# Patient Record
Sex: FEMALE | Race: WHITE | NOT HISPANIC OR LATINO | Employment: FULL TIME | URBAN - METROPOLITAN AREA
[De-identification: names, ages, dates, MRNs, and addresses within clinical notes are randomized per-mention and may not be internally consistent; named-entity substitution may affect disease eponyms.]

---

## 2017-01-16 DIAGNOSIS — F33.1 MAJOR DEPRESSIVE DISORDER, RECURRENT EPISODE, MODERATE (H): ICD-10-CM

## 2017-01-16 DIAGNOSIS — F33.1 MAJOR DEPRESSIVE DISORDER, RECURRENT EPISODE, MODERATE (H): Primary | ICD-10-CM

## 2017-01-17 NOTE — TELEPHONE ENCOUNTER
Fluoxetine     Last Written Prescription Date: 10/04/2016  Last Fill Quantity: 30, # refills: 1  Last Office Visit with [facility] primary care provider:  11/14/2016        Last PHQ-9 score on record=   PHQ-9 SCORE 9/26/2016   Total Score 20       Natasha Daley MA

## 2017-01-17 NOTE — TELEPHONE ENCOUNTER
Routing refill request to provider for review/approval because:  A break in medication  Patient needs to be seen because:  Per last OV note on 10/10/2016 patient was to follow up in 4 weeks  PHQ 9>4  Nuha Delatorre, RN

## 2017-01-18 RX ORDER — FLUOXETINE 40 MG/1
40 CAPSULE ORAL DAILY
Qty: 30 CAPSULE | Refills: 1 | Status: SHIPPED | OUTPATIENT
Start: 2017-01-18 | End: 2017-09-01

## 2017-01-18 RX ORDER — HUMAN INSULIN 100 [USP'U]/ML
INJECTION, SUSPENSION SUBCUTANEOUS
Qty: 45 ML | Refills: 3 | Status: ON HOLD | OUTPATIENT
Start: 2017-01-18 | End: 2017-10-08

## 2017-03-07 ENCOUNTER — TELEPHONE (OUTPATIENT)
Dept: FAMILY MEDICINE | Facility: OTHER | Age: 32
End: 2017-03-07

## 2017-03-07 NOTE — TELEPHONE ENCOUNTER
Summary:    Patient is due/failing the following:   Eye and foot exam, TSH, Microalbumin, A1C, LDL, PAP and PHQ9    Action needed:   Patient needs office visit for PAP and diabetic follow up., Patient needs to do PHQ9. and Patient needs fasting lab only appointment    Type of outreach:    Phone, left message for patient to call back.     Questions for provider review:    None                                                                                                                                    Matilda Saucedo       Chart routed to Care Team .        Panel Management Review      Patient has the following on her problem list:     Depression / Dysthymia review  PHQ-9 SCORE 11/23/2015 9/26/2016   Total Score 16 20      Patient is due for:  PHQ9 and DAP    Diabetes    ASA:     Last A1C  Lab Results   Component Value Date    A1C 11.5 01/29/2016    A1C 11.7 10/06/2015    A1C 11.1 04/01/2015    A1C 11.3 07/24/2014    A1C 11.3 05/30/2014     A1C tested: FAILED    Last LDL:    Lab Results   Component Value Date    CHOL 207 01/29/2016     Lab Results   Component Value Date    HDL 55 01/29/2016     Lab Results   Component Value Date     01/29/2016     03/05/2014     Lab Results   Component Value Date    TRIG 116 01/29/2016     Lab Results   Component Value Date    CHOLHDLRATIO 3.7 04/01/2015     Lab Results   Component Value Date    NHDL 152 01/29/2016       Is the patient on a Statin? NO             Is the patient on Aspirin? NO        Last three blood pressure readings:  BP Readings from Last 3 Encounters:   10/10/16 102/70   10/04/16 110/60   09/28/16 105/74          Tobacco History:     History   Smoking Status     Current Every Day Smoker     Packs/day: 1.00     Years: 13.00     Types: Cigarettes   Smokeless Tobacco     Never Used     Comment: Reports 1/2 PPD - was smoking 1 PPD pre-pregnancy           Composite cancer screening  Chart review shows that this patient is due/due soon for the  following Pap Smear

## 2017-03-10 NOTE — TELEPHONE ENCOUNTER
Patient will call back to schedule when life settles down. Patient reports her daughter had a liver transplant and her health is more important then her own at this time.    Matilda Saucedo

## 2017-05-26 ENCOUNTER — HEALTH MAINTENANCE LETTER (OUTPATIENT)
Age: 32
End: 2017-05-26

## 2017-06-26 ENCOUNTER — HOSPITAL ENCOUNTER (EMERGENCY)
Facility: CLINIC | Age: 32
Discharge: HOME | End: 2017-06-26
Attending: FAMILY MEDICINE | Admitting: FAMILY MEDICINE
Payer: COMMERCIAL

## 2017-06-26 VITALS
HEART RATE: 70 BPM | DIASTOLIC BLOOD PRESSURE: 77 MMHG | OXYGEN SATURATION: 99 % | SYSTOLIC BLOOD PRESSURE: 130 MMHG | RESPIRATION RATE: 20 BRPM | TEMPERATURE: 97.3 F

## 2017-06-26 DIAGNOSIS — Z79.4 ENCOUNTER FOR LONG-TERM (CURRENT) USE OF INSULIN (H): ICD-10-CM

## 2017-06-26 DIAGNOSIS — Z79.4 TYPE 2 DIABETES MELLITUS WITHOUT COMPLICATION, WITH LONG-TERM CURRENT USE OF INSULIN (H): ICD-10-CM

## 2017-06-26 DIAGNOSIS — N92.0 MENORRHAGIA WITH REGULAR CYCLE: ICD-10-CM

## 2017-06-26 DIAGNOSIS — E11.9 TYPE 2 DIABETES MELLITUS WITHOUT COMPLICATION, WITH LONG-TERM CURRENT USE OF INSULIN (H): ICD-10-CM

## 2017-06-26 LAB
ALBUMIN SERPL-MCNC: 4 G/DL (ref 3.4–5)
ALBUMIN UR-MCNC: NEGATIVE MG/DL
ALP SERPL-CCNC: 126 U/L (ref 40–150)
ALT SERPL W P-5'-P-CCNC: 17 U/L (ref 0–50)
ANION GAP SERPL CALCULATED.3IONS-SCNC: 8 MMOL/L (ref 3–14)
APPEARANCE UR: CLEAR
APTT PPP: 30 SEC (ref 22–37)
AST SERPL W P-5'-P-CCNC: 7 U/L (ref 0–45)
B-HCG SERPL-ACNC: <1 IU/L (ref 0–5)
BASOPHILS # BLD AUTO: 0 10E9/L (ref 0–0.2)
BASOPHILS NFR BLD AUTO: 0 %
BILIRUB SERPL-MCNC: 0.1 MG/DL (ref 0.2–1.3)
BILIRUB UR QL STRIP: NEGATIVE
BUN SERPL-MCNC: 10 MG/DL (ref 7–30)
CALCIUM SERPL-MCNC: 8.5 MG/DL (ref 8.5–10.1)
CHLORIDE SERPL-SCNC: 99 MMOL/L (ref 94–109)
CO2 SERPL-SCNC: 30 MMOL/L (ref 20–32)
COLOR UR AUTO: ABNORMAL
CREAT SERPL-MCNC: 0.83 MG/DL (ref 0.52–1.04)
DIFFERENTIAL METHOD BLD: ABNORMAL
EOSINOPHIL # BLD AUTO: 0 10E9/L (ref 0–0.7)
EOSINOPHIL NFR BLD AUTO: 0 %
ERYTHROCYTE [DISTWIDTH] IN BLOOD BY AUTOMATED COUNT: 14.2 % (ref 10–15)
GFR SERPL CREATININE-BSD FRML MDRD: 78 ML/MIN/1.7M2
GLUCOSE SERPL-MCNC: 412 MG/DL (ref 70–99)
GLUCOSE UR STRIP-MCNC: >499 MG/DL
HCT VFR BLD AUTO: 37.2 % (ref 35–47)
HGB BLD-MCNC: 11.9 G/DL (ref 11.7–15.7)
HGB UR QL STRIP: NEGATIVE
IMM GRANULOCYTES # BLD: 0 10E9/L (ref 0–0.4)
IMM GRANULOCYTES NFR BLD: 0.3 %
INR PPP: 0.89 (ref 0.86–1.14)
KETONES UR STRIP-MCNC: NEGATIVE MG/DL
LEUKOCYTE ESTERASE UR QL STRIP: NEGATIVE
LYMPHOCYTES # BLD AUTO: 2.9 10E9/L (ref 0.8–5.3)
LYMPHOCYTES NFR BLD AUTO: 39.8 %
MCH RBC QN AUTO: 25.4 PG (ref 26.5–33)
MCHC RBC AUTO-ENTMCNC: 32 G/DL (ref 31.5–36.5)
MCV RBC AUTO: 80 FL (ref 78–100)
MONOCYTES # BLD AUTO: 0.7 10E9/L (ref 0–1.3)
MONOCYTES NFR BLD AUTO: 9 %
NEUTROPHILS # BLD AUTO: 3.7 10E9/L (ref 1.6–8.3)
NEUTROPHILS NFR BLD AUTO: 50.9 %
NITRATE UR QL: NEGATIVE
PH UR STRIP: 7 PH (ref 5–7)
PLATELET # BLD AUTO: 244 10E9/L (ref 150–450)
POTASSIUM SERPL-SCNC: 3.8 MMOL/L (ref 3.4–5.3)
PROT SERPL-MCNC: 7.7 G/DL (ref 6.8–8.8)
RBC # BLD AUTO: 4.68 10E12/L (ref 3.8–5.2)
RBC #/AREA URNS AUTO: <1 /HPF (ref 0–2)
SODIUM SERPL-SCNC: 137 MMOL/L (ref 133–144)
SP GR UR STRIP: 1.03 (ref 1–1.03)
T4 FREE SERPL-MCNC: 1.02 NG/DL (ref 0.76–1.46)
TSH SERPL DL<=0.005 MIU/L-ACNC: 6.05 MU/L (ref 0.4–4)
URN SPEC COLLECT METH UR: ABNORMAL
UROBILINOGEN UR STRIP-MCNC: 0 MG/DL (ref 0–2)
WBC # BLD AUTO: 7.2 10E9/L (ref 4–11)
WBC #/AREA URNS AUTO: <1 /HPF (ref 0–2)

## 2017-06-26 PROCEDURE — 81001 URINALYSIS AUTO W/SCOPE: CPT | Performed by: FAMILY MEDICINE

## 2017-06-26 PROCEDURE — 84439 ASSAY OF FREE THYROXINE: CPT | Performed by: FAMILY MEDICINE

## 2017-06-26 PROCEDURE — 84702 CHORIONIC GONADOTROPIN TEST: CPT | Performed by: FAMILY MEDICINE

## 2017-06-26 PROCEDURE — 99283 EMERGENCY DEPT VISIT LOW MDM: CPT | Performed by: FAMILY MEDICINE

## 2017-06-26 PROCEDURE — 85025 COMPLETE CBC W/AUTO DIFF WBC: CPT | Performed by: FAMILY MEDICINE

## 2017-06-26 PROCEDURE — 84443 ASSAY THYROID STIM HORMONE: CPT | Performed by: FAMILY MEDICINE

## 2017-06-26 PROCEDURE — 83036 HEMOGLOBIN GLYCOSYLATED A1C: CPT | Performed by: OBSTETRICS & GYNECOLOGY

## 2017-06-26 PROCEDURE — 80053 COMPREHEN METABOLIC PANEL: CPT | Performed by: FAMILY MEDICINE

## 2017-06-26 PROCEDURE — 85610 PROTHROMBIN TIME: CPT | Performed by: FAMILY MEDICINE

## 2017-06-26 PROCEDURE — 99283 EMERGENCY DEPT VISIT LOW MDM: CPT | Mod: Z6 | Performed by: FAMILY MEDICINE

## 2017-06-26 PROCEDURE — 85730 THROMBOPLASTIN TIME PARTIAL: CPT | Performed by: FAMILY MEDICINE

## 2017-06-26 PROCEDURE — 36415 COLL VENOUS BLD VENIPUNCTURE: CPT | Performed by: OBSTETRICS & GYNECOLOGY

## 2017-06-26 NOTE — ED AVS SNAPSHOT
[facility] Emergency Department    [address] [place] [address]    Phone:  [phone]    Fax:  [phone]                                       Vickie Duque   MRN: [id number]    Department:  [facility] Emergency Department   Date of Visit:  6/26/2017           After Visit Summary Signature Page     I have received my discharge instructions, and my questions have been answered. I have discussed any challenges I see with this plan with the nurse or doctor.    ..........................................................................................................................................  Patient/Patient Representative Signature      ..........................................................................................................................................  Patient Representative Print Name and Relationship to Patient    ..................................................               ................................................  Date                                            Time    ..........................................................................................................................................  Reviewed by Signature/Title    ...................................................              ..............................................  Date                                                            Time

## 2017-06-26 NOTE — ED AVS SNAPSHOT
[facility] Emergency Department    [address] [place] [address]    Phone:  [phone]    Fax:  [phone]                                       Vickie Duque   MRN: [id number]    Department:  [facility] Emergency Department   Date of Visit:  6/26/2017           Patient Information     Date Of Birth          1985        Your diagnoses for this visit were:     Menorrhagia with regular cycle        You were seen by Robbie Arnold MD.      Follow-up Information     Follow up with Donnie Velasquez MD On 6/30/2017.    Specialties:  Family Practice, OB/Gyn    Why:  at 11:40 am in [facility]    Contact information:    [facility]  [address] [place] [id number] [phone]          Discharge Instructions       See Dr Velasquez in clinic on Friday, June 30 at 11:40 AM and the [place] clinic.  If your labs are significantly abnormal and require attention, I will contact you, otherwise Dr Velasquez can review them in clinic when you see him.  Please return to the ED if you develop dizziness, lightheadedness, fever over 101, or any concerns.  It was nice visiting with both of you tonight.   I hope you find some relief very soon.    Thank you for choosing [facility]. We appreciate the opportunity to meet your urgent medical needs. Please let us know if we could have done anything to make your stay more satisfying.    After discharge, please closely monitor for any new or worsening symptoms. Return to the Emergency Department if you develop any acute worsening signs or symptoms.    If you had lab work, cultures or imaging studies done during your stay, the final results may still be pending. We will call you if your plan of care needs to change. However, if you are not improving as expected, please follow up with your primary care provider or clinic.     Start any prescription medications that were prescribed to you  "and take them as directed.     Please see additional handouts that may be pertinent to your condition.         Future Appointments        Provider Department Dept Phone Center    6/30/2017 11:40 AM Donnie Velasquez MD [facility] [phone] [facility]      24 Hour Appointment Hotline       To make an appointment at any [facility], call [phone] ([phone]). If you don't have a family doctor or clinic, we will help you find one. [facility] are conveniently located to serve the needs of you and your family.             Review of your medicines      Our records show that you are taking the medicines listed below. If these are incorrect, please call your family doctor or clinic.        Dose / Directions Last dose taken    ACCU-CHEK SMARTVIEW test strip   Quantity:  100 each   Generic drug:  blood glucose monitoring        TEST 4 TIMES A DAY OR AS DI RECTED   Refills:  11        ACE/ARB NOT PRESCRIBED (INTENTIONAL)   Dose:  1 each        1 each every 3 hours ACE & ARB not prescribed due to Intolerance and Symptomatic hypotension not due to excessive diuresis   Refills:  0        acetaminophen 500 MG tablet   Commonly known as:  TYLENOL   Dose:  500 mg   Quantity:  60 tablet        Take 1 tablet (500 mg) by mouth every 6 hours as needed for pain   Refills:  0        B-D INSULIN SYRINGE 31G X 5/16\" 0.5 ML   Quantity:  100 each   Generic drug:  insulin syringe-needle U-100        USE TO INJECT INSULIN   Refills:  6        * blood glucose monitoring meter device kit   Commonly known as:  no brand specified   Quantity:  1 kit        Use to test blood sugar 2 times daily or as directed.   Refills:  0        * blood glucose monitoring meter device kit   Quantity:  1 kit        Use to test blood sugar 4 times daily or as directed.   Refills:  0        DIABETIC STERILE LANCETS device   Dose:  1 Device   Quantity:  1 Box        1 Device 4 times daily.   Refills:  11        " FLUoxetine 40 MG capsule   Commonly known as:  PROzac   Dose:  40 mg   Quantity:  30 capsule        Take 1 capsule (40 mg) by mouth daily   Refills:  1        glucagon 1 MG kit   Commonly known as:  GLUCAGON EMERGENCY   Dose:  1 mg   Quantity:  1 each        Inject 1 mg into the vein once for 1 dose   Refills:  0        LORazepam 1 MG tablet   Commonly known as:  ATIVAN   Dose:  0.5 mg   Quantity:  20 tablet        Take 0.5 tablets (0.5 mg) by mouth every 8 hours as needed for anxiety   Refills:  0        NovoLIN MIX 70/30 VIAL injection   Quantity:  45 mL   Generic drug:  insulin NPH-insulin regular        INJECT 40 UNITS SUBQ BEFORE BREAKFAST AND 30 UNITS BEFORE DINNER   Refills:  3        WOMENS MULTI VITAMIN & MINERAL PO        Refills:  0        * Notice:  This list has 2 medication(s) that are the same as other medications prescribed for you. Read the directions carefully, and ask your doctor or other care provider to review them with you.            Procedures and tests performed during your visit     CBC with platelets differential    Comprehensive metabolic panel    HCG quantitative pregnancy (blood)    INR    Partial thromboplastin time    TSH with free T4 reflex    UA with Microscopic      Orders Needing Specimen Collection     None      Pending Results     Date and Time Order Name Status Description    6/26/2017 0045 HCG quantitative pregnancy (blood) In process     6/26/2017 0037 Comprehensive metabolic panel In process     6/26/2017 0037 Partial thromboplastin time In process     6/26/2017 0037 INR In process     6/26/2017 0037 TSH with free T4 reflex In process             Pending Culture Results     No orders found from 6/24/2017 to 6/27/2017.            Pending Results Instructions     If you had any lab results that were not finalized at the time of your Discharge, you can call the ED Lab Result RN at [phone]. You will be contacted by this team for any positive Lab results or changes in  treatment. The nurses are available 7 days a week from 10A to 6:30P.  You can leave a message 24 hours per day and they will return your call.        Thank you for choosing [facility]       Thank you for choosing [facility] for your care. Our goal is always to provide you with excellent care. Hearing back from our patients is one way we can continue to improve our services. Please take a few minutes to complete the written survey that you may receive in the mail after you visit with us. Thank you!        [employer] Information     [employer] gives you secure access to your electronic health record. If you see a primary care provider, you can also send messages to your care team and make appointments. If you have questions, please call your primary care clinic.  If you do not have a primary care provider, please call [phone] and they will assist you.        Care EveryWhere ID     This is your Care EveryWhere ID. This could be used by other organizations to access your [facility] medical records  [id number]        Equal Access to Services     KRISTIN ONOFRE : Theresa Renteria, cesario jeff, emre burris, kendra luna. So [facility] [phone].    ATENCIÓN: Si habla español, tiene a barrientos disposición servicios gratuitos de asistencia lingüística. Llame al [phone].    We comply with applicable federal civil rights laws and [place] laws. We do not discriminate on the basis of race, color, national origin, age, disability sex, sexual orientation or gender identity.            After Visit Summary       This is your record. Keep this with you and show to your community pharmacist(s) and doctor(s) at your next visit.

## 2017-06-26 NOTE — ED PROVIDER NOTES
History     Chief Complaint   Patient presents with     Vaginal Bleeding     HPI  Vickie Duque is a 31 year old female who presents to the ED this evening with heavy menses.  She has been dealing with this for the past 4 years ever since her tubal ligation in April 2013.  This menses was a couple of days late but has been even heavier than usual.  She is soaking a super pad every hour.  She denies any orthostatic changes.  No fevers.  No vaginal discharge.    In a monogamous relationship.  Partner had a vasectomy, in addition to her tubal ligation, so she really doubts chance of pregnancy.    She had a D&C back in 2010 after miscarriage.    Type I diabetic on insulin for the past 14 years.    Works at the [employer].  Lives in [place].    Past Medical History:   Diagnosis Date     Adjustment disorder with anxious mood 11/23/2015     Anxiety 11/27/2015     Depressive disorder, not elsewhere classified      Diabetic eye exam (H) 12/19/14     Mixed hyperlipidemia 11/30/2006     Regional enteritis of unspecified site     Ulcerative Colitis     Type I (juvenile type) diabetes mellitus with ketoacidosis, not stated as uncontrolled(250.11) (H) 01/01/2007    Moderately severe intensity.     Type I (juvenile type) diabetes mellitus with ketoacidosis, uncontrolled 02/14/08     Type I (juvenile type) diabetes mellitus without mention of complication, not stated as uncontrolled     diagnosed in 2003     Ulcerative colitis, unspecified     remission. Last flare 6 yrs ago.        Past Surgical History:   Procedure Laterality Date     DILATION AND CURETTAGE SUCTION  4/2010    miscarriage     HC COLONOSCOPY W BIOPSY  08/16/06     HC COLONOSCOPY W/WO BRUSH/WASH         Social History     Social History     Marital status: Single     Spouse name: N/A     Number of children: 1     Years of education: 12     Occupational History     Nursing assistant      Social History Main Topics     Smoking status: Current Every Day Smoker      Packs/day: 1.00     Years: 13.00     Types: Cigarettes     Smokeless tobacco: Never Used      Comment: Reports 1/2 PPD - was smoking 1 PPD pre-pregnancy     Alcohol use 0.0 oz/week     0 Standard drinks or equivalent per week      Comment: 1-2 times per month     Drug use: No     Sexual activity: Yes     Partners: Male     Birth control/ protection: Surgical, Female Surgical      Comment: tubal ligation     Other Topics Concern      Service No     Blood Transfusions No     Caffeine Concern Yes     Advised not more than 16 ounces/day     Occupational Exposure Yes     Visiting [employer] - student online classes     Hobby Hazards No     Sleep Concern No     Stress Concern Yes     Weight Concern No     Special Diet Yes     IDDM Type I     Back Care Yes     work-related about 1 year ago     Exercise Yes     Active at work     Bike Helmet No     Seat Belt Yes     Self-Exams No     Social History Narrative    Lives in [place] with Rod gandhi and her son and their daughter.   Vickie and Rod smoke.   No indoor cats/kittens.   No concerns about domestic violence.          Allergies   Allergen Reactions     No Known Drug Allergies        Med List Reviewed       I have reviewed the Medications, Allergies, Past Medical and Surgical History, and Social History in the Epic system.         Review of Systems   All other systems reviewed and are negative.      Physical Exam   BP: 130/77  Pulse: 70  Temp: 97.3  F (36.3  C)  Resp: 20  SpO2: 99 %  Physical Exam   Constitutional: She is oriented to person, place, and time. She appears well-developed and well-nourished. No distress.   HENT:   Head: Normocephalic.   Pulmonary/Chest: Effort normal. No respiratory distress.   Neurological: She is alert and oriented to person, place, and time.   Skin: She is not diaphoretic. No pallor.   Psychiatric: She has a normal mood and affect.     Using shared decision making, we did decided to defer her pelvic exam as she will  need one in clinic anyway.      ED Course    She is in no acute distress.  I suggested that we get her in to seek GYN to consider options.  Since she is in monogamous relationship and has had no vaginal discharge or fevers, we elected to forego pelvic exam and she will need one when she is seen by gynecology.  May also consider an endometrial biopsy in the clinic and they can discuss options.      I offered OCPs or a progesterone withdrawal which would give her a heavy menses when she stops it,  but since she is already several days into her menses this time around, we elected to hold off on that and just get her into see Dr Velasquez clinic.      We will check some basic labs including a CBC, comprehensive profile, TSH with T4 reflex, serum hcg and coags.        Late entry:  Patient's blood sugar came back at 412.  Nursing staff contacted her.  She will follow her scale insulin plan for elevated blood sugar and can check her sugars at home.  Her TSH was up a bit.  Free T4 is pending.  That can be addressed at her clinic visit.       ED Course     Procedures          Results for orders placed or performed during the hospital encounter of 06/26/17 (from the past 24 hour(s))   UA with Microscopic   Result Value Ref Range    Color Urine Straw     Appearance Urine Clear     Glucose Urine >499 (A) NEG mg/dL    Bilirubin Urine Negative NEG    Ketones Urine Negative NEG mg/dL    Specific Gravity Urine 1.028 1.003 - 1.035    Blood Urine Negative NEG    pH Urine 7.0 5.0 - 7.0 pH    Protein Albumin Urine Negative NEG mg/dL    Urobilinogen mg/dL 0.0 0.0 - 2.0 mg/dL    Nitrite Urine Negative NEG    Leukocyte Esterase Urine Negative NEG    Source Midstream Urine     WBC Urine <1 0 - 2 /HPF    RBC Urine <1 0 - 2 /HPF   CBC with platelets differential   Result Value Ref Range    WBC 7.2 4.0 - 11.0 10e9/L    RBC Count 4.68 3.8 - 5.2 10e12/L    Hemoglobin 11.9 11.7 - 15.7 g/dL    Hematocrit 37.2 35.0 - 47.0 %    MCV 80 78 - 100 fl     MCH 25.4 (L) 26.5 - 33.0 pg    MCHC 32.0 31.5 - 36.5 g/dL    RDW 14.2 10.0 - 15.0 %    Platelet Count 244 150 - 450 10e9/L    Diff Method Automated Method     % Neutrophils 50.9 %    % Lymphocytes 39.8 %    % Monocytes 9.0 %    % Eosinophils 0.0 %    % Basophils 0.0 %    % Immature Granulocytes 0.3 %    Absolute Neutrophil 3.7 1.6 - 8.3 10e9/L    Absolute Lymphocytes 2.9 0.8 - 5.3 10e9/L    Absolute Monocytes 0.7 0.0 - 1.3 10e9/L    Absolute Eosinophils 0.0 0.0 - 0.7 10e9/L    Absolute Basophils 0.0 0.0 - 0.2 10e9/L    Abs Immature Granulocytes 0.0 0 - 0.4 10e9/L   TSH with free T4 reflex   Result Value Ref Range    TSH 6.05 (H) 0.40 - 4.00 mU/L   INR   Result Value Ref Range    INR 0.89 0.86 - 1.14   Partial thromboplastin time   Result Value Ref Range    PTT 30 22 - 37 sec   Comprehensive metabolic panel   Result Value Ref Range    Sodium 137 133 - 144 mmol/L    Potassium 3.8 3.4 - 5.3 mmol/L    Chloride 99 94 - 109 mmol/L    Carbon Dioxide 30 20 - 32 mmol/L    Anion Gap 8 3 - 14 mmol/L    Glucose 412 (H) 70 - 99 mg/dL    Urea Nitrogen 10 7 - 30 mg/dL    Creatinine 0.83 0.52 - 1.04 mg/dL    GFR Estimate 78 >60 mL/min/1.7m2    GFR Estimate If Black >90   GFR Calc   >60 mL/min/1.7m2    Calcium 8.5 8.5 - 10.1 mg/dL    Bilirubin Total 0.1 (L) 0.2 - 1.3 mg/dL    Albumin 4.0 3.4 - 5.0 g/dL    Protein Total 7.7 6.8 - 8.8 g/dL    Alkaline Phosphatase 126 40 - 150 U/L    ALT 17 0 - 50 U/L    AST 7 0 - 45 U/L   HCG quantitative pregnancy (blood)   Result Value Ref Range    HCG Quantitative Serum <1 0 - 5 IU/L        Assessments & Plan (with Medical Decision Making)  (N92.0) Menorrhagia with regular cycle  Comment: 4 years duration, since tubal ligation.  Plan: Labs are pending.  GYN consult as outpatient.  We set up an appointment for her with Dr Velasquez this coming Friday, June 30 at 11:40 AM.          I have reviewed the nursing notes.    I have reviewed the findings, diagnosis, plan and need for  follow up with the patient.       New Prescriptions    No medications on file       Final diagnoses:   Menorrhagia with regular cycle       6/26/2017   [facility] EMERGENCY DEPARTMENT     Robbie Arnold MD  06/26/17 0047       Robbie Arnold MD  06/26/17 0138

## 2017-06-26 NOTE — DISCHARGE INSTRUCTIONS
See Dr Velasquez in clinic on Friday, June 30 at 11:40 AM and the [facility].  If your labs are significantly abnormal and require attention, I will contact you, otherwise Dr Velasquez can review them in clinic when you see him.  Please return to the ED if you develop dizziness, lightheadedness, fever over 101, or any concerns.  It was nice visiting with both of you tonight.   I hope you find some relief very soon.    Thank you for choosing [facility]. We appreciate the opportunity to meet your urgent medical needs. Please let us know if we could have done anything to make your stay more satisfying.    After discharge, please closely monitor for any new or worsening symptoms. Return to the Emergency Department if you develop any acute worsening signs or symptoms.    If you had lab work, cultures or imaging studies done during your stay, the final results may still be pending. We will call you if your plan of care needs to change. However, if you are not improving as expected, please follow up with your primary care provider or clinic.     Start any prescription medications that were prescribed to you and take them as directed.     Please see additional handouts that may be pertinent to your condition.

## 2017-06-27 ENCOUNTER — TELEPHONE (OUTPATIENT)
Dept: OBGYN | Facility: CLINIC | Age: 32
End: 2017-06-27

## 2017-06-27 NOTE — TELEPHONE ENCOUNTER
Per RM would like to see patient on Wed (06/28/2017) at 1130 instead of Friday at 1140.  Left message for patient to return call to clinic and confirm if this is ok. Please change appt time if this is doable.  Emma Monk, [facility]

## 2017-06-27 NOTE — TELEPHONE ENCOUNTER
Necoe returns call and message is relayed.  Pt is agreeable to the appt change and is placed on Dr Taylor schedule accordingly.

## 2017-06-28 ENCOUNTER — OFFICE VISIT (OUTPATIENT)
Dept: FAMILY MEDICINE | Facility: CLINIC | Age: 32
End: 2017-06-28
Payer: COMMERCIAL

## 2017-06-28 VITALS
TEMPERATURE: 96.9 F | HEIGHT: 64 IN | WEIGHT: 158 LBS | BODY MASS INDEX: 26.98 KG/M2 | DIASTOLIC BLOOD PRESSURE: 64 MMHG | HEART RATE: 76 BPM | SYSTOLIC BLOOD PRESSURE: 114 MMHG | RESPIRATION RATE: 14 BRPM | OXYGEN SATURATION: 98 %

## 2017-06-28 DIAGNOSIS — N92.1 MENORRHAGIA WITH IRREGULAR CYCLE: Primary | ICD-10-CM

## 2017-06-28 DIAGNOSIS — E10.8 TYPE 1 DIABETES MELLITUS WITH COMPLICATION (H): ICD-10-CM

## 2017-06-28 DIAGNOSIS — Z12.4 SCREENING FOR MALIGNANT NEOPLASM OF CERVIX: ICD-10-CM

## 2017-06-28 LAB — HBA1C MFR BLD: 11.2 % (ref 4.3–6)

## 2017-06-28 PROCEDURE — 88305 TISSUE EXAM BY PATHOLOGIST: CPT | Mod: 26 | Performed by: OBSTETRICS & GYNECOLOGY

## 2017-06-28 PROCEDURE — 88305 TISSUE EXAM BY PATHOLOGIST: CPT | Performed by: OBSTETRICS & GYNECOLOGY

## 2017-06-28 PROCEDURE — 99215 OFFICE O/P EST HI 40 MIN: CPT | Mod: 25 | Performed by: OBSTETRICS & GYNECOLOGY

## 2017-06-28 PROCEDURE — 58100 BIOPSY OF UTERUS LINING: CPT | Performed by: OBSTETRICS & GYNECOLOGY

## 2017-06-28 ASSESSMENT — PAIN SCALES - GENERAL: PAINLEVEL: NO PAIN (0)

## 2017-06-28 NOTE — MR AVS SNAPSHOT
After Visit Summary   6/28/2017    Vickie Duque    MRN: [id number]           Patient Information     Date Of Birth          1985        Visit Information        Provider Department      6/28/2017 11:30 AM Donnie Velasquez MD [facility]        Today's Diagnoses     Menorrhagia with irregular cycle    -  1    Type 1 diabetes mellitus with complication (H)           Follow-ups after your visit        Additional Services     ENDOCRINOLOGY ADULT REFERRAL       Your provider has referred you to: [facility]: [facility] [phone]   [email or web address]      Please be aware that coverage of these services is subject to the terms and limitations of your health insurance plan.  Call member services at your health plan with any benefit or coverage questions.      Please bring the following to your appointment:    >>   Any x-rays, CTs or MRIs which have been performed.  Contact the facility where they were done to arrange for  prior to your scheduled appointment.    >>   List of current medications   >>   This referral request   >>   Any documents/labs given to you for this referral                  Your next 10 appointments already scheduled     Jun 30, 2017  2:45 PM CDT   US PELVIC COMPLETE W TRANSVAGINAL with PHUS1   [facility] ([facility])    [address] [place] [id number] [phone]           Please bring a list of your medicines (including vitamins, minerals and over-the-counter drugs). Also, tell your doctor about any allergies you may have. Wear comfortable clothes and leave your valuables at home.  Adults: Drink six 8-ounce glasses of fluid one hour before your exam. Do NOT empty your bladder.  If you need to empty your bladder before your exam, try to release only a little bit of urine. Then, drink another 8oz glass of fluid.   Children: Children who are potty trained should drink at least 4 cups (32 oz) of liquid 45 minutes to one hour prior to the exam. The child s bladder must be full in order to achieve a diagnostic exam. If your child is very uncomfortable or has an urgent need to pee, please notify a technologist; they will try to find out how much longer the wait may be and provide instructions to help relieve the pressure. Occasionally it is medically necessary to insert a urinary catheter to fill the bladder.  Please call the Imaging Department at your exam site with any questions.            Jul 05, 2017  9:50 AM CDT   [facility]    [address] [address] [phone]           Bring a current list of meds and any records pertaining to this visit.  For Physicals, please bring immunization records and any forms needing to be filled out.  Please arrive 10 minutes early to complete paperwork.              Future tests that were ordered for you today     Open Future Orders        Priority Expected Expires Ordered    US Pelvic Complete w Transvaginal Routine  6/28/2018 6/28/2017            Who to contact     If you have questions or need follow up information about today's clinic visit or your schedule please contact [facility] directly at [phone].  Normal or non-critical lab and imaging results will be communicated to you by MyChart, letter or phone within 4 business days after the clinic has received the results. If you do not hear from us within 7 days, please contact the clinic through MyChart or phone. If you have a critical or abnormal lab result, we will notify you by phone as soon as possible.  Submit refill requests through [employer] or call your pharmacy and they will forward the refill request to us. Please allow 3 business days for your refill to be completed.          Additional  "Information About Your Visit        MyChart Information     [employer] gives you secure access to your electronic health record. If you see a primary care provider, you can also send messages to your care team and make appointments. If you have questions, please call your primary care clinic.  If you do not have a primary care provider, please call [phone] and they will assist you.        Care EveryWhere ID     This is your Care EveryWhere ID. This could be used by other organizations to access your [facility] medical records  [id number]        Your Vitals Were     Pulse Temperature Respirations Height Last Period Pulse Oximetry    76 96.9  F (36.1  C) (Tympanic) 14 5' 4\" (1.626 m) 06/22/2017 98%    Breastfeeding? BMI (Body Mass Index)                No 27.12 kg/m2           Blood Pressure from Last 3 Encounters:   06/28/17 114/64   06/26/17 130/77   10/10/16 102/70    Weight from Last 3 Encounters:   06/28/17 158 lb (71.7 kg)   10/10/16 150 lb 6.4 oz (68.2 kg)   10/04/16 152 lb 6.4 oz (69.1 kg)              We Performed the Following     ENDOCRINOLOGY ADULT REFERRAL     ENDOMETRIAL BIOPSY W/O CERVICAL DILATION     Hemoglobin A1c        Primary Care Provider    Physician No Ref-Primary       No address on file        Equal Access to Services     KRISTIN ONOFRE : Hadii aad ku hadasho Soomaali, waaxda luqadaha, qaybta kaalmada adeegyada, waxay yonyin bora gutierrez . So [facility] [phone].    ATENCIÓN: Si habla español, tiene a barrientos disposición servicios gratuitos de asistencia lingüística. Llame al [phone].    We comply with applicable federal civil rights laws and [place] laws. We do not discriminate on the basis of race, color, national origin, age, disability sex, sexual orientation or gender identity.            Thank you!     Thank you for choosing [facility]  for your care. Our goal is always to provide you with excellent care. Hearing back from our patients is one way we can " "continue to improve our services. Please take a few minutes to complete the written survey that you may receive in the mail after your visit with us. Thank you!             Your Updated Medication List - Protect others around you: Learn how to safely use, store and throw away your medicines at www.disposemymeds.org.          This list is accurate as of: 6/28/17 11:58 AM.  Always use your most recent med list.                   Brand Name Dispense Instructions for use Diagnosis    ACCU-CHEK SMARTVIEW test strip   Generic drug:  blood glucose monitoring     100 each    TEST 4 TIMES A DAY OR AS DI RECTED    Uncontrolled type 1 diabetes mellitus (H)       ACE/ARB NOT PRESCRIBED (INTENTIONAL)      1 each every 3 hours ACE & ARB not prescribed due to Intolerance and Symptomatic hypotension not due to excessive diuresis    Type 1 diabetes, HbA1c goal < 7% (H)       acetaminophen 500 MG tablet    TYLENOL    60 tablet    Take 1 tablet (500 mg) by mouth every 6 hours as needed for pain    Headache(784.0)       B-D INSULIN SYRINGE 31G X 5/16\" 0.5 ML   Generic drug:  insulin syringe-needle U-100     100 each    USE TO INJECT INSULIN    Diabetes mellitus type I (H)       * blood glucose monitoring meter device kit    no brand specified    1 kit    Use to test blood sugar 2 times daily or as directed.    Type I (juvenile type) diabetes mellitus without mention of complication, uncontrolled       * blood glucose monitoring meter device kit     1 kit    Use to test blood sugar 4 times daily or as directed.    Type 1 diabetes, HbA1c goal < 7% (H)       DIABETIC STERILE LANCETS device     1 Box    1 Device 4 times daily.    Type I (juvenile type) diabetes mellitus without mention of complication, not stated as uncontrolled       FLUoxetine 40 MG capsule    PROzac    30 capsule    Take 1 capsule (40 mg) by mouth daily    Major depressive disorder, recurrent episode, moderate (H)       NovoLIN MIX 70/30 VIAL injection   Generic drug:  " insulin NPH-insulin regular     45 mL    INJECT 40 UNITS SUBQ BEFORE BREAKFAST AND 30 UNITS BEFORE DINNER    Uncontrolled type 1 diabetes mellitus with complication (H)       WOMENS MULTI VITAMIN & MINERAL PO           * Notice:  This list has 2 medication(s) that are the same as other medications prescribed for you. Read the directions carefully, and ask your doctor or other care provider to review them with you.

## 2017-06-28 NOTE — NURSING NOTE
"Chief Complaint   Patient presents with     Consult     ER follow up menorrhagia       Initial /64 (BP Location: Right arm, Patient Position: Chair, Cuff Size: Adult Regular)  Pulse 76  Temp 96.9  F (36.1  C) (Tympanic)  Resp 14  Ht 5' 4\" (1.626 m)  Wt 158 lb (71.7 kg)  LMP 06/22/2017  SpO2 98%  Breastfeeding? No  BMI 27.12 kg/m2 Estimated body mass index is 27.12 kg/(m^2) as calculated from the following:    Height as of this encounter: 5' 4\" (1.626 m).    Weight as of this encounter: 158 lb (71.7 kg)..   BP completed using cuff size: regular  Medication Rec Completed    Emma Monk CMA    "

## 2017-06-28 NOTE — LETTER
July 13, 2017    Vickie Duque  [address] [facility] [address]    Dear Vickie,  We are happy to inform you that your PAP smear result from 6/28/17 is normal.  We are now able to do a follow up test on PAP smears. The DNA test is for HPV (Human Papilloma Virus). Cervical cancer is closely linked with certain types of HPV. Your result showed no evidence of high risk HPV.  Therefore we recommend you return in 3 years for your next pap smear and HPV test.  You will still need to return to the clinic every year for an annual exam and other preventive tests.  Please contact the clinic at [phone] with any questions.  Sincerely,    Donnie Velasquez MD/anshul

## 2017-06-28 NOTE — PROGRESS NOTES
SUBJECTIVE:                                                      Referral from Dr Arnold    Reason for consultation:  menorrhagia    History:  Vickie  Is a 31 year old female  4 para 3013( SVDs ) (1 miscarriage) who presents today because of menorrhagia for the past 4 years since her tubal ligation after her last delivery.  She doesn't want to use birth control pills due to her smoking history and diabetes and also wants to avoid other hormonal therapy such as Mirena IUD.  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     DKA (diabetic ketoacidoses) (H)     Advanced directives, counseling/discussion     DVT prophylaxis     Tobacco abuse     SIRS (systemic inflammatory response syndrome) (H)     Uncontrolled type 1 diabetes mellitus (H)     Viral URI with cough     Noncompliance with medication regimen     Anxiety     Ulcerative colitis without complications, unspecified ulcerative colitis     Major depressive disorder, recurrent episode, moderate (H)     Past Surgical History:   Procedure Laterality Date     DILATION AND CURETTAGE SUCTION  2010    miscarriage     HC COLONOSCOPY W BIOPSY  06     HC COLONOSCOPY W/WO BRUSH/WASH         Social History   Substance Use Topics     Smoking status: Current Every Day Smoker     Packs/day: 1.00     Years: 13.00     Types: Cigarettes     Smokeless tobacco: Never Used      Comment: Reports 1/2 PPD - was smoking 1 PPD pre-pregnancy     Alcohol use 0.0 oz/week     0 Standard drinks or equivalent per week      Comment: 1-2 times per month     Family History   Problem Relation Age of Onset     Hypertension Father      DIABETES Maternal Grandmother      CANCER Maternal Grandmother      DIABETES Paternal Grandfather      DIABETES  "Maternal Uncle      DIABETES Maternal Aunt          Current Outpatient Prescriptions   Medication Sig Dispense Refill     NOVOLIN MIX 70/30 VIAL (70-30) 100 UNIT/ML susp INJECT 40 UNITS SUBQ BEFORE BREAKFAST AND 30 UNITS BEFORE DINNER 45 mL 3     FLUoxetine (PROZAC) 40 MG capsule Take 1 capsule (40 mg) by mouth daily 30 capsule 1     B-D INSULIN SYRINGE 31G X 5/16\" 0.5 ML USE TO INJECT INSULIN 100 each 6     ACCU-CHEK SMARTVIEW test strip TEST 4 TIMES A DAY OR AS DI RECTED 100 each 11     blood glucose monitoring (ACCU-CHEK TRAA SMARTVIEW) meter device kit Use to test blood sugar 4 times daily or as directed. 1 kit 0     blood glucose meter (NO BRAND SPECIFIED) meter device kit Use to test blood sugar 2 times daily or as directed. 1 kit 0     ACE/ARB NOT PRESCRIBED, INTENTIONAL, 1 each every 3 hours ACE & ARB not prescribed due to Intolerance and Symptomatic hypotension not due to excessive diuresis       acetaminophen (TYLENOL) 500 MG tablet Take 1 tablet (500 mg) by mouth every 6 hours as needed for pain 60 tablet 0     DIABETIC STERILE LANCETS device 1 Device 4 times daily. 1 Box 11     Multiple Vitamins-Minerals (WOMENS MULTI VITAMIN & MINERAL PO)          ROS:  A 12 point systems review is negative except for what is listed above in the Subjective history.            OBJECTIVE:                                                    Vital signs: Blood pressure 114/64, pulse 76, temperature 96.9  F (36.1  C), temperature source Tympanic, resp. rate 14, height 5' 4\" (1.626 m), weight 158 lb (71.7 kg), last menstrual period 06/22/2017, SpO2 98 %, not currently breastfeeding.        HEENT is normal.      Neck is supple, mobile, no adenoapthy or masses palpable. Normal range of motion noted.     Chest is clear to auscultation. No wheezes, rales or rhonchi heard.  cardiac exam is normal with s1, s2, no murmurs or adventitious sounds.Normal rate and rhythm is heard.     Abdomen is soft,  nondistended, No masses felt.No " HSM. No guarding or rigidity or rebound noted. Palpation reveals  no    tenderness   Normal bowel sounds heard.     Pelvic exam:My nurse Emma   was present to chaperone the exam.    The external genitalia appeared normal.      The vaginal vault was without bleeding  or   discharge or odor.      The cervix was smooth and shiny and normal in appearance.      A pap was obtained.      No vaginal support defects were noted,      Bimanual exam revealed a 6 week   sized uterus. It does descend  Somewhat  well in the vaginal vault.      No adnexal masses were felt.      There was no  cervical motion tenderness.      Exam was NOT   limited by the patient's body habitus.        With my nurse present, and after receiving informed consent from the patient, I performed the endometrial biopsy in the usual fashion using a standard pipelle. The pipelle sounded to  6 cm. I sent the biopsy for pathology. She tolerated the procedure well. She was instructed to call or present to clinic or ER if she has unusual amount of cramping, bleeding, fever or vaginal discharge.                 ASSESSMENT/PLAN:                                                        1.31 year old female  with menorrhagia. Differential diagnosis would include:  A. thyroid dysfunction(hypothroid or hyperthroid)- recent TSH normal  B. uterine leiomyomata(fibroids)- -will check pelvic US    C. endometrial hyperplasia -  await the endometrial biopsy results    D. anovulatory cycles, such as from PCO, stress, weight gain or loss, aging, etc  E.  With the menorrhagia, one must rule out anemia.- recent hgb normal        2. A total of 45 minutes were spent face-to-face with this patient during today's consultation, with more than 50% of that time devoted to conversation and counseling about the management decisions.  We discussed options for treatment- i think that ablation makes the most sense because she declines hromone therapy.  Estrogen is relatively  contraindicated due to her uncontrolled diabetes and smoking history.  Booklet given    She will review it and rechekc with me in 1 week.      3. uncotrolled diabetes- i have advised her to see endocrinology . Dr Escoto has been managing her diabetes and he advised her to do this so i reiterated that today. We cannot do surgery until her A1C is reasonably back in line- will check today.    4. rechekc 1 week.                                                                        Thank you for this consultation.    Copy to  Dr Paulie MD  [facility]

## 2017-06-29 ENCOUNTER — TELEPHONE (OUTPATIENT)
Dept: OBGYN | Facility: CLINIC | Age: 32
End: 2017-06-29

## 2017-06-29 DIAGNOSIS — E10.65 TYPE 1 DIABETES MELLITUS WITH HYPERGLYCEMIA (H): Primary | ICD-10-CM

## 2017-06-29 NOTE — TELEPHONE ENCOUNTER
Left message for patient to return call to clinic.  Please inform patient of results. Referral placed for diabetic ed. Remind patient to see Endocrinology but to see diabetic ed in the mean time  Emma Monk CMA

## 2017-06-29 NOTE — PROGRESS NOTES
Emma Please inform Necoe/ or caretaker  that this result(s) is/are showing that her diabetic control is not good- please make sure she keeps her referral to endocrinologist and set up appt with Verenice from diabetic ed in the meantime to help her.Thanks. CARMELA Velasquez MD

## 2017-06-29 NOTE — TELEPHONE ENCOUNTER
----- Message from Donnie Velasquez MD sent at 6/29/2017 10:07 AM CDT -----  Emma Please inform Necoe/ or caretaker  that this result(s) is/are showing that her diabetic control is not good- please make sure she keeps her referral to endocrinologist and set up appt with Verenice from diabetic ed in the meantime to help her.Thanks. CARMELA Velasquez MD

## 2017-06-30 ENCOUNTER — HOSPITAL ENCOUNTER (OUTPATIENT)
Dept: ULTRASOUND IMAGING | Facility: CLINIC | Age: 32
Discharge: HOME | End: 2017-06-30
Attending: OBSTETRICS & GYNECOLOGY | Admitting: OBSTETRICS & GYNECOLOGY
Payer: COMMERCIAL

## 2017-06-30 ENCOUNTER — TELEPHONE (OUTPATIENT)
Dept: EDUCATION SERVICES | Facility: CLINIC | Age: 32
End: 2017-06-30

## 2017-06-30 DIAGNOSIS — N92.1 MENORRHAGIA WITH IRREGULAR CYCLE: ICD-10-CM

## 2017-06-30 PROCEDURE — 76830 TRANSVAGINAL US NON-OB: CPT

## 2017-06-30 NOTE — TELEPHONE ENCOUNTER
Patient returned call, unable to reach team, message per Dr Velasquez was relayed to patient, patient states no further questions & said she has the number she needs to schedule her appt.  Thank you,  Aviva Mcdowell  Patient Representative

## 2017-06-30 NOTE — TELEPHONE ENCOUNTER
Diabetes Education Scheduling Outreach #1:    Call to patient to schedule. Patient declined to schedule. She states she will call us back at another time.      Mara Mosley  [place] [facility]  Diabetes and Nutrition Scheduling

## 2017-06-30 NOTE — TELEPHONE ENCOUNTER
Hi Dr. Velasquez,     Just an FYI, Vickie declined to schedule right now with Diabetes Education.      Thank you for the referral!   Viri Escalera RD, LD   Diabetes Education

## 2017-07-01 LAB — COPATH REPORT: NORMAL

## 2017-07-04 NOTE — PROGRESS NOTES
Emma Please inform Necoe/ or caretaker  that her results show that she has a normal biopsy of the lining in her uterus, but the ultrasound shows either blood clot or else a small polyp, which is a growth in the uterus- it may be the cause of her bleeding. I would advise her to have the ablation we discussed at our last visit, but first she needs to get her diabetes under control- please ask her to see diabetic ed and also the endocrinologist as she promised to do, and then come back to see me.Thanks. CARMELA Velasquez MD

## 2017-07-05 ENCOUNTER — TELEPHONE (OUTPATIENT)
Dept: OBGYN | Facility: CLINIC | Age: 32
End: 2017-07-05

## 2017-07-05 LAB
COPATH REPORT: NORMAL
PAP: NORMAL

## 2017-07-05 NOTE — TELEPHONE ENCOUNTER
Spoke to patient, she is frustrated and stated she is working 12 hour shifts and has not been able to schedule with endocrinology due to that. Revisited the topic of diabetic ed and patient states she doesn't want someone to tell her how to eat. I explained to patient that our diabetic educators are trained to assist her in multiple different thing including insulin management, diet, etc. Patient said her diabetes will always be out of control and hung up the phone.  Emma Monk, CMA

## 2017-07-05 NOTE — TELEPHONE ENCOUNTER
----- Message from Donnie Velasquez MD sent at 7/4/2017  4:04 PM CDT -----  Emma Please inform Necoe/ or caretaker  that her results show that she has a normal biopsy of the lining in her uterus, but the ultrasound shows either blood clot or else a small polyp, which is a growth in the uterus- it may be the cause of her bleeding. I would advise her to have the ablation we discussed at our last visit, but first she needs to get her diabetes under control- please ask her to see diabetic ed and also the endocrinologist as she promised to do, and then come back to see me.Thanks. CARMELA Velasquez MD

## 2017-07-07 LAB
FINAL DIAGNOSIS: NORMAL
HPV HR 12 DNA CVX QL NAA+PROBE: NEGATIVE
HPV16 DNA SPEC QL NAA+PROBE: NEGATIVE
HPV18 DNA SPEC QL NAA+PROBE: NEGATIVE
SPECIMEN DESCRIPTION: NORMAL

## 2017-09-01 ENCOUNTER — HOSPITAL ENCOUNTER (EMERGENCY)
Facility: CLINIC | Age: 32
Discharge: HOME | End: 2017-09-01
Attending: FAMILY MEDICINE | Admitting: FAMILY MEDICINE
Payer: COMMERCIAL

## 2017-09-01 VITALS
TEMPERATURE: 96.9 F | SYSTOLIC BLOOD PRESSURE: 112 MMHG | RESPIRATION RATE: 12 BRPM | WEIGHT: 160 LBS | HEIGHT: 65 IN | HEART RATE: 85 BPM | BODY MASS INDEX: 26.66 KG/M2 | DIASTOLIC BLOOD PRESSURE: 79 MMHG

## 2017-09-01 DIAGNOSIS — M77.42 METATARSALGIA OF BOTH FEET: ICD-10-CM

## 2017-09-01 DIAGNOSIS — M77.41 METATARSALGIA OF BOTH FEET: ICD-10-CM

## 2017-09-01 PROCEDURE — 99284 EMERGENCY DEPT VISIT MOD MDM: CPT | Mod: Z6 | Performed by: FAMILY MEDICINE

## 2017-09-01 PROCEDURE — 99282 EMERGENCY DEPT VISIT SF MDM: CPT | Performed by: FAMILY MEDICINE

## 2017-09-01 RX ORDER — METHYLPREDNISOLONE 4 MG
TABLET, DOSE PACK ORAL
Qty: 21 TABLET | Refills: 0 | Status: SHIPPED | OUTPATIENT
Start: 2017-09-01 | End: 2017-09-18

## 2017-09-01 NOTE — ED NOTES
Pt with one month of pain in the bottom of both of her feet off the base of the second toe. 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 Pt here because it is not improving.

## 2017-09-01 NOTE — ED PROVIDER NOTES
"  History     Chief Complaint   Patient presents with     Foot Pain     bilateral     HPI  Vickie Duque is a 31 year old female who presents with one month of foot pain.  Patient works on her feet mostly time during the day.  She states she is try different shoes and the ones with more gel bottom seems to help better.  She denies any specific injury.  She has not tried any ice or massage to her feet.  She is not seen anyone for this.  She states the pain seems to be worst Andrade at night and it is not bad 1st thing in the morning.    I have reviewed the Medications, Allergies, Past Medical and Surgical History, and Social History in the Epic system.        Review of Systems   All other systems reviewed and are negative.      Physical Exam   BP: 112/79  Pulse: 85  Temp: 96.9  F (36.1  C)  Resp: 12  Height: 165.1 cm (5' 5\")  Weight: 72.6 kg (160 lb)  Physical Exam   Constitutional: She appears well-developed and well-nourished. No distress.   HENT:   Head: Normocephalic and atraumatic.   Mouth/Throat: Oropharynx is clear and moist.   Musculoskeletal:        Right foot: There is tenderness and bony tenderness. There is normal range of motion, no swelling, normal capillary refill and no crepitus.        Left foot: There is bony tenderness. There is normal range of motion, no tenderness, no swelling, normal capillary refill, no crepitus and no deformity.        Feet:    Skin: She is not diaphoretic.   Nursing note and vitals reviewed.      ED Course     ED Course     Procedures         exam seems consistent with the possible metatarsalgia.  Recommend patient to  a metatarsal pad where they can pick this up at a sports store.  Patient was told to follow-up with podiatry she's not better in one week.  I'm also going to try to patient on Medrol Dosepak to see if this helps.  She was given some limitations at work to be off of her feet every 15 minutes every hour.    Assessments & Plan (with Medical Decision " Making)  metatarsalgia      I have reviewed the nursing notes.    I have reviewed the findings, diagnosis, plan and need for follow up with the patient.      New Prescriptions    METHYLPREDNISOLONE (MEDROL DOSEPAK) 4 MG TABLET    Follow package instructions       Final diagnoses:   Metatarsalgia of both feet       9/1/2017   [facility] EMERGENCY DEPARTMENT     Brian Gonzales MD  09/01/17 0036

## 2017-09-01 NOTE — LETTER
[facility] EMERGENCY DEPARTMENT  [address] [place] [address]  [phone]      2017    Vickie Duque  [address] [facility] [address]  [phone] (home)     : 1985      To Whom it may concern:    Vickie Duque was seen in our Emergency Department today, 2017 for an injury.        After returning to work the following restrictions apply for 5 days:   needs to be able to get off of her feet for 15 minutes every hour while working.      Sincerely,    Brian Gonzales MD

## 2017-09-01 NOTE — ED AVS SNAPSHOT
[facility] Emergency Department    [address] [place] [address]    Phone:  [phone]    Fax:  [phone]                                       Vickie Duque   MRN: [id number]    Department:  [facility] Emergency Department   Date of Visit:  9/1/2017           After Visit Summary Signature Page     I have received my discharge instructions, and my questions have been answered. I have discussed any challenges I see with this plan with the nurse or doctor.    ..........................................................................................................................................  Patient/Patient Representative Signature      ..........................................................................................................................................  Patient Representative Print Name and Relationship to Patient    ..................................................               ................................................  Date                                            Time    ..........................................................................................................................................  Reviewed by Signature/Title    ...................................................              ..............................................  Date                                                            Time

## 2017-09-01 NOTE — LETTER
[facility] EMERGENCY DEPARTMENT  [address] [place] [address]  [phone]      2017    Vickie Duque  [address] [facility] [address]  [phone] (home)     : 1985      To Whom it may concern:    Vickie Duque was seen in our Emergency Department today, 2017 for an injury.        The employee might be taking medication so that they cannot operate moving machinery or perform activities that require balancing or working above ground.    After returning to work the following restrictions apply for 5 days:   needs to be able to get off of her feet for 15 minutes every hour while working.      Sincerely,    Brian Gonzales MD

## 2017-09-01 NOTE — ED AVS SNAPSHOT
[facility] Emergency Department    [address] [place] [address]    Phone:  [phone]    Fax:  [phone]                                       Vickie Duque   MRN: [id number]    Department:  [facility] Emergency Department   Date of Visit:  9/1/2017           Patient Information     Date Of Birth          1985        Your diagnoses for this visit were:     Metatarsalgia of both feet        You were seen by Brian Gonzales MD.      Follow-up Information     Follow up with Pacheco Stephenson DPM. Schedule an appointment as soon as possible for a visit in 1 week.    Specialty:  Podiatry    Why:  If not improving.    Contact information:    [address] [place] [address] [phone]        Discharge References/Attachments     METATARSALGIA, WHAT IS (ENGLISH)    METATARSALGIA, TREATING (ENGLISH)      24 Hour Appointment Hotline       To make an appointment at any [place] clinic, call [phone]). If you don't have a family doctor or clinic, we will help you find one. [place] clinics are conveniently located to serve the needs of you and your family.             Review of your medicines      START taking        Dose / Directions Last dose taken    methylPREDNISolone 4 MG tablet   Commonly known as:  MEDROL DOSEPAK   Quantity:  21 tablet        Follow package instructions   Refills:  0          Our records show that you are taking the medicines listed below. If these are incorrect, please call your family doctor or clinic.        Dose / Directions Last dose taken    ACCU-CHEK SMARTVIEW test strip   Quantity:  100 each   Generic drug:  blood glucose monitoring        TEST 4 TIMES A DAY OR AS DI RECTED   Refills:  11        ACE/ARB NOT PRESCRIBED (INTENTIONAL)   Dose:  1 each        1 each every 3 hours ACE & ARB not prescribed due to Intolerance and Symptomatic hypotension not due to excessive diuresis   Refills:  0        acetaminophen 500 MG  "tablet   Commonly known as:  TYLENOL   Dose:  500 mg   Quantity:  60 tablet        Take 1 tablet (500 mg) by mouth every 6 hours as needed for pain   Refills:  0        B-D INSULIN SYRINGE 31G X 5/16\" 0.5 ML   Quantity:  100 each   Generic drug:  insulin syringe-needle U-100        USE TO INJECT INSULIN   Refills:  6        * blood glucose monitoring meter device kit   Commonly known as:  no brand specified   Quantity:  1 kit        Use to test blood sugar 2 times daily or as directed.   Refills:  0        * blood glucose monitoring meter device kit   Quantity:  1 kit        Use to test blood sugar 4 times daily or as directed.   Refills:  0        DIABETIC STERILE LANCETS device   Dose:  1 Device   Quantity:  1 Box        1 Device 4 times daily.   Refills:  11        NovoLIN MIX 70/30 VIAL injection   Quantity:  45 mL   Generic drug:  insulin NPH-insulin regular        INJECT 40 UNITS SUBQ BEFORE BREAKFAST AND 30 UNITS BEFORE DINNER   Refills:  3        WOMENS MULTI VITAMIN & MINERAL PO        Refills:  0        * Notice:  This list has 2 medication(s) that are the same as other medications prescribed for you. Read the directions carefully, and ask your doctor or other care provider to review them with you.            Prescriptions were sent or printed at these locations (1 Prescription)                   [facility]   [address] [address]    Telephone:  [phone]   Fax:  [phone]   Hours:                  These medications are not ready yet because we are checking if your insurance will help you pay for them. Call your pharmacy to confirm that your medication is ready for pickup. It may take up to 24 hours for them to receive the prescription. If the prescription is not ready within 3 business days, please contact your clinic or your provider (1 of 1)         methylPREDNISolone (MEDROL DOSEPAK) 4 MG tablet                Orders Needing Specimen Collection     " None      Pending Results     No orders found from 8/30/2017 to 9/2/2017.            Pending Culture Results     No orders found from 8/30/2017 to 9/2/2017.            Pending Results Instructions     If you had any lab results that were not finalized at the time of your Discharge, you can call the ED Lab Result RN at [phone]. You will be contacted by this team for any positive Lab results or changes in treatment. The nurses are available 7 days a week from 10A to 6:30P.  You can leave a message 24 hours per day and they will return your call.        Thank you for choosing [facility]       Thank you for choosing [facility] for your care. Our goal is always to provide you with excellent care. Hearing back from our patients is one way we can continue to improve our services. Please take a few minutes to complete the written survey that you may receive in the mail after you visit with us. Thank you!        [facility] Information     [facility] gives you secure access to your electronic health record. If you see a primary care provider, you can also send messages to your care team and make appointments. If you have questions, please call your primary care clinic.  If you do not have a primary care provider, please call [phone] and they will assist you.        Care EveryWhere ID     This is your Care EveryWhere ID. This could be used by other organizations to access your [facility] medical records  [id number]        Equal Access to Services     KRISTIN ONOFRE : Hadii sri Renteria, waaxda luqadaha, qaybta kaalmada fatuma, kendra luna. So [facility] [phone].    ATENCIÓN: Si habla español, tiene a barrientos disposición servicios gratuitos de asistencia lingüística. Llame al [phone].    We comply with applicable federal civil rights laws and [place] laws. We do not discriminate on the basis of race, color, national origin, age, disability sex, sexual orientation or gender  identity.            After Visit Summary       This is your record. Keep this with you and show to your community pharmacist(s) and doctor(s) at your next visit.

## 2017-09-14 ENCOUNTER — TELEPHONE (OUTPATIENT)
Dept: FAMILY MEDICINE | Facility: OTHER | Age: 32
End: 2017-09-14

## 2017-09-14 NOTE — TELEPHONE ENCOUNTER
Summary:    Patient is due/failing the following:   Eye and foot exam, Microalbumin, A1C, FOLLOW UP, LDL and PHQ9    Action needed:   Patient needs office visit for diabetic follow up., Patient needs to do PHQ9. and Patient needs fasting lab only appointment    Type of outreach:    Phone, left message for patient to call back.     Questions for provider review:    None                                                                                                                                    Matilda Saucedo       Chart routed to Care Team .        Panel Management Review      Patient has the following on her problem list:     Depression / Dysthymia review  PHQ-9 SCORE 11/23/2015 9/26/2016   Total Score 16 20      Patient is due for:  PHQ9    Diabetes    ASA:     Last A1C  Lab Results   Component Value Date    A1C 11.2 06/26/2017    A1C 11.5 01/29/2016    A1C 11.7 10/06/2015    A1C 11.1 04/01/2015    A1C 11.3 07/24/2014     A1C tested: FAILED    Last LDL:    Lab Results   Component Value Date    CHOL 207 01/29/2016     Lab Results   Component Value Date    HDL 55 01/29/2016     Lab Results   Component Value Date     01/29/2016     Lab Results   Component Value Date    TRIG 116 01/29/2016     Lab Results   Component Value Date    CHOLHDLRATIO 3.7 04/01/2015     Lab Results   Component Value Date    NHDL 152 01/29/2016       Is the patient on a Statin? NO             Is the patient on Aspirin? NO        Last three blood pressure readings:  BP Readings from Last 3 Encounters:   09/01/17 112/79   06/28/17 114/64   06/26/17 130/77        Tobacco History:     History   Smoking Status     Current Every Day Smoker     Packs/day: 1.00     Years: 13.00     Types: Cigarettes   Smokeless Tobacco     Never Used               Composite cancer screening  Chart review shows that this patient is due/due soon for the following None

## 2017-09-14 NOTE — LETTER
[address]   [facility] [address]  Phone: [phone]  September 27, 2017      Vickie Duque  [facility] [address] [facility] [address]      Dear Vickie,    We care about your health and have reviewed your health plan including your medical conditions, medications, and lab results.  Based on this review, it is recommended that you follow up regarding the following health topic(s):  -Diabetes    We recommend you take the following action(s):  -schedule a FOLLOWUP OFFICE APPOINTMENT.  We will perform the following labs:  A1c, Lipids (fasting cholesterol - nothing to eat except water and/or meds for 8-10 hours) and Microablumin.     Please call us at the [facility] - [phone] (or use [employer]) to address the above recommendations.     Thank you for trusting [facility] and we appreciate the opportunity to serve you.  We look forward to supporting your healthcare needs in the future.    Healthy Regards,    Your Health Care Team  [facility]

## 2017-09-18 ENCOUNTER — OFFICE VISIT (OUTPATIENT)
Dept: PODIATRY | Facility: CLINIC | Age: 32
End: 2017-09-18
Payer: COMMERCIAL

## 2017-09-18 ENCOUNTER — RADIANT APPOINTMENT (OUTPATIENT)
Dept: GENERAL RADIOLOGY | Facility: CLINIC | Age: 32
End: 2017-09-18
Attending: PODIATRIST
Payer: COMMERCIAL

## 2017-09-18 VITALS — WEIGHT: 160 LBS | HEIGHT: 64 IN | TEMPERATURE: 97 F | BODY MASS INDEX: 27.31 KG/M2

## 2017-09-18 DIAGNOSIS — M77.42 METATARSALGIA OF BOTH FEET: Primary | ICD-10-CM

## 2017-09-18 DIAGNOSIS — M77.41 METATARSALGIA OF BOTH FEET: ICD-10-CM

## 2017-09-18 DIAGNOSIS — M77.8 CAPSULITIS OF FOOT, LEFT: ICD-10-CM

## 2017-09-18 DIAGNOSIS — M77.42 METATARSALGIA OF BOTH FEET: ICD-10-CM

## 2017-09-18 DIAGNOSIS — M77.41 METATARSALGIA OF BOTH FEET: Primary | ICD-10-CM

## 2017-09-18 PROCEDURE — 73630 X-RAY EXAM OF FOOT: CPT | Mod: TC

## 2017-09-18 PROCEDURE — 99204 OFFICE O/P NEW MOD 45 MIN: CPT | Performed by: PODIATRIST

## 2017-09-18 ASSESSMENT — PAIN SCALES - GENERAL: PAINLEVEL: MILD PAIN (3)

## 2017-09-18 NOTE — NURSING NOTE
"Chief Complaint   Patient presents with     Consult     B/L ball ft pain > Left, onset late Aug 2017; new pt     Diabetes     Type 1       Initial Temp 97  F (36.1  C) (Temporal)  Ht 5' 4\" (1.626 m)  Wt 160 lb (72.6 kg)  LMP 08/31/2017 (Exact Date)  BMI 27.46 kg/m2 Estimated body mass index is 27.46 kg/(m^2) as calculated from the following:    Height as of this encounter: 5' 4\" (1.626 m).    Weight as of this encounter: 160 lb (72.6 kg).  BP completed using cuff size: NA (Not Taken)  Medication Reconciliation: complete    Ann-Marie Najera CMA, September 18, 2017    "

## 2017-09-18 NOTE — PATIENT INSTRUCTIONS
Reliable shoe stores: To maximize your experience and provide the best possible fit.  Be sure to show them your foot concerns and tell them Dr. Stephenson sent you.      Stores listed in bold have only athletic shoes, and stores that are not bold are mostly casual or variety of shoes    [employer]  [address] [place] [address]  [phone]    [facility]  [address] [place] [address]  [phone]     [employer]  [address] [place] [address]  [phone]    [employer]  [address] [facility]  [facility] [address]  [phone]    [employer]  [address] [place], [place] [address] [phone]    [employer]  [address] [place] [address]  [phone]                         [employer] Locations:     [address] [place] [address]   [phone]     [address] [place] [address] [address] [place] [address]   [phone]     [address] [place] [address]   [phone]     [address] [place] [address]   [phone]     [address] [place] [address]   [phone]     [address] [place] [address]   [phone]     [address] [facility] [address]   [place] [address]   [phone]     [address] [facility]   [place] [address]   [phone]             How to find reasonable shoes          The correct width    Correct Fitting    Correct Length      Foot Distortion    Posture Distortion                          Torsional Rigidity      Grasp behind the heel and underneath the foot and twist      Bad    Excessive torsion/twist in midfoot     Less torsion/twist in midfoot is better                   Heel Counter Rigidity      Grasp just above   midsole and squeeze      Bad    Soft heel counter      Good    Rigid Heel Counter      Flexion Rigidity      Grasp shoe and bend from forefoot to rearfoot              DIABETES AND YOUR FEET    What effect does  "diabetes have on the feet?  Diabetes can result in several problems in the feet including contractures of the tendons leading to deformities and reduced function of the bones, skin ulcers or open sores on pressure points or prominent deformities, reduced sensation, reduced blood flow and thus reduced oxygen and immune cells to the tiny vessels in our feet. This all leads to higher risk of hospitalization, infections, and amputations.     What is neuropathy?  Neuropathy is a term used to describe a loss of nerve function.  Patients with diabetes are at risk of developing neuropathy if their sugars continue to run high and are above the normal value of 140.  The elevated blood sugar in the body enters the nerves causing it to swell and impair nerve function.  The higher the blood sugar and the longer it is elevated, the more damage is done to nerves.  This damage is permanent and irreversible.  These damaged sensory nerves can then cause reduced feeling or cause pain.  Damaged motor nerves can reduce blood flow and white blood cells into into your foot, skin and bones reducing your ability to heal a small problem. And neuropathy can cause tendons to become unbalanced and contribute to the formation of deformity and contractures in our feet. Often times, neuropathy can be prevented by controlling your blood sugar.  Your risk of developing neuropathy goes up dramatically as your hemoglobin A1C raises above 7.5.      How do I know if I have neuropathy?  When a person develops neuropathy, they usually begin to feel numbness or tingling in their feet and sometimes in their legs.  Other symptoms may include painful burning or hot feet, tingling, electrical sensations or feeling like insects or ants are crawling on your feet or legs.  If blood sugar remains above 140  for long periods of time, neuropathy can also occur in the hands.  When a person loses their \"protective threshold\" or ability to detect a 5.[address] [place] " Pradip monofilament is when they have elevated risk for developing foot deformity, contractures, foot infections, amputations, Charcot arthropathy, or other complications. Keep your hemoglobin A1C below 7.5 to reduce this risk.    What is vascular disease?  Peripheral vascular disease is a term used to describe a loss or decrease in circulation (blood flow).  There is a problem in getting blood, immune cells, and oxygen to areas that need it.  Similar to neuropathy, sugars can build up in the walls of the arteries (blood vessels) and cause them to become swollen, thickened and hardened.  This decreases the amount of blood that can go to an area that needs it.  Though this is common in the legs of diabetic patients, it can also affect other arteries (blood vessels) in the body such as in the heart, kidney, eyes, and the blood flow into bones.  It is often seen first in the small vessels of her body notably our feet and toes.    How do I know if I have vascular disease?  In the legs, vascular disease usually results in cramping.  Patients who develop leg cramps after walking the same distance every time (i.e. One block, half a mile, ect.) need to let their doctors know so that their circulation may be checked.  Cramps causing severe pain in the feet and/or legs while sleeping and the cramps go away when you stand or hang your legs off the side of the bed, may also be a sign of poor blood circulation.  Occasional cramping in cold weather or on rare occasions with activity may not be due to poor circulation, but you should inform your doctor.    How can these problems be prevented?  The key to prevention is good blood sugar control all day every day.  Inadequate blood sugar control is the most common way patients experience these problems. Reducing, controlling and measuring your daily consumption of sugar or carbohydrates is essential to understanding and managing diabetes.  Physical activity (exercise) is a very  good way to help decrease your blood sugars.  Exercise can lower your blood sugar, blood pressure, and cholesterol.  It also reduces your risk for heart disease and stroke, relieves stress, and strengthens your heart, muscles and bones. Physical activity also increases your balance and reduces development of contractures and foot deformities over time. In addition, regular activity helps insulin work better, improves your blood circulation, and keeps your joints flexible.  If you're trying to lose weight, a combination of exercise and wise food choices can help you reach your target weight and maintain it.  Activity and exercise alone can not make up for poor diet choices, eating too much, or eating too many sugars or carbohydrates.  Ask your doctor for help when you are not meeting your blood sugar goals. Changes or increases in medication are powerful tools in reducing your blood sugar.    Know your blood sugar and hemoglobin A1C trend.  Upon first diagnosis or during acute illness, checking your blood sugar 4 times a day can help you understand how your diet, activity, and lifestyle affect your blood sugar.  Monitoring your hemoglobin A1C can help you understand how well you are managing blood sugar over the long run.  Your hemoglobin A1C tells you what your blood sugar averages all day, every day, over the past 90 days.       To experience the lowest risk of complications associated with diabetes such as neuropathy, loss of blood flow, bone or joint infection, charcot arthropathy, or amputation, the American Diabetes Association recommends a target hemoglobin A1C of less than 7.0%, while the American Association of Clinical Endocrinologists' recommendation is 6.5% or less.  Both organizations advise that the goals be individualized based on patient factors such as other health conditions, history of hypoglycemia, education, and life expectancy.  A patients risk of experiencing complications associated with  diabetes is only slightly elevated with a hemoglobin A1C above 6.0.  However, this risk goes up exponentially when the hemoglobin A1C is above 7.5.  The longer the hemoglobin A1C is elevated, the more risk that patient will experience in their lifetime. The damage that occurs to nerves, blood vessels, tendons, bones and body organs, while their hemoglobin A1C is elevated is mostly irreversible and worsens with each additional time period of elevated hemoglobin A1C.     You must understand and manage your disease.  Your health insurance or medical team cannot manage this disease for you.  When you take responsibility for understanding and managing your disease, you can expect to experience fewer problems associated with diabetes in your lifetime.  You will  Also experience a higher quality of life and health and reduced cost of health care.    Diabetic Foot Care Recommendations  The following are recommendations for avoiding serious foot problems or injury    DO'S  1. Be aware of your hemoglobin A1C and continue to follow up with your medical team for adjustments in your lifestyle and medication until your reach your A1C goal.  Keep this below 7.5 to reduce your risk of developing complications associated with diabetes.    2.  Wash your feet with lukewarm water and a mild soap and then dry them thoroughly, especially between the toes.  Gently floss your towel or washcloth between each toe at every bath.  Soaking your feet in water cannot clean dead skin, debris, and bacteria from your feet and is not necessary.   3. Examine your feet daily looking for cuts, corns, blisters, cracks, ect..., especially after wearing new shoes or increased or changed activities.  Make sure to look between your toes.  If you cannot see the bottom of your feet, set a mirror on the floor and hold your foot over it, or ask a family member to examine your feet for you daily.  Contact your doctor immediately if new problems are noted or if  sores are not healing.  4.  Immediately apply moisturizer cream such as Cetaphil to the tops and bottoms of your feet, avoiding areas between the toes.  Apply sunscreen or cover your feet if they will be exposed to extended sunlight.  5.Use clean comfortable shoes.  Socks should not have thick seams or cut off the circulation around the leg.  Break in new shoes slowly and rotate with older shoes until broken in.  Check the inside of your shoes with your hand to look for areas of irritation or objects that may have fallen into your shoes.    6. Keep slippers by the side of your bed for use during the night.  7. Shoes should be fitted by a professional and should not cause areas of irritation.  Check your feet regularly when wearing a new pair of shoes and replace them as needed.  8.  Talk to your doctor about proper exercise.  Exercise and stretching stimulate blood flow to your feet and maintain proper glucose levels.  Use it or lose it!  9.  Monitor your blood glucose level and your hemoglobin A1C.  Notify your doctor immediately if your blood sugar is abnormally high or low.  10.  Cut your nails straight across, but then gently round any sharp edges with a nail file.  If you have neuropathy, peripheral vascular disease or cannot see that well to trim your own toenails, see a medical professional for care.    DONT'S  1.  Do not soak your feet if you have an open sore or your provider has informed you that you have neuropathy or loss of protective threshold.  Use only lukewarm water and always check the temperature with your hand as hot water can easily burn your feet.    2.  Never use a hot water bottle or heating pad on your feet.  Also do not apply hot or cold compresses to your feet.  With decreased sensation, you could burn or freeze your feet.  Do not rest your feet near a heat source such as a heater or heat register.    3.  Do not apply any of these to your feet:    - over the counter medicine for corns or  warts    -  Harsh chemicals like boric acid    -  Do not self-treat corns, cuts, blisters or infections.  Always consult your doctor.   4.  Do not wear sandals, slippers or walk barefoot, especially on harsh surfaces.  5.  If you smoke, stop!!!

## 2017-09-18 NOTE — LETTER
9/18/2017        RE: Vickie Duque  [address] [facility] [address]        HPI:  Vickie Duque is a 32 year old female who is seen in consultation at the request of ED Dept - Brian Gonzales MD.    Pt presents for eval of:   (Onset, Location, L/R, Character, Treatments, Injury if yes)    XR Left and Right foot today, 9/18/2017    DM Type 1     Onset late Aug 2017, plantar Left and Right ball of foot pain > left. No injury noted.   Sharp, feels like bone is going to come thru top of foot, throbbing, pain 3-10  Different shoes, OTC , finished medrol dose pack, no relief    Works as a PCA at [employer].    Weight management plan: Patient was referred to their PCP to discuss a diet and exercise plan.    ROS:  10 point ROS neg other than the symptoms noted above in the HPI.    PAST MEDICAL HISTORY:   Past Medical History:   Diagnosis Date     Adjustment disorder with anxious mood 11/23/2015     Anxiety 11/27/2015     ASCUS of cervix with negative high risk HPV 06/19/2008     Depressive disorder, not elsewhere classified      Diabetic eye exam (H) 12/19/14     DM ketoacidosis type I (H) 01/01/2007    Moderately severe intensity.     Mixed hyperlipidemia 11/30/2006     Regional enteritis of unspecified site     Ulcerative Colitis     Type I (juvenile type) diabetes mellitus with ketoacidosis, uncontrolled 02/14/08     Type I (juvenile type) diabetes mellitus without mention of complication, not stated as uncontrolled     diagnosed in 2003     Ulcerative colitis, unspecified     remission. Last flare 6 yrs ago.         PAST SURGICAL HISTORY:   Past Surgical History:   Procedure Laterality Date     DILATION AND CURETTAGE SUCTION  4/2010    miscarriage     HC COLONOSCOPY W BIOPSY  08/16/06     HC COLONOSCOPY W/WO BRUSH/WASH          MEDICATIONS:   Current Outpatient Prescriptions:      NOVOLIN MIX 70/30 VIAL (70-30) 100 UNIT/ML susp, INJECT 40 UNITS SUBQ BEFORE BREAKFAST AND 30 UNITS BEFORE DINNER, Disp: 45 mL,  "Rfl: 3     B-D INSULIN SYRINGE 31G X 5/16\" 0.5 ML, USE TO INJECT INSULIN, Disp: 100 each, Rfl: 6     ACCU-CHEK SMARTVIEW test strip, TEST 4 TIMES A DAY OR AS DI RECTED, Disp: 100 each, Rfl: 11     blood glucose monitoring (ACCU-CHEK TARA SMARTVIEW) meter device kit, Use to test blood sugar 4 times daily or as directed., Disp: 1 kit, Rfl: 0     Multiple Vitamins-Minerals (WOMENS MULTI VITAMIN & MINERAL PO), , Disp: , Rfl:      blood glucose meter (NO BRAND SPECIFIED) meter device kit, Use to test blood sugar 2 times daily or as directed., Disp: 1 kit, Rfl: 0     ACE/ARB NOT PRESCRIBED, INTENTIONAL,, 1 each every 3 hours ACE & ARB not prescribed due to Intolerance and Symptomatic hypotension not due to excessive diuresis, Disp: , Rfl:      acetaminophen (TYLENOL) 500 MG tablet, Take 1 tablet (500 mg) by mouth every 6 hours as needed for pain, Disp: 60 tablet, Rfl: 0     DIABETIC STERILE LANCETS device, 1 Device 4 times daily., Disp: 1 Box, Rfl: 11     ALLERGIES:    Allergies   Allergen Reactions     No Known Drug Allergies         SOCIAL HISTORY:   Social History     Social History     Marital status: Single     Spouse name: N/A     Number of children: 1     Years of education: 12     Occupational History     Nursing assistant      Social History Main Topics     Smoking status: Current Every Day Smoker     Packs/day: 1.00     Years: 13.00     Types: Cigarettes     Smokeless tobacco: Never Used     Alcohol use 0.0 oz/week     0 Standard drinks or equivalent per week      Comment: rare     Drug use: No     Sexual activity: Yes     Partners: Male     Birth control/ protection: Surgical, Female Surgical      Comment: tubal ligation     Other Topics Concern      Service No     Blood Transfusions No     Caffeine Concern Yes     Advised not more than 16 ounces/day     Occupational Exposure Yes     Visiting [facility] [employer] [facility] - student online classes     Hobby Hazards No     Sleep Concern No     Stress Concern Yes     " "Weight Concern No     Special Diet Yes     IDDM Type I     Back Care Yes     work-related about 1 year ago     Exercise Yes     Active at work     Bike Helmet No     Seat Belt Yes     Self-Exams No     Social History Narrative    Lives in [place] with Rod gandhi and her son and their daughter.   Vickie and Rod smoke.   No indoor cats/kittens.   No concerns about domestic violence.        FAMILY HISTORY:   Family History   Problem Relation Age of Onset     Hypertension Father      DIABETES Maternal Grandmother      CANCER Maternal Grandmother      DIABETES Paternal Grandfather      DIABETES Maternal Uncle      DIABETES Maternal Aunt         EXAM:Vitals: Temp 97  F (36.1  C) (Temporal)  Ht 5' 4\" (1.626 m)  Wt 160 lb (72.6 kg)  LMP 08/31/2017 (Exact Date)  BMI 27.46 kg/m2  BMI= Body mass index is 27.46 kg/(m^2).    General appearance: Patient is alert and fully cooperative with history & exam.  No sign of distress is noted during the visit.     Psychiatric: Affect is pleasant & appropriate.  Patient appears motivated to improve health.     Respiratory: Breathing is regular & unlabored while sitting.     HEENT: Hearing is intact to spoken word.  Speech is clear.  No gross evidence of visual impairment that would impact ambulation.     Vascular: DP & PT pulses are intact & regular bilaterally.  No significant edema or varicosities noted.  CFT and skin temperature is normal to both lower extremities.     Neurologic: Lower extremity sensation is intact to light touch.  No evidence of weakness or contracture in the lower extremities.  No evidence of neuropathy.    Dermatologic: Skin is intact to both lower extremities with adequate texture, turgor and tone about the integument.  No paronychia or evidence of soft tissue infection is noted.     Musculoskeletal: Patient is ambulatory without assistive device or brace.  No palpable edema in all pain is noted about the second third metatarsal phalangeal joint left foot. " Negative anterior drawer about this joint. There is no medial or lateral deviation about this joint. She wears flip-flops today.    Radiographs 3 views left foot demonstrate no joint dislocation or diastases. No medial lateral deviation. No acute cortical reaction.    Hemoglobin A1C (%)   Date Value   06/26/2017 11.2 (H)   01/29/2016 11.5 (H)   10/06/2015 11.7 (H)   04/01/2015 11.1 (H)   07/24/2014 11.3 (H)   05/30/2014 11.3 (H)   03/05/2014 11.2 (H)   11/06/2012 7.9 (H)     Creatinine (mg/dL)   Date Value   06/26/2017 0.83   09/27/2016 0.85   01/29/2016 0.86   11/16/2015 0.76   04/01/2015 0.75   07/24/2014 0.83       ASSESSMENT:       ICD-10-CM    1. Metatarsalgia of both feet M77.41 XR Foot Bilateral G/E 3 Views    M77.42    2. Capsulitis of foot, left M77.52 CARE COORDINATION REFERRAL     CANCELED: ORTHOTICS REFERRAL   3. Uncontrolled type 1 diabetes mellitus with complication (H) E10.8 CANCELED: ORTHOTICS REFERRAL    E10.65         PLAN:  Reviewed patient[facility] in [facility].      9/18/2017   Has not had controlled DM since 2012.    Interpreted radiographs  Secondary to uncontrolled diabetes she has developed extensive collagen cross-linking and this is now weakening very hypermobile flexible joints throughout the ball the foot and she has developed capsulitis metatarsalgia left second MPJ. Negative anterior drawer of this joint note dislocation. No stress reaction or stress fractures. Most at today's visit was spent discussing collagen cross-linking and how uncontrolled blood sugars weakens this tissue causing overuse and how this will cause other problems that are not able to be fixed without controlling her blood sugars first.    For now we can begin custom molded orthotics to increase load through the arch and reduce load through the forefoot. Orthotic to also cut out this area and offload.  Written instructions for proper shoe gear for stiff shank and more cushion about the forefoot.  Discontinue barefoot  walking flip-flops and slip ons for now  Discussed oral anti-inflammatories as well as risks associated with them  Also discussed injections and risks associated    All questions were answered to her satisfaction. She agrees that she has not been managing her diabetes well in that she has been noncompliant and she admits that there must be changes to be successful in her health. I recommended a visit with care coordination to help find this to improve her medical literacy, improve diabetes education, nutrition, appropriate support or referred to endocrinology.  At that point she may be a candidate for sliding scale and/or insulin pump she is very willing to move forward and engage with this.    At the end of the visit however she requested a work release for painful feet area and I suggested that it is her uncontrolled diabetes that makes her feet hurt. A few days off of work will not make her feet feel better upon returning to work. She requested that I leave at that point.    My sense is that she will engage managing her diabetes a bit more in an experience multispecialty care team can improve her medical literacy understanding and treatment and management of her disease.      Follow-up after obtaining the orthotics and changes in shoe gear.    In addition to the above history and physical exam, approximately 45 minutes of time was spent with the patient, greater than 50% directly with the patient, counseling, educating, and coordinating care in regards to the above noted multiple diagnoses in addition to performing the documented procedure.        At this point she would benefit from understanding why it is important to have blood sugars well below 200 average in 140 and below and never above 200. Then she would benefit from understanding how to manage carbohydrates and calories consistently then how to utilize insulin in relationship to those carbohydrates/calories.  Some form of insulin sliding scale may be  helpful for her to learn. Insulin pump may also be helpful for managing baseline.    Pacheco Stephenson DPM        Sincerely,        Pacheco Stephenson DPM

## 2017-09-18 NOTE — MR AVS SNAPSHOT
After Visit Summary   9/18/2017    Vickie Duque    MRN: [id number]           Patient Information     Date Of Birth          1985        Visit Information        Provider Department      9/18/2017 1:30 PM Pacheco Stephenson, RAJESH [facility]'s Diagnoses     Metatarsalgia of both feet    -  1    Capsulitis of foot, left        Uncontrolled type 1 diabetes mellitus with complication (H)          Care Instructions    Reliable shoe stores: To maximize your experience and provide the best possible fit.  Be sure to show them your foot concerns and tell them Dr. Stephenson sent you.      Stores listed in bold have only athletic shoes, and stores that are not bold are mostly casual or variety of shoes    [employer]  [address] [place] [address]  [phone]    [facility] [employer] [facility]  [address] [place] [address]  [phone]    [facility] [employer] [facility]  [address] [place], [place] [id number] [phone]    [employer]  [address] [facility]  [facility] [address]  [phone]    [employer]  [address] [place] [address]  [phone]    [employer]  [address] [place] [address]  [phone]                         [employer] Locations:     [address] [place] [address]   [phone]     [address] [place] [address] [address] [place] [address]   [phone]     [address] [place] [address]   [phone]     [address] [place] [address]   [phone]     [address] [place] [address]   [phone]     [address] [facility] [place] [address]   [phone]     [address] [facility] [address]   [place] [address]   [phone]     [address]   [place], [place] [id number] [phone]             How to find reasonable shoes          The correct width    Correct Fitting    Correct Length      Foot Distortion    Posture Distortion                           Torsional Rigidity      Grasp behind the heel and underneath the foot and twist      Bad    Excessive torsion/twist in midfoot     Less torsion/twist in midfoot is better                   Heel Counter Rigidity      Grasp just above   midsole and squeeze      Bad    Soft heel counter      Good    Rigid Heel Counter      Flexion Rigidity      Grasp shoe and bend from forefoot to rearfoot              DIABETES AND YOUR FEET    What effect does diabetes have on the feet?  Diabetes can result in several problems in the feet including contractures of the tendons leading to deformities and reduced function of the bones, skin ulcers or open sores on pressure points or prominent deformities, reduced sensation, reduced blood flow and thus reduced oxygen and immune cells to the tiny vessels in our feet. This all leads to higher risk of hospitalization, infections, and amputations.     What is neuropathy?  Neuropathy is a term used to describe a loss of nerve function.  Patients with diabetes are at risk of developing neuropathy if their sugars continue to run high and are above the normal value of 140.  The elevated blood sugar in the body enters the nerves causing it to swell and impair nerve function.  The higher the blood sugar and the longer it is elevated, the more damage is done to nerves.  This damage is permanent and irreversible.  These damaged sensory nerves can then cause reduced feeling or cause pain.  Damaged motor nerves can reduce blood flow and white blood cells into into your foot, skin and bones reducing your ability to heal a small problem. And neuropathy can cause tendons to become unbalanced and contribute to the formation of deformity and contractures in our feet. Often times, neuropathy can be prevented by controlling your blood sugar.  Your risk of developing neuropathy goes up dramatically as your hemoglobin A1C raises above 7.5.      How do I know if I have neuropathy?  When a person  "develops neuropathy, they usually begin to feel numbness or tingling in their feet and sometimes in their legs.  Other symptoms may include painful burning or hot feet, tingling, electrical sensations or feeling like insects or ants are crawling on your feet or legs.  If blood sugar remains above 140  for long periods of time, neuropathy can also occur in the hands.  When a person loses their \"protective threshold\" or ability to detect a 5.[address] monofilament is when they have elevated risk for developing foot deformity, contractures, foot infections, amputations, Charcot arthropathy, or other complications. Keep your hemoglobin A1C below 7.5 to reduce this risk.    What is vascular disease?  Peripheral vascular disease is a term used to describe a loss or decrease in circulation (blood flow).  There is a problem in getting blood, immune cells, and oxygen to areas that need it.  Similar to neuropathy, sugars can build up in the walls of the arteries (blood vessels) and cause them to become swollen, thickened and hardened.  This decreases the amount of blood that can go to an area that needs it.  Though this is common in the legs of diabetic patients, it can also affect other arteries (blood vessels) in the body such as in the heart, kidney, eyes, and the blood flow into bones.  It is often seen first in the small vessels of her body notably our feet and toes.    How do I know if I have vascular disease?  In the legs, vascular disease usually results in cramping.  Patients who develop leg cramps after walking the same distance every time (i.e. One block, half a mile, ect.) need to let their doctors know so that their circulation may be checked.  Cramps causing severe pain in the feet and/or legs while sleeping and the cramps go away when you stand or hang your legs off the side of the bed, may also be a sign of poor blood circulation.  Occasional cramping in cold weather or on rare occasions with " activity may not be due to poor circulation, but you should inform your doctor.    How can these problems be prevented?  The key to prevention is good blood sugar control all day every day.  Inadequate blood sugar control is the most common way patients experience these problems. Reducing, controlling and measuring your daily consumption of sugar or carbohydrates is essential to understanding and managing diabetes.  Physical activity (exercise) is a very good way to help decrease your blood sugars.  Exercise can lower your blood sugar, blood pressure, and cholesterol.  It also reduces your risk for heart disease and stroke, relieves stress, and strengthens your heart, muscles and bones. Physical activity also increases your balance and reduces development of contractures and foot deformities over time. In addition, regular activity helps insulin work better, improves your blood circulation, and keeps your joints flexible.  If you're trying to lose weight, a combination of exercise and wise food choices can help you reach your target weight and maintain it.  Activity and exercise alone can not make up for poor diet choices, eating too much, or eating too many sugars or carbohydrates.  Ask your doctor for help when you are not meeting your blood sugar goals. Changes or increases in medication are powerful tools in reducing your blood sugar.    Know your blood sugar and hemoglobin A1C trend.  Upon first diagnosis or during acute illness, checking your blood sugar 4 times a day can help you understand how your diet, activity, and lifestyle affect your blood sugar.  Monitoring your hemoglobin A1C can help you understand how well you are managing blood sugar over the long run.  Your hemoglobin A1C tells you what your blood sugar averages all day, every day, over the past 90 days.       To experience the lowest risk of complications associated with diabetes such as neuropathy, loss of blood flow, bone or joint infection,  charcot arthropathy, or amputation, the American Diabetes Association recommends a target hemoglobin A1C of less than 7.0%, while the American Association of Clinical Endocrinologists' recommendation is 6.5% or less.  Both organizations advise that the goals be individualized based on patient factors such as other health conditions, history of hypoglycemia, education, and life expectancy.  A patients risk of experiencing complications associated with diabetes is only slightly elevated with a hemoglobin A1C above 6.0.  However, this risk goes up exponentially when the hemoglobin A1C is above 7.5.  The longer the hemoglobin A1C is elevated, the more risk that patient will experience in their lifetime. The damage that occurs to nerves, blood vessels, tendons, bones and body organs, while their hemoglobin A1C is elevated is mostly irreversible and worsens with each additional time period of elevated hemoglobin A1C.     You must understand and manage your disease.  Your health insurance or medical team cannot manage this disease for you.  When you take responsibility for understanding and managing your disease, you can expect to experience fewer problems associated with diabetes in your lifetime.  You will  Also experience a higher quality of life and health and reduced cost of health care.    Diabetic Foot Care Recommendations  The following are recommendations for avoiding serious foot problems or injury    DO'S  1. Be aware of your hemoglobin A1C and continue to follow up with your medical team for adjustments in your lifestyle and medication until your reach your A1C goal.  Keep this below 7.5 to reduce your risk of developing complications associated with diabetes.    2.  Wash your feet with lukewarm water and a mild soap and then dry them thoroughly, especially between the toes.  Gently floss your towel or washcloth between each toe at every bath.  Soaking your feet in water cannot clean dead skin, debris, and  bacteria from your feet and is not necessary.   3. Examine your feet daily looking for cuts, corns, blisters, cracks, ect..., especially after wearing new shoes or increased or changed activities.  Make sure to look between your toes.  If you cannot see the bottom of your feet, set a mirror on the floor and hold your foot over it, or ask a family member to examine your feet for you daily.  Contact your doctor immediately if new problems are noted or if sores are not healing.  4.  Immediately apply moisturizer cream such as Cetaphil to the tops and bottoms of your feet, avoiding areas between the toes.  Apply sunscreen or cover your feet if they will be exposed to extended sunlight.  5.Use clean comfortable shoes.  Socks should not have thick seams or cut off the circulation around the leg.  Break in new shoes slowly and rotate with older shoes until broken in.  Check the inside of your shoes with your hand to look for areas of irritation or objects that may have fallen into your shoes.    6. Keep slippers by the side of your bed for use during the night.  7. Shoes should be fitted by a professional and should not cause areas of irritation.  Check your feet regularly when wearing a new pair of shoes and replace them as needed.  8.  Talk to your doctor about proper exercise.  Exercise and stretching stimulate blood flow to your feet and maintain proper glucose levels.  Use it or lose it!  9.  Monitor your blood glucose level and your hemoglobin A1C.  Notify your doctor immediately if your blood sugar is abnormally high or low.  10.  Cut your nails straight across, but then gently round any sharp edges with a nail file.  If you have neuropathy, peripheral vascular disease or cannot see that well to trim your own toenails, see a medical professional for care.    DONT'S  1.  Do not soak your feet if you have an open sore or your provider has informed you that you have neuropathy or loss of protective threshold.  Use only  "lukewarm water and always check the temperature with your hand as hot water can easily burn your feet.    2.  Never use a hot water bottle or heating pad on your feet.  Also do not apply hot or cold compresses to your feet.  With decreased sensation, you could burn or freeze your feet.  Do not rest your feet near a heat source such as a heater or heat register.    3.  Do not apply any of these to your feet:    - over the counter medicine for corns or warts    -  Harsh chemicals like boric acid    -  Do not self-treat corns, cuts, blisters or infections.  Always consult your doctor.   4.  Do not wear sandals, slippers or walk barefoot, especially on harsh surfaces.  5.  If you smoke, stop!!!            Follow-ups after your visit        Additional Services     CARE COORDINATION REFERRAL       Services are provided by a care coordinator for people with complex needs such as; medical, social, or  financial issues.  The care coordinator works with the patient and their primary care provider to determine health goals, obtain resources, achieve outcomes, and develop care plans that help coordinate a patient's care.     REFERRAL REASON:   Uncontrolled Chronic Disease    Provide additional details for Care Coordination to best meet Patient's current needs:     Clinic Staff has discussed Care Coordination Referral with the Patient/Caregiver: yes            ORTHOTICS REFERRAL       [facility] scheduling staff may contact patient to arrange appointments for casting of orthotics and often do not leave messages.  The patient may call this number for scheduling at their convenience. Scheduling Phone [phone].      One pair custom functional foot orthotics.   Flexible polypropylene shell with 1/8\" Spenco cushioned top cover, crepe rearfoot post, medial density arch fill, LAVERN bottom cover.  Aerobic model.                  Who to contact     If you have questions or need follow up information about today's clinic visit or your " "schedule please contact [facility] directly at [phone].  Normal or non-critical lab and imaging results will be communicated to you by MyChart, letter or phone within 4 business days after the clinic has received the results. If you do not hear from us within 7 days, please contact the clinic through [employer] or phone. If you have a critical or abnormal lab result, we will notify you by phone as soon as possible.  Submit refill requests through [employer] or call your pharmacy and they will forward the refill request to us. Please allow 3 business days for your refill to be completed.          Additional Information About Your Visit        [employer] Information     [employer] gives you secure access to your electronic health record. If you see a primary care provider, you can also send messages to your care team and make appointments. If you have questions, please call your primary care clinic.  If you do not have a primary care provider, please call [phone] and they will assist you.        Care EveryWhere ID     This is your Care EveryWhere ID. This could be used by other organizations to access your [facility] medical records  [id number]        Your Vitals Were     Temperature Height Last Period BMI (Body Mass Index)          97  F (36.1  C) (Temporal) 5' 4\" (1.626 m) 08/31/2017 (Exact Date) 27.46 kg/m2         Blood Pressure from Last 3 Encounters:   09/01/17 112/79   06/28/17 114/64   06/26/17 130/77    Weight from Last 3 Encounters:   09/18/17 160 lb (72.6 kg)   09/01/17 160 lb (72.6 kg)   06/28/17 158 lb (71.7 kg)              We Performed the Following     CARE COORDINATION REFERRAL     ORTHOTICS REFERRAL        Primary Care Provider    Physician No Ref-Primary       No address on file        Equal Access to Services     KRISTIN ONOFRE : Theresa Renteria, cesario jeff, kendra main. [facility] [phone].    ATENCIÓN: Si " "radha rubio, tiene a barrientos disposición servicios gratuitos de asistencia lingüística. Armin damian [phone].    We comply with applicable federal civil rights laws and [place] laws. We do not discriminate on the basis of race, color, national origin, age, disability sex, sexual orientation or gender identity.            Thank you!     Thank you for choosing [facility]  for your care. Our goal is always to provide you with excellent care. Hearing back from our patients is one way we can continue to improve our services. Please take a few minutes to complete the written survey that you may receive in the mail after your visit with us. Thank you!             Your Updated Medication List - Protect others around you: Learn how to safely use, store and throw away your medicines at www.disposemymeds.org.          This list is accurate as of: 9/18/17  2:10 PM.  Always use your most recent med list.                   Brand Name Dispense Instructions for use Diagnosis    ACCU-CHEK SMARTVIEW test strip   Generic drug:  blood glucose monitoring     100 each    TEST 4 TIMES A DAY OR AS DI RECTED    Uncontrolled type 1 diabetes mellitus (H)       ACE/ARB NOT PRESCRIBED (INTENTIONAL)      1 each every 3 hours ACE & ARB not prescribed due to Intolerance and Symptomatic hypotension not due to excessive diuresis    Type 1 diabetes, HbA1c goal < 7% (H)       acetaminophen 500 MG tablet    TYLENOL    60 tablet    Take 1 tablet (500 mg) by mouth every 6 hours as needed for pain    Headache(784.0)       B-D INSULIN SYRINGE 31G X 5/16\" 0.5 ML   Generic drug:  insulin syringe-needle U-100     100 each    USE TO INJECT INSULIN    Diabetes mellitus type I (H)       * blood glucose monitoring meter device kit    no brand specified    1 kit    Use to test blood sugar 2 times daily or as directed.    Type I (juvenile type) diabetes mellitus without mention of complication, uncontrolled       * blood glucose monitoring meter " device kit     1 kit    Use to test blood sugar 4 times daily or as directed.    Type 1 diabetes, HbA1c goal < 7% (H)       DIABETIC STERILE LANCETS device     1 Box    1 Device 4 times daily.    Type I (juvenile type) diabetes mellitus without mention of complication, not stated as uncontrolled       NovoLIN MIX 70/30 VIAL injection   Generic drug:  insulin NPH-insulin regular     45 mL    INJECT 40 UNITS SUBQ BEFORE BREAKFAST AND 30 UNITS BEFORE DINNER    Uncontrolled type 1 diabetes mellitus with complication (H)       WOMENS MULTI VITAMIN & MINERAL PO           * Notice:  This list has 2 medication(s) that are the same as other medications prescribed for you. Read the directions carefully, and ask your doctor or other care provider to review them with you.

## 2017-09-18 NOTE — PROGRESS NOTES
"HPI:  Vickie Duque is a 32 year old female who is seen in consultation at the request of ED Dept - Brian Gonzales MD.    Pt presents for eval of:   (Onset, Location, L/R, Character, Treatments, Injury if yes)    XR Left and Right foot today, 9/18/2017    DM Type 1     Onset late Aug 2017, plantar Left and Right ball of foot pain > left. No injury noted.   Sharp, feels like bone is going to come thru top of foot, throbbing, pain 3-10  Different shoes, OTC , finished medrol dose pack, no relief    Works as a PCA at [employer].    Weight management plan: Patient was referred to their PCP to discuss a diet and exercise plan.    ROS:  10 point ROS neg other than the symptoms noted above in the HPI.    PAST MEDICAL HISTORY:   Past Medical History:   Diagnosis Date     Adjustment disorder with anxious mood 11/23/2015     Anxiety 11/27/2015     ASCUS of cervix with negative high risk HPV 06/19/2008     Depressive disorder, not elsewhere classified      Diabetic eye exam (H) 12/19/14     DM ketoacidosis type I (H) 01/01/2007    Moderately severe intensity.     Mixed hyperlipidemia 11/30/2006     Regional enteritis of unspecified site     Ulcerative Colitis     Type I (juvenile type) diabetes mellitus with ketoacidosis, uncontrolled 02/14/08     Type I (juvenile type) diabetes mellitus without mention of complication, not stated as uncontrolled     diagnosed in 2003     Ulcerative colitis, unspecified     remission. Last flare 6 yrs ago.         PAST SURGICAL HISTORY:   Past Surgical History:   Procedure Laterality Date     DILATION AND CURETTAGE SUCTION  4/2010    miscarriage     HC COLONOSCOPY W BIOPSY  08/16/06     HC COLONOSCOPY W/WO BRUSH/WASH          MEDICATIONS:   Current Outpatient Prescriptions:      NOVOLIN MIX 70/30 VIAL (70-30) 100 UNIT/ML susp, INJECT 40 UNITS SUBQ BEFORE BREAKFAST AND 30 UNITS BEFORE DINNER, Disp: 45 mL, Rfl: 3     B-D INSULIN SYRINGE 31G X 5/16\" 0.5 ML, USE TO INJECT INSULIN, Disp: 100 " each, Rfl: 6     ACCU-CHEK SMARTVIEW test strip, TEST 4 TIMES A DAY OR AS DI RECTED, Disp: 100 each, Rfl: 11     blood glucose monitoring (ACCU-CHEK TARA SMARTVIEW) meter device kit, Use to test blood sugar 4 times daily or as directed., Disp: 1 kit, Rfl: 0     Multiple Vitamins-Minerals (WOMENS MULTI VITAMIN & MINERAL PO), , Disp: , Rfl:      blood glucose meter (NO BRAND SPECIFIED) meter device kit, Use to test blood sugar 2 times daily or as directed., Disp: 1 kit, Rfl: 0     ACE/ARB NOT PRESCRIBED, INTENTIONAL,, 1 each every 3 hours ACE & ARB not prescribed due to Intolerance and Symptomatic hypotension not due to excessive diuresis, Disp: , Rfl:      acetaminophen (TYLENOL) 500 MG tablet, Take 1 tablet (500 mg) by mouth every 6 hours as needed for pain, Disp: 60 tablet, Rfl: 0     DIABETIC STERILE LANCETS device, 1 Device 4 times daily., Disp: 1 Box, Rfl: 11     ALLERGIES:    Allergies   Allergen Reactions     No Known Drug Allergies         SOCIAL HISTORY:   Social History     Social History     Marital status: Single     Spouse name: N/A     Number of children: 1     Years of education: 12     Occupational History     Nursing assistant      Social History Main Topics     Smoking status: Current Every Day Smoker     Packs/day: 1.00     Years: 13.00     Types: Cigarettes     Smokeless tobacco: Never Used     Alcohol use 0.0 oz/week     0 Standard drinks or equivalent per week      Comment: rare     Drug use: No     Sexual activity: Yes     Partners: Male     Birth control/ protection: Surgical, Female Surgical      Comment: tubal ligation     Other Topics Concern      Service No     Blood Transfusions No     Caffeine Concern Yes     Advised not more than 16 ounces/day     Occupational Exposure Yes     Visiting [employer] [facility] - student online classes     Hobby Hazards No     Sleep Concern No     Stress Concern Yes     Weight Concern No     Special Diet Yes     IDDM Type I     Back Care Yes      "work-related about 1 year ago     Exercise Yes     Active at work     Bike Helmet No     Seat Belt Yes     Self-Exams No     Social History Narrative    Lives in [place] with Rod gandhi and her son and their daughter.   Vickie and Rod smoke.   No indoor cats/kittens.   No concerns about domestic violence.        FAMILY HISTORY:   Family History   Problem Relation Age of Onset     Hypertension Father      DIABETES Maternal Grandmother      CANCER Maternal Grandmother      DIABETES Paternal Grandfather      DIABETES Maternal Uncle      DIABETES Maternal Aunt         EXAM:Vitals: Temp 97  F (36.1  C) (Temporal)  Ht 5' 4\" (1.626 m)  Wt 160 lb (72.6 kg)  LMP 08/31/2017 (Exact Date)  BMI 27.46 kg/m2  BMI= Body mass index is 27.46 kg/(m^2).    General appearance: Patient is alert and fully cooperative with history & exam.  No sign of distress is noted during the visit.     Psychiatric: Affect is pleasant & appropriate.  Patient appears motivated to improve health.     Respiratory: Breathing is regular & unlabored while sitting.     HEENT: Hearing is intact to spoken word.  Speech is clear.  No gross evidence of visual impairment that would impact ambulation.     Vascular: DP & PT pulses are intact & regular bilaterally.  No significant edema or varicosities noted.  CFT and skin temperature is normal to both lower extremities.     Neurologic: Lower extremity sensation is intact to light touch.  No evidence of weakness or contracture in the lower extremities.  No evidence of neuropathy.    Dermatologic: Skin is intact to both lower extremities with adequate texture, turgor and tone about the integument.  No paronychia or evidence of soft tissue infection is noted.     Musculoskeletal: Patient is ambulatory without assistive device or brace.  No palpable edema in all pain is noted about the second third metatarsal phalangeal joint left foot. Negative anterior drawer about this joint. There is no medial or lateral " deviation about this joint. She wears flip-flops today.    Radiographs 3 views left foot demonstrate no joint dislocation or diastases. No medial lateral deviation. No acute cortical reaction.    Hemoglobin A1C (%)   Date Value   06/26/2017 11.2 (H)   01/29/2016 11.5 (H)   10/06/2015 11.7 (H)   04/01/2015 11.1 (H)   07/24/2014 11.3 (H)   05/30/2014 11.3 (H)   03/05/2014 11.2 (H)   11/06/2012 7.9 (H)     Creatinine (mg/dL)   Date Value   06/26/2017 0.83   09/27/2016 0.85   01/29/2016 0.86   11/16/2015 0.76   04/01/2015 0.75   07/24/2014 0.83       ASSESSMENT:       ICD-10-CM    1. Metatarsalgia of both feet M77.41 XR Foot Bilateral G/E 3 Views    M77.42    2. Capsulitis of foot, left M77.52 CARE COORDINATION REFERRAL     CANCELED: ORTHOTICS REFERRAL   3. Uncontrolled type 1 diabetes mellitus with complication (H) E10.8 CANCELED: ORTHOTICS REFERRAL    E10.65         PLAN:  Reviewed [facility] in [facility].      9/18/2017   Has not had controlled DM since 2012.    Interpreted radiographs  Secondary to uncontrolled diabetes she has developed extensive collagen cross-linking and this is now weakening very hypermobile flexible joints throughout the ball the foot and she has developed capsulitis metatarsalgia left second MPJ. Negative anterior drawer of this joint note dislocation. No stress reaction or stress fractures. Most at today's visit was spent discussing collagen cross-linking and how uncontrolled blood sugars weakens this tissue causing overuse and how this will cause other problems that are not able to be fixed without controlling her blood sugars first.    For now we can begin custom molded orthotics to increase load through the arch and reduce load through the forefoot. Orthotic to also cut out this area and offload.  Written instructions for proper shoe gear for stiff shank and more cushion about the forefoot.  Discontinue barefoot walking flip-flops and slip ons for now  Discussed oral anti-inflammatories as  well as risks associated with them  Also discussed injections and risks associated    All questions were answered to her satisfaction. She agrees that she has not been managing her diabetes well in that she has been noncompliant and she admits that there must be changes to be successful in her health. I recommended a visit with care coordination to help find this to improve her medical literacy, improve diabetes education, nutrition, appropriate support or referred to endocrinology.  At that point she may be a candidate for sliding scale and/or insulin pump she is very willing to move forward and engage with this.    At the end of the visit however she requested a work release for painful feet area and I suggested that it is her uncontrolled diabetes that makes her feet hurt. A few days off of work will not make her feet feel better upon returning to work. She requested that I leave at that point.    My sense is that she will engage managing her diabetes a bit more in an experience multispecialty care team can improve her medical literacy understanding and treatment and management of her disease.      Follow-up after obtaining the orthotics and changes in shoe gear.    In addition to the above history and physical exam, approximately 45 minutes of time was spent with the patient, greater than 50% directly with the patient, counseling, educating, and coordinating care in regards to the above noted multiple diagnoses in addition to performing the documented procedure.        At this point she would benefit from understanding why it is important to have blood sugars well below 200 average in 140 and below and never above 200. Then she would benefit from understanding how to manage carbohydrates and calories consistently then how to utilize insulin in relationship to those carbohydrates/calories.  Some form of insulin sliding scale may be helpful for her to learn. Insulin pump may also be helpful for managing  baseline.    Pacheco Stephenson DPM

## 2017-09-18 NOTE — Clinical Note
9/18/2017         RE: Vickie Duque  [address] [facility] [address]        Dear Colleague,    Thank you for referring your patient, Vickie Duque, to the [facility]. Please see a copy of my visit note below.    HPI:  Vickie Duque is a 32 year old female who is seen in consultation at the request of ED Dept - Brian Gonzales MD.    Pt presents for eval of:   (Onset, Location, L/R, Character, Treatments, Injury if yes)    XR Left and Right foot today, 9/18/2017    DM Type 1     Onset late Aug 2017, plantar Left and Right ball of foot pain > left. No injury noted.   Sharp, feels like bone is going to come thru top of foot, throbbing, pain 3-10  Different shoes, OTC , finished medrol dose pack, no relief    Works as a PCA at [employer].    Weight management plan: Patient was referred to their PCP to discuss a diet and exercise plan.    HPI:  ***    ROS:  10 point ROS neg other than the symptoms noted above in the HPI.    PAST MEDICAL HISTORY:   Past Medical History:   Diagnosis Date     Adjustment disorder with anxious mood 11/23/2015     Anxiety 11/27/2015     ASCUS of cervix with negative high risk HPV 06/19/2008     Depressive disorder, not elsewhere classified      Diabetic eye exam (H) 12/19/14     DM ketoacidosis type I (H) 01/01/2007    Moderately severe intensity.     Mixed hyperlipidemia 11/30/2006     Regional enteritis of unspecified site     Ulcerative Colitis     Type I (juvenile type) diabetes mellitus with ketoacidosis, uncontrolled 02/14/08     Type I (juvenile type) diabetes mellitus without mention of complication, not stated as uncontrolled     diagnosed in 2003     Ulcerative colitis, unspecified     remission. Last flare 6 yrs ago.         PAST SURGICAL HISTORY:   Past Surgical History:   Procedure Laterality Date     DILATION AND CURETTAGE SUCTION  4/2010    miscarriage     HC COLONOSCOPY W BIOPSY  08/16/06     HC COLONOSCOPY W/WO BRUSH/WASH          MEDICATIONS:  "  Current Outpatient Prescriptions:      NOVOLIN MIX 70/30 VIAL (70-30) 100 UNIT/ML susp, INJECT 40 UNITS SUBQ BEFORE BREAKFAST AND 30 UNITS BEFORE DINNER, Disp: 45 mL, Rfl: 3     B-D INSULIN SYRINGE 31G X 5/16\" 0.5 ML, USE TO INJECT INSULIN, Disp: 100 each, Rfl: 6     ACCU-CHEK SMARTVIEW test strip, TEST 4 TIMES A DAY OR AS DI RECTED, Disp: 100 each, Rfl: 11     blood glucose monitoring (ACCU-CHEK TARA SMARTVIEW) meter device kit, Use to test blood sugar 4 times daily or as directed., Disp: 1 kit, Rfl: 0     Multiple Vitamins-Minerals (WOMENS MULTI VITAMIN & MINERAL PO), , Disp: , Rfl:      blood glucose meter (NO BRAND SPECIFIED) meter device kit, Use to test blood sugar 2 times daily or as directed., Disp: 1 kit, Rfl: 0     ACE/ARB NOT PRESCRIBED, INTENTIONAL,, 1 each every 3 hours ACE & ARB not prescribed due to Intolerance and Symptomatic hypotension not due to excessive diuresis, Disp: , Rfl:      acetaminophen (TYLENOL) 500 MG tablet, Take 1 tablet (500 mg) by mouth every 6 hours as needed for pain, Disp: 60 tablet, Rfl: 0     DIABETIC STERILE LANCETS device, 1 Device 4 times daily., Disp: 1 Box, Rfl: 11     ALLERGIES:    Allergies   Allergen Reactions     No Known Drug Allergies         SOCIAL HISTORY:   Social History     Social History     Marital status: Single     Spouse name: N/A     Number of children: 1     Years of education: 12     Occupational History     Nursing assistant      Social History Main Topics     Smoking status: Current Every Day Smoker     Packs/day: 1.00     Years: 13.00     Types: Cigarettes     Smokeless tobacco: Never Used     Alcohol use 0.0 oz/week     0 Standard drinks or equivalent per week      Comment: rare     Drug use: No     Sexual activity: Yes     Partners: Male     Birth control/ protection: Surgical, Female Surgical      Comment: tubal ligation     Other Topics Concern      Service No     Blood Transfusions No     Caffeine Concern Yes     Advised not more than " "16 ounces/day     Occupational Exposure Yes     Visiting [employer] [facility] - student online classes     Hobby Hazards No     Sleep Concern No     Stress Concern Yes     Weight Concern No     Special Diet Yes     IDDM Type I     Back Care Yes     work-related about 1 year ago     Exercise Yes     Active at work     Bike Helmet No     Seat Belt Yes     Self-Exams No     Social History Narrative    Lives in [place] with Rod gandhi and her son and their daughter.   Vickie and Rod smoke.   No indoor cats/kittens.   No concerns about domestic violence.        FAMILY HISTORY:   Family History   Problem Relation Age of Onset     Hypertension Father      DIABETES Maternal Grandmother      CANCER Maternal Grandmother      DIABETES Paternal Grandfather      DIABETES Maternal Uncle      DIABETES Maternal Aunt         EXAM:Vitals: Temp 97  F (36.1  C) (Temporal)  Ht 5' 4\" (1.626 m)  Wt 160 lb (72.6 kg)  LMP 08/31/2017 (Exact Date)  BMI 27.46 kg/m2  BMI= Body mass index is 27.46 kg/(m^2).    General appearance: Patient is alert and fully cooperative with history & exam.  No sign of distress is noted during the visit.     Psychiatric: Affect is pleasant & appropriate.  Patient appears motivated to improve health.     Respiratory: Breathing is regular & unlabored while sitting.     HEENT: Hearing is intact to spoken word.  Speech is clear.  No gross evidence of visual impairment that would impact ambulation.     Vascular: DP & PT pulses are intact & regular bilaterally.  No significant edema or varicosities noted.  CFT and skin temperature is normal to both lower extremities.     Neurologic: Lower extremity sensation is intact to light touch.  No evidence of weakness or contracture in the lower extremities.  No evidence of neuropathy.    Dermatologic: Skin is intact to both lower extremities with adequate texture, turgor and tone about the integument.  No paronychia or evidence of soft tissue infection is noted. "     Musculoskeletal: Patient is ambulatory without assistive device or brace.  No gross ankle deformity noted.  No foot or ankle joint effusion is noted.  ***    Hemoglobin A1C (%)   Date Value   06/26/2017 11.2 (H)   01/29/2016 11.5 (H)   10/06/2015 11.7 (H)   04/01/2015 11.1 (H)   07/24/2014 11.3 (H)   05/30/2014 11.3 (H)   03/05/2014 11.2 (H)   11/06/2012 7.9 (H)     Creatinine (mg/dL)   Date Value   06/26/2017 0.83   09/27/2016 0.85   01/29/2016 0.86   11/16/2015 0.76   04/01/2015 0.75   07/24/2014 0.83       ASSESSMENT:       ICD-10-CM    1. Metatarsalgia of both feet M77.41 XR Foot Bilateral G/E 3 Views    M77.42    2. Capsulitis of foot, left M77.52 CARE COORDINATION REFERRAL     CANCELED: ORTHOTICS REFERRAL   3. Uncontrolled type 1 diabetes mellitus with complication (H) E10.8 CANCELED: ORTHOTICS REFERRAL    E10.65         PLAN:  Reviewed patient's chart in epic.      9/18/2017   Has not had controlled DM since 2012.    Secondary to uncontrolled diabetes she has developed extensive collagen cross-linking and this is now weakening very hypermobile flexible joints throughout the ball the foot and she has developed capsulitis metatarsalgia left second MPJ. Negative anterior drawer of this joint note dislocation. No stress reaction or stress fractures. Most at today's visit was spent discussing collagen cross-linking and how uncontrolled blood sugars weakens this tissue causing overuse and how this will cause other problems that are not able to be fixed without controlling her blood sugars first.    For now we can begin custom molded orthotics to increase load through the arch and reduce load through the forefoot. Orthotic to also cut out this area and offload.  Written instructions for proper shoe gear for stiff shank and more cushion about the forefoot.  Discontinue barefoot walking flip-flops and slip ons for now  Discussed oral anti-inflammatories as well as risks associated with them  Also discussed  injections and risks associated    All questions were answered to her satisfaction. She agrees that she has not been managing her diabetes well in that she has been noncompliant and she admits that there must be changes to be successful in her health. I recommended a visit with care coordination to help find this to improve her medical literacy, improve diabetes education, nutrition, appropriate support or referred to endocrinology.  At that point she may be a candidate for sliding scale and/or insulin pump she is very willing to move forward and engage with this.    At the end of the visit however she requested a work release for painful feet area and I suggested that it is her uncontrolled diabetes that makes her feet hurt. A few days off of work will not make her feet feel better upon returning to work. She requested that I leave at that point.    My sense is that she will engage managing her diabetes a bit more in an experience multispecialty care team can improve her medical literacy understanding and treatment and management of her disease.      Follow-up after obtaining the orthotics and changes in shoe gear.    At this point she would benefit from understanding why it is important to have blood sugars well below 200 average in 140 and below and never above 200. Then she would benefit from understanding how to manage carbohydrates and calories consistently then how to utilize insulin in relationship to those carbohydrates/calories.  Some form of insulin sliding scale may be helpful for her to learn. Insulin pump may also be helpful for managing baseline.    Pacheco Stephenson DPM      Again, thank you for allowing me to participate in the care of your patient.        Sincerely,        Pacheco Stephenson DPM

## 2017-09-19 ENCOUNTER — CARE COORDINATION (OUTPATIENT)
Dept: CARE COORDINATION | Facility: CLINIC | Age: 32
End: 2017-09-19

## 2017-09-19 NOTE — LETTER
Health Care Home - Access Care Plan    About Me  Patient Name:  Vickie Duque    YOB: 1985  Age:                            32 year old   Caren MRN:         [id number] Telephone Information:     Home Phone [phone]   Mobile [phone]       Address:    [address] [place] [id number] Email address:  [email or web address]      Emergency Contact(s)  Name Relationship Lgl Grd Work Phone Home Phone Mobile Phone   1MIESHA HERNANDEZ Mother   [phone]             Health Maintenance: Routine Health maintenance Reviewed: Due/Overdue   Health Maintenance Due   Topic Date Due     DEPRESSION ACTION PLAN Q1 YR  09/16/2001     FOOT EXAM Q1 YEAR  04/01/2016     MICROALBUMIN Q1 YEAR  04/01/2016     EYE EXAM Q1 YEAR  10/08/2016     LIPID MONITORING Q1 YEAR  01/29/2017     PHQ-9 Q6 MONTHS  03/26/2017     INFLUENZA VACCINE (SYSTEM ASSIGNED)  09/01/2017         My Access Plan  Medical Emergency 911   Questions or concerns during clinic hours Primary Clinic Line, I will call the clinic directly: Primary Clinic: [facility] [phone]   24 Hour Appointment Line [phone] or  [phone] ([phone])  (toll free)   24 Hour Nurse Line [phone] (toll free)   Questions or concerns outside clinic hours 24 Hour Appointment Line, I will call the after-hours on-call line:   [place] [phone] or [phone] ([phone]) (toll-free)   Preferred Urgent Care Preferred Urgent Care: Other   Preferred Hospital Preferred Hospital: [facility]  [phone]   Preferred Pharmacy [facility] [place], [place] - [address]      Behavioral Health Crisis Line The National Suicide Prevention Lifeline at 1-191.251.5022 or 911     My Care Team Members  Patient Care Team       Relationship Specialty Notifications Start End    No Ref-Primary, Physician PCP - General   6/26/17     Ирина Irving, RN Clinic Care Coordinator   Admissions 9/19/17     Phone: [phone] Fax: [phone]        Dede Putnam, RN Clinic Care Coordinator  Admissions 9/19/17     Phone: [phone] Fax: [phone]        Pacheco Stephenson DPM MD Podiatry Admissions 9/19/17     Phone: [phone] Fax: [phone]         [address] [place] [address]        My Medical and Care Information  Problem List   Patient Active Problem List   Diagnosis     Sprain of back     Type 1 diabetes mellitus with hyperglycemia (H)     HYPERLIPIDEMIA LDL GOAL <100     Facial paralysis/[place] palsy     DKA (diabetic ketoacidoses) (H)     Advanced directives, counseling/discussion     DVT prophylaxis     Tobacco abuse     SIRS (systemic inflammatory response syndrome) (H)     Uncontrolled type 1 diabetes mellitus (H)     Viral URI with cough     Noncompliance with medication regimen     Anxiety     Ulcerative colitis without complications, unspecified ulcerative colitis     Major depressive disorder, recurrent episode, moderate (H)     ASCUS of cervix with negative high risk HPV      Current Medications and Allergies:  See printed Medication Report

## 2017-09-19 NOTE — PROGRESS NOTES
Clinic Care Coordination Contact  [facility]/Voicemail    Referral Source: Specialist    Clinical Data: Care Coordinator Outreach, Per Podiatry referred her to Care Coordination due to not controlling her diabetes well. He referred her to improve her medical literacy, improve diabetic education, nutrition, appropriate support or referred to endocrinology per note.    Outreach attempted x 1.  Left message on voicemail with call back information and requested return call.    Plan: Care Coordinator will mail out care coordination introduction letter with care coordinator contact information and explanation of care coordination services. Care Coordinator will try to reach patient again in 1-2 business days.    Ирина Irving RN, [facility]  RN Care Coordinator  [facility], [place], [facility]  Phone # [phone]  9/19/2017 11:54 AM

## 2017-09-19 NOTE — LETTER
[address]   [id number]        September 19, 2017      Vickie Duque  [address] [place] [id number]    Dear Vickie,  I am the Clinic Care Coordinator that works with your primary care provider's clinic. I have been trying to reach you recently to introduce Clinic Care Coordination and to see if there was anything I could assist you with.  Below is a description of what Clinic Care Coordination is and how I can further assist you.     The Clinic Care Coordinator role is a Registered Nurse and/or  who understands the health care system. The goal of Clinic Care Coordination is to help you manage your health and improve access to the [facility] in the most efficient manner.  The Registered Nurse can assist you in meeting your health care goals by providing education, coordinating services, and strengthening the communication among your providers. The  can assist you with financial, behavioral, psychosocial, and chemical dependency and counseling/psychiatric resources.    Please feel free to keep this letter and contact information to contact me (Ирина)at [phone] or your Primary Care RN Care Coordinator Haydee [phone] (out of the office until 10/1/17) with any further questions or concerns that may arise. We at [facility] are focused on providing you with the highest-quality healthcare experience possible and that all starts with you.       Sincerely,     Ирина Irving        Enclosed: I have enclosed a copy of a 24 Hour Access Plan. This has helpful phone numbers for you to call when needed. Please keep this in an easy to access place to use as needed. I have enclosed an Authorization to Discuss Protected Health Information form. Please review, fill out, sign and send back to me so I can make sure we have this on file to be able to talk to family/friends if you would like us to be able to.

## 2017-09-20 NOTE — PROGRESS NOTES
Clinic Care Coordination Contact  [facility]/Voicemail    Referral Source: Specialist    Clinical Data: Care Coordinator Outreach, Per Podiatry referred her to Care Coordination due to not controlling her diabetes well. He referred her to improve her medical literacy, improve diabetic education, nutrition, appropriate support or referred to endocrinology per note.    Outreach attempted x 2.  Left message on voicemail with call back information and requested return call.    Plan: Care Coordinator mailed out care coordination introduction letter on 9/19/17. Care Coordinator will do no further outreaches at this time.    Ирина Irving RN, [facility]  RN Care Coordinator  [facility], [facility]  Phone # [phone]  9/20/2017 10:16 AM

## 2017-10-07 ENCOUNTER — HOSPITAL ENCOUNTER (OUTPATIENT)
Facility: CLINIC | Age: 32
Setting detail: OBSERVATION
Discharge: HOME | End: 2017-10-08
Attending: INTERNAL MEDICINE | Admitting: INTERNAL MEDICINE
Payer: COMMERCIAL

## 2017-10-07 DIAGNOSIS — F17.210 CIGARETTE SMOKER: ICD-10-CM

## 2017-10-07 DIAGNOSIS — E10.9 TYPE 1 DIABETES MELLITUS WITHOUT COMPLICATION (H): ICD-10-CM

## 2017-10-07 DIAGNOSIS — E10.65 UNCONTROLLED TYPE 1 DIABETES MELLITUS WITH HYPERGLYCEMIA (H): ICD-10-CM

## 2017-10-07 DIAGNOSIS — Z79.4 ENCOUNTER FOR LONG-TERM (CURRENT) USE OF INSULIN (H): ICD-10-CM

## 2017-10-07 LAB — GLUCOSE BLDC GLUCOMTR-MCNC: 282 MG/DL (ref 70–99)

## 2017-10-07 PROCEDURE — 83036 HEMOGLOBIN GLYCOSYLATED A1C: CPT | Performed by: INTERNAL MEDICINE

## 2017-10-07 PROCEDURE — 80053 COMPREHEN METABOLIC PANEL: CPT | Performed by: INTERNAL MEDICINE

## 2017-10-07 PROCEDURE — 99285 EMERGENCY DEPT VISIT HI MDM: CPT | Mod: 25 | Performed by: INTERNAL MEDICINE

## 2017-10-07 PROCEDURE — 87086 URINE CULTURE/COLONY COUNT: CPT | Performed by: INTERNAL MEDICINE

## 2017-10-07 PROCEDURE — 96361 HYDRATE IV INFUSION ADD-ON: CPT | Performed by: INTERNAL MEDICINE

## 2017-10-07 PROCEDURE — 99285 EMERGENCY DEPT VISIT HI MDM: CPT | Mod: Z6 | Performed by: INTERNAL MEDICINE

## 2017-10-07 PROCEDURE — 81001 URINALYSIS AUTO W/SCOPE: CPT | Performed by: INTERNAL MEDICINE

## 2017-10-07 PROCEDURE — 96360 HYDRATION IV INFUSION INIT: CPT | Performed by: INTERNAL MEDICINE

## 2017-10-07 PROCEDURE — 85025 COMPLETE CBC W/AUTO DIFF WBC: CPT | Performed by: INTERNAL MEDICINE

## 2017-10-07 PROCEDURE — 00000146 ZZHCL STATISTIC GLUCOSE BY METER IP

## 2017-10-07 PROCEDURE — 25000128 H RX IP 250 OP 636: Performed by: INTERNAL MEDICINE

## 2017-10-07 PROCEDURE — 87088 URINE BACTERIA CULTURE: CPT | Performed by: INTERNAL MEDICINE

## 2017-10-07 PROCEDURE — 87186 SC STD MICRODIL/AGAR DIL: CPT | Performed by: INTERNAL MEDICINE

## 2017-10-07 PROCEDURE — 84443 ASSAY THYROID STIM HORMONE: CPT | Performed by: INTERNAL MEDICINE

## 2017-10-07 PROCEDURE — 86140 C-REACTIVE PROTEIN: CPT | Performed by: INTERNAL MEDICINE

## 2017-10-07 RX ORDER — SODIUM CHLORIDE 9 MG/ML
1000 INJECTION, SOLUTION INTRAVENOUS CONTINUOUS
Status: DISCONTINUED | OUTPATIENT
Start: 2017-10-07 | End: 2017-10-08

## 2017-10-07 RX ADMIN — SODIUM CHLORIDE 1000 ML: 9 INJECTION, SOLUTION INTRAVENOUS at 23:45

## 2017-10-07 NOTE — IP AVS SNAPSHOT
Unit [address] Observation [address] [address]    Phone:  [phone]    Fax:  [phone]                                       After Visit Summary   10/7/2017    Vickie Duque    MRN: [id number]           After Visit Summary Signature Page     I have received my discharge instructions, and my questions have been answered. I have discussed any challenges I see with this plan with the nurse or doctor.    ..........................................................................................................................................  Patient/Patient Representative Signature      ..........................................................................................................................................  Patient Representative Print Name and Relationship to Patient    ..................................................               ................................................  Date                                            Time    ..........................................................................................................................................  Reviewed by Signature/Title    ...................................................              ..............................................  Date                                                            Time

## 2017-10-07 NOTE — LETTER
To Whom it may concern:      Vickie Duque was seen in our Emergency Department today, 10/08/17.  I expect her condition to improve over the next few days.  She may return to work/school when improved.    Sincerely,        YAIMA SMITH MD

## 2017-10-07 NOTE — LETTER
UNIT [address] OBSERVATION [address] [address]  Phone: [phone]  Fax: [phone]    October 8, 2017        Vickie Duque  [address] [place] [id number]          To whom it may concern:    RE: Vickie Johnston was seen in the Emergency Department on 10/07/2017 and subsequently admitted to the Observation until from 10/7-10/8.          Sincerely,        RAJESH Pulliam, CNP  Nurse Practitioner   Emergency Department Observation Unit

## 2017-10-07 NOTE — IP AVS SNAPSHOT
MRN:[id number]                      After Visit Summary   10/7/2017    Vickie Duque    MRN: [id number]           Thank you!     Thank you for choosing [facility] for your care. Our goal is always to provide you with excellent care. Hearing back from our patients is one way we can continue to improve our services. Please take a few minutes to complete the written survey that you may receive in the mail after you visit with us. Thank you!        Patient Information     Date Of Birth          1985        Designated Caregiver       Most Recent Value    Caregiver    Will someone help with your care after discharge? yes    Name of designated caregiver     Phone number of caregiver home    Caregiver address home      About your hospital stay     You were admitted on:  October 8, 2017 You last received care in the:  Unit [facility] Observation [facility]    You were discharged on:  October 8, 2017        Reason for your hospital stay       You were admitted with uncontrolled type 1 DM. We consulted Endocrinology who recommended the following:   -Discontinue insulin 70/30  -Start insulin Basaglar 25 units daily  -Insulin Novolog 1 unit per 15 g of carbohydrate Three times daily with meals  -Insulin Novolog medium dose correction scale 1 unit per 50 >140 mg/dl  -Glucagon pen to use for severe hypoglycemia  -Follow-up in endocrine clinic in [facility] in 4-8 weeks.    Please monitor your blood glucose carefully. I am concerned that you do not recognize low blood sugars as this could be life threatening for you.                  Who to Call     For medical emergencies, please call 911.  For non-urgent questions about your medical care, please call your primary care provider or clinic, None          Attending Provider     Provider Specialty    Marc Greer MD Emergency Medicine    Janie Pressley MD Internal Medicine       Primary Care Provider    Physician No Ref-Primary       When to  contact your care team       Return to the ED with low (< 70) or high blood sugars (> 300),  fever, uncontrolled nausea, vomiting, unrelieved pain, bleeding not relieved with pressure, dizziness, chest pain, shortness of breath, loss of consciousness, and any new or concerning symptoms.                  After Care Instructions     Activity       Your activity upon discharge: activity as tolerated, no driving if symptoms of hypoglycemia            Diet       Follow this diet upon discharge:       Moderate Consistent CHO Diet            Monitor and record       blood glucose 4 times a day, before meals and at bedtime                  Follow-up Appointments     Adult [facility] Follow-up and recommended labs and tests       Follow up with primary care provider, Physician No Ref-Primary, within 7 days for hospital follow- up.     Endocrinology in 4-6 weeks.       Appointments on [place] and/or [facility] (with [facility] or [facility] provider or service). Call [phone] if you haven't heard regarding these appointments within 7 days of discharge.                  Additional Services     Endocrinology Adult Referral                 Pending Results     Date and Time Order Name Status Description    10/7/2017 2320 Urine Culture Aerobic Bacterial Preliminary             Statement of Approval     Ordered          10/08/17 1420  I have reviewed and agree with all the recommendations and orders detailed in this document.  EFFECTIVE NOW     Approved and electronically signed by:  Sarah Paul APRN CNP             Admission Information     Date & Time Provider Department Dept. Phone    10/7/2017 Janie Pressley MD Unit 6D Observation [facility] [phone]      Your Vitals Were     Blood Pressure Pulse Temperature Respirations Weight Pulse Oximetry    95/61 76 98  F (36.7  C) (Oral) 16 74.8 kg (165 lb) 98%    BMI (Body Mass Index)                   28.32 kg/m2           MyChart Information     [facility] gives you secure  access to your electronic health record. If you see a primary care provider, you can also send messages to your care team and make appointments. If you have questions, please call your primary care clinic.  If you do not have a primary care provider, please call [phone] and they will assist you.        Care EveryWhere ID     This is your Care EveryWhere ID. This could be used by other organizations to access your [facility] medical records  [id number]        Equal Access to Services     KRISTIN ONOFRE : Hadii sri ramoso Soomaali, waaxda luqadaha, qaybta kaalmada adeegyada, kendra luna. So [facility] [phone].    ATENCIÓN: Si habla español, tiene a barrientos disposición servicios gratuitos de asistencia lingüística. Llame al [phone].    We comply with applicable federal civil rights laws and [place] laws. We do not discriminate on the basis of race, color, national origin, age, disability, sex, sexual orientation, or gender identity.               Review of your medicines      START taking        Dose / Directions    BASAGLAR 100 UNIT/ML injection   Used for:  Uncontrolled type 1 diabetes mellitus with hyperglycemia (H)        Dose:  25 Units   Inject 25 Units Subcutaneous daily   Quantity:  30 mL   Refills:  1       glucagon 1 MG Solr injection   Used for:  Uncontrolled type 1 diabetes mellitus with hyperglycemia (H)        Dose:  1 mg   Inject 1 mg Subcutaneous every 15 minutes as needed for low blood sugar   Quantity:  2 each   Refills:  0       * insulin aspart 100 UNIT/ML injection   Commonly known as:  NovoLOG PEN   Used for:  Uncontrolled type 1 diabetes mellitus with hyperglycemia (H)        Dose:  1-7 Units   Inject 1-7 Units Subcutaneous 3 times daily (before meals) Do Not take Correction Insulin if Pre-Meal BG < than 140.  140 - 189 give 1 unit 190 - 239 give 2 units 240 - 289 give 3 units 290 - 339 give 4 units 340- 399 give 5 units 400-449 give 6 units Greater than or  "equal to 450 give 7 units Based on pre-meal blood glucose.   Notify clinic if glucose greater than or equal to 350 mg/dL after administration of correction dose   Quantity:  10 mL   Refills:  0       * insulin aspart 100 UNIT/ML injection   Commonly known as:  NovoLOG PEN   Used for:  Uncontrolled type 1 diabetes mellitus with hyperglycemia (H)        Dose:  1 Units   Inject 1 Units Subcutaneous 3 times daily (with meals) DOSE:  1 units per 15 grams of carbohydrate. Do not give if pre-meal glucose is less than 60 mg/dL.   Quantity:  5 mL   Refills:  0       * Notice:  This list has 2 medication(s) that are the same as other medications prescribed for you. Read the directions carefully, and ask your doctor or other care provider to review them with you.      CONTINUE these medicines which have NOT CHANGED        Dose / Directions    ACCU-CHEK SMARTVIEW test strip   Used for:  Uncontrolled type 1 diabetes mellitus (H)   Generic drug:  blood glucose monitoring        TEST 4 TIMES A DAY OR AS DI RECTED   Quantity:  100 each   Refills:  11       ACE/ARB NOT PRESCRIBED (INTENTIONAL)   Used for:  Type 1 diabetes, HbA1c goal < 7% (H)        Dose:  1 each   1 each every 3 hours ACE & ARB not prescribed due to Intolerance and Symptomatic hypotension not due to excessive diuresis   Refills:  0       B-D INSULIN SYRINGE 31G X 5/16\" 0.5 ML   Used for:  Diabetes mellitus type I (H)   Generic drug:  insulin syringe-needle U-100        USE TO INJECT INSULIN   Quantity:  100 each   Refills:  6       * blood glucose monitoring meter device kit   Commonly known as:  no brand specified   Used for:  Type I (juvenile type) diabetes mellitus without mention of complication, uncontrolled        Use to test blood sugar 2 times daily or as directed.   Quantity:  1 kit   Refills:  0       * blood glucose monitoring meter device kit   Used for:  Type 1 diabetes, HbA1c goal < 7% (H)        Use to test blood sugar 4 times daily or as directed. " "  Quantity:  1 kit   Refills:  0       DIABETIC STERILE LANCETS device   Used for:  Type I (juvenile type) diabetes mellitus without mention of complication, not stated as uncontrolled        Dose:  1 Device   1 Device 4 times daily.   Quantity:  1 Box   Refills:  11       * Notice:  This list has 2 medication(s) that are the same as other medications prescribed for you. Read the directions carefully, and ask your doctor or other care provider to review them with you.      STOP taking     NovoLIN MIX 70/30 VIAL injection   Generic drug:  insulin NPH-insulin regular                Where to get your medicines      These medications were sent to [facility] [place], [place] - [address] [address]     Phone:  [phone]     BASAGLAR 100 UNIT/ML injection    glucagon 1 MG Solr injection         Some of these will need a paper prescription and others can be bought over the counter. Ask your nurse if you have questions.     Bring a paper prescription for each of these medications     insulin aspart 100 UNIT/ML injection    insulin aspart 100 UNIT/ML injection                Protect others around you: Learn how to safely use, store and throw away your medicines at www.disposemymeds.org.             Medication List: This is a list of all your medications and when to take them. Check marks below indicate your daily home schedule. Keep this list as a reference.      Medications           Morning Afternoon Evening Bedtime As Needed    ACCU-CHEK SMARTVIEW test strip   TEST 4 TIMES A DAY OR AS DI RECTED   Generic drug:  blood glucose monitoring                                ACE/ARB NOT PRESCRIBED (INTENTIONAL)   1 each every 3 hours ACE & ARB not prescribed due to Intolerance and Symptomatic hypotension not due to excessive diuresis                                B-D INSULIN SYRINGE 31G X 5/16\" 0.5 ML   USE TO INJECT INSULIN   Generic drug:  insulin syringe-needle " U-100                                BASAGLAR 100 UNIT/ML injection   Inject 25 Units Subcutaneous daily   Last time this was given:  25 Units on 10/8/2017  2:12 PM                                * blood glucose monitoring meter device kit   Commonly known as:  no brand specified   Use to test blood sugar 2 times daily or as directed.                                * blood glucose monitoring meter device kit   Use to test blood sugar 4 times daily or as directed.                                DIABETIC STERILE LANCETS device   1 Device 4 times daily.                                glucagon 1 MG Solr injection   Inject 1 mg Subcutaneous every 15 minutes as needed for low blood sugar                                * insulin aspart 100 UNIT/ML injection   Commonly known as:  NovoLOG PEN   Inject 1-7 Units Subcutaneous 3 times daily (before meals) Do Not take Correction Insulin if Pre-Meal BG < than 140.  140 - 189 give 1 unit 190 - 239 give 2 units 240 - 289 give 3 units 290 - 339 give 4 units 340- 399 give 5 units 400-449 give 6 units Greater than or equal to 450 give 7 units Based on pre-meal blood glucose.   Notify clinic if glucose greater than or equal to 350 mg/dL after administration of correction dose   Last time this was given:  3 Units on 10/8/2017  2:11 PM                                * insulin aspart 100 UNIT/ML injection   Commonly known as:  NovoLOG PEN   Inject 1 Units Subcutaneous 3 times daily (with meals) DOSE:  1 units per 15 grams of carbohydrate. Do not give if pre-meal glucose is less than 60 mg/dL.   Last time this was given:  3 Units on 10/8/2017  2:11 PM                                * Notice:  This list has 4 medication(s) that are the same as other medications prescribed for you. Read the directions carefully, and ask your doctor or other care provider to review them with you.              More Information        Hypoglycemia (Low Blood Sugar)     Fast-acting sugar includes a cup of nonfat  milk.     Too little sugar (glucose) in your blood is called hypoglycemia or low blood sugar. Low blood sugar usually means anything lower than 70 mg/dL. Talk with your healthcare provider about your target range and what level is too low for you. Diabetes itself doesn t cause low blood sugar. But some of the treatments for diabetes, such as pills or insulin, may raise your risk for it. Low blood sugar may cause you to pass out or have a seizure. So always treat low blood sugar right away, but don't overeat.  Special note: Always carry a source of fast-acting sugar and a snack in case of hypoglycemia.   What you may notice  If you have low blood sugar, you may have one or more of these symptoms:    Shakiness or dizziness    Cold, clammy skin or sweating    Feelings of hunger    Headache    Nervousness    A hard, fast heartbeat    Weakness    Confusion or irritability    Blurred vision    Having nightmares or waking up confused or sweating    Numbness or tingling in the lips or tongue  What you should do  Here are tips to follow if you have hypoglycemia:     First check your blood sugar. If it is too low (out of your target range), eat or drink 15 to 20 grams of fast-acting sugar. This may be 3 to 4 glucose tablets, 4 ounces (half a cup) of fruit juice or regular (nondiet) soda, 8 ounces (1 cup) of fat-free milk, or 1 tablespoon of honey. Don t take more than this, or your blood sugar may go too high.    Wait 15 minutes. Then recheck your blood sugar if you can.    If your blood sugar is still too low, repeat the steps above and check your blood sugar again. If your blood sugar still has not returned to your target range, contact your healthcare provider or seek emergency care.    Once your blood sugar returns to target range, eat a snack or meal.  Preventing low blood sugar  Things you can do include the following:     If your condition needs a strict treatment plan, eat your meals and snacks at the same times each  day. Don t skip meals!    If your treatment plan lets you change when you eat and what you eat, learn how to change the time and dose of your rapid-acting insulin to match this.     Ask your healthcare provider if it is safe for you to drink alcohol. Never drink on an empty stomach.    Take your medicine at the prescribed times.    Always carry a source of fast-acting sugar and a snack when you re away from home.  Other things to do  Additional tips include the following:    Carry a medical ID card, a compact USB drive, or wear a medical alert bracelet or necklace. It should say that you have diabetes. It should also say what to do if you pass out or have a seizure.    Make sure your family, friends, and coworkers know the signs of low blood sugar. Tell them what to do if your blood sugar falls very low and you can t treat yourself.    Keep a glucagon emergency kit handy. Be sure your family, friends, and coworkers know how and when to use it. Check it regularly and replace the glucagon before it expires.    Talk with your health care team about other things you can do to prevent low blood sugar.     If you have unexplained hypoglycemia or hypoglycemia several times, call your healthcare provider.   Date Last Reviewed: 5/1/2016 2000-2017 [employer]. [address] [place] [address], [place] [address]. All rights reserved. This information is not intended as a substitute for professional medical care. Always follow your healthcare professional's instructions.                Diabetes with High Blood Sugar  You have been treated for high blood sugar (hyperglycemia). This may be because of an infection or other illness, eating too many sweets or starches, or not taking enough insulin.  Home care  Monitor and write down your blood sugar level at least twice a day. Do this before breakfast and before dinner. If you take insulin, record your routine insulin dose as well. Also record any additional doses required  "based on your sliding scale. Do this for the next 3 to 5 days.  High blood sugar may cause symptoms that you can learn to recognize, such as:    Frequent urination    Thirst    Dizziness    Headache    Shortness of breath    Breath that smells fruity    Nausea or vomiting    Abdominal pain    Drowsiness or loss of consciousness  If you have high-blood-sugar symptoms, use a blood or urine test to find out what your blood sugar level is. If it is above your usual range, use the \"sliding scale\" regular insulin dose prescribed by your healthcare provider. If you were not given a range for your insulin dose, contact your healthcare provider for more advice.  Follow-up care  Follow up with your healthcare provider, or as advised. You may need to meet with your healthcare provider in the next week. You will likely review your blood sugar records together. You may also talk to your provider about adjusting your dose of insulin or other medicine for blood sugar.  When to seek medical advice  Call your healthcare provider right away if these occur:    High blood sugar symptoms (Symptoms are described above.)    Blood sugar over 300 mg/dl If you can t reach your healthcare provider, go to a hospital emergency room or urgent care center  Date Last Reviewed: 6/1/2016 2000-2017 [employer]. [address] [place] [address]. All rights reserved. This information is not intended as a substitute for professional medical care. Always follow your healthcare professional's instructions.                Understanding Carbohydrates    A car needs the right type of fuel to run. And you need the right kind of food to function. To keep your energy level up, your body needs food that has carbohydrates. But carbohydrates raise blood sugar levels higher and faster than other kinds of food. Your dietitian will work with you to figure out the amount of carbohydrates you need.  Starches  Starches are found in grains, some " vegetables, and beans. Grain products include bread, pasta, cereal, and tortillas. Starchy vegetables include potatoes, peas, corn, lima beans, yams, and squash. Kidney beans, boland beans, black beans, garbanzo beans, and lentils also contain starches.  Sugars  Sugars are found naturally in many foods. Or sugar can be added. Foods that contain natural sugar include fruits and fruit juices, dairy products, honey, and molasses. Added sugars are found in most desserts, processed foods, candy, regular soda, and fruit drinks. These are very helpful for treating low blood sugar, or hypoglycemia. They provide sugar quickly. Try to keep at least 15 to 20 grams of these simple sugars with you at all times. Eat this if you begin to have low blood sugar symptoms.  Fiber  Fiber comes from plant foods. Most fiber isn t digested by the body. Instead of raising blood sugar levels like other carbohydrates, it actually stops blood sugar from rising too fast. Fiber is found in fruits, vegetables, whole grains, beans, peas, and many nuts.  Carb counting  Keep track of the amount of carbohydrates you eat. This can help you keep the right balance of physical activity and medicine. The amount of carbohydrates needed will vary for each person. It depends on many things such as your health, the medicines you take, and how active you are. Your healthcare team will help you figure out the right amount of carbohydrates for you. You may start with around 45 to 60 grams of carbohydrate per meal, depending on your situation. Carb counting is a system that helps you keep track of the carbohydrates you eat at each meal.  Carbohydrates come from a variety of foods. These include grains, starchy vegetables, fruit, milk, beans, and snack foods. You can either count carbohydrate grams or carbohydrate servings. When you count carbohydrate servings, 1 carbohydrate serving = 15 grams of carbohydrates.  Here are some examples of foods containing about 15  grams of carbohydrates (1 serving of carbohydrates):    1/2 cup of canned or frozen fruit    A small piece of fresh fruit (4 ounces)    1 slice of bread    1/2 cup of oatmeal    1/3 cup of rice    4 to 6 crackers    1/2 English muffin    1/2 cup of black beans    1/4 of a large baked potato (3 ounces)    2/3 cup of plain fat-free yogurt    1 cup of soup    1/2 cup of casserole    6 chicken nuggets    2-inch-square brownie or cake without frosting    2 small cookies    1/2 cup of ice cream or sherbet  Carb counting is easier when food labels are available. Look at the label to see how many grams of total carbohydrates the food contains. Then you can figure out how much you should eat.  Two very important lines to look at on the label are the serving size and the total carbohydrate amount. Here are some tips for using food labels to count your carbohydrate intake:    Check the serving size. The information on the label is based on that serving size. If you eat more than the listed serving size, you may have to double or triple the other information on the label.     Check the total grams of carbohydrates. Total carbohydrate from the label includes sugar, starch, and fiber. Be sure to use the total carbohydrate number and not sugar alone.    Know how many grams of carbohydrates you can have.  Be familiar with the matching portion sizes.    Compare labels. Compare the labels of different products, looking at serving sizes and total carbohydrates to find the products that work best for you.     Don't forget protein and fat. With all the focus on carb counting, it might be easy to forget protein and fat in your meals. Don't forget to include sources of protein and healthy fat to balance your meals.  It s also important to be consistent with the amount and time you eat when taking a fixed dose of diabetes medicine. Work with your healthcare provider or dietitian if you need additional help. He or she can help you keep  track of your carbohydrate intake. He or she can also help you figure out how many grams of carbohydrates you should have.  Date Last Reviewed: 7/1/2016 2000-2017 [employer]. [address] [place] [address], [place] [address]. All rights reserved. This information is not intended as a substitute for professional medical care. Always follow your healthcare professional's instructions.

## 2017-10-08 VITALS
WEIGHT: 165 LBS | OXYGEN SATURATION: 98 % | HEART RATE: 76 BPM | TEMPERATURE: 98 F | BODY MASS INDEX: 28.32 KG/M2 | SYSTOLIC BLOOD PRESSURE: 95 MMHG | DIASTOLIC BLOOD PRESSURE: 61 MMHG | RESPIRATION RATE: 16 BRPM

## 2017-10-08 PROBLEM — R73.9 HYPERGLYCEMIA: Status: ACTIVE | Noted: 2017-10-08

## 2017-10-08 LAB
ALBUMIN SERPL-MCNC: 3.1 G/DL (ref 3.4–5)
ALBUMIN UR-MCNC: NEGATIVE MG/DL
ALP SERPL-CCNC: 95 U/L (ref 40–150)
ALT SERPL W P-5'-P-CCNC: 14 U/L (ref 0–50)
ANION GAP SERPL CALCULATED.3IONS-SCNC: 8 MMOL/L (ref 3–14)
APPEARANCE UR: CLEAR
AST SERPL W P-5'-P-CCNC: 8 U/L (ref 0–45)
BACTERIA #/AREA URNS HPF: ABNORMAL /HPF
BASOPHILS # BLD AUTO: 0 10E9/L (ref 0–0.2)
BASOPHILS NFR BLD AUTO: 0 %
BILIRUB SERPL-MCNC: 0.2 MG/DL (ref 0.2–1.3)
BILIRUB UR QL STRIP: NEGATIVE
BUN SERPL-MCNC: 15 MG/DL (ref 7–30)
CALCIUM SERPL-MCNC: 8.9 MG/DL (ref 8.5–10.1)
CHLORIDE SERPL-SCNC: 105 MMOL/L (ref 94–109)
CO2 SERPL-SCNC: 27 MMOL/L (ref 20–32)
COLOR UR AUTO: ABNORMAL
CREAT SERPL-MCNC: 0.88 MG/DL (ref 0.52–1.04)
CRP SERPL-MCNC: <2.9 MG/L (ref 0–8)
DIFFERENTIAL METHOD BLD: ABNORMAL
EOSINOPHIL # BLD AUTO: 0 10E9/L (ref 0–0.7)
EOSINOPHIL NFR BLD AUTO: 0 %
ERYTHROCYTE [DISTWIDTH] IN BLOOD BY AUTOMATED COUNT: 15.3 % (ref 10–15)
GFR SERPL CREATININE-BSD FRML MDRD: 73 ML/MIN/1.7M2
GLUCOSE BLDC GLUCOMTR-MCNC: 103 MG/DL (ref 70–99)
GLUCOSE BLDC GLUCOMTR-MCNC: 107 MG/DL (ref 70–99)
GLUCOSE BLDC GLUCOMTR-MCNC: 115 MG/DL (ref 70–99)
GLUCOSE BLDC GLUCOMTR-MCNC: 131 MG/DL (ref 70–99)
GLUCOSE BLDC GLUCOMTR-MCNC: 176 MG/DL (ref 70–99)
GLUCOSE BLDC GLUCOMTR-MCNC: 267 MG/DL (ref 70–99)
GLUCOSE BLDC GLUCOMTR-MCNC: 305 MG/DL (ref 70–99)
GLUCOSE BLDC GLUCOMTR-MCNC: 47 MG/DL (ref 70–99)
GLUCOSE BLDC GLUCOMTR-MCNC: 52 MG/DL (ref 70–99)
GLUCOSE BLDC GLUCOMTR-MCNC: 89 MG/DL (ref 70–99)
GLUCOSE SERPL-MCNC: 245 MG/DL (ref 70–99)
GLUCOSE UR STRIP-MCNC: >1000 MG/DL
HBA1C MFR BLD: 10 % (ref 4.3–6)
HCT VFR BLD AUTO: 34.7 % (ref 35–47)
HGB BLD-MCNC: 11.3 G/DL (ref 11.7–15.7)
HGB UR QL STRIP: NEGATIVE
IMM GRANULOCYTES # BLD: 0 10E9/L (ref 0–0.4)
IMM GRANULOCYTES NFR BLD: 0.2 %
KETONES BLD-SCNC: 0.3 MMOL/L (ref 0–0.6)
KETONES UR STRIP-MCNC: 5 MG/DL
LEUKOCYTE ESTERASE UR QL STRIP: NEGATIVE
LYMPHOCYTES # BLD AUTO: 3.7 10E9/L (ref 0.8–5.3)
LYMPHOCYTES NFR BLD AUTO: 36.2 %
MAGNESIUM SERPL-MCNC: 1.8 MG/DL (ref 1.6–2.3)
MCH RBC QN AUTO: 25.6 PG (ref 26.5–33)
MCHC RBC AUTO-ENTMCNC: 32.6 G/DL (ref 31.5–36.5)
MCV RBC AUTO: 79 FL (ref 78–100)
MONOCYTES # BLD AUTO: 0.6 10E9/L (ref 0–1.3)
MONOCYTES NFR BLD AUTO: 5.8 %
MUCOUS THREADS #/AREA URNS LPF: PRESENT /LPF
NEUTROPHILS # BLD AUTO: 6 10E9/L (ref 1.6–8.3)
NEUTROPHILS NFR BLD AUTO: 57.8 %
NITRATE UR QL: POSITIVE
NRBC # BLD AUTO: 0 10*3/UL
NRBC BLD AUTO-RTO: 0 /100
PH UR STRIP: 5.5 PH (ref 5–7)
PHOSPHATE SERPL-MCNC: 4 MG/DL (ref 2.5–4.5)
PLATELET # BLD AUTO: 273 10E9/L (ref 150–450)
POTASSIUM SERPL-SCNC: 3.6 MMOL/L (ref 3.4–5.3)
PROT SERPL-MCNC: 6.5 G/DL (ref 6.8–8.8)
RBC # BLD AUTO: 4.42 10E12/L (ref 3.8–5.2)
RBC #/AREA URNS AUTO: 0 /HPF (ref 0–2)
SODIUM SERPL-SCNC: 140 MMOL/L (ref 133–144)
SOURCE: ABNORMAL
SP GR UR STRIP: 1.03 (ref 1–1.03)
SQUAMOUS #/AREA URNS AUTO: <1 /HPF (ref 0–1)
TSH SERPL DL<=0.005 MIU/L-ACNC: 2.57 MU/L (ref 0.4–4)
UROBILINOGEN UR STRIP-MCNC: NORMAL MG/DL (ref 0–2)
WBC # BLD AUTO: 10.3 10E9/L (ref 4–11)
WBC #/AREA URNS AUTO: 3 /HPF (ref 0–2)

## 2017-10-08 PROCEDURE — 25000131 ZZH RX MED GY IP 250 OP 636 PS 637: Performed by: NURSE PRACTITIONER

## 2017-10-08 PROCEDURE — 82010 KETONE BODYS QUAN: CPT | Performed by: INTERNAL MEDICINE

## 2017-10-08 PROCEDURE — 83735 ASSAY OF MAGNESIUM: CPT | Performed by: INTERNAL MEDICINE

## 2017-10-08 PROCEDURE — 84100 ASSAY OF PHOSPHORUS: CPT | Performed by: INTERNAL MEDICINE

## 2017-10-08 PROCEDURE — 99235 HOSP IP/OBS SAME DATE MOD 70: CPT | Mod: Z6 | Performed by: PHYSICIAN ASSISTANT

## 2017-10-08 PROCEDURE — 25000131 ZZH RX MED GY IP 250 OP 636 PS 637: Performed by: PHYSICIAN ASSISTANT

## 2017-10-08 PROCEDURE — 36415 COLL VENOUS BLD VENIPUNCTURE: CPT | Performed by: INTERNAL MEDICINE

## 2017-10-08 PROCEDURE — 00000146 ZZHCL STATISTIC GLUCOSE BY METER IP

## 2017-10-08 PROCEDURE — G0378 HOSPITAL OBSERVATION PER HR: HCPCS

## 2017-10-08 PROCEDURE — 25000128 H RX IP 250 OP 636: Performed by: INTERNAL MEDICINE

## 2017-10-08 PROCEDURE — 96372 THER/PROPH/DIAG INJ SC/IM: CPT

## 2017-10-08 PROCEDURE — 25000131 ZZH RX MED GY IP 250 OP 636 PS 637: Performed by: INTERNAL MEDICINE

## 2017-10-08 PROCEDURE — 96361 HYDRATE IV INFUSION ADD-ON: CPT | Performed by: INTERNAL MEDICINE

## 2017-10-08 RX ORDER — ONDANSETRON 2 MG/ML
4 INJECTION INTRAMUSCULAR; INTRAVENOUS EVERY 6 HOURS PRN
Status: DISCONTINUED | OUTPATIENT
Start: 2017-10-08 | End: 2017-10-08 | Stop reason: HOSPADM

## 2017-10-08 RX ORDER — LIDOCAINE 40 MG/G
CREAM TOPICAL
Status: DISCONTINUED | OUTPATIENT
Start: 2017-10-08 | End: 2017-10-08 | Stop reason: HOSPADM

## 2017-10-08 RX ORDER — NICOTINE POLACRILEX 4 MG
15-30 LOZENGE BUCCAL
Status: DISCONTINUED | OUTPATIENT
Start: 2017-10-08 | End: 2017-10-08 | Stop reason: HOSPADM

## 2017-10-08 RX ORDER — INSULIN GLARGINE 100 [IU]/ML
25 INJECTION, SOLUTION SUBCUTANEOUS DAILY
Qty: 30 ML | Refills: 1 | Status: SHIPPED | OUTPATIENT
Start: 2017-10-08 | End: 2017-10-31

## 2017-10-08 RX ORDER — DEXTROSE MONOHYDRATE 25 G/50ML
25 INJECTION, SOLUTION INTRAVENOUS ONCE
Status: DISCONTINUED | OUTPATIENT
Start: 2017-10-08 | End: 2017-10-08

## 2017-10-08 RX ORDER — DEXTROSE MONOHYDRATE 25 G/50ML
25-50 INJECTION, SOLUTION INTRAVENOUS
Status: DISCONTINUED | OUTPATIENT
Start: 2017-10-08 | End: 2017-10-08 | Stop reason: HOSPADM

## 2017-10-08 RX ORDER — METOCLOPRAMIDE HYDROCHLORIDE 5 MG/ML
10 INJECTION INTRAMUSCULAR; INTRAVENOUS EVERY 6 HOURS PRN
Status: DISCONTINUED | OUTPATIENT
Start: 2017-10-08 | End: 2017-10-08 | Stop reason: HOSPADM

## 2017-10-08 RX ADMIN — SODIUM CHLORIDE 1000 ML: 9 INJECTION, SOLUTION INTRAVENOUS at 01:18

## 2017-10-08 RX ADMIN — INSULIN ASPART 3 UNITS: 100 INJECTION, SOLUTION INTRAVENOUS; SUBCUTANEOUS at 11:01

## 2017-10-08 RX ADMIN — INSULIN ASPART 4 UNITS: 100 INJECTION, SOLUTION INTRAVENOUS; SUBCUTANEOUS at 14:10

## 2017-10-08 RX ADMIN — INSULIN GLARGINE 25 UNITS: 100 INJECTION, SOLUTION SUBCUTANEOUS at 14:12

## 2017-10-08 RX ADMIN — INSULIN ASPART 3 UNITS: 100 INJECTION, SOLUTION INTRAVENOUS; SUBCUTANEOUS at 14:11

## 2017-10-08 ASSESSMENT — ENCOUNTER SYMPTOMS
NECK PAIN: 0
COUGH: 0
SHORTNESS OF BREATH: 0
NAUSEA: 0
DYSURIA: 0
CONFUSION: 0
WHEEZING: 0
HEADACHES: 0
DIARRHEA: 0
POLYDIPSIA: 0
VOMITING: 0
ADENOPATHY: 0
ABDOMINAL PAIN: 0
BACK PAIN: 0
TROUBLE SWALLOWING: 0
WEAKNESS: 0
LIGHT-HEADEDNESS: 1
FLANK PAIN: 0
NUMBNESS: 0
FEVER: 0
PALPITATIONS: 0
CHILLS: 0

## 2017-10-08 NOTE — PLAN OF CARE
Problem: Patient Care Overview  Goal: Discharge Needs Assessment  - Blood Glucose greater than 70 less than 250 on two consecutive readings. : Yes  - Ketones absent from urine. : Pending results  - Tolerating oral intake to maintain hydration : Yes  - Safe disposition plan has been identified : No     Pt a/o x 4; VSS, pt denies pain, headache, dizziness, n/v, or SOB. Pt reports some numbness and tingaling on bottoms of feet.      According to hand-off report from ED [place] nurse, just prior to transporting to [facility], pt is not symptomatic but her blood sugar was 52; they gave her orange juice, blood sugar went down to 47, they gave her a sandwich, last blood sugar was 89. Upon arrival to , blood sugar level was 103. Per provider, we are checking BS Q15 minutes.     Pt left the unit to go smoke. Will check BS when she returns to room.

## 2017-10-08 NOTE — ED PROVIDER NOTES
Sign out from Dr. Greer at 3AM    Situation:  33 yo F with a hx of type I DM presenting with labile blood sugar with episodes of hypoglycemia. On 70/30 insulin BID.  250s glucose now. Has UTI as well.     Plan:  Admitted to  for transition to long and short acting insulin with sliding scale/carb counting and DM education.     Events:  3:41 AM  Alerted by nursing of pt having hypoglycemia in 50s. Not symptomatic. Did not receive any insulin since arrival here. Given juice.   Rechecked, glucose in 40s. Given a sandwich.  Continue to recheck regularly until safe glucose levels achieved.     3:54 AM  Repeat glucose in 80s. Ready for transfer to . [facility] here.    MD Rafael Scott, Ethel Nathan MD  10/10/17 0609

## 2017-10-08 NOTE — ED NOTES
ED Nursing Note:    Type 1 diabetes for 14 years.    Has not been well controlled for the past several.  States her blood glucoses have been ranging between 33 (this morning) to 508.   Feels tired, but otherwise not nauseated, no vomiting, and denies other symptoms of DKA.   BG checks are every couple of hours right now.  Takes 40 units of 70/30 Humulin  in the morning and 30 units 70/30 Humulin in the evening.

## 2017-10-08 NOTE — DISCHARGE SUMMARY
ED Observation Discharge Summary    Vickie Duque   MRN# [id number]  Age: 32 year old   YOB: 1985            Date of Admission: 10/7/2017    Date of Discharge: 10/8/2017  Admitting Physician: Dr. Janie Pressley MD  Discharge Physician: Dr. Qyunh MD  NP/PA: Sarah Paul CNP        DISCHARGE DIAGNOSIS:   1. Uncontrolled Type 1 DM    INTERVAL HISTORY: VSS, afebrile. Up ad saray. Eating and drinking ok. Feels comfortable with insulin changes. Has been on SSI and Carb Counting in the past. Denies fevers, chills, headaches, dizziness,  cough, SOB, wheezing, chest pain/pressure, abdominal pain, N/V, constipation, extremity swelling/weakness/numbness/tingling, or signs of active bleeding.        PHYSICAL EXAM:   Blood pressure 95/61, pulse 76, temperature 98  F (36.7  C), temperature source Oral, resp. rate 16, weight 74.8 kg (165 lb), SpO2 98 %, not currently breastfeeding.     GENERAL APPEARENCE: A/O x4. NAD.  SKIN: Clean, dry, and intact   HEENT/NECK: NCAT. Sclera anicteric, PERRLA, EOMI.  Oral mucosa pink and moist   CARDIOVASCULAR: S1, S2 RRR. No murmurs, rubs, or gallops.   RESPIRATORY: Respiratory effort WNL. CTA  bilaterally without crackles/rales/wheeze   GI: Active BS in all 4 quadrants. Abdomen soft and non-tender. No masses or hepatosplenomegaly.  : Deferred  MUSCULOSKELETAL:Extremities normal, no gross deformities noted, non-tender to palpation.   PV: 2+ bilateral radial and pedal pulses. No edema noted.   NEURO: CN II-XII grossly intact. Speech normal. Appropriate throughout interview. HEME/LYMPH: No visible bleeding.   PSYCHIATRIC: Mentation and affect appear normal  VASCULAR ACCESS: CDI without erythema or discharge. Non-tender.    PROCEDURES AND IMAGING:   Results for orders placed or performed during the hospital encounter of 10/07/17 (from the past 24 hour(s))   Glucose by meter   Result Value Ref Range    Glucose 282 (H) 70 - 99 mg/dL   UA with Microscopic reflex to Culture   Result  Value Ref Range    Color Urine Light Yellow     Appearance Urine Clear     Glucose Urine >1000 (A) NEG^Negative mg/dL    Bilirubin Urine Negative NEG^Negative    Ketones Urine 5 (A) NEG^Negative mg/dL    Specific Gravity Urine 1.027 1.003 - 1.035    Blood Urine Negative NEG^Negative    pH Urine 5.5 5.0 - 7.0 pH    Protein Albumin Urine Negative NEG^Negative mg/dL    Urobilinogen mg/dL Normal 0.0 - 2.0 mg/dL    Nitrite Urine Positive (A) NEG^Negative    Leukocyte Esterase Urine Negative NEG^Negative    Source Unspecified Urine     WBC Urine 3 (H) 0 - 2 /HPF    RBC Urine 0 0 - 2 /HPF    Bacteria Urine Many (A) NEG^Negative /HPF    Squamous Epithelial /HPF Urine <1 0 - 1 /HPF    Mucous Urine Present (A) NEG^Negative /LPF   Urine Culture Aerobic Bacterial   Result Value Ref Range    Specimen Description Unspecified Urine     Special Requests Specimen received in preservative     Culture Micro PENDING    CBC with platelets differential   Result Value Ref Range    WBC 10.3 4.0 - 11.0 10e9/L    RBC Count 4.42 3.8 - 5.2 10e12/L    Hemoglobin 11.3 (L) 11.7 - 15.7 g/dL    Hematocrit 34.7 (L) 35.0 - 47.0 %    MCV 79 78 - 100 fl    MCH 25.6 (L) 26.5 - 33.0 pg    MCHC 32.6 31.5 - 36.5 g/dL    RDW 15.3 (H) 10.0 - 15.0 %    Platelet Count 273 150 - 450 10e9/L    Diff Method Automated Method     % Neutrophils 57.8 %    % Lymphocytes 36.2 %    % Monocytes 5.8 %    % Eosinophils 0.0 %    % Basophils 0.0 %    % Immature Granulocytes 0.2 %    Nucleated RBCs 0 0 /100    Absolute Neutrophil 6.0 1.6 - 8.3 10e9/L    Absolute Lymphocytes 3.7 0.8 - 5.3 10e9/L    Absolute Monocytes 0.6 0.0 - 1.3 10e9/L    Absolute Eosinophils 0.0 0.0 - 0.7 10e9/L    Absolute Basophils 0.0 0.0 - 0.2 10e9/L    Abs Immature Granulocytes 0.0 0 - 0.4 10e9/L    Absolute Nucleated RBC 0.0    Comprehensive metabolic panel   Result Value Ref Range    Sodium 140 133 - 144 mmol/L    Potassium 3.6 3.4 - 5.3 mmol/L    Chloride 105 94 - 109 mmol/L    Carbon Dioxide 27 20 -  32 mmol/L    Anion Gap 8 3 - 14 mmol/L    Glucose 245 (H) 70 - 99 mg/dL    Urea Nitrogen 15 7 - 30 mg/dL    Creatinine 0.88 0.52 - 1.04 mg/dL    GFR Estimate 73 >60 mL/min/1.7m2    GFR Estimate If Black 89 >60 mL/min/1.7m2    Calcium 8.9 8.5 - 10.1 mg/dL    Bilirubin Total 0.2 0.2 - 1.3 mg/dL    Albumin 3.1 (L) 3.4 - 5.0 g/dL    Protein Total 6.5 (L) 6.8 - 8.8 g/dL    Alkaline Phosphatase 95 40 - 150 U/L    ALT 14 0 - 50 U/L    AST 8 0 - 45 U/L   Hemoglobin A1c   Result Value Ref Range    Hemoglobin A1C 10.0 (H) 4.3 - 6.0 %   CRP inflammation   Result Value Ref Range    CRP Inflammation <2.9 0.0 - 8.0 mg/L   TSH with free T4 reflex   Result Value Ref Range    TSH 2.57 0.40 - 4.00 mU/L   Glucose by meter   Result Value Ref Range    Glucose 52 (L) 70 - 99 mg/dL   Glucose by meter   Result Value Ref Range    Glucose 47 (LL) 70 - 99 mg/dL   Glucose by meter   Result Value Ref Range    Glucose 89 70 - 99 mg/dL   Glucose by meter   Result Value Ref Range    Glucose 103 (H) 70 - 99 mg/dL   Glucose by meter   Result Value Ref Range    Glucose 107 (H) 70 - 99 mg/dL   Glucose by meter   Result Value Ref Range    Glucose 115 (H) 70 - 99 mg/dL   Glucose by meter   Result Value Ref Range    Glucose 131 (H) 70 - 99 mg/dL   Endocrine Diabetes Adult IP Consult: Patient to be seen: Routine within 24 hrs; Call back #: [phone]; Poorly controlled Type 1 DM; Consultant may enter orders: Yes    Narrative    Dylan Medrano MD     10/8/2017 12:41 PM  Reason for visit/consult: Management of DMI    Primary care provider: No Ref-Primary, Physician    HPI:  Vickie Duque is a 32 year old female with a history of DM type I   diagnosed at age 18 who presented to the ED with extreme   fluctuations in blood glucose.States yesterday morning when she   woke up on her way to work she felt like she was going to pass   out and diaphoretic; BG was 33. She turned around and went back   home and had something to eat and drink and it was back up to  67   after a nap. Then later in the day it shot up to over 200. At   some point it was up to 406.   She has been managed for the most part by her PCP currently Dr. Lau. She has been on Insulin 70/30 for the last 3 years. She   takes 40 units in the morning and 30 units at night. Prior to   that had been on Lantus and Novolog. She reports a few episodes   of DKA for which she as been admitted at [facility].  She was admitted to observation for monitoring and a better   control of her DMI.   Her most recent A1C is 10%. She is being placed on medium dose   correction scale. She has not received any insulin since her   admission. Yet her BG dropped from 282 at 2300 to 43 @ 3 AM.  She reports hypoglycemia episodes on frequent basis. She admits   to hypoglycemia unawareness.     Past Medical/Surgical History:  Past Medical History:   Diagnosis Date     Adjustment disorder with anxious mood 11/23/2015     Anxiety 11/27/2015     ASCUS of cervix with negative high risk HPV 06/19/2008     Depressive disorder, not elsewhere classified      Diabetic eye exam (H) 12/19/14     Mixed hyperlipidemia 11/30/2006     Regional enteritis of unspecified site     Ulcerative Colitis     Type I (juvenile type) diabetes mellitus with ketoacidosis, not   stated as uncontrolled(250.11) 01/01/2007    Moderately severe intensity.     Type I (juvenile type) diabetes mellitus with ketoacidosis,   uncontrolled 02/14/08     Type I (juvenile type) diabetes mellitus without mention of   complication, not stated as uncontrolled     diagnosed in 2003     Ulcerative colitis, unspecified     remission. Last flare 6 yrs ago.      Past Surgical History:   Procedure Laterality Date     DILATION AND CURETTAGE SUCTION  4/2010    miscarriage     HC COLONOSCOPY W BIOPSY  08/16/06     HC COLONOSCOPY W/WO BRUSH/WASH       TUBAL LIGATION         Allergies:  Allergies   Allergen Reactions     No Known Drug Allergies        Current Medications   Current  Facility-Administered Medications   Medication     lidocaine 1 % 1 mL     lidocaine (LMX4) kit     sodium chloride (PF) 0.9% PF flush 3 mL     sodium chloride (PF) 0.9% PF flush 3 mL     glucose 40 % gel 15-30 g    Or     dextrose 50 % injection 25-50 mL    Or     glucagon injection 1 mg     ondansetron (ZOFRAN) injection 4 mg     prochlorperazine (COMPAZINE) injection 5-10 mg     metoclopramide (REGLAN) injection 10 mg     insulin aspart (NovoLOG) inj (RAPID ACTING)     insulin aspart (NovoLOG) inj (RAPID ACTING)       Family History:  Family History   Problem Relation Age of Onset     Hypertension Father      DIABETES Maternal Grandmother      CANCER Maternal Grandmother      DIABETES Paternal Grandfather      DIABETES Maternal Uncle      DIABETES Maternal Aunt        Social History:  Social History   Substance Use Topics     Smoking status: Current Every Day Smoker     Packs/day: 1.00     Years: 16.00     Types: Cigarettes     Smokeless tobacco: Never Used     Alcohol use 0.0 oz/week     0 Standard drinks or equivalent per week      Comment: rare       ROS:  10 point negative except as mentioned in HPI    Exam  Blood pressure 101/66, pulse 76, temperature 98.2  F (36.8  C),   temperature source Oral, resp. rate 16, weight 74.8 kg (165 lb),   SpO2 96 %, not currently breastfeeding.  Gen: well appearing, nad, pleasant and conversant  HEENT: anicteric, EOMI, no proptosis or lid lag  Thyroid: no thyroid goiter or cervical LAD  Cardio: RRR, no m/r/g  Resp: CTAB. No wheezing or crackles  Abd: soft, NT/ND. Normal BS  Skin: Warm and dry. No rash or lesions.   Ext: no swelling or edema  Feet: no deformities or ulcers, 2+ DP pulses  Neuro: A&Ox3, relaxation phase of reflexes normal, no tremor on   outstretched arms  Psych: Normal affect and mood.    Labs/Imaging    TSH   Date Value Ref Range Status   10/07/2017 2.57 0.40 - 4.00 mU/L Final   06/26/2017 6.05 (H) 0.40 - 4.00 mU/L Final   03/05/2014 3.97 0.4 - 5.0 mU/L Final    03/16/2012 3.75 0.4 - 5.0 mU/L Final   01/29/2010 2.91 0.4 - 5.0 mU/L Final     T4 Free   Date Value Ref Range Status   06/26/2017 1.02 0.76 - 1.46 ng/dL Final   03/28/2006 1.10 0.70 - 1.85 ng/dL Final   ]  Lab Results   Component Value Date    A1C 10.0 10/07/2017    A1C 11.2 06/26/2017    A1C 11.5 01/29/2016    A1C 11.7 10/06/2015    A1C 11.1 04/01/2015         Assessment and Plan  Uncontrolled DMI complicated by episodes of hypoglycemia and   hypoglycemia unawareness. A1C is 10%. The patient would like to   achieve better control of her diabetes and minimize hypoglycemic   episodes. We discussed changing her insulin regimen to   basal-bolus. She was also counseled on carb counting, sliding   scale and hypoglycemia management.  -D/c PTA regimen of insulin 70/30  -Discharge the patient home on insulin Lantus 25 units daily  -Insulin Novolog 1 unit per 15 g of carbohydrate TID with meals  -Insulin Novolog medium dose correction scale 1 unit per 50 >140   mg/dl  -Please prescribe a glucagon pen upon discharge to use for severe   hypoglycemia  -Referral to our endocrine clinic in [facility] in 4-8 weeks.      Patient seen and examined with staff endocrinologist Dr Chava Medrano MD  Diabetes, Metabolism and Endocrinology Fellow  Pager: [phone]     Ketone Beta-Hydroxybutyrate Quantitative   Result Value Ref Range    Ketone Quantitative 0.3 0.0 - 0.6 mmol/L   Magnesium   Result Value Ref Range    Magnesium 1.8 1.6 - 2.3 mg/dL   Phosphorus   Result Value Ref Range    Phosphorus 4.0 2.5 - 4.5 mg/dL   Glucose by meter   Result Value Ref Range    Glucose 176 (H) 70 - 99 mg/dL   Glucose by meter   Result Value Ref Range    Glucose 267 (H) 70 - 99 mg/dL   Glucose by meter   Result Value Ref Range    Glucose 305 (H) 70 - 99 mg/dL     DISCHARGE MEDICATIONS:   Current Discharge Medication List      START taking these medications    Details   glucagon 1 MG SOLR injection Inject 1 mg Subcutaneous  "every 15 minutes as needed for low blood sugar  Qty: 2 each, Refills: 0    Associated Diagnoses: Uncontrolled type 1 diabetes mellitus with hyperglycemia (H)      !! insulin aspart (NOVOLOG PEN) 100 UNIT/ML injection Inject 1-7 Units Subcutaneous 3 times daily (before meals) Do Not take Correction Insulin if Pre-Meal BG < than 140.   140 - 189 give 1 unit  190 - 239 give 2 units  240 - 289 give 3 units  290 - 339 give 4 units  340- 399 give 5 units  400-449 give 6 units  Greater than or equal to 450 give 7 units  Based on pre-meal blood glucose.    Notify clinic if glucose greater than or equal to 350 mg/dL after administration of correction dose  Qty: 10 mL, Refills: 0    Associated Diagnoses: Uncontrolled type 1 diabetes mellitus with hyperglycemia (H)      !! insulin aspart (NOVOLOG PEN) 100 UNIT/ML injection Inject 1 Units Subcutaneous 3 times daily (with meals) DOSE:  1 units per 15 grams of carbohydrate. Do not give if pre-meal glucose is less than 60 mg/dL.  Qty: 5 mL, Refills: 0    Associated Diagnoses: Uncontrolled type 1 diabetes mellitus with hyperglycemia (H)      BASAGLAR 100 UNIT/ML injection Inject 25 Units Subcutaneous daily  Qty: 30 mL, Refills: 1    Associated Diagnoses: Uncontrolled type 1 diabetes mellitus with hyperglycemia (H)       !! - Potential duplicate medications found. Please discuss with provider.      CONTINUE these medications which have NOT CHANGED    Details   B-D INSULIN SYRINGE 31G X 5/16\" 0.5 ML USE TO INJECT INSULIN  Qty: 100 each, Refills: 6    Associated Diagnoses: Diabetes mellitus type I (H)      ACCU-CHEK SMARTVIEW test strip TEST 4 TIMES A DAY OR AS DI RECTED  Qty: 100 each, Refills: 11    Associated Diagnoses: Uncontrolled type 1 diabetes mellitus (H)      !! blood glucose monitoring (ACCU-CHEK TARA SMARTVIEW) meter device kit Use to test blood sugar 4 times daily or as directed.  Qty: 1 kit, Refills: 0    Associated Diagnoses: Type 1 diabetes, HbA1c goal < 7% (H)      !! " "blood glucose meter (NO BRAND SPECIFIED) meter device kit Use to test blood sugar 2 times daily or as directed.  Qty: 1 kit, Refills: 0    Comments: Please dispense as patient's insurance will cover.  Associated Diagnoses: Type I (juvenile type) diabetes mellitus without mention of complication, uncontrolled      ACE/ARB NOT PRESCRIBED, INTENTIONAL, 1 each every 3 hours ACE & ARB not prescribed due to Intolerance and Symptomatic hypotension not due to excessive diuresis    Associated Diagnoses: Type 1 diabetes, HbA1c goal < 7% (H)      DIABETIC STERILE LANCETS device 1 Device 4 times daily.  Qty: 1 Box, Refills: 11    Associated Diagnoses: Type I (juvenile type) diabetes mellitus without mention of complication, not stated as uncontrolled       !! - Potential duplicate medications found. Please discuss with provider.      STOP taking these medications       NOVOLIN MIX 70/30 VIAL (70-30) 100 UNIT/ML susp Comments:   Reason for Stopping:                 CONSULTATIONS:   Consultation during this admission received from:  Endocrinology     BRIEF HISTORY OF PRESENT ILLNESS:   (Adopted from admission H&P).    \"Vickie Duque is a 32 year old female with a history of Juvenile DM type I diagnosed at age 18 who presented to the ED with extreme fluctuations in blood glucose. States yesterday morning when she woke up on her way to work she felt like she was going to pass out and diaphoretic; BG was 33. She turned around and went back home and had something to eat and drink and it was back up to 67 after a nap. Then later in the day it shot up to over 200. At some point it was up to 406. Her last insulin dose was last night at 7pm. She has been managed for the most part by her PCP currently Dr. Lau. She has been on Insulin 70/30 for the last 3 years. She takes 40 units in the morning and 30 units at night. Prior to that had been on Lantus and Novolog. She has never been on an insulin pump and has been told that that would " "not be an option for her. She reports daily problems with glucose control and such extreme fluctuations. She reports a few episodes of DKA for which she as been admitted at [facility]. Reports recent nasal congestion. Has a headache but this is a chronic problem. Denies any recent ear pain, sore throat, GI symptoms, chest pain, SOB, urinary symptoms. She states she has had problems with her foot already and has seen a Podiatrist occasionally. She is due for an eye exam.     In the ED, HR 70's-80's, BP /58-82, RR 16-18, SaO2 % on RA, Temp 97.8. Labs show CMP with albumin 3.1, TP 6.5  otherwise normal. CBC with H/H 11.3/34.7 otherwise normal. Normal CRP, TSH. UA with >1000 glucose, 5 ketones, positive nitrite, 3 WBC, many SE. UCx sent and pending. In the ED the patient was given 1L NS bolus. Just before she left the ED her BG was 52. She was given juice and it was 47. She was then given a sandwich and  It was up to 89. She is being admitted for consult with Endocrinology for recommendations on BG control.     On admission to the observation unit the patient was stable.\"     ED OBSERVATION COURSE: Vickie Duque is a 32 year old female with a history of Juvenile DM type I diagnosed at age 18 who presented to the ED with extreme fluctuations in blood glucose.       1. Type 1 DM: Yesterday morning when she woke up on her way to work she felt like she was going to pass out and diaphoretic; BG was 33. After food and drink and it was back up to 67 after a nap. Later in the day shot up to over 200. At some point it was up to 406. Her last insulin dose was last night at 7pm. She has been on Insulin 70/30 for the last 3 years. She takes 40 units in the morning and 30 units at night. In the ED, HR 70's-80's, BP /58-82, RR 16-18, SaO2 % on RA, Temp 97.8. Labs show CMP with albumin 3.1, TP 6.5  otherwise normal. CBC with H/H 11.3/34.7 otherwise normal. Normal CRP, TSH. UA with >1000 glucose, 5 " ketones, positive nitrite, 3 WBC, many SE. UCx sent and pending. In ED the patient was given 1L NS bolus. Just before she left the ED her BG was 52. She was given juice and it was 47. She was then given a sandwich and  It was up to 89. Last A1c was on 10 last night. It was 11.2 on 6/26/17. Has been on Insulin 70/30 x 3 years PTA. While in the ED and in observation her BG ranged from . Endocrinology was consulted who recommended:  -D/c PTA regimen of insulin 70/30  -Discharge the patient home on insulin Lantus 25 units daily, insurance doesn't cover Lantus so will replace with Basaglar 25 Unit daily   -Insulin Novolog 1 unit per 15 g of carbohydrate TID with meals  -Insulin Novolog medium dose correction scale 1 unit per 50 >140 mg/dl  -Prescribe a glucagon pen upon discharge to use for severe hypoglycemia  -Follow-up in endocrine clinic in [facility] in 4-8 weeks.      She was discharged in a stable condition. States she feels comfortable with the plan of care. Has done SSI and Carb counting in past. Discussed signs of hypo and hyperglycemia. Discussed that I am concerned she doesn't have symptoms of hypoglycemia. Discussed that this can be life threatening or cause significant disability. She verbalized understanding. Will monitor BG more frequently on this new regimen to be sure she doesn't experience hypo or hyperglycemia.     2. Tobacco Abuse: Declined Nicotine patch or gum      DISCHARGE DISPOSITION:   Discharged to home. The patient was discharged in a stable condition.     DISCHARGE INSTRUCTIONS AND FOLLOW-UP:    Discharge Procedure Orders  Endocrinology Adult Referral     Adult [facility] Follow-up and recommended labs and tests   Order Comments: Follow up with primary care provider, Physician No Ref-Primary, within 7 days for hospital follow- up.     Endocrinology in 4-6 weeks.       Appointments on [place] and/or [facility] (with [facility] or [facility] provider or service). Call [phone] if you  haven't heard regarding these appointments within 7 days of discharge.     Activity   Order Comments: Your activity upon discharge: activity as tolerated, no driving if symptoms of hypoglycemia   Order Specific Question Answer Comments   Is discharge order? Yes      Monitor and record   Order Comments: blood glucose 4 times a day, before meals and at bedtime     Reason for your hospital stay   Order Comments: You were admitted with uncontrolled type 1 DM. We consulted Endocrinology who recommended the following:   -Discontinue insulin 70/30  -Start insulin Basaglar 25 units daily  -Insulin Novolog 1 unit per 15 g of carbohydrate Three times daily with meals  -Insulin Novolog medium dose correction scale 1 unit per 50 >140 mg/dl  -Glucagon pen to use for severe hypoglycemia  -Follow-up in endocrine clinic in [facility] in 4-8 weeks.    Please monitor your blood glucose carefully. I am concerned that you do not recognize low blood sugars as this could be life threatening for you.     When to contact your care team   Order Comments: Return to the ED with low (< 70) or high blood sugars (> 300),  fever, uncontrolled nausea, vomiting, unrelieved pain, bleeding not relieved with pressure, dizziness, chest pain, shortness of breath, loss of consciousness, and any new or concerning symptoms.     Full Code     Diet   Order Comments: Follow this diet upon discharge:      Moderate Consistent CHO Diet   Order Specific Question Answer Comments   Is discharge order? Yes         Attestation:   I have reviewed today's vital signs, notes, medications, labs and imaging.      RAJESH Pulliam, CNP  Nurse Practitioner   Emergency Department Observation Unit

## 2017-10-08 NOTE — ED NOTES
BG 52. MD aware. Patient alert and oriented.  2 Apple juice given. BG decrease to 47. MD aware. Ham sandwich given with [place].  BG increased to 89. MD aware. OK for transport.

## 2017-10-08 NOTE — PLAN OF CARE
Problem: Patient Care Overview  Goal: Plan of Care/Patient Progress Review  - Blood Glucose greater than 70 less than 250 on two consecutive readings. : YES  - Ketones absent from urine. : PENDING  - Tolerating oral intake to maintain hydration : YES  - Safe disposition plan has been identified : No     Pt A&Ox4, VSS, denies pain. Blood glucose WNL, pt to notify nurse when breakfast arrives to recheck before eating. Pt resting comfortably. Will continue to monitor with endocrinology consult.

## 2017-10-08 NOTE — ED PROVIDER NOTES
"  History     Chief Complaint   Patient presents with     Hyperglycemia     Ongoing  problems with blood sugars, ranging from 33 to over 400 this week, \"It has been out of whack for a while and I am sick of trying things that don't work.\"     HPI  Vickie Duque is a 32 year old female who presents with labile blood sugar. She has a history of Type 1 diabetes for 14 years. She has had DKA in the past. She has never had great control. Recently she has been managed with 70/30 insulin twice/ day. She has been on long acting insulin with carb counting and 3 times/ day sliding scale coverage in the past. She has never had an insulin pump. She had an episode of symptomatic hypoglycemia this morning with blood sugar of 39. Tonight she was going to work and felt she might again pass out on the road. Blood sugar has been all over the map, from  over the past week. She has not had good control at all recently. She states her doctor put her on the 70/30 regimen after the birth of her second child because he thought it would be easier to manage with her lifestyle. She notes visual blurring. She denies history of neuropathy or nephropathy. She has had a bit of urinary urgency, but no burning. She denies polyuria, polydipsia or nocturia. She has no fever, chills, sweats, cough, sputum, shortness of breaht, chest pain, nausea, vomiting, diarrhea or abdominal pain. She has no leg pain or swelling.    PAST MEDICAL HISTORY:   Past Medical History:   Diagnosis Date     Adjustment disorder with anxious mood 11/23/2015     Anxiety 11/27/2015     ASCUS of cervix with negative high risk HPV 06/19/2008     Depressive disorder, not elsewhere classified      Diabetic eye exam (H) 12/19/14     Mixed hyperlipidemia 11/30/2006     Regional enteritis of unspecified site     Ulcerative Colitis     Type I (juvenile type) diabetes mellitus with ketoacidosis, not stated as uncontrolled(250.11) 01/01/2007    Moderately severe intensity.     Type " I (juvenile type) diabetes mellitus with ketoacidosis, uncontrolled 02/14/08     Type I (juvenile type) diabetes mellitus without mention of complication, not stated as uncontrolled     diagnosed in 2003     Ulcerative colitis, unspecified     remission. Last flare 6 yrs ago.        PAST SURGICAL HISTORY:   Past Surgical History:   Procedure Laterality Date     DILATION AND CURETTAGE SUCTION  4/2010    miscarriage     HC COLONOSCOPY W BIOPSY  08/16/06     HC COLONOSCOPY W/WO BRUSH/WASH         FAMILY HISTORY:   Family History   Problem Relation Age of Onset     Hypertension Father      DIABETES Maternal Grandmother      CANCER Maternal Grandmother      DIABETES Paternal Grandfather      DIABETES Maternal Uncle      DIABETES Maternal Aunt        SOCIAL HISTORY:   Social History   Substance Use Topics     Smoking status: Current Every Day Smoker     Packs/day: 1.00     Years: 13.00     Types: Cigarettes     Smokeless tobacco: Never Used     Alcohol use 0.0 oz/week     0 Standard drinks or equivalent per week      Comment: rare         I have reviewed the Medications, Allergies, Past Medical and Surgical History, and Social History in the Epic system.    Review of Systems   Constitutional: Negative for chills and fever.   HENT: Negative for congestion and trouble swallowing.    Eyes: Positive for visual disturbance.   Respiratory: Negative for cough, shortness of breath and wheezing.    Cardiovascular: Negative for chest pain, palpitations and leg swelling.   Gastrointestinal: Negative for abdominal pain, diarrhea, nausea and vomiting.   Endocrine: Negative for polydipsia and polyuria.   Genitourinary: Positive for urgency. Negative for dysuria and flank pain.   Musculoskeletal: Negative for back pain and neck pain.   Skin: Negative for rash.   Neurological: Positive for light-headedness. Negative for weakness, numbness and headaches.   Hematological: Negative for adenopathy.   Psychiatric/Behavioral: Negative for  confusion.       Physical Exam   BP: 132/82  Pulse: 85  Heart Rate: 85  Temp: 97.8  F (36.6  C)  Resp: 16  Weight: 74.8 kg (165 lb)  SpO2: 100 %  Physical Exam   Constitutional: She is oriented to person, place, and time. She appears well-developed and well-nourished. No distress.   HENT:   Head: Normocephalic and atraumatic.   Right Ear: External ear normal.   Left Ear: External ear normal.   Nose: Nose normal.   Mouth/Throat: Oropharynx is clear and moist.   Eyes: EOM are normal. Pupils are equal, round, and reactive to light. No scleral icterus.   Neck: Normal range of motion. Neck supple. No JVD present.   Cardiovascular: Normal rate, regular rhythm and normal heart sounds.    No murmur heard.  Pulmonary/Chest: Effort normal and breath sounds normal. She has no wheezes. She has no rales.   Abdominal: Soft. Bowel sounds are normal. There is no tenderness. There is no rebound and no guarding.   Musculoskeletal: She exhibits no edema or tenderness.   Lymphadenopathy:     She has no cervical adenopathy.   Neurological: She is alert and oriented to person, place, and time. She displays normal reflexes. No cranial nerve deficit. She exhibits normal muscle tone. Coordination normal.   Skin: Skin is warm and dry. No rash noted.   Psychiatric: She has a normal mood and affect. Her behavior is normal.   Nursing note and vitals reviewed.      ED Course     ED Course     Procedures        Labs/Imaging    Results for orders placed or performed during the hospital encounter of 10/07/17 (from the past 24 hour(s))   Glucose by meter   Result Value Ref Range    Glucose 282 (H) 70 - 99 mg/dL   UA with Microscopic reflex to Culture   Result Value Ref Range    Color Urine Light Yellow     Appearance Urine Clear     Glucose Urine >1000 (A) NEG^Negative mg/dL    Bilirubin Urine Negative NEG^Negative    Ketones Urine 5 (A) NEG^Negative mg/dL    Specific Gravity Urine 1.027 1.003 - 1.035    Blood Urine Negative NEG^Negative    pH Urine  5.5 5.0 - 7.0 pH    Protein Albumin Urine Negative NEG^Negative mg/dL    Urobilinogen mg/dL Normal 0.0 - 2.0 mg/dL    Nitrite Urine Positive (A) NEG^Negative    Leukocyte Esterase Urine Negative NEG^Negative    Source Unspecified Urine     WBC Urine 3 (H) 0 - 2 /HPF    RBC Urine 0 0 - 2 /HPF    Bacteria Urine Many (A) NEG^Negative /HPF    Squamous Epithelial /HPF Urine <1 0 - 1 /HPF    Mucous Urine Present (A) NEG^Negative /LPF   CBC with platelets differential   Result Value Ref Range    WBC 10.3 4.0 - 11.0 10e9/L    RBC Count 4.42 3.8 - 5.2 10e12/L    Hemoglobin 11.3 (L) 11.7 - 15.7 g/dL    Hematocrit 34.7 (L) 35.0 - 47.0 %    MCV 79 78 - 100 fl    MCH 25.6 (L) 26.5 - 33.0 pg    MCHC 32.6 31.5 - 36.5 g/dL    RDW 15.3 (H) 10.0 - 15.0 %    Platelet Count 273 150 - 450 10e9/L    Diff Method Automated Method     % Neutrophils 57.8 %    % Lymphocytes 36.2 %    % Monocytes 5.8 %    % Eosinophils 0.0 %    % Basophils 0.0 %    % Immature Granulocytes 0.2 %    Nucleated RBCs 0 0 /100    Absolute Neutrophil 6.0 1.6 - 8.3 10e9/L    Absolute Lymphocytes 3.7 0.8 - 5.3 10e9/L    Absolute Monocytes 0.6 0.0 - 1.3 10e9/L    Absolute Eosinophils 0.0 0.0 - 0.7 10e9/L    Absolute Basophils 0.0 0.0 - 0.2 10e9/L    Abs Immature Granulocytes 0.0 0 - 0.4 10e9/L    Absolute Nucleated RBC 0.0    Comprehensive metabolic panel   Result Value Ref Range    Sodium 140 133 - 144 mmol/L    Potassium 3.6 3.4 - 5.3 mmol/L    Chloride 105 94 - 109 mmol/L    Carbon Dioxide 27 20 - 32 mmol/L    Anion Gap 8 3 - 14 mmol/L    Glucose 245 (H) 70 - 99 mg/dL    Urea Nitrogen 15 7 - 30 mg/dL    Creatinine 0.88 0.52 - 1.04 mg/dL    GFR Estimate 73 >60 mL/min/1.7m2    GFR Estimate If Black 89 >60 mL/min/1.7m2    Calcium 8.9 8.5 - 10.1 mg/dL    Bilirubin Total 0.2 0.2 - 1.3 mg/dL    Albumin 3.1 (L) 3.4 - 5.0 g/dL    Protein Total 6.5 (L) 6.8 - 8.8 g/dL    Alkaline Phosphatase 95 40 - 150 U/L    ALT 14 0 - 50 U/L    AST 8 0 - 45 U/L   Hemoglobin A1c   Result  Value Ref Range    Hemoglobin A1C 10.0 (H) 4.3 - 6.0 %   CRP inflammation   Result Value Ref Range    CRP Inflammation <2.9 0.0 - 8.0 mg/L   TSH with free T4 reflex   Result Value Ref Range    TSH 2.57 0.40 - 4.00 mU/L         Assessments & Plan (with Medical Decision Making)   Impression:  Young woman with uncontrolled type 1 diabetes. She is frustrated at her glycemic lability and inability to control her blood sugar. She is having frequent hypoglycemic events and episodes of high blood sugar. There is no definite acute change in this. She does appear to have a probable mild UTI. She is on a very poor insulin regimen for type 1 diabetes. She is amenable to going on to a current standard therapy with basal insulin and ultrashort insulin with meals and snacks and is familiar with carb counting and sliding scale. She is interested in getting her diabetes under better control. She is agreeable to observation admission to convert to a more appropriate regimen and referral to a diabetes specialist clinic.    I have reviewed the nursing notes.    I have reviewed the findings, diagnosis, plan and need for follow up with the patient.    New Prescriptions    No medications on file       Final diagnoses:   Uncontrolled type 1 diabetes mellitus with hyperglycemia (H)       10/7/2017   [facility], [place], EMERGENCY DEPARTMENT     Marc Greer MD  10/08/17 0049       Marc Greer MD  10/08/17 0147

## 2017-10-08 NOTE — PLAN OF CARE
Problem: Patient Care Overview  Goal: Plan of Care/Patient Progress Review  Outcome: Adequate for Discharge Date Met:  10/08/17  Discharge instructions reviewed, understood, and signed by patient. VSS, PIV removed, new medications reviewed and understood, patient has all belongings. Patient left unit via ambulating with family.

## 2017-10-08 NOTE — PROGRESS NOTES
Diabetes education provided to pt. Per pt, she has had lantus and carb coveraged prescribed before and feels comfortable with both. Pt educated on how to use insulin pens, where to inject, time of action for each medication, and the changes to both novolog and lantus prescriptions. Handouts provided and pt verbalizes understanding.

## 2017-10-08 NOTE — PROGRESS NOTES
[facility]  Daily Progress Note          Assessment & Plan:   Vickie Duque is a 32 year old female with a history of Juvenile DM type I diagnosed at age 18 who presented to the ED with extreme fluctuations in blood glucose.     Plan:  1. Uncontrolled Type 1 DM: BG labile. B/t  yesterday.  She has been on Insulin 70/30 for the last 3 years. She takes 40 units in the morning and 30 units at night. In the ED, HR 70's-80's, BP /58-82, RR 16-18, SaO2 % on RA, Temp 97.8. Labs show CMP with albumin 3.1, TP 6.5  otherwise normal. CBC with H/H 11.3/34.7 otherwise normal. Normal CRP, TSH. UA with >1000 glucose, 5 ketones, positive nitrite, 3 WBC, many SE. UCx sent and pending. In ED the patient was given 1L NS bolus. Just before she left the ED her BG was 52. She was given juice and it was 47. She was then given a sandwich and  It was up to 89.  A1c 10.0 last night. It was 11.2 on 6/26/17. On Insulin 70/30 x 3 years.  this am   - BG checks TID ac and Qhs  - Carbohydrate Consistent Diet  - SSI with medium resistance  - Hypoglycemic protocol  - Endocrinology Consult  - Diabetes CNS Consult     2. Tobacco Abuse: Declined Nicotine patch or gum       FEN: Moderate Consistent CHO Diet  Lines: PIV  Prophylaxis: Early Ambulation          Consults:   Endocrinology          Discharge Planning:   Pending Endocrinology recommendations         Interval History:   Tired this am. Denies acute complaints     ROS:   Constitutional: No fevers/chills. Tolerating diet.   Cardiovascular: No chest pain or palpitations.   Respiratory: No cough or SOB.   GI: No abdominal pain. No N/V.      : No urinary complaints.   Musculoskeletal: Denies pain.           Physical Exam:   /66  Pulse 76  Temp 98.2  F (36.8  C) (Oral)  Resp 16  Wt 74.8 kg (165 lb)  SpO2 96%  BMI 28.32 kg/m2     GENERAL: Alert and oriented x 3. NAD.   HEENT: Anicteric sclera. Mucous membranes moist.    CV: RRR. S1, S2. No murmurs appreciated.   RESPIRATORY: Effort normal. Lungs CTAB with no wheezing, rales, rhonchi.   GI: Abdomen soft and non distended with normoactive bowel sounds present in all quadrants. No tenderness, rebound, guarding.   NEUROLOGICAL: No focal deficits. Moves all extremities.    EXTREMITIES: No peripheral edema. Intact bilateral pedal pulses.   SKIN: No jaundice. No rashes.     Medication list reviewed.   Today's labs and imaging were reviewed.     RAJESH Pulliam, CNP  Emergency Department Observation Unit

## 2017-10-08 NOTE — H&P
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    Plan:  1. Type 1 DM: Yesterday morning when she woke up on her way to work she felt like she was going to pass out and diaphoretic; BG was 33. After food and drink and it was back up to 67 after a nap. Later in the day shot up to over 200. At some point it was up to 406. Her last insulin dose was last night at 7pm. She has been on Insulin 70/30 for the last 3 years. She takes 40 units in the morning and 30 units at night. In the ED, HR 70's-80's, BP /58-82, RR 16-18, SaO2 % on RA, Temp 97.8. Labs show CMP with albumin 3.1, TP 6.5  otherwise normal. CBC with H/H 11.3/34.7 otherwise normal. Normal CRP, TSH. UA with >1000 glucose, 5 ketones, positive nitrite, 3 WBC, many SE. UCx sent and pending. In ED the patient was given 1L NS bolus. Just before she left the ED her BG was 52. She was given juice and it was 47. She was then given a sandwich and  It was up to 89. Last A1c was on 10 tonight. It was 11.2 on 6/26/17. On Insulin 70/30 x 3 years.  - BG checks TID ac and Qhs  - Carbohydrate Consistent Diet  - SSI with medium resistance  - Hypoglycemic protocol  - Endocrinology Consult  - Diabetes CNS Consult    2. 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 States yesterday morning when she woke up on her way to work she felt like she was going to pass out and diaphoretic; BG was 33. She " turned around and went back home and had something to eat and drink and it was back up to 67 after a nap. Then later in the day it shot up to over 200. At some point it was up to 406. Her last insulin dose was last night at 7pm. She has been managed for the most part by her PCP currently Dr. Lau. She has been on Insulin 70/30 for the last 3 years. She takes 40 units in the morning and 30 units at night. Prior to that had been on Lantus and Novolog. She has never been on an insulin pump and has been told that that would not be an option for her. She reports daily problems with glucose control and such extreme fluctuations. She reports a few episodes of DKA for which she as been admitted at [facility]. Reports recent nasal congestion. Has a headache but this is a chronic problem. Denies any recent ear pain, sore throat, GI symptoms, chest pain, SOB, urinary symptoms. She states she has had problems with her foot already and has seen a Podiatrist occasionally. She is due for an eye exam.    In the ED, HR 70's-80's, BP /58-82, RR 16-18, SaO2 % on RA, Temp 97.8. Labs show CMP with albumin 3.1, TP 6.5  otherwise normal. CBC with H/H 11.3/34.7 otherwise normal. Normal CRP, TSH. UA with >1000 glucose, 5 ketones, positive nitrite, 3 WBC, many SE. UCx sent and pending. In the ED the patient was given 1L NS bolus. Just before she left the ED her BG was 52. She was given juice and it was 47. She was then given a sandwich and  It was up to 89. She is being admitted for consult with Endocrinology for recommendations on BG control.    On admission to the observation unit the patient was stable.    History:    Past Medical History:   Diagnosis Date     Adjustment disorder with anxious mood 11/23/2015     Anxiety 11/27/2015     ASCUS of cervix with negative high risk HPV 06/19/2008     Depressive disorder, not elsewhere classified      Diabetic eye exam (H) 12/19/14     Mixed hyperlipidemia 11/30/2006      Regional enteritis of unspecified site     Ulcerative Colitis     Type I (juvenile type) diabetes mellitus with ketoacidosis, not stated as uncontrolled(250.11) 01/01/2007    Moderately severe intensity.     Type I (juvenile type) diabetes mellitus with ketoacidosis, uncontrolled 02/14/08     Type I (juvenile type) diabetes mellitus without mention of complication, not stated as uncontrolled     diagnosed in 2003     Ulcerative colitis, unspecified     remission. Last flare 6 yrs ago.        Past Surgical History:   Procedure Laterality Date     DILATION AND CURETTAGE SUCTION  4/2010    miscarriage     HC COLONOSCOPY W BIOPSY  08/16/06     HC COLONOSCOPY W/WO BRUSH/WASH       TUBAL LIGATION         Family History   Problem Relation Age of Onset     Hypertension Father      DIABETES Maternal Grandmother      CANCER Maternal Grandmother      DIABETES Paternal Grandfather      DIABETES Maternal Uncle      DIABETES Maternal Aunt        Social History     Social History     Marital status: Single     Spouse name: N/A     Number of children: 1     Years of education: 12     Occupational History     Nursing assistant      Social History Main Topics     Smoking status: Current Every Day Smoker     Packs/day: 1.00     Years: 16.00     Types: Cigarettes     Smokeless tobacco: Never Used     Alcohol use 0.0 oz/week     0 Standard drinks or equivalent per week      Comment: rare     Drug use: No     Sexual activity: Yes     Partners: Male     Birth control/ protection: Surgical, Female Surgical      Comment: tubal ligation     Other Topics Concern      Service No     Blood Transfusions No     Caffeine Concern Yes     Advised not more than 16 ounces/day     Occupational Exposure Yes     Visiting [employer] [facility] - student online classes     Hobby Hazards No     Sleep Concern No     Stress Concern Yes     Weight Concern No     Special Diet Yes     IDDM Type I     Back Care Yes     work-related about 1 year ago      "Exercise Yes     Active at work     Bike Helmet No     Seat Belt Yes     Self-Exams No     Social History Narrative    Lives in [place] with Rod gandhi and her son and their daughter.   Vickie and Rod smoke.   No indoor cats/kittens.   No concerns about domestic violence.         No current facility-administered medications on file prior to encounter.   Current Outpatient Prescriptions on File Prior to Encounter:  NOVOLIN MIX 70/30 VIAL (70-30) 100 UNIT/ML susp INJECT 40 UNITS SUBQ BEFORE BREAKFAST AND 30 UNITS BEFORE DINNER   B-D INSULIN SYRINGE 31G X 5/16\" 0.5 ML USE TO INJECT INSULIN   ACCU-CHEK SMARTVIEW test strip TEST 4 TIMES A DAY OR AS DI RECTED   blood glucose monitoring (ACCU-CHEK TARA SMARTVIEW) meter device kit Use to test blood sugar 4 times daily or as directed.   blood glucose meter (NO BRAND SPECIFIED) meter device kit Use to test blood sugar 2 times daily or as directed.   ACE/ARB NOT PRESCRIBED, INTENTIONAL, 1 each every 3 hours ACE & ARB not prescribed due to Intolerance and Symptomatic hypotension not due to excessive diuresis   DIABETIC STERILE LANCETS device 1 Device 4 times daily.       Data:    Results for orders placed or performed during the hospital encounter of 10/07/17   Glucose by meter   Result Value Ref Range    Glucose 282 (H) 70 - 99 mg/dL   CBC with platelets differential   Result Value Ref Range    WBC 10.3 4.0 - 11.0 10e9/L    RBC Count 4.42 3.8 - 5.2 10e12/L    Hemoglobin 11.3 (L) 11.7 - 15.7 g/dL    Hematocrit 34.7 (L) 35.0 - 47.0 %    MCV 79 78 - 100 fl    MCH 25.6 (L) 26.5 - 33.0 pg    MCHC 32.6 31.5 - 36.5 g/dL    RDW 15.3 (H) 10.0 - 15.0 %    Platelet Count 273 150 - 450 10e9/L    Diff Method Automated Method     % Neutrophils 57.8 %    % Lymphocytes 36.2 %    % Monocytes 5.8 %    % Eosinophils 0.0 %    % Basophils 0.0 %    % Immature Granulocytes 0.2 %    Nucleated RBCs 0 0 /100    Absolute Neutrophil 6.0 1.6 - 8.3 10e9/L    Absolute Lymphocytes 3.7 0.8 - 5.3 10e9/L    " Absolute Monocytes 0.6 0.0 - 1.3 10e9/L    Absolute Eosinophils 0.0 0.0 - 0.7 10e9/L    Absolute Basophils 0.0 0.0 - 0.2 10e9/L    Abs Immature Granulocytes 0.0 0 - 0.4 10e9/L    Absolute Nucleated RBC 0.0    Comprehensive metabolic panel   Result Value Ref Range    Sodium 140 133 - 144 mmol/L    Potassium 3.6 3.4 - 5.3 mmol/L    Chloride 105 94 - 109 mmol/L    Carbon Dioxide 27 20 - 32 mmol/L    Anion Gap 8 3 - 14 mmol/L    Glucose 245 (H) 70 - 99 mg/dL    Urea Nitrogen 15 7 - 30 mg/dL    Creatinine 0.88 0.52 - 1.04 mg/dL    GFR Estimate 73 >60 mL/min/1.7m2    GFR Estimate If Black 89 >60 mL/min/1.7m2    Calcium 8.9 8.5 - 10.1 mg/dL    Bilirubin Total 0.2 0.2 - 1.3 mg/dL    Albumin 3.1 (L) 3.4 - 5.0 g/dL    Protein Total 6.5 (L) 6.8 - 8.8 g/dL    Alkaline Phosphatase 95 40 - 150 U/L    ALT 14 0 - 50 U/L    AST 8 0 - 45 U/L   Hemoglobin A1c   Result Value Ref Range    Hemoglobin A1C 10.0 (H) 4.3 - 6.0 %   CRP inflammation   Result Value Ref Range    CRP Inflammation <2.9 0.0 - 8.0 mg/L   TSH with free T4 reflex   Result Value Ref Range    TSH 2.57 0.40 - 4.00 mU/L   UA with Microscopic reflex to Culture   Result Value Ref Range    Color Urine Light Yellow     Appearance Urine Clear     Glucose Urine >1000 (A) NEG^Negative mg/dL    Bilirubin Urine Negative NEG^Negative    Ketones Urine 5 (A) NEG^Negative mg/dL    Specific Gravity Urine 1.027 1.003 - 1.035    Blood Urine Negative NEG^Negative    pH Urine 5.5 5.0 - 7.0 pH    Protein Albumin Urine Negative NEG^Negative mg/dL    Urobilinogen mg/dL Normal 0.0 - 2.0 mg/dL    Nitrite Urine Positive (A) NEG^Negative    Leukocyte Esterase Urine Negative NEG^Negative    Source Unspecified Urine     WBC Urine 3 (H) 0 - 2 /HPF    RBC Urine 0 0 - 2 /HPF    Bacteria Urine Many (A) NEG^Negative /HPF    Squamous Epithelial /HPF Urine <1 0 - 1 /HPF    Mucous Urine Present (A) NEG^Negative /LPF   Glucose by meter   Result Value Ref Range    Glucose 52 (L) 70 - 99 mg/dL   Glucose by  meter   Result Value Ref Range    Glucose 47 (LL) 70 - 99 mg/dL   Glucose by meter   Result Value Ref Range    Glucose 89 70 - 99 mg/dL   Glucose by meter   Result Value Ref Range    Glucose 103 (H) 70 - 99 mg/dL   Urine Culture Aerobic Bacterial   Result Value Ref Range    Specimen Description Unspecified Urine     Special Requests Specimen received in preservative     Culture Micro PENDING         ROS:    Review Of Systems  Skin: negative  Eyes: blurry vision x few days  Ears/Nose/Throat: positive for nasal congestion, negative for, earache, persistent sore throat  Respiratory: No shortness of breath, dyspnea on exertion, cough, or hemoptysis  Cardiovascular: negative  Gastrointestinal: negative for, poor appetite, nausea, vomiting, heartburn, abdominal pain, constipation and diarrhea  Genitourinary: negative  Musculoskeletal: negative  Neurologic: positive for headaches and numbness or tingling of feet, negative for, syncope, paralysis and local weakness  Psychiatric: negative  Hematologic/Lymphatic/Immunologic: negative for, chills and fever  Endocrine: positive for diabetes, negative for, polyphagia, polydipsia and polyuria  PCP: Dr. Lau    10 point ROS negative other than the symptoms noted above.      Exam:  Vitals:  B/P: 105/69, T: 97.9, P: 76, R: 16    Constitutional: alert, no distress, cooperative and over weight  Head: Normocephalic. No masses, lesions, tenderness or abnormalities  Neck: Neck supple. No adenopathy. Thyroid symmetric, normal size,, Carotids without bruits.  ENT: ENT exam normal, no neck nodes or sinus tenderness  Cardiovascular: negative, PMI normal. No lifts, heaves, or thrills. RRR. No murmurs, clicks gallops or rub  Respiratory: negative, Percussion normal. Good diaphragmatic excursion. Lungs clear  Gastrointestinal: Abdomen soft, non-tender. BS normal. No masses, organomegaly  : Deferred  Musculoskeletal: extremities normal- no gross deformities noted, gait normal and normal  muscle tone  Skin: no suspicious lesions or rashes  Neurologic: Gait normal. Reflexes normal and symmetric. Sensation grossly WNL.  Psychiatric: mentation appears normal and affect normal/bright  Hematologic/Lymphatic/Immunologic: normal ant/post cervical, axillary, supraclavicular and inguinal nodes    Consults: endocrine, diabetes CNS  FEN: carb consistent diet  DVT prophylaxis: encourage ambulation; expect short stay  GI prophylaxis: protonix  BM prophylaxis: none  Code Status: full   Disposition: home once Endocrine consult has been completed with dispo recommendations, stable labs and vitals    Signed:  Mayi Mohr PA-C   October 8, 2017 at 4:57 AM

## 2017-10-08 NOTE — PLAN OF CARE
Problem: Patient Care Overview  Goal: Plan of Care/Patient Progress Review  - Blood Glucose greater than 70 less than 250 on two consecutive readings. : YES  - Ketones absent from urine. : YES  - Tolerating oral intake to maintain hydration : YES  - Safe disposition plan has been identified : YES     Pt resting comfortably. Endocrine consult complete, lantus and novolog changed and pt updated.

## 2017-10-08 NOTE — CONSULTS
Reason for visit/consult: Management of DMI    Primary care provider: No Ref-Primary, Physician    HPI:  Vickie Duque is a 32 year old female with a history of DM type I diagnosed at age 18 who presented to the ED with extreme fluctuations in blood glucose.States yesterday morning when she woke up on her way to work she felt like she was going to pass out and diaphoretic; BG was 33. She turned around and went back home and had something to eat and drink and it was back up to 67 after a nap. Then later in the day it shot up to over 200. At some point it was up to 406.     She has been managed for the most part by her PCP currently Dr. Lau. She has been on Insulin 70/30 for the last 3 years. She takes 40 units in the morning and 30 units at night. Prior to that had been on Lantus and Novolog. She reports a few episodes of DKA for which she as been admitted at [facility].    She was admitted to observation for monitoring and a better control of her DMI.     Her most recent A1C is 10%. She is being placed on medium dose correction scale. She has not received any insulin since her admission. Yet her BG dropped from 282 at 2300 to 43 @ 3 AM.  She reports hypoglycemia episodes on frequent basis. She admits to hypoglycemia unawareness.     Past Medical/Surgical History:  Past Medical History:   Diagnosis Date     Adjustment disorder with anxious mood 11/23/2015     Anxiety 11/27/2015     ASCUS of cervix with negative high risk HPV 06/19/2008     Depressive disorder, not elsewhere classified      Diabetic eye exam (H) 12/19/14     Mixed hyperlipidemia 11/30/2006     Regional enteritis of unspecified site     Ulcerative Colitis     Type I (juvenile type) diabetes mellitus with ketoacidosis, not stated as uncontrolled(250.11) 01/01/2007    Moderately severe intensity.     Type I (juvenile type) diabetes mellitus with ketoacidosis, uncontrolled 02/14/08     Type I (juvenile type) diabetes mellitus without mention of  complication, not stated as uncontrolled     diagnosed in 2003     Ulcerative colitis, unspecified     remission. Last flare 6 yrs ago.      Past Surgical History:   Procedure Laterality Date     DILATION AND CURETTAGE SUCTION  4/2010    miscarriage     HC COLONOSCOPY W BIOPSY  08/16/06     HC COLONOSCOPY W/WO BRUSH/WASH       TUBAL LIGATION         Allergies:  Allergies   Allergen Reactions     No Known Drug Allergies        Current Medications   Current Facility-Administered Medications   Medication     lidocaine 1 % 1 mL     lidocaine (LMX4) kit     sodium chloride (PF) 0.9% PF flush 3 mL     sodium chloride (PF) 0.9% PF flush 3 mL     glucose 40 % gel 15-30 g    Or     dextrose 50 % injection 25-50 mL    Or     glucagon injection 1 mg     ondansetron (ZOFRAN) injection 4 mg     prochlorperazine (COMPAZINE) injection 5-10 mg     metoclopramide (REGLAN) injection 10 mg     insulin aspart (NovoLOG) inj (RAPID ACTING)     insulin aspart (NovoLOG) inj (RAPID ACTING)       Family History:  Family History   Problem Relation Age of Onset     Hypertension Father      DIABETES Maternal Grandmother      CANCER Maternal Grandmother      DIABETES Paternal Grandfather      DIABETES Maternal Uncle      DIABETES Maternal Aunt        Social History:  Social History   Substance Use Topics     Smoking status: Current Every Day Smoker     Packs/day: 1.00     Years: 16.00     Types: Cigarettes     Smokeless tobacco: Never Used     Alcohol use 0.0 oz/week     0 Standard drinks or equivalent per week      Comment: rare       ROS:  10 point negative except as mentioned in HPI    Exam  Blood pressure 101/66, pulse 76, temperature 98.2  F (36.8  C), temperature source Oral, resp. rate 16, weight 74.8 kg (165 lb), SpO2 96 %, not currently breastfeeding.  Gen: well appearing, nad, pleasant and conversant  HEENT: anicteric, EOMI, no proptosis or lid lag  Thyroid: no thyroid goiter or cervical LAD  Cardio: RRR, no m/r/g  Resp: CTAB. No  wheezing or crackles  Abd: soft, NT/ND. Normal BS  Skin: Warm and dry. No rash or lesions.   Ext: no swelling or edema  Feet: no deformities or ulcers, 2+ DP pulses  Neuro: A&Ox3, relaxation phase of reflexes normal, no tremor on outstretched arms  Psych: Normal affect and mood.    Labs/Imaging    TSH   Date Value Ref Range Status   10/07/2017 2.57 0.40 - 4.00 mU/L Final   06/26/2017 6.05 (H) 0.40 - 4.00 mU/L Final   03/05/2014 3.97 0.4 - 5.0 mU/L Final   03/16/2012 3.75 0.4 - 5.0 mU/L Final   01/29/2010 2.91 0.4 - 5.0 mU/L Final     T4 Free   Date Value Ref Range Status   06/26/2017 1.02 0.76 - 1.46 ng/dL Final   03/28/2006 1.10 0.70 - 1.85 ng/dL Final   ]  Lab Results   Component Value Date    A1C 10.0 10/07/2017    A1C 11.2 06/26/2017    A1C 11.5 01/29/2016    A1C 11.7 10/06/2015    A1C 11.1 04/01/2015         Assessment and Plan  Uncontrolled DMI complicated by episodes of hypoglycemia and hypoglycemia unawareness. A1C is 10%. The patient would like to achieve better control of her diabetes and minimize hypoglycemic episodes. We discussed changing her insulin regimen to basal-bolus. She was also counseled on carb counting, sliding scale and hypoglycemia management.    -D/c PTA regimen of insulin 70/30  -Discharge the patient home on insulin Lantus 25 units daily  -Insulin Novolog 1 unit per 15 g of carbohydrate TID with meals  -Insulin Novolog medium dose correction scale 1 unit per 50 >140 mg/dl  -Please prescribe a glucagon pen upon discharge to use for severe hypoglycemia  -Referral to our endocrine clinic in [facility] in 4-8 weeks.      Patient seen and examined with staff endocrinologist Dr Chava Medrano MD  Diabetes, Metabolism and Endocrinology Fellow  Pager: [phone]    ATTENDING NOTE   I have seen and examined the patient, reviewed and edited the fellow's note, and agree with the plan of care.    Aaliyah Larsen MD    Division of Diabetes and  Endocrinology  Department of Medicine  [phone]

## 2017-10-09 ENCOUNTER — TELEPHONE (OUTPATIENT)
Dept: FAMILY MEDICINE | Facility: CLINIC | Age: 32
End: 2017-10-09

## 2017-10-09 ENCOUNTER — NURSE TRIAGE (OUTPATIENT)
Dept: NURSING | Facility: CLINIC | Age: 32
End: 2017-10-09

## 2017-10-09 ENCOUNTER — TELEPHONE (OUTPATIENT)
Dept: ENDOCRINOLOGY | Facility: CLINIC | Age: 32
End: 2017-10-09

## 2017-10-09 LAB
BACTERIA SPEC CULT: ABNORMAL
Lab: ABNORMAL
SPECIMEN SOURCE: ABNORMAL

## 2017-10-09 NOTE — TELEPHONE ENCOUNTER
Type of Visit  [facility]  Diagnosis/Reason for Visit  Uncontrolled Type 1 Diabetes with hyperglycemia  Date of Visit  10/08/17  # of ED Visits in past year  2      Please call patient for follow up.

## 2017-10-09 NOTE — TELEPHONE ENCOUNTER
Per Chava Message:  She should see Haydee Rojas in between because we started her on new regimen.     Aaliyah            Previous Messages       ----- Message -----      From: Sumi Strickland LPN      Sent: 10/9/2017   2:24 PM        To: Aaliyah Larsen MD   Subject: FW: new referral diabetes                         Leslie Aaliyah,     All of our providers are out until February. How urgent is this, do we need to over book some one?     Sumi     ----- Message -----      From: Aaliyah Larsen MD      Sent: 10/8/2017  12:32 PM        To: Dylan Medrano MD, *   Subject: new referral diabetes                             Judd Rodriguez,   This pt would like to be seen at [facility]. She is new to our clinic. Has not seen endocrinologist. I saw her in the hospital this weekend. Please help her make appt with anybody.     Thanks,   Aaliyah         As of 10/9/17 next available new patient with [facility] is 1/10/18.    Left message for patient to return call.    Sumi Strickland LPN  Adult Endocrinology  [facility]

## 2017-10-09 NOTE — TELEPHONE ENCOUNTER
Monitor and record   Order Comments: blood glucose 4 times a day, before meals and at bedtime          Reason for your hospital stay   Order Comments: You were admitted with uncontrolled type 1 DM. We consulted Endocrinology who recommended the following:   -Discontinue insulin 70/30  -Start insulin Basaglar 25 units daily  -Insulin Novolog 1 unit per 15 g of carbohydrate Three times daily with meals  -Insulin Novolog medium dose correction scale 1 unit per 50 >140 mg/dl  -Glucagon pen to use for severe hypoglycemia  -Follow-up in endocrine clinic in [facility] in 4-8 weeks.    Please monitor your blood glucose carefully. I am concerned that you do not recognize low blood sugars as this could be life threatening for you.          When to contact your care team   Order Comments: Return to the ED with low (< 70) or high blood sugars (> 300),  fever, uncontrolled nausea, vomiting, unrelieved pain, bleeding not relieved with pressure, dizziness, chest pain, shortness of breath, loss of consciousness, and any new or concerning symptoms.     ED / Discharge Outreach Protocol    Patient Contact    Attempt # 1    Was call answered?  No.  Left message on voicemail with information to call me back.  Elena Keller, RN

## 2017-10-10 NOTE — TELEPHONE ENCOUNTER
Patient scheduled with Haydee GAVIN and Dr Cao. Patient will bring meter to appointment.    Sumi Strickland LPN

## 2017-10-11 ENCOUNTER — DOCUMENTATION ONLY (OUTPATIENT)
Dept: PHARMACY | Facility: CLINIC | Age: 32
End: 2017-10-11

## 2017-10-11 NOTE — TELEPHONE ENCOUNTER
ACCU-CHEK SMARTVIEW test strip         Last Written Prescription Date: 10/28/15  Last Fill Quantity: 100, # refills: 11  Last Office Visit with G, P or [facility] prescribing provider:  6/28/17        BP Readings from Last 3 Encounters:   10/08/17 95/61   09/01/17 112/79   06/28/17 114/64     Lab Results   Component Value Date    MICROL 6 04/01/2015     Lab Results   Component Value Date    UMALCR 7.56 04/01/2015     Creatinine   Date Value Ref Range Status   10/07/2017 0.88 0.52 - 1.04 mg/dL Final   ]  GFR Estimate   Date Value Ref Range Status   10/07/2017 73 >60 mL/min/1.7m2 Final     Comment:     Non  GFR Calc   06/26/2017 78 >60 mL/min/1.7m2 Final     Comment:     Non  GFR Calc   09/27/2016 77 >60 mL/min/1.7m2 Final     Comment:     Non  GFR Calc     GFR Estimate If Black   Date Value Ref Range Status   10/07/2017 89 >60 mL/min/1.7m2 Final     Comment:      GFR Calc   06/26/2017 >90   GFR Calc   >60 mL/min/1.7m2 Final   09/27/2016 >90   GFR Calc   >60 mL/min/1.7m2 Final     Lab Results   Component Value Date    CHOL 207 01/29/2016     Lab Results   Component Value Date    HDL 55 01/29/2016     Lab Results   Component Value Date     01/29/2016     Lab Results   Component Value Date    TRIG 116 01/29/2016     Lab Results   Component Value Date    CHOLHDLRATIO 3.7 04/01/2015     Lab Results   Component Value Date    AST 8 10/07/2017     Lab Results   Component Value Date    ALT 14 10/07/2017     Lab Results   Component Value Date    A1C 10.0 10/07/2017    A1C 11.2 06/26/2017    A1C 11.5 01/29/2016    A1C 11.7 10/06/2015    A1C 11.1 04/01/2015     Potassium   Date Value Ref Range Status   10/07/2017 3.6 3.4 - 5.3 mmol/L Final

## 2017-10-11 NOTE — TELEPHONE ENCOUNTER
Patient observation status. No need for Hospital follow up call. If following up with Endocrine in [place].  Megan Aguirre RN

## 2017-10-11 NOTE — PROGRESS NOTES
Prior Authorization Approval    Humalog Kwikpen  Qty: 15  Day Supply: 30  Diagnosis: Uncontrolled type 1 diabetes mellitus with hyperglycemia (H) (E10.65)    Expected copay: 3.00  Effective Dates: 10/08/2017 - 10/08/2018    Insurance: KALLIE Crespo  Phone: [phone]  ID: [id number]  Case Number: [id number]  Submitted via: [email or web address]    Note: Pt's primary insurance, [employer], prefers Humalog. Pt's secondary insurance, [facility], prefers Novolog. Prior auth requested for secondary insurance.          Elvira Tavarez  Pharmacy Liaison  Ph: [phone] Page: [phone]

## 2017-10-12 RX ORDER — BLOOD SUGAR DIAGNOSTIC
STRIP MISCELLANEOUS
Qty: 100 EACH | Refills: 1 | Status: SHIPPED | OUTPATIENT
Start: 2017-10-12 | End: 2018-08-09

## 2017-10-12 NOTE — TELEPHONE ENCOUNTER
Routing refill request to provider for review/approval because:  A break in medication, has not been prescribed since 2015    Siri Thomas RN  [facility]

## 2017-10-31 DIAGNOSIS — E10.65 UNCONTROLLED TYPE 1 DIABETES MELLITUS WITH HYPERGLYCEMIA (H): ICD-10-CM

## 2017-10-31 NOTE — TELEPHONE ENCOUNTER
Pt is requsting a refill sent to new pharmacy for her pen needles, Basaglar, and short acting insulin. Gabrielle Galo CMA ([facility])

## 2017-11-01 RX ORDER — INSULIN GLARGINE 100 [IU]/ML
25 INJECTION, SOLUTION SUBCUTANEOUS DAILY
Qty: 30 ML | Refills: 1 | Status: SHIPPED | OUTPATIENT
Start: 2017-11-01 | End: 2018-08-09

## 2017-11-01 RX ORDER — SYRINGE-NEEDLE,INSULIN,0.5 ML 27GX1/2"
SYRINGE, EMPTY DISPOSABLE MISCELLANEOUS
Qty: 100 EACH | Refills: 6 | Status: SHIPPED | OUTPATIENT
Start: 2017-11-01 | End: 2018-08-09

## 2017-11-01 NOTE — TELEPHONE ENCOUNTER
So it would appear that I last saw this patient well over a year ago.    Please contact her and set up a follow-up appointment.    Thank you. Tigist

## 2017-11-01 NOTE — TELEPHONE ENCOUNTER
Pt was called and advised her prescriptions were refill but she is due for an appointment. Pt was scheduled for a follow up on 11/13/17 with Dr. Escoto. Appointment reminder was mailed to home address.

## 2017-11-03 ENCOUNTER — TELEPHONE (OUTPATIENT)
Dept: FAMILY MEDICINE | Facility: OTHER | Age: 32
End: 2017-11-03

## 2017-11-03 DIAGNOSIS — E10.65 UNCONTROLLED TYPE 1 DIABETES MELLITUS WITH HYPERGLYCEMIA (H): ICD-10-CM

## 2017-11-13 ENCOUNTER — OFFICE VISIT (OUTPATIENT)
Dept: INTERNAL MEDICINE | Facility: CLINIC | Age: 32
End: 2017-11-13
Payer: COMMERCIAL

## 2017-11-13 VITALS
TEMPERATURE: 97.3 F | DIASTOLIC BLOOD PRESSURE: 72 MMHG | SYSTOLIC BLOOD PRESSURE: 102 MMHG | BODY MASS INDEX: 27.81 KG/M2 | HEART RATE: 80 BPM | OXYGEN SATURATION: 99 % | WEIGHT: 162 LBS

## 2017-11-13 DIAGNOSIS — K51.90 ULCERATIVE COLITIS WITHOUT COMPLICATIONS, UNSPECIFIED LOCATION (H): ICD-10-CM

## 2017-11-13 DIAGNOSIS — F33.1 MAJOR DEPRESSIVE DISORDER, RECURRENT EPISODE, MODERATE (H): ICD-10-CM

## 2017-11-13 DIAGNOSIS — E10.65 TYPE 1 DIABETES MELLITUS WITH HYPERGLYCEMIA (H): Primary | ICD-10-CM

## 2017-11-13 LAB
ALBUMIN SERPL-MCNC: 3.9 G/DL (ref 3.4–5)
ALP SERPL-CCNC: 115 U/L (ref 40–150)
ALT SERPL W P-5'-P-CCNC: 16 U/L (ref 0–50)
ANION GAP SERPL CALCULATED.3IONS-SCNC: 7 MMOL/L (ref 3–14)
AST SERPL W P-5'-P-CCNC: 6 U/L (ref 0–45)
BILIRUB SERPL-MCNC: 0.2 MG/DL (ref 0.2–1.3)
BUN SERPL-MCNC: 13 MG/DL (ref 7–30)
CALCIUM SERPL-MCNC: 9.2 MG/DL (ref 8.5–10.1)
CHLORIDE SERPL-SCNC: 104 MMOL/L (ref 94–109)
CO2 SERPL-SCNC: 29 MMOL/L (ref 20–32)
CREAT SERPL-MCNC: 0.8 MG/DL (ref 0.52–1.04)
GFR SERPL CREATININE-BSD FRML MDRD: 82 ML/MIN/1.7M2
GLUCOSE SERPL-MCNC: 37 MG/DL (ref 70–99)
HBA1C MFR BLD: 8.9 % (ref 4.3–6)
POTASSIUM SERPL-SCNC: 3.2 MMOL/L (ref 3.4–5.3)
PROT SERPL-MCNC: 7.8 G/DL (ref 6.8–8.8)
SODIUM SERPL-SCNC: 140 MMOL/L (ref 133–144)

## 2017-11-13 PROCEDURE — 80053 COMPREHEN METABOLIC PANEL: CPT | Performed by: INTERNAL MEDICINE

## 2017-11-13 PROCEDURE — 99214 OFFICE O/P EST MOD 30 MIN: CPT | Performed by: INTERNAL MEDICINE

## 2017-11-13 PROCEDURE — 83036 HEMOGLOBIN GLYCOSYLATED A1C: CPT | Performed by: INTERNAL MEDICINE

## 2017-11-13 PROCEDURE — 36415 COLL VENOUS BLD VENIPUNCTURE: CPT | Performed by: INTERNAL MEDICINE

## 2017-11-13 PROCEDURE — 99207 C FOOT EXAM  NO CHARGE: CPT | Mod: 25 | Performed by: INTERNAL MEDICINE

## 2017-11-13 ASSESSMENT — PATIENT HEALTH QUESTIONNAIRE - PHQ9: SUM OF ALL RESPONSES TO PHQ QUESTIONS 1-9: 8

## 2017-11-13 ASSESSMENT — PAIN SCALES - GENERAL: PAINLEVEL: MODERATE PAIN (4)

## 2017-11-13 NOTE — LETTER
My Depression Action Plan  Name: Vickie Duque   Date of Birth 1985  Date: 11/13/2017    My doctor: No Ref-Primary, Physician   My clinic: [address] [address] [phone]          GREEN    ZONE   Good Control    What it looks like:     Things are going generally well. You have normal up s and down s. You may even feel depressed from time to time, but bad moods usually last less than a day.   What you need to do:  1. Continue to care for yourself (see self care plan)  2. Check your depression survival kit and update it as needed  3. Follow your physician s recommendations including any medication.  4. Do not stop taking medication unless you consult with your physician first.           YELLOW         ZONE Getting Worse    What it looks like:     Depression is starting to interfere with your life.     It may be hard to get out of bed; you may be starting to isolate yourself from others.    Symptoms of depression are starting to last most all day and this has happened for several days.     You may have suicidal thoughts but they are not constant.   What you need to do:     1. Call your care team, your response to treatment will improve if you keep your care team informed of your progress. Yellow periods are signs an adjustment may need to be made.     2. Continue your self-care, even if you have to fake it!    3. Talk to someone in your support network    4. Open up your depression survival kit           RED    ZONE Medical Alert - Get Help    What it looks like:     Depression is seriously interfering with your life.     You may experience these or other symptoms: You can t get out of bed most days, can t work or engage in other necessary activities, you have trouble taking care of basic hygiene, or basic responsibilities, thoughts of suicide or death that will not go away, self-injurious behavior.     What you need to do:  1. Call your care team  and request a same-day appointment. If they are not available (weekends or after hours) call your local crisis line, emergency room or 911.      Electronically signed by: aMyra Barry, November 13, 2017    Depression Self Care Plan / Survival Kit    Self-Care for Depression  Here s the deal. Your body and mind are really not as separate as most people think.  What you do and think affects how you feel and how you feel influences what you do and think. This means if you do things that people who feel good do, it will help you feel better.  Sometimes this is all it takes.  There is also a place for medication and therapy depending on how severe your depression is, so be sure to consult with your medical provider and/ or Behavioral Health Consultant if your symptoms are worsening or not improving.     In order to better manage my stress, I will:    Exercise  Get some form of exercise, every day. This will help reduce pain and release endorphins, the  feel good  chemicals in your brain. This is almost as good as taking antidepressants!  This is not the same as joining a gym and then never going! (they count on that by the way ) It can be as simple as just going for a walk or doing some gardening, anything that will get you moving.      Hygiene   Maintain good hygiene (Get out of bed in the morning, Make your bed, Brush your teeth, Take a shower, and Get dressed like you were going to work, even if you are unemployed).  If your clothes don't fit try to get ones that do.    Diet  I will strive to eat foods that are good for me, drink plenty of water, and avoid excessive sugar, caffeine, alcohol, and other mood-altering substances.  Some foods that are helpful in depression are: complex carbohydrates, B vitamins, flaxseed, fish or fish oil, fresh fruits and vegetables.    Psychotherapy  I agree to participate in Individual Therapy (if recommended).    Medication  If prescribed medications, I agree to take them.  Missing  doses can result in serious side effects.  I understand that drinking alcohol, or other illicit drug use, may cause potential side effects.  I will not stop my medication abruptly without first discussing it with my provider.    Staying Connected With Others  I will stay in touch with my friends, family members, and my primary care provider/team.    Use your imagination  Be creative.  We all have a creative side; it doesn t matter if it s oil painting, sand castles, or mud pies! This will also kick up the endorphins.    Witness Beauty  (AKA stop and smell the roses) Take a look outside, even in mid-winter. Notice colors, textures. Watch the squirrels and birds.     Service to others  Be of service to others.  There is always someone else in need.  By helping others we can  get out of ourselves  and remember the really important things.  This also provides opportunities for practicing all the other parts of the program.    Humor  Laugh and be silly!  Adjust your TV habits for less news and crime-drama and more comedy.    Control your stress  Try breathing deep, massage therapy, biofeedback, and meditation. Find time to relax each day.     My support system    Clinic Contact:  Phone number:    Contact 1:  Phone number:    Contact 2:  Phone number:    Islam/:  Phone number:    Therapist:  Phone number:    Local crisis center:    Phone number:    Other community support:  Phone number:

## 2017-11-13 NOTE — MR AVS SNAPSHOT
After Visit Summary   11/13/2017    Vickie Duque    MRN: [id number]           Patient Information     Date Of Birth          1985        Visit Information        Provider Department      11/13/2017 2:00 PM Roscoe Escoto DO [facility]        Today's Diagnoses     Type 1 diabetes mellitus with hyperglycemia (H)    -  1    Major depressive disorder, recurrent episode, moderate (H)        Ulcerative colitis without complications, unspecified location (H)           Follow-ups after your visit        Your next 10 appointments already scheduled     Deyvi 10, 2018  2:00 PM CST   New Visit with Brea Cao MD   [facility]    [address] [address] [place] [address] [phone]              Who to contact     If you have questions or need follow up information about today's clinic visit or your schedule please contact [facility] directly at [phone].  Normal or non-critical lab and imaging results will be communicated to you by MyChart, letter or phone within 4 business days after the clinic has received the results. If you do not hear from us within 7 days, please contact the clinic through Contactualhart or phone. If you have a critical or abnormal lab result, we will notify you by phone as soon as possible.  Submit refill requests through [employer] or call your pharmacy and they will forward the refill request to us. Please allow 3 business days for your refill to be completed.          Additional Information About Your Visit        MyChart Information     [employer] gives you secure access to your electronic health record. If you see a primary care provider, you can also send messages to your care team and make appointments. If you have questions, please call your primary care clinic.  If you do not have a primary care provider, please call [phone] and they will assist you.        Care  EveryWhere ID     This is your Care EveryWhere ID. This could be used by other organizations to access your [place] medical records  [id number]        Your Vitals Were     Pulse Temperature Pulse Oximetry BMI (Body Mass Index)          80 97.3  F (36.3  C) (Temporal) 99% 27.81 kg/m2         Blood Pressure from Last 3 Encounters:   11/13/17 102/72   10/08/17 95/61   09/01/17 112/79    Weight from Last 3 Encounters:   11/13/17 162 lb (73.5 kg)   10/07/17 165 lb (74.8 kg)   09/18/17 160 lb (72.6 kg)              We Performed the Following     Comprehensive metabolic panel     DEPRESSION ACTION PLAN (DAP)     FOOT EXAM     Hemoglobin A1c        Primary Care Provider    Physician No Ref-Primary       NO REF-PRIMARY PHYSICIAN        Equal Access to Services     KRISTIN ONOFRE : Theresa Renteria, waherminio luqadaha, qaybta kaalmada fatuma, kendra gutierrez . So [facility] [phone].    ATENCIÓN: Si habla español, tiene a barrientos disposición servicios gratuitos de asistencia lingüística. Llame al [phone].    We comply with applicable federal civil rights laws and [place] laws. We do not discriminate on the basis of race, color, national origin, age, disability, sex, sexual orientation, or gender identity.            Thank you!     Thank you for choosing [facility]  for your care. Our goal is always to provide you with excellent care. Hearing back from our patients is one way we can continue to improve our services. Please take a few minutes to complete the written survey that you may receive in the mail after your visit with us. Thank you!             Your Updated Medication List - Protect others around you: Learn how to safely use, store and throw away your medicines at www.disposemymeds.org.          This list is accurate as of: 11/13/17 11:59 PM.  Always use your most recent med list.                   Brand Name Dispense Instructions for use Diagnosis    ACCU-CHEK  "SMARTVIEW test strip   Generic drug:  blood glucose monitoring     100 each    TEST 4 TIMES A DAY AS DIRECTED    Uncontrolled type 1 diabetes mellitus without complication (H)       ACE/ARB/ARNI NOT PRESCRIBED (INTENTIONAL)      1 each every 3 hours ACE & ARB not prescribed due to Intolerance and Symptomatic hypotension not due to excessive diuresis    Type 1 diabetes, HbA1c goal < 7% (H)       BASAGLAR 100 UNIT/ML injection     30 mL    Inject 25 Units Subcutaneous daily    Uncontrolled type 1 diabetes mellitus with hyperglycemia (H)       DIABETIC STERILE LANCETS device     1 Box    1 Device 4 times daily.    Type I (juvenile type) diabetes mellitus without mention of complication, not stated as uncontrolled       glucagon 1 MG Solr injection     2 each    Inject 1 mg Subcutaneous every 15 minutes as needed for low blood sugar    Uncontrolled type 1 diabetes mellitus with hyperglycemia (H)       insulin lispro 100 UNIT/ML injection    HumaLOG KWIKpen    30 mL    Inject 1-7 Units Subcutaneous 3 times daily (before meals) Do Not take Correction Insulin if Pre-Meal BG < than 140.  140 - 189 give 1 unit 190 - 239 give 2 units 240 - 289 give 3 units 290 - 339 give 4 units 340- 399 give 5 units 400-449 give 6 units Greater than or equal to 450 give 7 units Based on pre-meal blood glucose.   Notify clinic if glucose greater than or equal to 350 mg/dL after administration of correction dose    Uncontrolled type 1 diabetes mellitus with hyperglycemia (H)       insulin pen needle 32G X 4 MM    BD TARA U/F    270 each    Use 3 daily or as directed.    Uncontrolled type 1 diabetes mellitus with hyperglycemia (H)       insulin syringe-needle U-100 31G X 5/16\" 0.5 ML    B-D INSULIN SYRINGE    100 each    USE TO INJECT INSULIN    Uncontrolled type 1 diabetes mellitus with hyperglycemia (H)         "

## 2017-11-13 NOTE — NURSING NOTE
"Chief Complaint   Patient presents with     Diabetes       Initial /72 (BP Location: Left arm, Patient Position: Chair, Cuff Size: Adult Regular)  Pulse 80  Temp 97.3  F (36.3  C) (Temporal)  Wt 162 lb (73.5 kg)  SpO2 99%  BMI 27.81 kg/m2 Estimated body mass index is 27.81 kg/(m^2) as calculated from the following:    Height as of 9/18/17: 5' 4\" (1.626 m).    Weight as of this encounter: 162 lb (73.5 kg).  Medication Reconciliation: complete   Mayra KNIGHTA    "

## 2017-11-13 NOTE — PROGRESS NOTES
SUBJECTIVE:   Vickie Duque is a 32 year old female who presents to clinic today for the following health issues:    Diabetes Follow-up      Patient is checking blood sugars: rarely- 348    Diabetic concerns: blood sugar frequently over 200     Symptoms of hypoglycemia (low blood sugar): none     Paresthesias (numbness or burning in feet) or sores: No     Date of last diabetic eye exam: overdue        Amount of exercise or physical activity: None    Problems taking medications regularly: No    Medication side effects: none    Diet: regular (no restrictions)                          Chief Complaint           The patient is a well-known 32-year-old female who has type I diabetes.  She was previously seen by the department of endocrinology at the [facility] but was unable to follow up.  They did adjust her insulin and put her on background Basaglar  With preprandial coverage.  She notes that she has been doing this fairly aggressively.  Her last glycosylated hemoglobin was 10.  She also notes that her daughter is post liver transplant and her primary priority.  Subsequently she frequently neglects her own health in favor of her daughter.  I've explained to her that if she dies, goes in the kidney failure, develops DKA etc., shall be very little help to her daughter.  She notes no significant neuropathy at this time.  She is not on an ACE inhibitor due to symptom medic hypotension.                       PAST, FAMILY,SOCIAL HISTORY:     Medical  History:   has a past medical history of Adjustment disorder with anxious mood (11/23/2015); Anxiety (11/27/2015); ASCUS of cervix with negative high risk HPV (06/19/2008); Depressive disorder, not elsewhere classified; Diabetic eye exam (H) (12/19/14); Mixed hyperlipidemia (11/30/2006); Regional enteritis of unspecified site; Type I (juvenile type) diabetes mellitus with ketoacidosis, not stated as uncontrolled(250.11) (01/01/2007); Type I (juvenile type) diabetes mellitus  "with ketoacidosis, uncontrolled (02/14/08); Type I (juvenile type) diabetes mellitus without mention of complication, not stated as uncontrolled; and Ulcerative colitis, unspecified.     Surgical History:   has a past surgical history that includes Colonoscopy w/wo Brush **Performed**; COLONOSCOPY W BIOPSY (08/16/06); Dilation and curettage suction (4/2010); and tubal ligation.     Social History:   reports that she has been smoking Cigarettes.  She has a 16.00 pack-year smoking history. She has never used smokeless tobacco. She reports that she drinks alcohol. She reports that she does not use illicit drugs.     Family History:  family history includes CANCER in her maternal grandmother; DIABETES in her maternal aunt, maternal grandmother, maternal uncle, and paternal grandfather; Hypertension in her father.            MEDICATIONS  Current Outpatient Prescriptions   Medication Sig Dispense Refill     insulin lispro (HUMALOG KWIKPEN) 100 UNIT/ML injection Inject 1-7 Units Subcutaneous 3 times daily (before meals) Do Not take Correction Insulin if Pre-Meal BG < than 140.   140 - 189 give 1 unit  190 - 239 give 2 units  240 - 289 give 3 units  290 - 339 give 4 units  340- 399 give 5 units  400-449 give 6 units  Greater than or equal to 450 give 7 units  Based on pre-meal blood glucose.    Notify clinic if glucose greater than or equal to 350 mg/dL after administration of correction dose 30 mL 1     insulin pen needle (BD TARA U/F) 32G X 4 MM Use 3 daily or as directed. 270 each 3     glucagon 1 MG SOLR injection Inject 1 mg Subcutaneous every 15 minutes as needed for low blood sugar 2 each 0     BASAGLAR 100 UNIT/ML injection Inject 25 Units Subcutaneous daily 30 mL 1     insulin syringe-needle U-100 (B-D INSULIN SYRINGE) 31G X 5/16\" 0.5 ML USE TO INJECT INSULIN 100 each 6     ACCU-CHEK SMARTVIEW test strip TEST 4 TIMES A DAY AS DIRECTED 100 each 1     ACE/ARB NOT PRESCRIBED, INTENTIONAL, 1 each every 3 hours ACE & " ARB not prescribed due to Intolerance and Symptomatic hypotension not due to excessive diuresis       DIABETIC STERILE LANCETS device 1 Device 4 times daily. 1 Box 11         --------------------------------------------------------------------------------------------------------------------                  Review of Systems     LUNGS: Pt denies: cough,excess sputum, hemoptysis, or shortness of breath.   HEART: Pt denies: chest pain, arrythmia, syncope, tachy or bradyarrhythmia.   GI: Pt denies: nausea, vomitting, diarrhea, constipation, melena, or hematochezia. No symptoms of ulcerative colitis.   NEURO: Pt denies: seizures, strokes, diplopia, weakness,  or paralysis. She does have some mild paresthesias in the feet.  She notes that these are not significant.   SKIN: Pt denies: itching, rashes, discoloration, or specific lesions of concern. Denies recent hair loss.                        Examination       Vital Signs:  B/P: 102/72, T: 97.3, P: 80, R: Data Unavailable, BMI: Body mass index is 27.81 kg/(m^2).   Constitutional: The patient appears to be in no acute distress. The patient appears to be adequately hydrated. No acute respiratory or hemodynamic distress is noted at this time.   LUNGS: clear bilaterally, airflow is brisk, no intercostal retraction or stridor is noted. No coughing is noted during visit.   HEART:  regular without rubs, clicks, gallops, or murmurs. PMI is nondisplaced. Upstrokes are brisk. S1,S2 are heard.   GI: Abdomen is soft, without rebound, guarding or tenderness. Bowel sounds are appropriate. No renal bruits are heard.    NEURO: Pt is alert and appropriate. No neurologic lateralization is noted. Cranial nerves 2-12 are intact. Peripheral sensory and motor function are grossly normal.                    Foot Exam: Feet are bilaterally present.  Capillary refill is brisk.  No ulceration or skin breakdown is present.  Sensation is  grossly intact in the toes.                    Decision-Making          1. Type 1 diabetes mellitus with hyperglycemia (H)  Check A1c.  Strongly recommend that the patient [facility] with the [facility] and her [facility]ocrinologist.  - FOOT EXAM  - Lipid panel reflex to direct LDL Fasting; Future  - Albumin Random Urine Quantitative with Creat Ratio; Future  - Hemoglobin A1c  - Comprehensive metabolic panel    2. Major depressive disorder, recurrent episode, moderate (H)  Review depression action plan.  - DEPRESSION ACTION PLAN (DAP)    3. Ulcerative colitis without complications, unspecified location (H)  follow                      FOLLOW UP   I have asked the patient to make an appointment for followup with me as predicated upon her lab results.        I have carefully explained the diagnosis and treatment options with the patient. The patient has displayed an understanding of the above, and all subsequent questions were answered.               DO AMRIT Mazariegos    Portions of this note were produced using [employer]  Although every attempt at real-time proof reading has been made, occasional grammar/syntax errors may have been missed.

## 2017-11-15 NOTE — PROGRESS NOTES
Dear Vickie, your recent test results are attached.   Your potassium is slightly low at 3.2. And your blood sugar was very low at 37 but you have already been contacted and obviously did just fine.  Your kidney and liver tests are very good.  Your A1c is down from 11.2 down to 8.9. This is very very good. Keep doing the great job.  Feel free to contact me via the office or My Chart if you have any questions regarding the above.  Sincerely,  Roscoe Escoto,  FACOI

## 2017-12-04 ENCOUNTER — TELEPHONE (OUTPATIENT)
Dept: FAMILY MEDICINE | Facility: CLINIC | Age: 32
End: 2017-12-04

## 2017-12-04 DIAGNOSIS — R76.11 MANTOUX: POSITIVE: Primary | ICD-10-CM

## 2017-12-04 NOTE — TELEPHONE ENCOUNTER
[facility] calling- [facility] tested positive. They would like an order for a CXR.      Dr Escoto can you please sign order?      Marce please call patient to arrange CXR when order signed.  Thanks!

## 2017-12-05 ENCOUNTER — TELEPHONE (OUTPATIENT)
Dept: FAMILY MEDICINE | Facility: OTHER | Age: 32
End: 2017-12-05

## 2017-12-05 ENCOUNTER — HOSPITAL ENCOUNTER (OUTPATIENT)
Dept: GENERAL RADIOLOGY | Facility: CLINIC | Age: 32
Discharge: HOME | End: 2017-12-05
Attending: INTERNAL MEDICINE | Admitting: INTERNAL MEDICINE
Payer: COMMERCIAL

## 2017-12-05 DIAGNOSIS — R76.11 MANTOUX: POSITIVE: ICD-10-CM

## 2017-12-05 PROCEDURE — 71010 XR CHEST 1 VW: CPT | Mod: TC

## 2017-12-06 ENCOUNTER — DOCUMENTATION ONLY (OUTPATIENT)
Dept: INTERNAL MEDICINE | Facility: CLINIC | Age: 32
End: 2017-12-06

## 2017-12-06 NOTE — TELEPHONE ENCOUNTER
Pt has been notified. I left a copy of results at the front dest for pickup. Mckayla Padron MA     12/6/2017

## 2017-12-06 NOTE — PROGRESS NOTES
Diabetic Review Team Follow up  Gaps identified on last contact: uncontrolled diabetes   Plan from last contact:  Initial outreach to pt to identify barriers and offer resources.   Progress: Attempted to contact x two with letter sent.  Pt has been following up in clinic.  New Gaps Identified: unable to contact  New/Continuing plan:    Will have Clinic RN to outreach to pt to try and engage pt and identify barriers and refer as appropriate.         Haydee Putnam RN,BSN  Clinical Care Coordinator      Nadine Kurtz BSW  Clinical Care Coordinator    Verenice Paul RDN, LD, CDE  Diabetic     Moncho McdanielD  [facility] pharmacist    Lucille Ridley RN  Specialty Care Patient Supervisor

## 2017-12-08 NOTE — PROGRESS NOTES
Dear Vickie, your recent test results are attached.  The chest x-ray demonstrates no evidence of tuberculosis  Feel free to contact me via the office or My Chart if you have any questions regarding the above.  Sincerely,  Roscoe Escoto DO FACOI

## 2018-01-08 ENCOUNTER — PRE VISIT (OUTPATIENT)
Dept: ENDOCRINOLOGY | Facility: CLINIC | Age: 33
End: 2018-01-08

## 2018-01-08 NOTE — TELEPHONE ENCOUNTER
Left message for patient to return call to complete pre-visit.   Maggy Gaona LPN  Adult Endocrinology  [facility]

## 2018-02-08 ENCOUNTER — DOCUMENTATION ONLY (OUTPATIENT)
Dept: LAB | Facility: OTHER | Age: 33
End: 2018-02-08

## 2018-02-08 DIAGNOSIS — Z11.1 SCREENING EXAMINATION FOR PULMONARY TUBERCULOSIS: ICD-10-CM

## 2018-02-08 DIAGNOSIS — Z11.1 SCREENING EXAMINATION FOR PULMONARY TUBERCULOSIS: Primary | ICD-10-CM

## 2018-02-08 PROCEDURE — 36415 COLL VENOUS BLD VENIPUNCTURE: CPT | Performed by: INTERNAL MEDICINE

## 2018-02-08 PROCEDURE — 86480 TB TEST CELL IMMUN MEASURE: CPT | Performed by: INTERNAL MEDICINE

## 2018-02-08 NOTE — PROGRESS NOTES
Patient was here to have a QTB done for employment. She needs orders for this.   Please review and sign lab if needed.  Thank you!  [facility]

## 2018-02-12 LAB
M TB TUBERC IFN-G BLD QL: NEGATIVE
M TB TUBERC IFN-G/MITOGEN IGNF BLD: 0.02 IU/ML

## 2018-03-26 ENCOUNTER — OFFICE VISIT (OUTPATIENT)
Dept: FAMILY MEDICINE | Facility: OTHER | Age: 33
End: 2018-03-26
Payer: COMMERCIAL

## 2018-03-26 VITALS
SYSTOLIC BLOOD PRESSURE: 102 MMHG | DIASTOLIC BLOOD PRESSURE: 60 MMHG | WEIGHT: 151.3 LBS | HEART RATE: 72 BPM | RESPIRATION RATE: 16 BRPM | BODY MASS INDEX: 25.21 KG/M2 | HEIGHT: 65 IN | TEMPERATURE: 96.8 F

## 2018-03-26 DIAGNOSIS — Z02.89 HEALTH EXAMINATION OF DEFINED SUBPOPULATION: Primary | ICD-10-CM

## 2018-03-26 DIAGNOSIS — Z23 NEED FOR PROPHYLACTIC VACCINATION AND INOCULATION AGAINST INFLUENZA: ICD-10-CM

## 2018-03-26 PROCEDURE — 99395 PREV VISIT EST AGE 18-39: CPT | Mod: 25 | Performed by: PHYSICIAN ASSISTANT

## 2018-03-26 PROCEDURE — 90686 IIV4 VACC NO PRSV 0.5 ML IM: CPT | Performed by: PHYSICIAN ASSISTANT

## 2018-03-26 PROCEDURE — 86735 MUMPS ANTIBODY: CPT | Performed by: PHYSICIAN ASSISTANT

## 2018-03-26 PROCEDURE — G0499 HEPB SCREEN HIGH RISK INDIV: HCPCS | Performed by: PHYSICIAN ASSISTANT

## 2018-03-26 PROCEDURE — 86787 VARICELLA-ZOSTER ANTIBODY: CPT | Performed by: PHYSICIAN ASSISTANT

## 2018-03-26 PROCEDURE — 86762 RUBELLA ANTIBODY: CPT | Performed by: PHYSICIAN ASSISTANT

## 2018-03-26 PROCEDURE — 36415 COLL VENOUS BLD VENIPUNCTURE: CPT | Performed by: PHYSICIAN ASSISTANT

## 2018-03-26 PROCEDURE — 90471 IMMUNIZATION ADMIN: CPT | Performed by: PHYSICIAN ASSISTANT

## 2018-03-26 PROCEDURE — 86765 RUBEOLA ANTIBODY: CPT | Performed by: PHYSICIAN ASSISTANT

## 2018-03-26 ASSESSMENT — PAIN SCALES - GENERAL: PAINLEVEL: NO PAIN (0)

## 2018-03-26 NOTE — NURSING NOTE
"Chief Complaint   Patient presents with     Physical       Initial /60 (BP Location: Left arm, Patient Position: Chair, Cuff Size: Adult Regular)  Pulse 72  Temp 96.8  F (36  C) (Oral)  Resp 16  Ht 5' 4.75\" (1.645 m)  Wt 151 lb 4.8 oz (68.6 kg)  LMP 03/12/2018  BMI 25.37 kg/m2 Estimated body mass index is 25.37 kg/(m^2) as calculated from the following:    Height as of this encounter: 5' 4.75\" (1.645 m).    Weight as of this encounter: 151 lb 4.8 oz (68.6 kg).  Medication Reconciliation: katelyn SHANNON LPN      "
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
## 2018-03-26 NOTE — LETTER
March 27, 2018      Vickie Duque  [address] [facility] [place] [address]        Dear ,    Your results are as listed.     Resulted Orders   Varicella Zoster Virus Antibody IgG   Result Value Ref Range    Varicella Zoster Virus Antibody IgG 3.8 (H) 0.0 - 0.8 AI      Comment:      Positive, suggests prev. exposure and probable immunity  Antibody index (AI) values reflect qualitative changes in antibody   concentration that cannot be directly associated with clinical condition or   disease state.     Hepatitis B surface antigen   Result Value Ref Range    Hep B Surface Agn Nonreactive NR^Nonreactive   Mumps Antibody IgG   Result Value Ref Range    Mumps Antibody IgG 1.9 (H) 0.0 - 0.8 AI      Comment:      Positive, suggests prev. exposure and probable immunity  Antibody index (AI) values reflect qualitative changes in antibody   concentration that cannot be directly associated with clinical condition or   disease state.     Rubeola Antibody IgG   Result Value Ref Range    Rubeola (Measles) Antibody IgG 6.4 (H) 0.0 - 0.8 AI      Comment:      Positive, suggests prev. exposure and probable immunity  Antibody index (AI) values reflect qualitative changes in antibody   concentration that cannot be directly associated with clinical condition or   disease state.     Rubella Antibody IgG Quantitative   Result Value Ref Range    Rubella Antibody IgG Quantitative 48 IU/mL      Comment:      Positive.  Suggests previous exposure or immunization and probable immunity  Reference Range:    Unvaccinated Negative 0-7 IU/mL  Vaccinated or previous exposure Positive 10 IU/ml or greater         If you have any questions or concerns, please call the clinic at the number listed above.       Sincerely,        Neelima Gutiérrez PA-C

## 2018-03-26 NOTE — LETTER
[facility]  [address] [facility] [address]  Phone: [phone]    March 26, 2018        Vickie Duque  [address] [facility] [address]          To whom it may concern:    RE: Vickie Duque    Patient was seen and treated today at our clinic.  Due to previous skin reaction to TB testing she should have blood testing for screening in the future.     Please contact me for questions or concerns.      Sincerely,        Neelima Gutiérrez PA-C

## 2018-03-26 NOTE — PATIENT INSTRUCTIONS
I will get blood tests to evaluate immune status for school and contact you with results.     Preventive Health Recommendations  Female Ages 26 - 39  Yearly exam:   See your health care provider every year in order to    Review health changes.     Discuss preventive care.      Review your medicines if you your doctor has prescribed any.    Until age 30: Get a Pap test every three years (more often if you have had an abnormal result).    After age 30: Talk to your doctor about whether you should have a Pap test every 3 years or have a Pap test with HPV screening every 5 years.   You do not need a Pap test if your uterus was removed (hysterectomy) and you have not had cancer.  You should be tested each year for STDs (sexually transmitted diseases), if you're at risk.   Talk to your provider about how often to have your cholesterol checked.  If you are at risk for diabetes, you should have a diabetes test (fasting glucose).  Shots: Get a flu shot each year. Get a tetanus shot every 10 years.   Nutrition:     Eat at least 5 servings of fruits and vegetables each day.    Eat whole-grain bread, whole-wheat pasta and brown rice instead of white grains and rice.    Talk to your provider about Calcium and Vitamin D.     Lifestyle    Exercise at least 150 minutes a week (30 minutes a day, 5 days of the week). This will help you control your weight and prevent disease.    Limit alcohol to one drink per day.    No smoking.     Wear sunscreen to prevent skin cancer.    See your dentist every six months for an exam and cleaning.

## 2018-03-26 NOTE — MR AVS SNAPSHOT
After Visit Summary   3/26/2018    Vickie Duque    MRN: [id number]           Patient Information     Date Of Birth          1985        Visit Information        Provider Department      3/26/2018 11:20 AM Neelima Gutiérrez PA-C [facility] Instructions      Preventive Health Recommendations  Female Ages 26 - 39  Yearly exam:   See your health care provider every year in order to    Review health changes.     Discuss preventive care.      Review your medicines if you your doctor has prescribed any.    Until age 30: Get a Pap test every three years (more often if you have had an abnormal result).    After age 30: Talk to your doctor about whether you should have a Pap test every 3 years or have a Pap test with HPV screening every 5 years.   You do not need a Pap test if your uterus was removed (hysterectomy) and you have not had cancer.  You should be tested each year for STDs (sexually transmitted diseases), if you're at risk.   Talk to your provider about how often to have your cholesterol checked.  If you are at risk for diabetes, you should have a diabetes test (fasting glucose).  Shots: Get a flu shot each year. Get a tetanus shot every 10 years.   Nutrition:     Eat at least 5 servings of fruits and vegetables each day.    Eat whole-grain bread, whole-wheat pasta and brown rice instead of white grains and rice.    Talk to your provider about Calcium and Vitamin D.     Lifestyle    Exercise at least 150 minutes a week (30 minutes a day, 5 days of the week). This will help you control your weight and prevent disease.    Limit alcohol to one drink per day.    No smoking.     Wear sunscreen to prevent skin cancer.    See your dentist every six months for an exam and cleaning.            Follow-ups after your visit        Follow-up notes from your care team     Return in about 1 year (around 3/26/2019) for Physical Exam.      Who to contact     If you have questions or  "need follow up information about today's clinic visit or your schedule please contact [facility] directly at [phone].  Normal or non-critical lab and imaging results will be communicated to you by VetCentrichart, letter or phone within 4 business days after the clinic has received the results. If you do not hear from us within 7 days, please contact the clinic through VetCentrichart or phone. If you have a critical or abnormal lab result, we will notify you by phone as soon as possible.  Submit refill requests through [employer] or call your pharmacy and they will forward the refill request to us. Please allow 3 business days for your refill to be completed.          Additional Information About Your Visit        VetCentricharTISSUELAB Information     [employer] gives you secure access to your electronic health record. If you see a primary care provider, you can also send messages to your care team and make appointments. If you have questions, please call your primary care clinic.  If you do not have a primary care provider, please call [phone] and they will assist you.        Care EveryWhere ID     This is your Care EveryWhere ID. This could be used by other organizations to access your [place] medical records  [id number]        Your Vitals Were     Pulse Temperature Respirations Height Last Period BMI (Body Mass Index)    72 96.8  F (36  C) (Oral) 16 5' 4.75\" (1.645 m) 03/12/2018 25.37 kg/m2       Blood Pressure from Last 3 Encounters:   03/26/18 102/60   11/13/17 102/72   10/08/17 95/61    Weight from Last 3 Encounters:   03/26/18 151 lb 4.8 oz (68.6 kg)   11/13/17 162 lb (73.5 kg)   10/07/17 165 lb (74.8 kg)              Today, you had the following     No orders found for display       Primary Care Provider Fax #    Physician No Ref-Primary [phone]       No address on file        Equal Access to Services     KRISTIN ONOFRE : Theresa Renteria, cesario lukristopheradaha, qaybta kaalmada fatuma, kendra sahni " anncarley blasrhiannon laKeshiaaan ah. So [facility] [phone].    ATENCIÓN: Si habla joanne, tiene a barrientos disposición servicios gratuitos de asistencia lingüística. Armin al [phone].    We comply with applicable federal civil rights laws and [place] laws. We do not discriminate on the basis of race, color, national origin, age, disability, sex, sexual orientation, or gender identity.            Thank you!     Thank you for choosing [facility]  for your care. Our goal is always to provide you with excellent care. Hearing back from our patients is one way we can continue to improve our services. Please take a few minutes to complete the written survey that you may receive in the mail after your visit with us. Thank you!             Your Updated Medication List - Protect others around you: Learn how to safely use, store and throw away your medicines at www.disposemymeds.org.          This list is accurate as of 3/26/18 11:50 AM.  Always use your most recent med list.                   Brand Name Dispense Instructions for use Diagnosis    ACCU-CHEK SMARTVIEW test strip   Generic drug:  blood glucose monitoring     100 each    TEST 4 TIMES A DAY AS DIRECTED    Uncontrolled type 1 diabetes mellitus without complication (H)       ACE/ARB/ARNI NOT PRESCRIBED (INTENTIONAL)      1 each every 3 hours ACE & ARB not prescribed due to Intolerance and Symptomatic hypotension not due to excessive diuresis    Type 1 diabetes, HbA1c goal < 7% (H)       BASAGLAR 100 UNIT/ML injection     30 mL    Inject 25 Units Subcutaneous daily    Uncontrolled type 1 diabetes mellitus with hyperglycemia (H)       DIABETIC STERILE LANCETS device     1 Box    1 Device 4 times daily.    Type I (juvenile type) diabetes mellitus without mention of complication, not stated as uncontrolled       glucagon 1 MG Solr injection     2 each    Inject 1 mg Subcutaneous every 15 minutes as needed for low blood sugar    Uncontrolled type 1 diabetes mellitus with  "hyperglycemia (H)       insulin lispro 100 UNIT/ML injection    HumaLOG KWIKpen    30 mL    Inject 1-7 Units Subcutaneous 3 times daily (before meals) Do Not take Correction Insulin if Pre-Meal BG < than 140.  140 - 189 give 1 unit 190 - 239 give 2 units 240 - 289 give 3 units 290 - 339 give 4 units 340- 399 give 5 units 400-449 give 6 units Greater than or equal to 450 give 7 units Based on pre-meal blood glucose.   Notify clinic if glucose greater than or equal to 350 mg/dL after administration of correction dose    Uncontrolled type 1 diabetes mellitus with hyperglycemia (H)       insulin pen needle 32G X 4 MM    BD TARA U/F    270 each    Use 3 daily or as directed.    Uncontrolled type 1 diabetes mellitus with hyperglycemia (H)       insulin syringe-needle U-100 31G X 5/16\" 0.5 ML    B-D INSULIN SYRINGE    100 each    USE TO INJECT INSULIN    Uncontrolled type 1 diabetes mellitus with hyperglycemia (H)         "

## 2018-03-27 LAB
HBV SURFACE AG SERPL QL IA: NONREACTIVE
MEV IGG SER QL IA: 6.4 AI (ref 0–0.8)
MUV IGG SER QL IA: 1.9 AI (ref 0–0.8)
RUBV IGG SERPL IA-ACNC: 48 IU/ML
VZV IGG SER QL IA: 3.8 AI (ref 0–0.8)

## 2018-04-04 ENCOUNTER — ALLIED HEALTH/NURSE VISIT (OUTPATIENT)
Dept: FAMILY MEDICINE | Facility: OTHER | Age: 33
End: 2018-04-04
Payer: COMMERCIAL

## 2018-04-04 DIAGNOSIS — Z23 NEED FOR VACCINATION: Primary | ICD-10-CM

## 2018-04-04 PROCEDURE — 90471 IMMUNIZATION ADMIN: CPT

## 2018-04-04 PROCEDURE — 90746 HEPB VACCINE 3 DOSE ADULT IM: CPT

## 2018-04-04 NOTE — MR AVS SNAPSHOT
After Visit Summary   4/4/2018    Vickie Duque    MRN: [id number]           Patient Information     Date Of Birth          1985        Visit Information        Provider Department      4/4/2018 1:45 PM TOSHA BURTON MA [facility]        Today's Diagnoses     Need for vaccination    -  1       Follow-ups after your visit        Who to contact     If you have questions or need follow up information about today's clinic visit or your schedule please contact [facility] directly at [phone].  Normal or non-critical lab and imaging results will be communicated to you by MyChart, letter or phone within 4 business days after the clinic has received the results. If you do not hear from us within 7 days, please contact the clinic through EUDOWEBhart or phone. If you have a critical or abnormal lab result, we will notify you by phone as soon as possible.  Submit refill requests through Mamba or call your pharmacy and they will forward the refill request to us. Please allow 3 business days for your refill to be completed.          Additional Information About Your Visit        MyChart Information     Mamba gives you secure access to your electronic health record. If you see a primary care provider, you can also send messages to your care team and make appointments. If you have questions, please call your primary care clinic.  If you do not have a primary care provider, please call [phone] and they will assist you.        Care EveryWhere ID     This is your Care EveryWhere ID. This could be used by other organizations to access your [place] medical records  [id number]        Your Vitals Were     Last Period                   03/12/2018            Blood Pressure from Last 3 Encounters:   03/26/18 102/60   11/13/17 102/72   10/08/17 95/61    Weight from Last 3 Encounters:   03/26/18 151 lb 4.8 oz (68.6 kg)   11/13/17 162 lb (73.5 kg)   10/07/17 165 lb (74.8 kg)              We  Performed the Following     1st  Administration  [[id number]]     HEPATITIS B VACCINE,  ADULT  [[id number]]        Primary Care Provider Fax #    Physician No Ref-Primary [phone]       No address on file        Equal Access to Services     JENNIFERBIANCA JEMIMA : Hadhaley aad ku hadaddie Renteria, warossyda lucamilo, jessicata kaanat burris, kendra goodwinoksana luna. So [facility] [phone].    ATENCIÓN: Si habla español, tiene a barrientos disposición servicios gratuitos de asistencia lingüística. Llame al [phone].    We comply with applicable federal civil rights laws and [place] laws. We do not discriminate on the basis of race, color, national origin, age, disability, sex, sexual orientation, or gender identity.            Thank you!     Thank you for choosing [facility]  for your care. Our goal is always to provide you with excellent care. Hearing back from our patients is one way we can continue to improve our services. Please take a few minutes to complete the written survey that you may receive in the mail after your visit with us. Thank you!             Your Updated Medication List - Protect others around you: Learn how to safely use, store and throw away your medicines at www.disposemymeds.org.          This list is accurate as of 4/4/18  2:37 PM.  Always use your most recent med list.                   Brand Name Dispense Instructions for use Diagnosis    ACCU-CHEK SMARTVIEW test strip   Generic drug:  blood glucose monitoring     100 each    TEST 4 TIMES A DAY AS DIRECTED    Uncontrolled type 1 diabetes mellitus without complication (H)       ACE/ARB/ARNI NOT PRESCRIBED (INTENTIONAL)      1 each every 3 hours ACE & ARB not prescribed due to Intolerance and Symptomatic hypotension not due to excessive diuresis    Type 1 diabetes, HbA1c goal < 7% (H)       BASAGLAR 100 UNIT/ML injection     30 mL    Inject 25 Units Subcutaneous daily    Uncontrolled type 1 diabetes mellitus with hyperglycemia (H)     "   DIABETIC STERILE LANCETS device     1 Box    1 Device 4 times daily.    Type I (juvenile type) diabetes mellitus without mention of complication, not stated as uncontrolled       glucagon 1 MG Solr injection     2 each    Inject 1 mg Subcutaneous every 15 minutes as needed for low blood sugar    Uncontrolled type 1 diabetes mellitus with hyperglycemia (H)       insulin lispro 100 UNIT/ML injection    HumaLOG KWIKpen    30 mL    Inject 1-7 Units Subcutaneous 3 times daily (before meals) Do Not take Correction Insulin if Pre-Meal BG < than 140.  140 - 189 give 1 unit 190 - 239 give 2 units 240 - 289 give 3 units 290 - 339 give 4 units 340- 399 give 5 units 400-449 give 6 units Greater than or equal to 450 give 7 units Based on pre-meal blood glucose.   Notify clinic if glucose greater than or equal to 350 mg/dL after administration of correction dose    Uncontrolled type 1 diabetes mellitus with hyperglycemia (H)       insulin pen needle 32G X 4 MM    BD TARA U/F    270 each    Use 3 daily or as directed.    Uncontrolled type 1 diabetes mellitus with hyperglycemia (H)       insulin syringe-needle U-100 31G X 5/16\" 0.5 ML    B-D INSULIN SYRINGE    100 each    USE TO INJECT INSULIN    Uncontrolled type 1 diabetes mellitus with hyperglycemia (H)         "

## 2018-04-04 NOTE — NURSING NOTE
Screening Questionnaire for Adult Immunization    Are you sick today?   No   Do you have allergies to medications, food, a vaccine component or latex?   No   Have you ever had a serious reaction after receiving a vaccination?   No   Do you have a long-term health problem with heart disease, lung disease, asthma, kidney disease, metabolic disease (e.g. diabetes), anemia, or other blood disorder?   No   Do you have cancer, leukemia, HIV/AIDS, or any other immune system problem?   No   In the past 3 months, have you taken medications that affect  your immune system, such as prednisone, other steroids, or anticancer drugs; drugs for the treatment of rheumatoid arthritis, Crohn s disease, or psoriasis; or have you had radiation treatments?   No   Have you had a seizure, or a brain or other nervous system problem?   No   During the past year, have you received a transfusion of blood or blood     products, or been given immune (gamma) globulin or antiviral drug?   No   For women: Are you pregnant or is there a chance you could become        pregnant during the next month?   No   Have you received any vaccinations in the past 4 weeks?   No     Immunization questionnaire answers were all negative.        Prior to injection verified patient identity using patient's name and date of birth.  Patient instructed to remain in clinic for 15 minutes afterwards, and to report any adverse reaction to me immediately.       Screening performed by Mckayla Padron on 4/4/2018 at 2:37 PM.

## 2018-05-03 ENCOUNTER — TELEPHONE (OUTPATIENT)
Dept: FAMILY MEDICINE | Facility: OTHER | Age: 33
End: 2018-05-03

## 2018-05-03 NOTE — TELEPHONE ENCOUNTER
Panel Management Review      Patient has the following on her problem list:     Diabetes    ASA: Failed    Last A1C  Lab Results   Component Value Date    A1C 8.9 11/13/2017    A1C 10.0 10/07/2017    A1C 11.2 06/26/2017    A1C 11.5 01/29/2016    A1C 11.7 10/06/2015     A1C tested: FAILED    Last LDL:    Lab Results   Component Value Date    CHOL 207 01/29/2016     Lab Results   Component Value Date    HDL 55 01/29/2016     Lab Results   Component Value Date     01/29/2016     Lab Results   Component Value Date    TRIG 116 01/29/2016     Lab Results   Component Value Date    CHOLHDLRATIO 3.7 04/01/2015     Lab Results   Component Value Date    NHDL 152 01/29/2016       Is the patient on a Statin? NO             Is the patient on Aspirin? NO        Last three blood pressure readings:  BP Readings from Last 3 Encounters:   03/26/18 102/60   11/13/17 102/72   10/08/17 95/61       Date of last diabetes office visit: 11/13/17     Tobacco History:     History   Smoking Status     Current Every Day Smoker     Packs/day: 1.00     Years: 16.00     Types: Cigarettes   Smokeless Tobacco     Never Used           Composite cancer screening  Chart review shows that this patient is due/due soon for the following None  Summary:    Patient is due/failing the following:   A1C, FOLLOW UP, LDL and PHQ9    Action needed:   Patient needs office visit for diabetes.    Type of outreach:    Sent letter.    Questions for provider review:    None                                                                                                                                    Mayra DEL VALLE       Chart routed to Care Team .

## 2018-05-03 NOTE — LETTER
[facility]  [address] [facility] [address]  [phone]        Vickie Duque  [address] [facility] [address]      May 3, 2018      Dear Vickie,    I care about your health and have reviewed your health plan, including your medical conditions, medication list, and lab results and am making recommendations based on this review, to better manage your health.    You are in particular need of attention regarding:  -Diabetes    I am recommending that you:  -schedule a FOLLOWUP OFFICE APPOINTMENT with me.  I will recheck your: A1c, Lipids (fasting cholesterol - nothing to eat except water and/or meds for 8+ hours), BMP (basic metabolic panel) and Microablumin tests.    If you've had the preventative screening completed at another facility or feel you're not due for this screening, please call our clinic at the number listed above or send us a My Chart message so we can update our records. We would like to thank you in advance for taking the time to take care of your health.  If you have any questions, please don t hesitate to contact our clinic.    Sincerely,       Your [facility] Team

## 2018-06-07 ENCOUNTER — TRANSFERRED RECORDS (OUTPATIENT)
Dept: HEALTH INFORMATION MANAGEMENT | Facility: CLINIC | Age: 33
End: 2018-06-07

## 2018-06-08 ENCOUNTER — TRANSFERRED RECORDS (OUTPATIENT)
Dept: HEALTH INFORMATION MANAGEMENT | Facility: CLINIC | Age: 33
End: 2018-06-08

## 2018-08-01 ENCOUNTER — DOCUMENTATION ONLY (OUTPATIENT)
Dept: FAMILY MEDICINE | Facility: CLINIC | Age: 33
End: 2018-08-01

## 2018-08-01 NOTE — PROGRESS NOTES
Diabetic Review Team Follow up  Gaps identified on last contact:  Uncontrolled diabetes   Plan from last contact:  Attempt to contact  Progress: [facility]  New Gaps Identified: continue to try and contact  New/Continuing plan:    Will have Clinic RN to outreach to pt to try and engage pt and identify barriers and refer as appropriate.     Haydee Putnam RN,BSN  Clinical Care Coordinator      Catarina Aguirre [facility]  Care Coordination     Verenice Paul RDN, LD, CDE  Diabetic     Moncho Aponte PharmD  MTM pharmacist    Megan Aguirre RN  [facility] Nurse    Ирина Ledesma [facility]  Behavioral Health Clinician     Kitty Armendariz RN  Patient Care Supervisor  Specialty Clinic

## 2018-08-03 NOTE — PROGRESS NOTES
Vickie answered her phone and agreed to a diabetes recheck with Lelo Marmolejo in [place] next week. She will plan to have her a1c drawn at that time.

## 2018-08-09 ENCOUNTER — OFFICE VISIT (OUTPATIENT)
Dept: FAMILY MEDICINE | Facility: OTHER | Age: 33
End: 2018-08-09
Payer: COMMERCIAL

## 2018-08-09 VITALS
HEART RATE: 90 BPM | HEIGHT: 65 IN | RESPIRATION RATE: 16 BRPM | SYSTOLIC BLOOD PRESSURE: 90 MMHG | WEIGHT: 155 LBS | BODY MASS INDEX: 25.83 KG/M2 | OXYGEN SATURATION: 99 % | TEMPERATURE: 97.5 F | DIASTOLIC BLOOD PRESSURE: 62 MMHG

## 2018-08-09 DIAGNOSIS — E10.65 UNCONTROLLED TYPE 1 DIABETES MELLITUS WITH HYPERGLYCEMIA (H): Primary | ICD-10-CM

## 2018-08-09 DIAGNOSIS — Z72.0 TOBACCO ABUSE: ICD-10-CM

## 2018-08-09 LAB
CREAT SERPL-MCNC: 0.96 MG/DL (ref 0.52–1.04)
CREAT UR-MCNC: 129 MG/DL
GFR SERPL CREATININE-BSD FRML MDRD: 66 ML/MIN/1.7M2
HBA1C MFR BLD: 11.2 % (ref 0–5.6)
LDLC SERPL DIRECT ASSAY-MCNC: 113 MG/DL
MICROALBUMIN UR-MCNC: 12 MG/L
MICROALBUMIN/CREAT UR: 8.91 MG/G CR (ref 0–25)

## 2018-08-09 PROCEDURE — 36415 COLL VENOUS BLD VENIPUNCTURE: CPT | Performed by: NURSE PRACTITIONER

## 2018-08-09 PROCEDURE — 82043 UR ALBUMIN QUANTITATIVE: CPT | Performed by: NURSE PRACTITIONER

## 2018-08-09 PROCEDURE — 99214 OFFICE O/P EST MOD 30 MIN: CPT | Performed by: NURSE PRACTITIONER

## 2018-08-09 PROCEDURE — 82565 ASSAY OF CREATININE: CPT | Performed by: NURSE PRACTITIONER

## 2018-08-09 PROCEDURE — 83036 HEMOGLOBIN GLYCOSYLATED A1C: CPT | Performed by: NURSE PRACTITIONER

## 2018-08-09 PROCEDURE — 83721 ASSAY OF BLOOD LIPOPROTEIN: CPT | Performed by: NURSE PRACTITIONER

## 2018-08-09 RX ORDER — INSULIN GLARGINE 100 [IU]/ML
25 INJECTION, SOLUTION SUBCUTANEOUS AT BEDTIME
Qty: 30 ML | Refills: 0 | Status: SHIPPED | OUTPATIENT
Start: 2018-08-09 | End: 2018-12-01

## 2018-08-09 RX ORDER — SYRINGE-NEEDLE,INSULIN,0.5 ML 27GX1/2"
SYRINGE, EMPTY DISPOSABLE MISCELLANEOUS
Qty: 100 EACH | Refills: 6 | Status: SHIPPED | OUTPATIENT
Start: 2018-08-09 | End: 2019-07-22

## 2018-08-09 ASSESSMENT — PAIN SCALES - GENERAL: PAINLEVEL: NO PAIN (0)

## 2018-08-09 NOTE — PROGRESS NOTES
SUBJECTIVE:   Vickie Duque is a 32 year old female who presents to clinic today for the following health issues:      Diabetes Follow-up      Patient is checking blood sugars: rarely.  Results range from 230 to 240    Diabetic concerns: None     Symptoms of hypoglycemia (low blood sugar): none     Paresthesias (numbness or burning in feet) or sores: No     Date of last diabetic eye exam: 2 months ago    BP Readings from Last 2 Encounters:   08/09/18 90/62   03/26/18 102/60     Hemoglobin A1C (%)   Date Value   11/13/2017 8.9 (H)   10/07/2017 10.0 (H)     LDL Cholesterol Calculated (mg/dL)   Date Value   01/29/2016 129 (H)   04/01/2015 121     Checking her blood sugars infrequently.  Will do loose carb counting with meals but is not checking her glucose prior to eating or giving herself insulin.  Has not seen endocrinology or diabetes education for years.     Problem list and histories reviewed & adjusted, as indicated.  Additional history: as documented    Patient Active Problem List   Diagnosis     Sprain of back     Type 1 diabetes mellitus with hyperglycemia (H)     HYPERLIPIDEMIA LDL GOAL <100     Facial paralysis/[place] palsy     DKA (diabetic ketoacidoses) (H)     Advanced directives, counseling/discussion     DVT prophylaxis     Tobacco abuse     SIRS (systemic inflammatory response syndrome) (H)     Uncontrolled type 1 diabetes mellitus (H)     Viral URI with cough     Noncompliance with medication regimen     Anxiety     Ulcerative colitis without complications (H)     Major depressive disorder, recurrent episode, moderate (H)     ASCUS of cervix with negative high risk HPV     Hyperglycemia     Past Surgical History:   Procedure Laterality Date     DILATION AND CURETTAGE SUCTION  4/2010    miscarriage     HC COLONOSCOPY W BIOPSY  08/16/06     HC COLONOSCOPY W/WO BRUSH/WASH       TUBAL LIGATION         Social History   Substance Use Topics     Smoking status: Current Every Day Smoker     Packs/day: 1.00  "    Years: 16.00     Types: Cigarettes     Smokeless tobacco: Never Used     Alcohol use 0.0 oz/week     0 Standard drinks or equivalent per week      Comment: rare     Family History   Problem Relation Age of Onset     Hypertension Father      Diabetes Maternal Grandmother      Cancer Maternal Grandmother      Diabetes Paternal Grandfather      Diabetes Maternal Uncle      Diabetes Maternal Aunt          Current Outpatient Prescriptions   Medication Sig Dispense Refill     ACE/ARB NOT PRESCRIBED, INTENTIONAL, 1 each every 3 hours ACE & ARB not prescribed due to Intolerance and Symptomatic hypotension not due to excessive diuresis       BASAGLAR 100 UNIT/ML injection Inject 25 Units Subcutaneous At Bedtime 30 mL 0     blood glucose monitoring (ACCU-CHEK SMARTVIEW) test strip TEST 4 TIMES A DAY AS DIRECTED 100 each 11     DIABETIC STERILE LANCETS device 1 Device 4 times daily. 1 Box 11     glucagon 1 MG SOLR injection Inject 1 mg Subcutaneous every 15 minutes as needed for low blood sugar 2 each 0     insulin lispro (HUMALOG KWIKPEN) 100 UNIT/ML injection Inject 1-7 Units Subcutaneous 3 times daily (before meals) Do Not take Correction Insulin if Pre-Meal BG < than 140.   140 - 189 give 1 unit  190 - 239 give 2 units  240 - 289 give 3 units  290 - 339 give 4 units  340- 399 give 5 units  400-449 give 6 units  Greater than or equal to 450 give 7 units  Based on pre-meal blood glucose.    Notify clinic if glucose greater than or equal to 350 mg/dL after administration of correction dose 30 mL 0     insulin pen needle (BD TARA U/F) 32G X 4 MM Use 3 daily or as directed. 270 each 3     insulin syringe-needle U-100 (B-D INSULIN SYRINGE) 31G X 5/16\" 0.5 ML USE TO INJECT INSULIN 100 each 6     [DISCONTINUED] BASAGLAR 100 UNIT/ML injection Inject 25 Units Subcutaneous daily 30 mL 1     [DISCONTINUED] insulin lispro (HUMALOG KWIKPEN) 100 UNIT/ML injection Inject 1-7 Units Subcutaneous 3 times daily (before meals) Do Not take " "Correction Insulin if Pre-Meal BG < than 140.   140 - 189 give 1 unit  190 - 239 give 2 units  240 - 289 give 3 units  290 - 339 give 4 units  340- 399 give 5 units  400-449 give 6 units  Greater than or equal to 450 give 7 units  Based on pre-meal blood glucose.    Notify clinic if glucose greater than or equal to 350 mg/dL after administration of correction dose 30 mL 1     Allergies   Allergen Reactions     No Known Drug Allergies      BP Readings from Last 3 Encounters:   08/09/18 90/62   03/26/18 102/60   11/13/17 102/72    Wt Readings from Last 3 Encounters:   08/09/18 155 lb (70.3 kg)   03/26/18 151 lb 4.8 oz (68.6 kg)   11/13/17 162 lb (73.5 kg)                    Reviewed and updated as needed this visit by clinical staff  Tobacco  Allergies  Meds  Med Hx  Surg Hx  Fam Hx  Soc Hx      Reviewed and updated as needed this visit by Provider         ROS:  Constitutional, HEENT, cardiovascular, pulmonary, gi and gu systems are negative, except as otherwise noted.    OBJECTIVE:     BP 90/62  Pulse 90  Temp 97.5  F (36.4  C) (Tympanic)  Resp 16  Ht 5' 4.75\" (1.645 m)  Wt 155 lb (70.3 kg)  LMP 08/04/2018  SpO2 99%  Breastfeeding? No  BMI 25.99 kg/m2  Body mass index is 25.99 kg/(m^2).  GENERAL: healthy, alert and no distress  NECK: no adenopathy, no asymmetry, masses, or scars and thyroid normal to palpation  RESP: lungs clear to auscultation - no rales, rhonchi or wheezes  CV: regular rate and rhythm, normal S1 S2, no S3 or S4, no murmur, click or rub, no peripheral edema and peripheral pulses strong  ABDOMEN: soft, nontender, no hepatosplenomegaly, no masses and bowel sounds normal  MS: no gross musculoskeletal defects noted, no edema    Diagnostic Test Results:  Office Visit on 08/09/2018   Component Date Value Ref Range Status     Hemoglobin A1C 08/09/2018 11.2* 0 - 5.6 % Final    Comment: Normal <5.7% Prediabetes 5.7-6.4%  Diabetes 6.5% or higher - adopted from ADA   consensus guidelines.   " "          ASSESSMENT/PLAN:     Diabetes Type I, A1c Uncontrolled, insulin dependent   Associated with the following complications    Renal Complications:  None    Ophthalmologic Complications: Retinopathy     Neurologic Complications: None    Peripheral Vascular Complications:  None    Other: None   Plan:  The following changes are made - Get her into see MTM pharmacist.  Increase Basaglar by 1 unit every 3 days until fasting glucose is less than 150 consistently. Stressed the importance of checking her blood glucose at least 3 times daily, especially before taking insulin.   Follow up in 1-2 months for recheck.  Urged smoking cessation, she declined.   - **A1C FUTURE anytime  - Albumin Random Urine Quantitative with Creat Ratio  - BASAGLAR 100 UNIT/ML injection; Inject 25 Units Subcutaneous At Bedtime  Dispense: 30 mL; Refill: 0  - insulin lispro (HUMALOG KWIKPEN) 100 UNIT/ML injection; Inject 1-7 Units Subcutaneous 3 times daily (before meals) Do Not take Correction Insulin if Pre-Meal BG < than 140.   140 - 189 give 1 unit  190 - 239 give 2 units  240 - 289 give 3 units  290 - 339 give 4 units  340- 399 give 5 units  400-449 give 6 units  Greater than or equal to 450 give 7 units  Based on pre-meal blood glucose.    Notify clinic if glucose greater than or equal to 350 mg/dL after administration of correction dose  Dispense: 30 mL; Refill: 0  - insulin syringe-needle U-100 (B-D INSULIN SYRINGE) 31G X 5/16\" 0.5 ML; USE TO INJECT INSULIN  Dispense: 100 each; Refill: 6  - insulin pen needle (BD TARA U/F) 32G X 4 MM; Use 3 daily or as directed.  Dispense: 270 each; Refill: 3  - blood glucose monitoring (ACCU-CHEK SMARTVIEW) test strip; TEST 4 TIMES A DAY AS DIRECTED  Dispense: 100 each; Refill: 11    2. Tobacco abuse  She declined any help with cessation.       FUTURE APPOINTMENTS:       - Follow-up visit in 1-2 months  See MTM pharmacist   See Patient Instructions    RAJESH Batista [facility] " [place]

## 2018-08-09 NOTE — MR AVS SNAPSHOT
After Visit Summary   8/9/2018    Vickie Duque    MRN: [id number]           Patient Information     Date Of Birth          1985        Visit Information        Provider Department      8/9/2018 9:20 AM Lelo Marmolejo APRN [facility]        Today's Diagnoses     Uncontrolled type 1 diabetes mellitus with hyperglycemia (H)    -  1      Care Instructions    See Kris the pharmacist here to discuss your insulin dosages    Increase your Basaglar by 1 unit every 3 days until your fasting glucose is less than 150 consistently.     Stop smoking.  Let us know if we can help with this.  There are multiple things we can do to assist you.     See me again in 1-2 months.                 Follow-ups after your visit        Additional Services     MED THERAPY MANAGE REFERRAL       Your provider has referred you to: **[facility] Scheduling (numerous locations) [phone]   [email or web address]    Reason for Referral: uncontrolled diabetes      The [facility] department will contact you to schedule an appointment.  You may also schedule the appointment by calling [phone].  For [facility] [place] patients, please call [phone] to confirm/schedule your appointment on the next business day.    This service is designed to help you get the most from your medications.  A specially trained Pharmacist will work closely with you and your providers to solve any questions, concerns, issues or problems related to your medications.    Please bring all of your prescription and non-prescription medications (such as vitamins, over-the-counter medications, and herbals) or a detailed medication list to your appointment.    If you have a glucose meter or other home monitoring information, please also bring this to your appointment (i.e. blood glucose log, blood pressure log, pain log, etc.).       "            Who to contact     If you have questions or need follow up information about today's clinic visit or your schedule please contact [facility] directly at [phone].  Normal or non-critical lab and imaging results will be communicated to you by Pro Stream +hart, letter or phone within 4 business days after the clinic has received the results. If you do not hear from us within 7 days, please contact the clinic through Pro Stream +hart or phone. If you have a critical or abnormal lab result, we will notify you by phone as soon as possible.  Submit refill requests through Kuddle or call your pharmacy and they will forward the refill request to us. Please allow 3 business days for your refill to be completed.          Additional Information About Your Visit        Kuddle Information     Kuddle gives you secure access to your electronic health record. If you see a primary care provider, you can also send messages to your care team and make appointments. If you have questions, please call your primary care clinic.  If you do not have a primary care provider, please call [phone] and they will assist you.        Care EveryWhere ID     This is your Care EveryWhere ID. This could be used by other organizations to access your [facility] medical records  [id number]        Your Vitals Were     Pulse Temperature Respirations Height Last Period Pulse Oximetry    90 97.5  F (36.4  C) (Tympanic) 16 5' 4.75\" (1.645 m) 08/04/2018 99%    Breastfeeding? BMI (Body Mass Index)                No 25.99 kg/m2           Blood Pressure from Last 3 Encounters:   08/09/18 90/62   03/26/18 102/60   11/13/17 102/72    Weight from Last 3 Encounters:   08/09/18 155 lb (70.3 kg)   03/26/18 151 lb 4.8 oz (68.6 kg)   11/13/17 162 lb (73.5 kg)              We Performed the Following     **A1C FUTURE anytime     Albumin Random Urine Quantitative with Creat Ratio     Creatinine     LDL cholesterol direct     MED THERAPY MANAGE REFERRAL     " "     Today's Medication Changes          These changes are accurate as of 8/9/18  9:59 AM.  If you have any questions, ask your nurse or doctor.               These medicines have changed or have updated prescriptions.        Dose/Directions    BASAGLAR 100 UNIT/ML injection   This may have changed:  when to take this   Used for:  Uncontrolled type 1 diabetes mellitus with hyperglycemia (H)   Changed by:  Lelo Marmolejo APRN CNP        Dose:  25 Units   Inject 25 Units Subcutaneous At Bedtime   Quantity:  30 mL   Refills:  0            Where to get your medicines      These medications were sent to [id number] - [place], [place] - [address] [place] [address]  [address] [place] [address] [place] [address]    Hours:  M-F 8:30-6:30; Sat 9-4; closed Sunday Phone:  [phone]     BASAGLAR 100 UNIT/ML injection    blood glucose monitoring test strip    insulin pen needle 32G X 4 MM    insulin syringe-needle U-100 31G X 5/16\" 0.5 ML         Some of these will need a paper prescription and others can be bought over the counter.  Ask your nurse if you have questions.     Bring a paper prescription for each of these medications     insulin lispro 100 UNIT/ML injection                Primary Care Provider Fax #    Physician No Ref-Primary [phone]       No address on file        Equal Access to Services     KRISTIN ONOFRE : Theresa ramoso Soadrianna, waaxda luqadaha, qaybta kaalmada adeegyada, kendra luna. So [facility] [phone].    ATENCIÓN: Si habla español, tiene a barrientos disposición servicios gratuitos de asistencia lingüística. Llame al [phone].    We comply with applicable federal civil rights laws and [place] laws. We do not discriminate on the basis of race, color, national origin, age, disability, sex, sexual orientation, or gender identity.            Thank you!     Thank you for choosing [facility]  for your care. Our goal is always to provide you with excellent care. Hearing back " from our patients is one way we can continue to improve our services. Please take a few minutes to complete the written survey that you may receive in the mail after your visit with us. Thank you!             Your Updated Medication List - Protect others around you: Learn how to safely use, store and throw away your medicines at www.disposemymeds.org.          This list is accurate as of 8/9/18  9:59 AM.  Always use your most recent med list.                   Brand Name Dispense Instructions for use Diagnosis    ACE/ARB/ARNI NOT PRESCRIBED (INTENTIONAL)      1 each every 3 hours ACE & ARB not prescribed due to Intolerance and Symptomatic hypotension not due to excessive diuresis    Type 1 diabetes, HbA1c goal < 7% (H)       BASAGLAR 100 UNIT/ML injection     30 mL    Inject 25 Units Subcutaneous At Bedtime    Uncontrolled type 1 diabetes mellitus with hyperglycemia (H)       blood glucose monitoring test strip    ACCU-CHEK SMARTVIEW    100 each    TEST 4 TIMES A DAY AS DIRECTED    Uncontrolled type 1 diabetes mellitus with hyperglycemia (H)       DIABETIC STERILE LANCETS device     1 Box    1 Device 4 times daily.    Type I (juvenile type) diabetes mellitus without mention of complication, not stated as uncontrolled       glucagon 1 MG Solr injection     2 each    Inject 1 mg Subcutaneous every 15 minutes as needed for low blood sugar    Uncontrolled type 1 diabetes mellitus with hyperglycemia (H)       insulin lispro 100 UNIT/ML injection    HumaLOG KWIKpen    30 mL    Inject 1-7 Units Subcutaneous 3 times daily (before meals) Do Not take Correction Insulin if Pre-Meal BG < than 140.  140 - 189 give 1 unit 190 - 239 give 2 units 240 - 289 give 3 units 290 - 339 give 4 units 340- 399 give 5 units 400-449 give 6 units Greater than or equal to 450 give 7 units Based on pre-meal blood glucose.   Notify clinic if glucose greater than or equal to 350 mg/dL after administration of correction dose    Uncontrolled type 1  "diabetes mellitus with hyperglycemia (H)       insulin pen needle 32G X 4 MM    BD TARA U/F    270 each    Use 3 daily or as directed.    Uncontrolled type 1 diabetes mellitus with hyperglycemia (H)       insulin syringe-needle U-100 31G X 5/16\" 0.5 ML    B-D INSULIN SYRINGE    100 each    USE TO INJECT INSULIN    Uncontrolled type 1 diabetes mellitus with hyperglycemia (H)         "

## 2018-08-09 NOTE — LETTER
August 9, 2018      Vickie Duque  [address] [facility] [address]        Dear ,    We are writing to inform you of your test results.    Your other labs look okay, just your high A1C that is concerning as we discussed in clinic.     Resulted Orders   **A1C FUTURE anytime   Result Value Ref Range    Hemoglobin A1C 11.2 (H) 0 - 5.6 %      Comment:      Normal <5.7% Prediabetes 5.7-6.4%  Diabetes 6.5% or higher - adopted from ADA   consensus guidelines.     Creatinine   Result Value Ref Range    Creatinine 0.96 0.52 - 1.04 mg/dL    GFR Estimate 66 >60 mL/min/1.7m2      Comment:      Non  GFR Calc    GFR Estimate If Black 80 >60 mL/min/1.7m2      Comment:       GFR Calc   Albumin Random Urine Quantitative with Creat Ratio   Result Value Ref Range    Creatinine Urine 129 mg/dL    Albumin Urine mg/L 12 mg/L    Albumin Urine mg/g Cr 8.91 0 - 25 mg/g Cr   LDL cholesterol direct   Result Value Ref Range    LDL Cholesterol Direct 113 (H) <100 mg/dL      Comment:      Above desirable:  100-129 mg/dl  Borderline High:  130-159 mg/dL  High:             160-189 mg/dL  Very high:       >189 mg/dl         If you have any questions or concerns, please call the clinic at the number listed above.       Sincerely,        RAJESH Batista CNP

## 2018-08-09 NOTE — PATIENT INSTRUCTIONS
See Kris, the pharmacist here to discuss your insulin dosages    Increase your Basaglar by 1 unit every 3 days until your fasting glucose is less than 150 consistently.     Stop smoking.  Let us know if we can help with this.  There are multiple things we can do to assist you.     See me again in 1-2 months.

## 2018-08-10 ASSESSMENT — PATIENT HEALTH QUESTIONNAIRE - PHQ9: SUM OF ALL RESPONSES TO PHQ QUESTIONS 1-9: 10

## 2018-08-14 ENCOUNTER — TELEPHONE (OUTPATIENT)
Dept: PHARMACY | Facility: OTHER | Age: 33
End: 2018-08-14

## 2018-08-14 NOTE — LETTER
September 5, 2018        Vickie Duque  [address] [facility] [address]        Dear Lelo Johnston DNP has recommended you schedule a Medication Therapy Management (MTM) appointment. MTM is designed to help you get the most of out of your medicines.     During an MTM appointment a specially trained pharmacist will review all of your medicines, both prescription and over-the-counter. They will make sure your medicines are the best choice for you and are safe and convenient for you.  MTM pharmacists work together with you and your doctor to help you understand your medicines, solve any problems related to your medicines and help you get the best results from taking your medicines.     At [facility], we strongly believe in a team approach to health care. We want to help you understand your medicines and health conditions. To learn more about how you might benefit from MTM services, watch the patient video at [email or web address].     To make an appointment, please call the clinic at [phone] or the MTM scheduling line at [phone] (toll-free at [phone]).    We look forward to hearing from you!        Tonya Damico, VioletaD

## 2018-08-14 NOTE — TELEPHONE ENCOUNTER
MTM referral from: [facility] visit (referral by provider)    MTM referral outreach attempt #2 on August 14, 2018 at 3:11 PM      Outcome: Patient not reachable after several attempts, will route to MTM Pharmacist/Provider as an FYI. Thank you for the referral.    Mai Rosenberg, MTM Coordinator

## 2018-08-22 ENCOUNTER — DOCUMENTATION ONLY (OUTPATIENT)
Dept: FAMILY MEDICINE | Facility: OTHER | Age: 33
End: 2018-08-22

## 2018-08-22 DIAGNOSIS — E10.65 TYPE 1 DIABETES MELLITUS WITH HYPERGLYCEMIA (H): Primary | ICD-10-CM

## 2018-08-22 NOTE — PROGRESS NOTES
Diabetes Review Team is requesting order for Diabetic ed.  Forwarding to Lelo Marmolejo CNP as she seen patient on 8/9/18.

## 2018-09-29 ENCOUNTER — HOSPITAL ENCOUNTER (EMERGENCY)
Facility: CLINIC | Age: 33
Discharge: HOME | End: 2018-09-29
Attending: EMERGENCY MEDICINE | Admitting: EMERGENCY MEDICINE
Payer: COMMERCIAL

## 2018-09-29 VITALS
OXYGEN SATURATION: 100 % | WEIGHT: 160 LBS | RESPIRATION RATE: 16 BRPM | DIASTOLIC BLOOD PRESSURE: 60 MMHG | TEMPERATURE: 97.8 F | BODY MASS INDEX: 26.83 KG/M2 | SYSTOLIC BLOOD PRESSURE: 97 MMHG

## 2018-09-29 DIAGNOSIS — N30.01 ACUTE CYSTITIS WITH HEMATURIA: ICD-10-CM

## 2018-09-29 LAB
ALBUMIN SERPL-MCNC: 3 G/DL (ref 3.4–5)
ALBUMIN UR-MCNC: 100 MG/DL
ALP SERPL-CCNC: 83 U/L (ref 40–150)
ALT SERPL W P-5'-P-CCNC: 10 U/L (ref 0–50)
ANION GAP SERPL CALCULATED.3IONS-SCNC: 6 MMOL/L (ref 3–14)
APPEARANCE UR: ABNORMAL
AST SERPL W P-5'-P-CCNC: 7 U/L (ref 0–45)
BACTERIA #/AREA URNS HPF: ABNORMAL /HPF
BASOPHILS # BLD AUTO: 0 10E9/L (ref 0–0.2)
BASOPHILS NFR BLD AUTO: 0.1 %
BILIRUB SERPL-MCNC: 0.2 MG/DL (ref 0.2–1.3)
BILIRUB UR QL STRIP: NEGATIVE
BUN SERPL-MCNC: 12 MG/DL (ref 7–30)
CALCIUM SERPL-MCNC: 8.5 MG/DL (ref 8.5–10.1)
CHLORIDE SERPL-SCNC: 110 MMOL/L (ref 94–109)
CO2 SERPL-SCNC: 28 MMOL/L (ref 20–32)
COLOR UR AUTO: YELLOW
CREAT SERPL-MCNC: 1.02 MG/DL (ref 0.52–1.04)
DIFFERENTIAL METHOD BLD: NORMAL
EOSINOPHIL NFR BLD AUTO: 0 %
ERYTHROCYTE [DISTWIDTH] IN BLOOD BY AUTOMATED COUNT: 13.1 % (ref 10–15)
GFR SERPL CREATININE-BSD FRML MDRD: 62 ML/MIN/1.7M2
GLUCOSE SERPL-MCNC: 138 MG/DL (ref 70–99)
GLUCOSE UR STRIP-MCNC: NEGATIVE MG/DL
HCG UR QL: NEGATIVE
HCT VFR BLD AUTO: 36.5 % (ref 35–47)
HGB BLD-MCNC: 12.1 G/DL (ref 11.7–15.7)
HGB UR QL STRIP: ABNORMAL
IMM GRANULOCYTES # BLD: 0 10E9/L (ref 0–0.4)
IMM GRANULOCYTES NFR BLD: 0.4 %
KETONES UR STRIP-MCNC: NEGATIVE MG/DL
LEUKOCYTE ESTERASE UR QL STRIP: ABNORMAL
LYMPHOCYTES # BLD AUTO: 2.5 10E9/L (ref 0.8–5.3)
LYMPHOCYTES NFR BLD AUTO: 31.2 %
MCH RBC QN AUTO: 27.7 PG (ref 26.5–33)
MCHC RBC AUTO-ENTMCNC: 33.2 G/DL (ref 31.5–36.5)
MCV RBC AUTO: 84 FL (ref 78–100)
MONOCYTES # BLD AUTO: 0.5 10E9/L (ref 0–1.3)
MONOCYTES NFR BLD AUTO: 6 %
MUCOUS THREADS #/AREA URNS LPF: PRESENT /LPF
NEUTROPHILS # BLD AUTO: 5.1 10E9/L (ref 1.6–8.3)
NEUTROPHILS NFR BLD AUTO: 62.3 %
NITRATE UR QL: POSITIVE
NRBC # BLD AUTO: 0 10*3/UL
NRBC BLD AUTO-RTO: 0 /100
PH UR STRIP: 5 PH (ref 5–7)
PLATELET # BLD AUTO: 232 10E9/L (ref 150–450)
POTASSIUM SERPL-SCNC: 3.8 MMOL/L (ref 3.4–5.3)
PROT SERPL-MCNC: 6.1 G/DL (ref 6.8–8.8)
RBC # BLD AUTO: 4.37 10E12/L (ref 3.8–5.2)
RBC #/AREA URNS AUTO: 40 /HPF (ref 0–2)
SODIUM SERPL-SCNC: 144 MMOL/L (ref 133–144)
SOURCE: ABNORMAL
SP GR UR STRIP: 1.01 (ref 1–1.03)
SQUAMOUS #/AREA URNS AUTO: 4 /HPF (ref 0–1)
UROBILINOGEN UR STRIP-MCNC: 0 MG/DL (ref 0–2)
WBC # BLD AUTO: 8.1 10E9/L (ref 4–11)
WBC #/AREA URNS AUTO: 431 /HPF (ref 0–5)
WBC CLUMPS #/AREA URNS HPF: PRESENT /HPF

## 2018-09-29 PROCEDURE — 87086 URINE CULTURE/COLONY COUNT: CPT | Performed by: EMERGENCY MEDICINE

## 2018-09-29 PROCEDURE — 99285 EMERGENCY DEPT VISIT HI MDM: CPT | Mod: Z6 | Performed by: EMERGENCY MEDICINE

## 2018-09-29 PROCEDURE — 80053 COMPREHEN METABOLIC PANEL: CPT | Performed by: EMERGENCY MEDICINE

## 2018-09-29 PROCEDURE — 96374 THER/PROPH/DIAG INJ IV PUSH: CPT | Performed by: EMERGENCY MEDICINE

## 2018-09-29 PROCEDURE — 99285 EMERGENCY DEPT VISIT HI MDM: CPT | Performed by: EMERGENCY MEDICINE

## 2018-09-29 PROCEDURE — 87088 URINE BACTERIA CULTURE: CPT | Performed by: EMERGENCY MEDICINE

## 2018-09-29 PROCEDURE — 25000128 H RX IP 250 OP 636: Performed by: EMERGENCY MEDICINE

## 2018-09-29 PROCEDURE — 87186 SC STD MICRODIL/AGAR DIL: CPT | Performed by: EMERGENCY MEDICINE

## 2018-09-29 PROCEDURE — 81025 URINE PREGNANCY TEST: CPT | Performed by: EMERGENCY MEDICINE

## 2018-09-29 PROCEDURE — 81001 URINALYSIS AUTO W/SCOPE: CPT | Performed by: EMERGENCY MEDICINE

## 2018-09-29 PROCEDURE — 85025 COMPLETE CBC W/AUTO DIFF WBC: CPT | Performed by: EMERGENCY MEDICINE

## 2018-09-29 RX ORDER — HYDROMORPHONE HYDROCHLORIDE 1 MG/ML
0.5 INJECTION, SOLUTION INTRAMUSCULAR; INTRAVENOUS; SUBCUTANEOUS
Status: DISCONTINUED | OUTPATIENT
Start: 2018-09-29 | End: 2018-09-29 | Stop reason: HOSPADM

## 2018-09-29 RX ORDER — LEVOFLOXACIN 500 MG/1
500 TABLET, FILM COATED ORAL DAILY
Qty: 7 TABLET | Refills: 0 | Status: SHIPPED | OUTPATIENT
Start: 2018-09-29 | End: 2018-09-29

## 2018-09-29 RX ORDER — LEVOFLOXACIN 500 MG/1
500 TABLET, FILM COATED ORAL DAILY
Qty: 7 TABLET | Refills: 0 | Status: SHIPPED | OUTPATIENT
Start: 2018-09-29 | End: 2018-12-20

## 2018-09-29 RX ADMIN — Medication 0.5 MG: at 14:42

## 2018-09-29 ASSESSMENT — ENCOUNTER SYMPTOMS
VOMITING: 0
ABDOMINAL PAIN: 1
DYSURIA: 0
NAUSEA: 0
FEVER: 0
DIARRHEA: 1
BLOOD IN STOOL: 0

## 2018-09-29 NOTE — ED AVS SNAPSHOT
[facility] Emergency Department    [address]     [place] [address]    Phone:  [phone]    Fax:  [phone]                                       Vickie Duque   MRN: [id number]    Department:  [facility] Emergency Department   Date of Visit:  9/29/2018           Patient Information     Date Of Birth          1985        Your diagnoses for this visit were:     Acute cystitis with hematuria        You were seen by Pepe Dubose MD.      Follow-up Information     Follow up with Lelo Marmolejo APRN CNP In 1 week.    Specialty:  Nurse Practitioner - Family    Why:  ER follow up    Contact information:    [address] [facility] [address]  [phone]          Discharge Instructions         Understanding Urinary Tract Infections (UTIs)  Most UTIs are caused by bacteria, although they may also be caused by viruses or fungi. Bacteria from the bowel are the most common source of infection. The infection may start because of any of the following:    Sexual activity. During sex, bacteria can travel from the penis, vagina, or rectum into the urethra.     Bacteria on the skin outside the rectum may travel into the urethra. This is more common in women since the rectum and urethra are closer to each other than in men. Wiping from front to back after using the toilet and keeping the area clean can help prevent germs from getting to the urethra.    Blockage of urine flow through the urinary tract. If urine sits too long, germs may start to grow out of control.      Parts of the urinary tract  The infection can occur in any part of the urinary tract.    The kidneys collect and store urine.    The ureters carry urine from the kidneys to the bladder.    The bladder holds urine until you are ready to let it out.    The urethra carries urine from the bladder out of the body. It is shorter in women, so bacteria can move through it more easily. The urethra is longer in men, so a UTI is  less likely to reach the bladder or kidneys in men.  Date Last Reviewed: 1/1/2017 2000-2017 [employer]. [address] [place] [address], [place] [address]. All rights reserved. This information is not intended as a substitute for professional medical care. Always follow your healthcare professional's instructions.          24 Hour Appointment Hotline       To make an appointment at any [facility], call [phone]). If you don't have a family doctor or clinic, we will help you find one. [facility] are conveniently located to serve the needs of you and your family.             Review of your medicines      START taking        Dose / Directions Last dose taken    levofloxacin 500 MG tablet   Commonly known as:  LEVAQUIN   Dose:  500 mg   Quantity:  7 tablet        Take 1 tablet (500 mg) by mouth daily for 7 days   Refills:  0          Our records show that you are taking the medicines listed below. If these are incorrect, please call your family doctor or clinic.        Dose / Directions Last dose taken    ACE/ARB/ARNI NOT PRESCRIBED (INTENTIONAL)   Dose:  1 each        1 each every 3 hours ACE & ARB not prescribed due to Intolerance and Symptomatic hypotension not due to excessive diuresis   Refills:  0        BASAGLAR 100 UNIT/ML injection   Dose:  25 Units   Quantity:  30 mL        Inject 25 Units Subcutaneous At Bedtime   Refills:  0        blood glucose monitoring test strip   Commonly known as:  ACCU-CHEK SMARTVIEW   Quantity:  100 each        TEST 4 TIMES A DAY AS DIRECTED   Refills:  11        DIABETIC STERILE LANCETS device   Dose:  1 Device   Quantity:  1 Box        1 Device 4 times daily.   Refills:  11        glucagon 1 MG Solr injection   Dose:  1 mg   Quantity:  2 each        Inject 1 mg Subcutaneous every 15 minutes as needed for low blood sugar   Refills:  0        insulin lispro 100 UNIT/ML injection   Commonly known as:  HumaLOG KWIKpen   Dose:  1-7 Units  "  Quantity:  30 mL        Inject 1-7 Units Subcutaneous 3 times daily (before meals) Do Not take Correction Insulin if Pre-Meal BG < than 140.  140 - 189 give 1 unit 190 - 239 give 2 units 240 - 289 give 3 units 290 - 339 give 4 units 340- 399 give 5 units 400-449 give 6 units Greater than or equal to 450 give 7 units Based on pre-meal blood glucose.   Notify clinic if glucose greater than or equal to 350 mg/dL after administration of correction dose   Refills:  0        insulin pen needle 32G X 4 MM   Commonly known as:  BD TARA U/F   Quantity:  270 each        Use 3 daily or as directed.   Refills:  3        insulin syringe-needle U-100 31G X 5/16\" 0.5 ML   Commonly known as:  B-D INSULIN SYRINGE   Quantity:  100 each        USE TO INJECT INSULIN   Refills:  6                Prescriptions were sent or printed at these locations (1 Prescription)                   [facility] [place] [id number] [facility]    [address] [facility] [address]    Telephone:  [phone]   Fax:  [phone]   Hours:                  E-Prescribed (1 of 1)         levofloxacin (LEVAQUIN) 500 MG tablet                Procedures and tests performed during your visit     CBC with platelets differential    Comprehensive metabolic panel    HCG qualitative urine    Peripheral IV catheter    UA reflex to Microscopic and Culture    Urine Culture Aerobic Bacterial      Orders Needing Specimen Collection     None      Pending Results     Date and Time Order Name Status Description    9/29/2018 1607 Urine Culture Aerobic Bacterial In process             Pending Culture Results     Date and Time Order Name Status Description    9/29/2018 1607 Urine Culture Aerobic Bacterial In process             Pending Results Instructions     If you had any lab results that were not finalized at the time of your Discharge, you can call the ED Lab Result RN at [phone]. You will be contacted by this team for any positive Lab results or " changes in treatment. The nurses are available 7 days a week from 10A to 6:30P.  You can leave a message 24 hours per day and they will return your call.        Thank you for choosing [facility]       Thank you for choosing [facility] for your care. Our goal is always to provide you with excellent care. Hearing back from our patients is one way we can continue to improve our services. Please take a few minutes to complete the written survey that you may receive in the mail after you visit with us. Thank you!        [facility] Information     [facility] [employer] gives you secure access to your electronic health record. If you see a primary care provider, you can also send messages to your care team and make appointments. If you have questions, please call your primary care clinic.  If you do not have a primary care provider, please call [phone] and they will assist you.        Care EveryWhere ID     This is your Care EveryWhere ID. This could be used by other organizations to access your [facility] medical records  [id number]        Equal Access to Services     KRISTIN ONOFRE : Theresa Renteria, cesario jeff, emre burris, kendra gutierrez . So [facility] [phone].    ATENCIÓN: Si habla español, tiene a barrientos disposición servicios gratuitos de asistencia lingüística. Llame al [phone].    We comply with applicable federal civil rights laws and [place] laws. We do not discriminate on the basis of race, color, national origin, age, disability, sex, sexual orientation, or gender identity.            After Visit Summary       This is your record. Keep this with you and show to your community pharmacist(s) and doctor(s) at your next visit.

## 2018-09-29 NOTE — ED PROVIDER NOTES
History     Chief Complaint   Patient presents with     Abdominal Pain     The history is provided by the patient and the spouse.     Vickie Duque is a 33 year old female who presents to the ED for ongoing bilateral abdominal pain that has waxed and weaned since last week. Her pain starts on right the right side which feels worse, and shoots to her left side. The pain is a dull 5-6/10. She always feels cold. At the beginning of the week, she experienced diarrhea, but it has resolved. Every time she eats, the pain worsens. She has noticed brown urine. She tried drinking grape juice which helped. Patient has tried Tylenol and Ibuprofen as well, and they dull her pain. She denies a fever, dysuria, nausea, vomiting, blood in stool, and dark stools. This is her first evaluation for her symptoms.     Patient Active Problem List   Diagnosis     Sprain of back     Type 1 diabetes mellitus with hyperglycemia (H)     HYPERLIPIDEMIA LDL GOAL <100     Facial paralysis/Meally palsy     DKA (diabetic ketoacidoses) (H)     Advanced directives, counseling/discussion     DVT prophylaxis     Tobacco abuse     SIRS (systemic inflammatory response syndrome) (H)     Uncontrolled type 1 diabetes mellitus (H)     Viral URI with cough     Noncompliance with medication regimen     Anxiety     Ulcerative colitis without complications (H)     Major depressive disorder, recurrent episode, moderate (H)     ASCUS of cervix with negative high risk HPV     Hyperglycemia     Past Medical History:   Diagnosis Date     Adjustment disorder with anxious mood 11/23/2015     Anxiety 11/27/2015     ASCUS of cervix with negative high risk HPV 06/19/2008     Depressive disorder, not elsewhere classified      Diabetic eye exam (H) 12/19/14     Mixed hyperlipidemia 11/30/2006     Regional enteritis of unspecified site     Ulcerative Colitis     Type I (juvenile type) diabetes mellitus with ketoacidosis, not stated as uncontrolled(250.11) 01/01/2007     Moderately severe intensity.     Type I (juvenile type) diabetes mellitus with ketoacidosis, uncontrolled 02/14/08     Type I (juvenile type) diabetes mellitus without mention of complication, not stated as uncontrolled     diagnosed in 2003     Ulcerative colitis, unspecified     remission. Last flare 6 yrs ago.        Past Surgical History:   Procedure Laterality Date     DILATION AND CURETTAGE SUCTION  4/2010    miscarriage     HC COLONOSCOPY W BIOPSY  08/16/06     HC COLONOSCOPY W/WO BRUSH/WASH       TUBAL LIGATION         Family History   Problem Relation Age of Onset     Hypertension Father      Diabetes Maternal Grandmother      Cancer Maternal Grandmother      Diabetes Paternal Grandfather      Diabetes Maternal Uncle      Diabetes Maternal Aunt        Social History   Substance Use Topics     Smoking status: Current Every Day Smoker     Packs/day: 1.00     Years: 16.00     Types: Cigarettes     Smokeless tobacco: Never Used     Alcohol use 0.0 oz/week     0 Standard drinks or equivalent per week      Comment: rare        Immunization History   Administered Date(s) Administered     HPV 06/01/2007     HepB-Adult 03/06/2018, 04/04/2018     Influenza (IIV3) PF 11/14/2003     Influenza Vaccine IM 3yrs+ 4 Valent IIV4 03/26/2018     Mantoux Tuberculin Skin Test 03/07/2014     Pneumococcal 23 valent 11/14/2003     TD (ADULT, 7+) 12/19/2005     TDAP Vaccine (Boostrix) 08/31/2016          Allergies   Allergen Reactions     No Known Drug Allergies        Current Outpatient Prescriptions   Medication Sig Dispense Refill     levofloxacin (LEVAQUIN) 500 MG tablet Take 1 tablet (500 mg) by mouth daily for 7 days 7 tablet 0     ACE/ARB NOT PRESCRIBED, INTENTIONAL, 1 each every 3 hours ACE & ARB not prescribed due to Intolerance and Symptomatic hypotension not due to excessive diuresis       BASAGLAR 100 UNIT/ML injection Inject 25 Units Subcutaneous At Bedtime 30 mL 0     blood glucose monitoring (ACCU-CHEK SMARTVIEW) test  "strip TEST 4 TIMES A DAY AS DIRECTED 100 each 11     DIABETIC STERILE LANCETS device 1 Device 4 times daily. 1 Box 11     glucagon 1 MG SOLR injection Inject 1 mg Subcutaneous every 15 minutes as needed for low blood sugar 2 each 0     insulin lispro (HUMALOG KWIKPEN) 100 UNIT/ML injection Inject 1-7 Units Subcutaneous 3 times daily (before meals) Do Not take Correction Insulin if Pre-Meal BG < than 140.   140 - 189 give 1 unit  190 - 239 give 2 units  240 - 289 give 3 units  290 - 339 give 4 units  340- 399 give 5 units  400-449 give 6 units  Greater than or equal to 450 give 7 units  Based on pre-meal blood glucose.    Notify clinic if glucose greater than or equal to 350 mg/dL after administration of correction dose 30 mL 0     insulin pen needle (BD TARA U/F) 32G X 4 MM Use 3 daily or as directed. 270 each 3     insulin syringe-needle U-100 (B-D INSULIN SYRINGE) 31G X 5/16\" 0.5 ML USE TO INJECT INSULIN 100 each 6         Review of Systems   Constitutional: Negative for fever.   Gastrointestinal: Positive for abdominal pain and diarrhea (has resolved). Negative for blood in stool, nausea and vomiting.        NEGATIVE dark stool   Endocrine: Positive for cold intolerance.   Genitourinary: Negative for dysuria.        POSITIVE change in urine color   All other systems reviewed and are negative.      Physical Exam   BP: 104/78  Heart Rate: 70  Temp: 97.8  F (36.6  C)  Resp: 16  Weight: 72.6 kg (160 lb)  SpO2: 100 %      Physical Exam   Constitutional: She is oriented to person, place, and time. She appears well-developed and well-nourished. No distress.   HENT:   Head: Normocephalic and atraumatic.   Mouth/Throat: Oropharynx is clear and moist. No oropharyngeal exudate.   Eyes: Pupils are equal, round, and reactive to light. No scleral icterus.   Neck: Normal range of motion. Neck supple.   Cardiovascular: Normal heart sounds and intact distal pulses.    Pulmonary/Chest: Breath sounds normal. No respiratory " distress.   Abdominal: Soft. There is tenderness.   Diffuse lower abdominal discomfort with exam   Musculoskeletal: She exhibits no edema or tenderness.   No CVA tenderness   Neurological: She is alert and oriented to person, place, and time.   Skin: Skin is warm and dry. No rash noted. She is not diaphoretic. No erythema. No pallor.   Psychiatric: She has a normal mood and affect. Her behavior is normal.   Nursing note and vitals reviewed.      ED Course     ED Course     Procedures                   Results for orders placed or performed during the hospital encounter of 09/29/18 (from the past 24 hour(s))   CBC with platelets differential   Result Value Ref Range    WBC 8.1 4.0 - 11.0 10e9/L    RBC Count 4.37 3.8 - 5.2 10e12/L    Hemoglobin 12.1 11.7 - 15.7 g/dL    Hematocrit 36.5 35.0 - 47.0 %    MCV 84 78 - 100 fl    MCH 27.7 26.5 - 33.0 pg    MCHC 33.2 31.5 - 36.5 g/dL    RDW 13.1 10.0 - 15.0 %    Platelet Count 232 150 - 450 10e9/L    Diff Method Automated Method     % Neutrophils 62.3 %    % Lymphocytes 31.2 %    % Monocytes 6.0 %    % Eosinophils 0.0 %    % Basophils 0.1 %    % Immature Granulocytes 0.4 %    Nucleated RBCs 0 0 /100    Absolute Neutrophil 5.1 1.6 - 8.3 10e9/L    Absolute Lymphocytes 2.5 0.8 - 5.3 10e9/L    Absolute Monocytes 0.5 0.0 - 1.3 10e9/L    Absolute Basophils 0.0 0.0 - 0.2 10e9/L    Abs Immature Granulocytes 0.0 0 - 0.4 10e9/L    Absolute Nucleated RBC 0.0    Comprehensive metabolic panel   Result Value Ref Range    Sodium 144 133 - 144 mmol/L    Potassium 3.8 3.4 - 5.3 mmol/L    Chloride 110 (H) 94 - 109 mmol/L    Carbon Dioxide 28 20 - 32 mmol/L    Anion Gap 6 3 - 14 mmol/L    Glucose 138 (H) 70 - 99 mg/dL    Urea Nitrogen 12 7 - 30 mg/dL    Creatinine 1.02 0.52 - 1.04 mg/dL    GFR Estimate 62 >60 mL/min/1.7m2    GFR Estimate If Black 75 >60 mL/min/1.7m2    Calcium 8.5 8.5 - 10.1 mg/dL    Bilirubin Total 0.2 0.2 - 1.3 mg/dL    Albumin 3.0 (L) 3.4 - 5.0 g/dL    Protein Total 6.1 (L)  6.8 - 8.8 g/dL    Alkaline Phosphatase 83 40 - 150 U/L    ALT 10 0 - 50 U/L    AST 7 0 - 45 U/L   UA reflex to Microscopic and Culture   Result Value Ref Range    Color Urine Yellow     Appearance Urine Cloudy     Glucose Urine Negative NEG^Negative mg/dL    Bilirubin Urine Negative NEG^Negative    Ketones Urine Negative NEG^Negative mg/dL    Specific Gravity Urine 1.014 1.003 - 1.035    Blood Urine Moderate (A) NEG^Negative    pH Urine 5.0 5.0 - 7.0 pH    Protein Albumin Urine 100 (A) NEG^Negative mg/dL    Urobilinogen mg/dL 0.0 0.0 - 2.0 mg/dL    Nitrite Urine Positive (A) NEG^Negative    Leukocyte Esterase Urine Large (A) NEG^Negative    Source Unspecified Urine     RBC Urine 40 (H) 0 - 2 /HPF    WBC Urine 431 (H) 0 - 5 /HPF    WBC Clumps Present (A) NEG^Negative /HPF    Bacteria Urine Few (A) NEG^Negative /HPF    Squamous Epithelial /HPF Urine 4 (H) 0 - 1 /HPF    Mucous Urine Present (A) NEG^Negative /LPF   HCG qualitative urine   Result Value Ref Range    HCG Qual Urine Negative NEG^Negative       Medications   HYDROmorphone (PF) (DILAUDID) injection 0.5 mg (0.5 mg Intravenous Given 9/29/18 1442)       Assessments & Plan (with Medical Decision Making)  Symptoms and laboratory evaluation is consistent with UTI.  I do not think there is any evidence for a kidney stone given she has no flank pain radiating pain down in the right lower quadrant or left lower quadrant.  She is diabetic and likely has had this UTI for a while according to the patient.  Urine culture is sent.  We will treat her with Levaquin times 1 week given type 1 diabetes and the amount of pyuria.  Return to ER precautions discussed.     I have reviewed the nursing notes.    I have reviewed the findings, diagnosis, plan and need for follow up with the patient.      Discharge Medication List as of 9/29/2018  5:23 PM      START taking these medications    Details   levofloxacin (LEVAQUIN) 500 MG tablet Take 1 tablet (500 mg) by mouth daily for 7  days, Disp-7 tablet, R-0, E-Prescribe             Final diagnoses:   Acute cystitis with hematuria     This document serves as a record of services personally performed by Pepe Dubose MD. It was created on their behalf by Luz Ewing, a trained medical scribe. The creation of this record is based on the provider's personal observations and the statements of the patient. This document has been checked and approved by the attending provider.    Note: Chart documentation done in part with Dragon Voice Recognition software. Although reviewed after completion, some word and grammatical errors may remain.    9/29/2018   [facility] EMERGENCY DEPARTMENT     Pepe Dubose MD  09/29/18 9835

## 2018-09-29 NOTE — ED TRIAGE NOTES
Pt reports abd pain for the past week.  Pt had diarrhea in the beginning but no longer. Pt denies nausea or vomiting.

## 2018-09-29 NOTE — LETTER
October 3, 2018      Vickie Duque  [address] [facility] [address]        Dear ,    We are writing to inform you of your test results.    Your urine culture grew out bacteria that should be covered by the antibiotic I gave you.  Please finish that prescription and follow up if your symptoms do not resolve.     Resulted Orders   CBC with platelets differential   Result Value Ref Range    WBC 8.1 4.0 - 11.0 10e9/L    RBC Count 4.37 3.8 - 5.2 10e12/L    Hemoglobin 12.1 11.7 - 15.7 g/dL    Hematocrit 36.5 35.0 - 47.0 %    MCV 84 78 - 100 fl    MCH 27.7 26.5 - 33.0 pg    MCHC 33.2 31.5 - 36.5 g/dL    RDW 13.1 10.0 - 15.0 %    Platelet Count 232 150 - 450 10e9/L    Diff Method Automated Method     % Neutrophils 62.3 %    % Lymphocytes 31.2 %    % Monocytes 6.0 %    % Eosinophils 0.0 %    % Basophils 0.1 %    % Immature Granulocytes 0.4 %    Nucleated RBCs 0 0 /100    Absolute Neutrophil 5.1 1.6 - 8.3 10e9/L    Absolute Lymphocytes 2.5 0.8 - 5.3 10e9/L    Absolute Monocytes 0.5 0.0 - 1.3 10e9/L    Absolute Basophils 0.0 0.0 - 0.2 10e9/L    Abs Immature Granulocytes 0.0 0 - 0.4 10e9/L    Absolute Nucleated RBC 0.0    Comprehensive metabolic panel   Result Value Ref Range    Sodium 144 133 - 144 mmol/L    Potassium 3.8 3.4 - 5.3 mmol/L    Chloride 110 (H) 94 - 109 mmol/L    Carbon Dioxide 28 20 - 32 mmol/L    Anion Gap 6 3 - 14 mmol/L    Glucose 138 (H) 70 - 99 mg/dL    Urea Nitrogen 12 7 - 30 mg/dL    Creatinine 1.02 0.52 - 1.04 mg/dL    GFR Estimate 62 >60 mL/min/1.7m2      Comment:      Non  GFR Calc    GFR Estimate If Black 75 >60 mL/min/1.7m2      Comment:       GFR Calc    Calcium 8.5 8.5 - 10.1 mg/dL    Bilirubin Total 0.2 0.2 - 1.3 mg/dL    Albumin 3.0 (L) 3.4 - 5.0 g/dL    Protein Total 6.1 (L) 6.8 - 8.8 g/dL    Alkaline Phosphatase 83 40 - 150 U/L    ALT 10 0 - 50 U/L    AST 7 0 - 45 U/L   UA reflex to Microscopic and Culture   Result Value Ref Range    Color Urine Yellow      Appearance Urine Cloudy     Glucose Urine Negative NEG^Negative mg/dL    Bilirubin Urine Negative NEG^Negative    Ketones Urine Negative NEG^Negative mg/dL    Specific Gravity Urine 1.014 1.003 - 1.035    Blood Urine Moderate (A) NEG^Negative    pH Urine 5.0 5.0 - 7.0 pH    Protein Albumin Urine 100 (A) NEG^Negative mg/dL    Urobilinogen mg/dL 0.0 0.0 - 2.0 mg/dL    Nitrite Urine Positive (A) NEG^Negative    Leukocyte Esterase Urine Large (A) NEG^Negative    Source Unspecified Urine     RBC Urine 40 (H) 0 - 2 /HPF    WBC Urine 431 (H) 0 - 5 /HPF    WBC Clumps Present (A) NEG^Negative /HPF    Bacteria Urine Few (A) NEG^Negative /HPF    Squamous Epithelial /HPF Urine 4 (H) 0 - 1 /HPF    Mucous Urine Present (A) NEG^Negative /LPF   HCG qualitative urine   Result Value Ref Range    HCG Qual Urine Negative NEG^Negative      Comment:      This test is for screening purposes.  Results should be interpreted along with   the clinical picture.  Confirmation testing is available if warranted by   ordering OIP557, HCG Quantitative Pregnancy.     Urine Culture Aerobic Bacterial   Result Value Ref Range    Specimen Description Midstream Urine     Special Requests Specimen received in preservative     Culture Micro 50,000 to 100,000 colonies/mL  Escherichia coli   (A)     Culture Micro (A)      50,000 to 100,000 colonies/mL  Strain 2  Escherichia coli         If you have any questions or concerns, please call the clinic at the number listed above.       Sincerely,        No name on file.

## 2018-09-29 NOTE — ED AVS SNAPSHOT
[facility] Emergency Department    [address] [place] [address]    Phone:  [phone]    Fax:  [phone]                                       Vickie Duque   MRN: [id number]    Department:  [facility] Emergency Department   Date of Visit:  9/29/2018           After Visit Summary Signature Page     I have received my discharge instructions, and my questions have been answered. I have discussed any challenges I see with this plan with the nurse or doctor.    ..........................................................................................................................................  Patient/Patient Representative Signature      ..........................................................................................................................................  Patient Representative Print Name and Relationship to Patient    ..................................................               ................................................  Date                                   Time    ..........................................................................................................................................  Reviewed by Signature/Title    ...................................................              ..............................................  Date                                               Time          22EPIC Rev 08/18
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
## 2018-09-29 NOTE — DISCHARGE INSTRUCTIONS
Understanding Urinary Tract Infections (UTIs)  Most UTIs are caused by bacteria, although they may also be caused by viruses or fungi. Bacteria from the bowel are the most common source of infection. The infection may start because of any of the following:    Sexual activity. During sex, bacteria can travel from the penis, vagina, or rectum into the urethra.     Bacteria on the skin outside the rectum may travel into the urethra. This is more common in women since the rectum and urethra are closer to each other than in men. Wiping from front to back after using the toilet and keeping the area clean can help prevent germs from getting to the urethra.    Blockage of urine flow through the urinary tract. If urine sits too long, germs may start to grow out of control.      Parts of the urinary tract  The infection can occur in any part of the urinary tract.    The kidneys collect and store urine.    The ureters carry urine from the kidneys to the bladder.    The bladder holds urine until you are ready to let it out.    The urethra carries urine from the bladder out of the body. It is shorter in women, so bacteria can move through it more easily. The urethra is longer in men, so a UTI is less likely to reach the bladder or kidneys in men.  Date Last Reviewed: 1/1/2017 2000-2017 [employer]. [address] [place] [address], [place] [address]. All rights reserved. This information is not intended as a substitute for professional medical care. Always follow your healthcare professional's instructions.

## 2018-09-29 NOTE — ED NOTES
"Pt has had a wk of constant but fluctuating pain that is located suprapubic/lower abd.  She states LMP was approx 1 month ago, \"but this pain has nothing to do with my periods.\"  When asked what she feels this pain is r/t, pt responds \"that's your job.\"  Pt asked  For a UA to r/o possible UTi  and pt states, \"you guys have not treated my UTI x 1 yr.\"  Pt had diarrhea earlier that has since resolved, denies any n/v/f/c.     "

## 2018-10-01 LAB
BACTERIA SPEC CULT: ABNORMAL
BACTERIA SPEC CULT: ABNORMAL
Lab: ABNORMAL
SPECIMEN SOURCE: ABNORMAL

## 2018-10-05 ENCOUNTER — DOCUMENTATION ONLY (OUTPATIENT)
Dept: FAMILY MEDICINE | Facility: CLINIC | Age: 33
End: 2018-10-05

## 2018-10-05 NOTE — PROGRESS NOTES
Diabetic Review Team Follow up  Gaps identified on last contact:  Uncontrolled diabetes  Plan from last contact:  MTM referral by provider, CDE order  Progress: Health Maintenance, Patient/Caregiver Understanding and Medication Management- Letter from MTM sent-did not respond. CDE attempted to reach [facility]  New Gaps Identified: SAME-Non compliant.  New/Continuing plan:  [place] MTM will reach out to patient by phone.     Catarina Aguirre [facility]  Care Coordination     Verenice Paul RDN, LD, CDE  Diabetic     Moncho McdanielD  [facility] pharmacist    Megan Aguirre RN  [facility] Nurse

## 2018-10-17 NOTE — PROGRESS NOTES
Routing to Tonya Damico PharmD to reach out to patient.    Moncho Aponte PharmD, [facility]  Medication Therapy Management Pharmacist  Pager: [phone]

## 2018-10-22 NOTE — PROGRESS NOTES
Called pt today. She requests an evening appt due to her work schedule. Right now she is working weekdays until 5pm in [place]. Pt also mentions that she doesn't work in the winter so that may be a better time to reach out. Offered meeting with MTM pharmacist at a different location. Unable to schedule appt because pt needed to go. Will try reaching out to pt again in a few weeks.    Tonya Damico, PharmD  Medication Therapy Management Pharmacist, [facility]  Pager: [phone]

## 2018-12-03 ENCOUNTER — TELEPHONE (OUTPATIENT)
Dept: FAMILY MEDICINE | Facility: OTHER | Age: 33
End: 2018-12-03

## 2018-12-03 DIAGNOSIS — E10.65 TYPE 1 DIABETES MELLITUS WITH HYPERGLYCEMIA (H): Primary | ICD-10-CM

## 2018-12-03 NOTE — TELEPHONE ENCOUNTER
Admelog is covered otherwise we need to do a Prior Auth on the Novolog.   Please advise.  ................Deyvi Mejias LPN,   December 3, 2018,      2:15 PM,   [facility]

## 2018-12-04 NOTE — TELEPHONE ENCOUNTER
Admelog also needs a prior authorization and according to the pharmacy it has best chance of being covered.  Please place RX for Admelog and I will be able to get the prior authorization started.    Leonor Rosado XRO/  [facility]

## 2018-12-05 ENCOUNTER — TELEPHONE (OUTPATIENT)
Dept: FAMILY MEDICINE | Facility: OTHER | Age: 33
End: 2018-12-05

## 2018-12-05 NOTE — TELEPHONE ENCOUNTER
Hard copy Rx [facility]ed [facility]. Encounter sent to TC to start PA process.  ................Deyvi Mejias LPN,   December 5, 2018,      1:21 PM,   [facility]

## 2018-12-05 NOTE — TELEPHONE ENCOUNTER
Prior Authorization Retail Medication Request    Medication/Dose: insulin lispro (ADMELOG SOLOSTAR) 100 UNIT/ML pen  ICD code (if different than what is on RX):  Type 1 diabetes mellitus with hyperglycemia (H) [E10.65]  Previously Tried and Failed:  insulin aspart (NOVOLOG PEN) 100 UNIT/ML injection  Rationale:  Cost/formulary change    Insurance Name:  [phone]  Insurance ID:  [id number]      Pharmacy Information (if different than what is on RX)  Name:  [facility]   Phone:  [phone]

## 2018-12-06 NOTE — TELEPHONE ENCOUNTER
Prior Authorization Not Needed per Insurance    Medication: insulin lispro (ADMELOG SOLOSTAR) 100 UNIT/ML pen  Insurance Company: [facility] - Phone [phone] Fax [phone]  Expected CoPay:      Pharmacy Filling the Rx: [id number] - [place] - [address] [place] [address]  Pharmacy Notified: Yes  Patient Notified: Yes

## 2018-12-06 NOTE — TELEPHONE ENCOUNTER
Central Prior Authorization Team   Phone: [phone]      PA Initiation    Medication: insulin lispro (ADMELOG SOLOSTAR) 100 UNIT/ML pen  Insurance Company: [employer] [place] - Phone [phone] Fax [phone]  Pharmacy Filling the Rx: [id number] - [place], [place] - [address] [place] [address]  Filling Pharmacy Phone: [phone]  Filling Pharmacy Fax:    Start Date: 12/6/2018

## 2018-12-20 ENCOUNTER — OFFICE VISIT (OUTPATIENT)
Dept: FAMILY MEDICINE | Facility: OTHER | Age: 33
End: 2018-12-20
Payer: COMMERCIAL

## 2018-12-20 ENCOUNTER — TELEPHONE (OUTPATIENT)
Dept: FAMILY MEDICINE | Facility: OTHER | Age: 33
End: 2018-12-20

## 2018-12-20 VITALS
TEMPERATURE: 97.9 F | RESPIRATION RATE: 16 BRPM | BODY MASS INDEX: 25.83 KG/M2 | SYSTOLIC BLOOD PRESSURE: 90 MMHG | WEIGHT: 154 LBS | DIASTOLIC BLOOD PRESSURE: 58 MMHG | OXYGEN SATURATION: 99 % | HEART RATE: 99 BPM

## 2018-12-20 DIAGNOSIS — Z11.1 SCREENING EXAMINATION FOR PULMONARY TUBERCULOSIS: ICD-10-CM

## 2018-12-20 DIAGNOSIS — E10.65 TYPE 1 DIABETES MELLITUS WITH HYPERGLYCEMIA (H): Primary | ICD-10-CM

## 2018-12-20 DIAGNOSIS — E10.65 UNCONTROLLED TYPE 1 DIABETES MELLITUS WITH HYPERGLYCEMIA (H): ICD-10-CM

## 2018-12-20 LAB — HBA1C MFR BLD: 11.6 % (ref 0–5.6)

## 2018-12-20 PROCEDURE — 86480 TB TEST CELL IMMUN MEASURE: CPT | Performed by: NURSE PRACTITIONER

## 2018-12-20 PROCEDURE — 99207 C FOOT EXAM  NO CHARGE: CPT | Performed by: NURSE PRACTITIONER

## 2018-12-20 PROCEDURE — 83036 HEMOGLOBIN GLYCOSYLATED A1C: CPT | Performed by: NURSE PRACTITIONER

## 2018-12-20 PROCEDURE — 36415 COLL VENOUS BLD VENIPUNCTURE: CPT | Performed by: NURSE PRACTITIONER

## 2018-12-20 PROCEDURE — 99214 OFFICE O/P EST MOD 30 MIN: CPT | Performed by: NURSE PRACTITIONER

## 2018-12-20 RX ORDER — INSULIN GLARGINE 100 [IU]/ML
INJECTION, SOLUTION SUBCUTANEOUS
Qty: 30 ML | Refills: 0 | Status: SHIPPED | OUTPATIENT
Start: 2018-12-20 | End: 2019-06-28

## 2018-12-20 ASSESSMENT — ANXIETY QUESTIONNAIRES
2. NOT BEING ABLE TO STOP OR CONTROL WORRYING: SEVERAL DAYS
IF YOU CHECKED OFF ANY PROBLEMS ON THIS QUESTIONNAIRE, HOW DIFFICULT HAVE THESE PROBLEMS MADE IT FOR YOU TO DO YOUR WORK, TAKE CARE OF THINGS AT HOME, OR GET ALONG WITH OTHER PEOPLE: NOT DIFFICULT AT ALL
3. WORRYING TOO MUCH ABOUT DIFFERENT THINGS: SEVERAL DAYS
6. BECOMING EASILY ANNOYED OR IRRITABLE: MORE THAN HALF THE DAYS
GAD7 TOTAL SCORE: 9
5. BEING SO RESTLESS THAT IT IS HARD TO SIT STILL: SEVERAL DAYS
7. FEELING AFRAID AS IF SOMETHING AWFUL MIGHT HAPPEN: SEVERAL DAYS
1. FEELING NERVOUS, ANXIOUS, OR ON EDGE: SEVERAL DAYS

## 2018-12-20 ASSESSMENT — PAIN SCALES - GENERAL: PAINLEVEL: NO PAIN (0)

## 2018-12-20 ASSESSMENT — PATIENT HEALTH QUESTIONNAIRE - PHQ9
SUM OF ALL RESPONSES TO PHQ QUESTIONS 1-9: 7
5. POOR APPETITE OR OVEREATING: MORE THAN HALF THE DAYS

## 2018-12-20 NOTE — PATIENT INSTRUCTIONS
I refilled your medications.     Follow up with the [facility] pharmacist    Follow up in 3 months in clinic.

## 2018-12-20 NOTE — TELEPHONE ENCOUNTER
Reason for Call: Request for an order or referral:    Order or referral being requested: endocrinologist     Date needed: as soon as possible    Has the patient been seen by the PCP for this problem? YES    Additional comments: patient is calling stating that she would like to have a referral placed to see an endocrinologist.     Phone number Patient can be reached at:  Cell number on file:    Telephone Information:   Mobile [phone]       Best Time:  any    Can we leave a detailed message on this number?  YES    Call taken on 12/20/2018 at 3:22 PM by Natasha Rees

## 2018-12-20 NOTE — LETTER
My Depression Action Plan  Name: Vickie Duque   Date of Birth 1985  Date: 12/20/2018    My doctor: Lelo Marmolejo   My clinic: [place] [address] [facility] [address] [phone]          GREEN    ZONE   Good Control    What it looks like:     Things are going generally well. You have normal up s and down s. You may even feel depressed from time to time, but bad moods usually last less than a day.   What you need to do:  1. Continue to care for yourself (see self care plan)  2. Check your depression survival kit and update it as needed  3. Follow your physician s recommendations including any medication.  4. Do not stop taking medication unless you consult with your physician first.           YELLOW         ZONE Getting Worse    What it looks like:     Depression is starting to interfere with your life.     It may be hard to get out of bed; you may be starting to isolate yourself from others.    Symptoms of depression are starting to last most all day and this has happened for several days.     You may have suicidal thoughts but they are not constant.   What you need to do:     1. Call your care team, your response to treatment will improve if you keep your care team informed of your progress. Yellow periods are signs an adjustment may need to be made.     2. Continue your self-care, even if you have to fake it!    3. Talk to someone in your support network    4. Open up your depression survival kit           RED    ZONE Medical Alert - Get Help    What it looks like:     Depression is seriously interfering with your life.     You may experience these or other symptoms: You can t get out of bed most days, can t work or engage in other necessary activities, you have trouble taking care of basic hygiene, or basic responsibilities, thoughts of suicide or death that will not go away, self-injurious behavior.     What you need to do:  1. Call your care team and request a  same-day appointment. If they are not available (weekends or after hours) call your local crisis line, emergency room or 911.            Depression Self Care Plan / Survival Kit    Self-Care for Depression  Here s the deal. Your body and mind are really not as separate as most people think.  What you do and think affects how you feel and how you feel influences what you do and think. This means if you do things that people who feel good do, it will help you feel better.  Sometimes this is all it takes.  There is also a place for medication and therapy depending on how severe your depression is, so be sure to consult with your medical provider and/ or Behavioral Health Consultant if your symptoms are worsening or not improving.     In order to better manage my stress, I will:    Exercise  Get some form of exercise, every day. This will help reduce pain and release endorphins, the  feel good  chemicals in your brain. This is almost as good as taking antidepressants!  This is not the same as joining a gym and then never going! (they count on that by the way ) It can be as simple as just going for a walk or doing some gardening, anything that will get you moving.      Hygiene   Maintain good hygiene (Get out of bed in the morning, Make your bed, Brush your teeth, Take a shower, and Get dressed like you were going to work, even if you are unemployed).  If your clothes don't fit try to get ones that do.    Diet  I will strive to eat foods that are good for me, drink plenty of water, and avoid excessive sugar, caffeine, alcohol, and other mood-altering substances.  Some foods that are helpful in depression are: complex carbohydrates, B vitamins, flaxseed, fish or fish oil, fresh fruits and vegetables.    Psychotherapy  I agree to participate in Individual Therapy (if recommended).    Medication  If prescribed medications, I agree to take them.  Missing doses can result in serious side effects.  I understand that drinking  alcohol, or other illicit drug use, may cause potential side effects.  I will not stop my medication abruptly without first discussing it with my provider.    Staying Connected With Others  I will stay in touch with my friends, family members, and my primary care provider/team.    Use your imagination  Be creative.  We all have a creative side; it doesn t matter if it s oil painting, sand castles, or mud pies! This will also kick up the endorphins.    Witness Beauty  (AKA stop and smell the roses) Take a look outside, even in mid-winter. Notice colors, textures. Watch the squirrels and birds.     Service to others  Be of service to others.  There is always someone else in need.  By helping others we can  get out of ourselves  and remember the really important things.  This also provides opportunities for practicing all the other parts of the program.    Humor  Laugh and be silly!  Adjust your TV habits for less news and crime-drama and more comedy.    Control your stress  Try breathing deep, massage therapy, biofeedback, and meditation. Find time to relax each day.     My support system    Clinic Contact:  Phone number:    Contact 1:  Phone number:    Contact 2:  Phone number:    Yarsanism/:  Phone number:    Therapist:  Phone number:    Local crisis center:    Phone number:    Other community support:  Phone number:

## 2018-12-20 NOTE — NURSING NOTE
Admelog insulin refill faxed to [facility].  ................Deyvi Mejias LPN,   December 20, 2018,      4:24 PM,   [facility]

## 2018-12-20 NOTE — PROGRESS NOTES
"  SUBJECTIVE:   Vickie Duque is a 33 year old female who presents to clinic today for the following health issues:      Diabetes Follow-up      Patient is checking blood sugars: 1 times a week    Diabetic concerns: None     Symptoms of hypoglycemia (low blood sugar): none     Paresthesias (numbness or burning in feet) or sores: No     Date of last diabetic eye exam: within a year    BP Readings from Last 2 Encounters:   12/20/18 90/58   09/29/18 97/60     Hemoglobin A1C (%)   Date Value   12/20/2018 11.6 (H)   08/09/2018 11.2 (H)     LDL Cholesterol Calculated (mg/dL)   Date Value   01/29/2016 129 (H)   04/01/2015 121     LDL Cholesterol Direct (mg/dL)   Date Value   08/09/2018 113 (H)         Diabetes has been under poor control for several years.  Has seen Endocrinology and internal medicine in the past.  States she was told she has \"brittle diabetes that will never be under good control\".   During our last visit I advised she see the [facility] pharmacist and diabetes educator.  She declined.     She would like her medications refilled today.  Has no concerns other that that she is upset we asked her to come in today as she was just here 3 months ago.      Problem list and histories reviewed & adjusted, as indicated.  Additional history: as documented    Patient Active Problem List   Diagnosis     Sprain of back     Type 1 diabetes mellitus with hyperglycemia (H)     HYPERLIPIDEMIA LDL GOAL <100     Facial paralysis/Hope palsy     DKA (diabetic ketoacidoses) (H)     Advanced directives, counseling/discussion     DVT prophylaxis     Tobacco abuse     SIRS (systemic inflammatory response syndrome) (H)     Uncontrolled type 1 diabetes mellitus (H)     Viral URI with cough     Noncompliance with medication regimen     Anxiety     Ulcerative colitis without complications (H)     Major depressive disorder, recurrent episode, moderate (H)     ASCUS of cervix with negative high risk HPV     Hyperglycemia     Past Surgical " History:   Procedure Laterality Date     DILATION AND CURETTAGE SUCTION  4/2010    miscarriage     HC COLONOSCOPY W BIOPSY  08/16/06     HC COLONOSCOPY W/WO BRUSH/WASH       TUBAL LIGATION         Social History     Tobacco Use     Smoking status: Current Every Day Smoker     Packs/day: 1.00     Years: 16.00     Pack years: 16.00     Types: Cigarettes     Smokeless tobacco: Never Used   Substance Use Topics     Alcohol use: Yes     Alcohol/week: 0.0 oz     Comment: rare     Family History   Problem Relation Age of Onset     Hypertension Father      Diabetes Maternal Grandmother      Cancer Maternal Grandmother      Diabetes Paternal Grandfather      Diabetes Maternal Uncle      Diabetes Maternal Aunt          Current Outpatient Medications   Medication Sig Dispense Refill     ACE/ARB NOT PRESCRIBED, INTENTIONAL, 1 each every 3 hours ACE & ARB not prescribed due to Intolerance and Symptomatic hypotension not due to excessive diuresis       blood glucose monitoring (ACCU-CHEK SMARTVIEW) test strip TEST 4 TIMES A DAY AS DIRECTED 100 each 11     DIABETIC STERILE LANCETS device 1 Device 4 times daily. 1 Box 11     glucagon 1 MG SOLR injection Inject 1 mg Subcutaneous every 15 minutes as needed for low blood sugar 2 each 0     insulin glargine (BASAGLAR KWIKPEN) 100 UNIT/ML pen INJECT 25 UNITS UNDER THE SKIN DAILY AT BEDTIME 30 mL 0     insulin lispro (ADMELOG SOLOSTAR) 100 UNIT/ML pen Sig - Route: Inject 1-7 Units Subcutaneous 3 times daily (before meals) Do Not take Correction Insulin if Pre-Meal BG < than 140.   140 - 189 give 1 unit   190 - 239 give 2 units   240 - 289 give 3 units   290 - 339 give 4 units   340- 399 give 5 units   400-449 give 6 units   Greater than or equal to 450 give 7 units   Based on pre-meal blood glucose.     Notify clinic if glucose greater than or equal to 350 mg/dL after administration of correct 3 mL 0     insulin pen needle (BD TARA U/F) 32G X 4 MM Use 3 daily or as directed. 270 each 3  "    insulin syringe-needle U-100 (B-D INSULIN SYRINGE) 31G X 5/16\" 0.5 ML USE TO INJECT INSULIN 100 each 6     Allergies   Allergen Reactions     No Known Drug Allergies      BP Readings from Last 3 Encounters:   12/20/18 90/58   09/29/18 97/60   08/09/18 90/62    Wt Readings from Last 3 Encounters:   12/20/18 69.9 kg (154 lb)   09/29/18 72.6 kg (160 lb)   08/09/18 70.3 kg (155 lb)                    Reviewed and updated as needed this visit by clinical staff  Tobacco  Allergies  Meds       Reviewed and updated as needed this visit by Provider         ROS:  Constitutional, HEENT, cardiovascular, pulmonary, gi and gu systems are negative, except as otherwise noted.    OBJECTIVE:     BP 90/58   Pulse 99   Temp 97.9  F (36.6  C) (Temporal)   Resp 16   Wt 69.9 kg (154 lb)   LMP 12/08/2018 (Exact Date)   SpO2 99%   Breastfeeding? No   BMI 25.83 kg/m    Body mass index is 25.83 kg/m .  GENERAL: healthy, alert and no distress  NECK: no adenopathy, no asymmetry, masses, or scars and thyroid normal to palpation  RESP: lungs clear to auscultation - no rales, rhonchi or wheezes  CV: regular rate and rhythm, normal S1 S2, no S3 or S4, no murmur, click or rub, no peripheral edema and peripheral pulses strong  ABDOMEN: soft, nontender, no hepatosplenomegaly, no masses and bowel sounds normal  MS: no gross musculoskeletal defects noted, no edema  Diabetic foot exam: normal DP and PT pulses, no trophic changes or ulcerative lesions, normal sensory exam and normal monofilament exam    Diagnostic Test Results:  Results for orders placed or performed in visit on 12/20/18 (from the past 24 hour(s))   Hemoglobin A1c   Result Value Ref Range    Hemoglobin A1C 11.6 (H) 0 - 5.6 %       ASSESSMENT/PLAN:         1. Type 1 diabetes mellitus with hyperglycemia (H)  Under very poor control.  I am going to get her back into see Endocrinology.    - Hemoglobin A1c  - insulin lispro (ADMELOG SOLOSTAR) 100 UNIT/ML pen; Sig - Route: Inject " 1-7 Units Subcutaneous 3 times daily (before meals) Do Not take Correction Insulin if Pre-Meal BG < than 140.   140 - 189 give 1 unit   190 - 239 give 2 units   240 - 289 give 3 units   290 - 339 give 4 units   340- 399 give 5 units   400-449 give 6 units   Greater than or equal to 450 give 7 units   Based on pre-meal blood glucose.     Notify clinic if glucose greater than or equal to 350 mg/dL after administration of correct  Dispense: 3 mL; Refill: 0  - FOOT EXAM    2. Uncontrolled type 1 diabetes mellitus with hyperglycemia (H)  - insulin glargine (BASAGLAR KWIKPEN) 100 UNIT/ML pen; INJECT 25 UNITS UNDER THE SKIN DAILY AT BEDTIME  Dispense: 30 mL; Refill: 0    3. Screening examination for pulmonary tuberculosis  She needs this for a new job.   - Quantiferon TB Gold Plus    See Patient Instructions    RAJESH Batista [facility]

## 2018-12-21 ASSESSMENT — ANXIETY QUESTIONNAIRES: GAD7 TOTAL SCORE: 9

## 2018-12-24 LAB
GAMMA INTERFERON BACKGROUND BLD IA-ACNC: 0.02 IU/ML
M TB IFN-G BLD-IMP: NEGATIVE
M TB IFN-G CD4+ BCKGRND COR BLD-ACNC: >10 IU/ML
MITOGEN IGNF BCKGRD COR BLD-ACNC: 0 IU/ML
MITOGEN IGNF BCKGRD COR BLD-ACNC: 0.01 IU/ML

## 2018-12-26 ENCOUNTER — TELEPHONE (OUTPATIENT)
Dept: FAMILY MEDICINE | Facility: OTHER | Age: 33
End: 2018-12-26

## 2018-12-26 NOTE — TELEPHONE ENCOUNTER
----- Message from RAJESH Batista CNP sent at 12/26/2018 12:07 PM CST -----  Please notify patient, call or letter    TB test was negative.     Electronically signed by Lelo Marmolejo CNP.

## 2018-12-26 NOTE — LETTER
December 26, 2018      Vickie Duque  [address] [facility] [address]        Dear ,    We are writing to inform you of your test results.    Your test results fall within the expected range(s) or remain unchanged from previous results.  Please continue with current treatment plan.    Resulted Orders   Hemoglobin A1c   Result Value Ref Range    Hemoglobin A1C 11.6 (H) 0 - 5.6 %      Comment:      Normal <5.7% Prediabetes 5.7-6.4%  Diabetes 6.5% or higher - adopted from ADA   consensus guidelines.     Quantiferon TB Gold Plus   Result Value Ref Range    Quantiferon-TB Gold Plus Result Negative NEG^Negative      Comment:      No interferon gamma response to M.tuberculosis antigens was detected.   Infection with M.tuberculosis is unlikely, however a single negative result   does not exclude infection. In patients at high risk for infection, a second   test should be considered  in accordance with the 2017 ATS/IDSA/CDC Clinical Practice Guidelines for   Diagnosis of Tuberculosis in Adults and Children [Lewinsohn DM et   al.Clin.Infect.Dis. 2017 64(2):111-115].      TB1 Ag minus Nil Value 0.00 IU/mL    TB2 Ag minus Nil Value 0.01 IU/mL    Mitogen minus Nil Result >10.00 IU/mL    Nil Result 0.02 IU/mL       If you have any questions or concerns, please call the clinic at the number listed above.       Sincerely,        RAJESH Batista CNP

## 2019-01-01 NOTE — TELEPHONE ENCOUNTER
(NOVOLIN MIX 70/30 VIAL) VIAL 100 UNITS/MLsusp    Last Written Prescription Date: 10/18/2016  Last Fill Quantity: 45ml, # refills: 0  Last Office Visit with G, P or [facility] prescribing provider:  11/14/2016        BP Readings from Last 3 Encounters:   10/10/16 102/70   10/04/16 110/60   09/28/16 105/74     MICROL        6   4/1/2015  No results found for this basename: microalbumin  CREATININE   Date Value Ref Range Status   09/27/2016 0.85 0.52 - 1.04 mg/dL Final   ]  GFR ESTIMATE   Date Value Ref Range Status   09/27/2016 77 >60 mL/min/1.7m2 Final     Comment:     Non  GFR Calc   01/29/2016 77 >60 mL/min/1.7m2 Final     Comment:     Non  GFR Calc   11/16/2015 88 >60 mL/min/1.7m2 Final     Comment:     Non  GFR Calc     GFR ESTIMATE IF BLACK   Date Value Ref Range Status   09/27/2016 >90   GFR Calc   >60 mL/min/1.7m2 Final   01/29/2016 >90   GFR Calc   >60 mL/min/1.7m2 Final   11/16/2015 >90   GFR Calc   >60 mL/min/1.7m2 Final     CHOL      207   1/29/2016  HDL       55   1/29/2016  LDL      129   1/29/2016  LDL      117   3/5/2014  TRIG      116   1/29/2016  CHOLHDLRATIO      3.7   4/1/2015  AST        8   1/29/2016  ALT       15   1/29/2016  A1C     11.5   1/29/2016  A1C     11.7   10/6/2015  A1C     11.1   4/1/2015  A1C     11.3   7/24/2014  A1C     11.3   5/30/2014  POTASSIUM   Date Value Ref Range Status   09/27/2016 4.2 3.4 - 5.3 mmol/L Final       
  FLUoxetine (PROZAC) 40 MG capsule  Last Written Prescription Date: 10/4/16  Last Fill Quantity: 30, # refills: 1  Last Office Visit with [facility] primary care provider:  11/14/16        Last PHQ-9 score on record=   PHQ-9 SCORE 9/26/2016   Total Score 20                   
Routing refill request to provider for review/approval because:  Medication is reported/historical- prozac   Mary given for Novolin- patient overdue for labs   Ирина Kulkarni RN        
1

## 2019-01-02 ENCOUNTER — OFFICE VISIT (OUTPATIENT)
Dept: FAMILY MEDICINE | Facility: OTHER | Age: 34
End: 2019-01-02
Payer: COMMERCIAL

## 2019-01-02 VITALS
RESPIRATION RATE: 16 BRPM | DIASTOLIC BLOOD PRESSURE: 82 MMHG | WEIGHT: 154.9 LBS | TEMPERATURE: 97.6 F | HEIGHT: 65 IN | SYSTOLIC BLOOD PRESSURE: 120 MMHG | BODY MASS INDEX: 25.81 KG/M2 | OXYGEN SATURATION: 100 % | HEART RATE: 98 BPM

## 2019-01-02 DIAGNOSIS — J02.0 STREPTOCOCCAL SORE THROAT: Primary | ICD-10-CM

## 2019-01-02 LAB
DEPRECATED S PYO AG THROAT QL EIA: ABNORMAL
SPECIMEN SOURCE: ABNORMAL

## 2019-01-02 PROCEDURE — 99213 OFFICE O/P EST LOW 20 MIN: CPT | Performed by: FAMILY MEDICINE

## 2019-01-02 PROCEDURE — 87880 STREP A ASSAY W/OPTIC: CPT | Performed by: FAMILY MEDICINE

## 2019-01-02 RX ORDER — AMOXICILLIN 500 MG/1
1000 CAPSULE ORAL 2 TIMES DAILY
Qty: 40 CAPSULE | Refills: 0 | Status: SHIPPED | OUTPATIENT
Start: 2019-01-02 | End: 2019-01-12

## 2019-01-02 ASSESSMENT — PAIN SCALES - GENERAL: PAINLEVEL: MODERATE PAIN (5)

## 2019-01-02 ASSESSMENT — MIFFLIN-ST. JEOR: SCORE: 1394.53

## 2019-01-02 NOTE — PROGRESS NOTES
SUBJECTIVE:   Vickie Duque is a 33 year old female who presents to clinic today for the following health issues:    Acute Illness   Acute illness concerns: sore throat  Onset: 2 days    Fever: no    Chills/Sweats: no    Headache (location?): no    Sinus Pressure:no    Conjunctivitis:  no    Ear Pain: YES: right    Rhinorrhea: YES    Congestion: maybe    Sore Throat: YES     Cough: YES-productive of yellow sputum, productive of green sputum    Wheeze: no     Decreased Appetite: no    Nausea: no    Vomiting: no    Diarrhea:  no    Dysuria/Freq.: no    Fatigue/Achiness: YES    Sick/Strep Exposure: YES- daughter had it a couple days ago     Therapies Tried and outcome: none      Nursing notes above reviewed and confirmed with patient.  Cough is minimal.  No chest pain or shortness of breath.  Normal appetite and has been drinking a lot of water.  No trouble with swallowing.  Not allergic to medication.  No other concerns.    Problem list and histories reviewed & adjusted, as indicated.  Additional history: as documented    Current Outpatient Medications   Medication Sig Dispense Refill     ACE/ARB NOT PRESCRIBED, INTENTIONAL, 1 each every 3 hours ACE & ARB not prescribed due to Intolerance and Symptomatic hypotension not due to excessive diuresis       amoxicillin (AMOXIL) 500 MG capsule Take 2 capsules (1,000 mg) by mouth 2 times daily for 10 days 40 capsule 0     blood glucose monitoring (ACCU-CHEK SMARTVIEW) test strip TEST 4 TIMES A DAY AS DIRECTED 100 each 11     DIABETIC STERILE LANCETS device 1 Device 4 times daily. 1 Box 11     glucagon 1 MG SOLR injection Inject 1 mg Subcutaneous every 15 minutes as needed for low blood sugar 2 each 0     insulin glargine (BASAGLAR KWIKPEN) 100 UNIT/ML pen INJECT 25 UNITS UNDER THE SKIN DAILY AT BEDTIME 30 mL 0     insulin lispro (ADMELOG SOLOSTAR) 100 UNIT/ML pen Sig - Route: Inject 1-7 Units Subcutaneous 3 times daily (before meals) Do Not take Correction Insulin if  "Pre-Meal BG < than 140.   140 - 189 give 1 unit   190 - 239 give 2 units   240 - 289 give 3 units   290 - 339 give 4 units   340- 399 give 5 units   400-449 give 6 units   Greater than or equal to 450 give 7 units   Based on pre-meal blood glucose.     Notify clinic if glucose greater than or equal to 350 mg/dL after administration of correct 3 mL 0     insulin pen needle (BD TARA U/F) 32G X 4 MM Use 3 daily or as directed. 270 each 3     insulin syringe-needle U-100 (B-D INSULIN SYRINGE) 31G X 5/16\" 0.5 ML USE TO INJECT INSULIN 100 each 6     Allergies   Allergen Reactions     No Known Drug Allergies        Reviewed and updated as needed this visit by clinical staff  Tobacco  Allergies  Meds  Soc Hx      Reviewed and updated as needed this visit by Provider         ROS:  Constitutional, HEENT, cardiovascular, pulmonary, gi and gu systems are negative, except as otherwise noted.    OBJECTIVE:     /82 (BP Location: Right arm, Patient Position: Sitting, Cuff Size: Adult Regular)   Pulse 98   Temp 97.6  F (36.4  C) (Temporal)   Resp 16   Ht 1.645 m (5' 4.75\")   Wt 70.3 kg (154 lb 14.4 oz)   LMP 12/08/2018 (Exact Date)   SpO2 100%   Breastfeeding? No   BMI 25.98 kg/m    Body mass index is 25.98 kg/m .   GENERAL: healthy, alert and no distress.  Speak in full sentences.  HENT: ear canals and TM's normal.  Nares are congested with clear drainage.  Oropharynx is pink and moist.  Tonsils are red but no exudate or hypertrophy.  No tender with palpation to the sinuses.  NECK: no adenopathy.  RESP: lungs clear to auscultation - no rales, rhonchi or wheezes  CV: regular rate and rhythm, no murmur.      Diagnostic Test Results:  Results for orders placed or performed in visit on 01/02/19   Strep, Rapid Screen   Result Value Ref Range    Specimen Description Throat     Rapid Strep A Screen (A)      POSITIVE: Group A Streptococcal antigen detected by immunoassay.       ASSESSMENT/PLAN:       ICD-10-CM    1. " Streptococcal sore throat J02.0 Strep, Rapid Screen     amoxicillin (AMOXIL) 500 MG capsule     Informed her it is contagious and ways to avoid spreading discussed. Family member may need to be treated if developed similar symptoms.  Started amoxicillin and encouraged her to take it as prescribed.  Encourage to drink a lot of fluid, especially water. Avoid crunchy or spicy food.  Call in or follow up if not improve or worse in the next couple days. She feels comfortable with the plan and all of her questions were answered.      Son Groves Mai, MD  [facility]

## 2019-01-02 NOTE — NURSING NOTE
Chief Complaint   Patient presents with     Pharyngitis     x2 days     Health Maintenance Due   Topic Date Due     INFLUENZA VACCINE (1) 09/01/2018     Earlene Landry, CMA

## 2019-02-06 ENCOUNTER — DOCUMENTATION ONLY (OUTPATIENT)
Dept: FAMILY MEDICINE | Facility: OTHER | Age: 34
End: 2019-02-06

## 2019-02-06 NOTE — PROGRESS NOTES
Diabetic Review Team Follow up  Gaps identified on last contact:  Medication Management/Non-compliance  Plan from last contact:  Umair SAAVEDRA to contact patient by phone.  Progress: Unable to schedule appointment due to patient's work schedule. Pt has declined CDE or MTM appointments.  Had appointment with PCP on 12/20/18. Requested endocrinology referral.    Lab Results   Component Value Date    A1C 11.6 12/20/2018    A1C 11.2 08/09/2018    A1C 8.9 11/13/2017    A1C 10.0 10/07/2017    A1C 11.2 06/26/2017     New Gaps Identified: SAME  New/Continuing plan:  Scheduled with endocrinology on 3/19/19. Team will review in 3 months.       Catarina Aguirre [facility]  Care Coordination     Verenice Paul RDN, LD, CDE  Diabetic     Moncho Aponte PharmD  [facility] pharmacist    Megan Aguirre RN  [facility] Nurse

## 2019-03-08 ENCOUNTER — PRE VISIT (OUTPATIENT)
Dept: ENDOCRINOLOGY | Facility: CLINIC | Age: 34
End: 2019-03-08

## 2019-03-08 NOTE — TELEPHONE ENCOUNTER
Left message for pt to call back to clinic to go over Pre-Visit questions for upcoming appointment on 3/19/19 with Dr Rhoades.   Elvira Cho, CMA

## 2019-03-19 ENCOUNTER — ALLIED HEALTH/NURSE VISIT (OUTPATIENT)
Dept: EDUCATION SERVICES | Facility: CLINIC | Age: 34
End: 2019-03-19
Payer: COMMERCIAL

## 2019-03-19 ENCOUNTER — OFFICE VISIT (OUTPATIENT)
Dept: ENDOCRINOLOGY | Facility: CLINIC | Age: 34
End: 2019-03-19
Payer: COMMERCIAL

## 2019-03-19 VITALS
DIASTOLIC BLOOD PRESSURE: 80 MMHG | WEIGHT: 161 LBS | SYSTOLIC BLOOD PRESSURE: 127 MMHG | BODY MASS INDEX: 27 KG/M2 | OXYGEN SATURATION: 97 % | HEART RATE: 87 BPM

## 2019-03-19 DIAGNOSIS — M72.2 PLANTAR FASCIITIS, BILATERAL: ICD-10-CM

## 2019-03-19 DIAGNOSIS — E10.319 TYPE 1 DIABETES MELLITUS WITH RETINOPATHY, MACULAR EDEMA PRESENCE UNSPECIFIED, UNSPECIFIED LATERALITY, UNSPECIFIED RETINOPATHY SEVERITY (H): Primary | ICD-10-CM

## 2019-03-19 DIAGNOSIS — E11.9 DIABETES MELLITUS WITHOUT COMPLICATION (H): Primary | ICD-10-CM

## 2019-03-19 DIAGNOSIS — E10.9 TYPE 1 DIABETES MELLITUS (H): Primary | ICD-10-CM

## 2019-03-19 LAB
GLUCOSE SERPL-MCNC: 148 MG/DL (ref 70–99)
HBA1C MFR BLD: 10.6 % (ref 0–5.6)
TSH SERPL DL<=0.005 MIU/L-ACNC: 2.95 MU/L (ref 0.4–4)

## 2019-03-19 PROCEDURE — G0108 DIAB MANAGE TRN  PER INDIV: HCPCS

## 2019-03-19 PROCEDURE — 82947 ASSAY GLUCOSE BLOOD QUANT: CPT | Performed by: INTERNAL MEDICINE

## 2019-03-19 PROCEDURE — 99207 ZZC DROP WITH A PROCEDURE: CPT

## 2019-03-19 PROCEDURE — 84443 ASSAY THYROID STIM HORMONE: CPT | Performed by: INTERNAL MEDICINE

## 2019-03-19 PROCEDURE — 83036 HEMOGLOBIN GLYCOSYLATED A1C: CPT | Performed by: INTERNAL MEDICINE

## 2019-03-19 PROCEDURE — 36415 COLL VENOUS BLD VENIPUNCTURE: CPT | Performed by: INTERNAL MEDICINE

## 2019-03-19 PROCEDURE — 99204 OFFICE O/P NEW MOD 45 MIN: CPT | Performed by: INTERNAL MEDICINE

## 2019-03-19 PROCEDURE — 84681 ASSAY OF C-PEPTIDE: CPT | Performed by: INTERNAL MEDICINE

## 2019-03-19 RX ORDER — PROCHLORPERAZINE 25 MG/1
1 SUPPOSITORY RECTAL CONTINUOUS
Qty: 1 DEVICE | Refills: 0 | Status: SHIPPED | OUTPATIENT
Start: 2019-03-19 | End: 2019-03-19

## 2019-03-19 RX ORDER — PROCHLORPERAZINE 25 MG/1
1 SUPPOSITORY RECTAL CONTINUOUS
Qty: 1 DEVICE | Refills: 0 | Status: SHIPPED | OUTPATIENT
Start: 2019-03-19 | End: 2020-11-23

## 2019-03-19 RX ORDER — PROCHLORPERAZINE 25 MG/1
1 SUPPOSITORY RECTAL
Qty: 1 EACH | Refills: 3 | Status: SHIPPED | OUTPATIENT
Start: 2019-03-19 | End: 2019-03-19

## 2019-03-19 RX ORDER — NAPROXEN SODIUM 550 MG/1
550 TABLET ORAL 2 TIMES DAILY WITH MEALS
Qty: 20 TABLET | Refills: 0 | Status: SHIPPED | OUTPATIENT
Start: 2019-03-19 | End: 2019-06-27

## 2019-03-19 RX ORDER — PROCHLORPERAZINE 25 MG/1
1 SUPPOSITORY RECTAL SEE ADMIN INSTRUCTIONS
Qty: 3 EACH | Refills: 11 | Status: SHIPPED | OUTPATIENT
Start: 2019-03-19 | End: 2020-04-14

## 2019-03-19 RX ORDER — PROCHLORPERAZINE 25 MG/1
1 SUPPOSITORY RECTAL
Qty: 1 EACH | Refills: 3 | Status: SHIPPED | OUTPATIENT
Start: 2019-03-19 | End: 2020-04-14

## 2019-03-19 NOTE — LETTER
3/19/2019         RE: Vickie Duque  [address] [facility] [address]        Dear Colleague,    Thank you for referring your patient, Vickie Duque, to the [facility]. Please see a copy of my visit note below.    Endocrinology Clinic Visit    Chief Complaint: Diabetes     Information obtained from:Patient    Subjective:         HPI: Vickie Duque is a 33 year old female with history of type 1 diabetes who is seen in consultation at Lelo Marmolejo's request for same.    Type 1 diabetes at the age of 18; the patient has been following up with her primary care physician regarding her diabetes care.    Current diabetic medications are: Insulin glargine 25 units daily, mealtime insulin 2 units for every 15 g of carbohydrates and a correction dose of insulin 1 unit for every 50 above 140.  A1c over the years as described below has been 10.    Lab Results   Component Value Date    A1C 10.6 03/19/2019    A1C 11.6 12/20/2018    A1C 11.2 08/09/2018    A1C 8.9 11/13/2017    A1C 10.0 10/07/2017     She has diabetic retinopathy.    Her meter broke about a month ago and she has not been checking her blood sugars since.  She reports that when she was checking her blood sugars her blood sugars mostly has been about 200s.  No low blood sugars below 70.  Denies any hypoglycemia symptoms.  She reports that she is compliant with her long-acting insulin most of the time however has not been taking mealtime insulin consistently.  She has not been correcting blood sugars as she does not been checking in the first place.    Review of system negative for shortness of breath or chest pain.  Reports long-standing fatigue.  She reports foot pain more involving the left side of the foot described as discomfort throughout the day.       Allergies   Allergen Reactions     No Known Drug Allergies        Current Outpatient Medications   Medication Sig Dispense Refill     glucagon 1 MG SOLR injection Inject 1 mg Subcutaneous  "every 15 minutes as needed for low blood sugar 2 each 0     insulin glargine (BASAGLAR KWIKPEN) 100 UNIT/ML pen INJECT 25 UNITS UNDER THE SKIN DAILY AT BEDTIME 30 mL 0     insulin lispro (ADMELOG SOLOSTAR) 100 UNIT/ML pen Sig - Route: Inject 1-7 Units Subcutaneous 3 times daily (before meals) Do Not take Correction Insulin if Pre-Meal BG < than 140.   140 - 189 give 1 unit   190 - 239 give 2 units   240 - 289 give 3 units   290 - 339 give 4 units   340- 399 give 5 units   400-449 give 6 units   Greater than or equal to 450 give 7 units   Based on pre-meal blood glucose.     Notify clinic if glucose greater than or equal to 350 mg/dL after administration of correct 3 mL 0     insulin pen needle (BD TARA U/F) 32G X 4 MM Use 3 daily or as directed. 270 each 3     insulin syringe-needle U-100 (B-D INSULIN SYRINGE) 31G X 5/16\" 0.5 ML USE TO INJECT INSULIN 100 each 6     naproxen sodium (ANAPROX) 550 MG tablet Take 1 tablet (550 mg) by mouth 2 times daily (with meals) 20 tablet 0     ACE/ARB NOT PRESCRIBED, INTENTIONAL, 1 each every 3 hours ACE & ARB not prescribed due to Intolerance and Symptomatic hypotension not due to excessive diuresis (Patient not taking: Reported on 3/19/2019)       blood glucose monitoring (ACCU-CHEK SMARTVIEW) test strip TEST 4 TIMES A DAY AS DIRECTED (Patient not taking: Reported on 3/19/2019) 100 each 11     DIABETIC STERILE LANCETS device 1 Device 4 times daily. (Patient not taking: Reported on 3/19/2019) 1 Box 11       Review of Systems     11 point review system (Constitutional, HENT, Eyes, Respiratory, Cardiovascular, Gastrointestinal, Genitourinary, Musculoskeletal,Neurological, Psychiatric/Behavioural, Endocrine) is negative or is as per HPI above    Past Medical History:   Diagnosis Date     Adjustment disorder with anxious mood 11/23/2015     Anxiety 11/27/2015     ASCUS of cervix with negative high risk HPV 06/19/2008     Depressive disorder, not elsewhere classified      Diabetic eye " exam (H) 12/19/14     Mixed hyperlipidemia 11/30/2006     Regional enteritis of unspecified site     Ulcerative Colitis     Type I (juvenile type) diabetes mellitus with ketoacidosis, not stated as uncontrolled(250.11) 01/01/2007    Moderately severe intensity.     Type I (juvenile type) diabetes mellitus with ketoacidosis, uncontrolled 02/14/08     Type I (juvenile type) diabetes mellitus without mention of complication, not stated as uncontrolled     diagnosed in 2003     Ulcerative colitis, unspecified     remission. Last flare 6 yrs ago.        Past Surgical History:   Procedure Laterality Date     DILATION AND CURETTAGE SUCTION  4/2010    miscarriage     HC COLONOSCOPY W BIOPSY  08/16/06     HC COLONOSCOPY W/WO BRUSH/WASH       TUBAL LIGATION         Family History   Problem Relation Age of Onset     Hypertension Father      Diabetes Maternal Grandmother      Cancer Maternal Grandmother      Diabetes Paternal Grandfather      Diabetes Maternal Uncle      Diabetes Maternal Aunt        Social History     Socioeconomic History     Marital status: Single     Spouse name: None     Number of children: 1     Years of education: 12     Highest education level: None   Occupational History     Occupation: Nursing assistant   Social Needs     Financial resource strain: None     Food insecurity:     Worry: None     Inability: None     Transportation needs:     Medical: None     Non-medical: None   Tobacco Use     Smoking status: Current Every Day Smoker     Packs/day: 1.00     Years: 16.00     Pack years: 16.00     Types: Cigarettes     Smokeless tobacco: Never Used   Substance and Sexual Activity     Alcohol use: Yes     Alcohol/week: 0.0 oz     Comment: rare     Drug use: No     Sexual activity: Yes     Partners: Male     Birth control/protection: Surgical, Female Surgical     Comment: tubal ligation   Lifestyle     Physical activity:     Days per week: None     Minutes per session: None     Stress: None   Relationships      Social connections:     Talks on phone: None     Gets together: None     Attends Confucianism service: None     Active member of club or organization: None     Attends meetings of clubs or organizations: None     Relationship status: None     Intimate partner violence:     Fear of current or ex partner: None     Emotionally abused: None     Physically abused: None     Forced sexual activity: None   Other Topics Concern      Service No     Blood Transfusions No     Caffeine Concern Yes     Comment: Advised not more than 16 ounces/day     Occupational Exposure Yes     Comment: Visiting [employer] - student online classes     Hobby Hazards No     Sleep Concern No     Stress Concern Yes     Weight Concern No     Special Diet Yes     Comment: IDDM Type I     Back Care Yes     Comment: work-related about 1 year ago     Exercise Yes     Comment: Active at work     Bike Helmet No     Seat Belt Yes     Self-Exams No     Parent/sibling w/ CABG, MI or angioplasty before 65F 55M? Not Asked   Social History Narrative    Lives in [place] with Rod gandhi and her son and their daughter.   Vickie and Rod smoke.   No indoor cats/kittens.   No concerns about domestic violence.       Objective:   /80 (BP Location: Left arm, Patient Position: Chair, Cuff Size: Adult Regular)   Pulse 87   Wt 73 kg (161 lb)   SpO2 97%   BMI 27.00 kg/m     Constitutional: Pleasant no acute cardiopulmonary distress.   EYES: anicteric, normal extra-ocular movements, no lid lag or retraction, is equal and reactive to light bilaterally.  HEENT: Mouth/Throat: Mucous membrane is moist. Oropharynx is clear.  Thyroid examination: Normal in size and no nodules appreciated.  cardiovascular: RRR, S1, S2 normal.   Pulmonary/Chest: CTAB. No wheezing or rales.   Abdominal: +BS. Non tender to palpation.  Stretch marks:   Neurological: Alert and oriented.  No tremor and reflexes are symmetrical bilaterally and within the normal limits. Muscle  strength 5/5.   Extremities: Foot exam unremarkable.  No open wound; no tenderness to palpation.  Psychological: appropriate mood and affect     In House Labs:   Lab Results   Component Value Date    A1C 10.6 03/19/2019    A1C 11.6 12/20/2018    A1C 11.2 08/09/2018    A1C 8.9 11/13/2017    A1C 10.0 10/07/2017       TSH   Date Value Ref Range Status   10/07/2017 2.57 0.40 - 4.00 mU/L Final   06/26/2017 6.05 (H) 0.40 - 4.00 mU/L Final   03/05/2014 3.97 0.4 - 5.0 mU/L Final   03/16/2012 3.75 0.4 - 5.0 mU/L Final   01/29/2010 2.91 0.4 - 5.0 mU/L Final     T4 Free   Date Value Ref Range Status   06/26/2017 1.02 0.76 - 1.46 ng/dL Final   03/28/2006 1.10 0.70 - 1.85 ng/dL Final       Creatinine   Date Value Ref Range Status   09/29/2018 1.02 0.52 - 1.04 mg/dL Final   ]    Recent Labs   Lab Test 08/09/18  0935 01/29/16  0933 04/01/15  1143 07/24/14  0851   CHOL  --  207* 197 193   HDL  --  55 53 51   * 129* 121 126   TRIG  --  116 113 84   CHOLHDLRATIO  --   --  3.7 4.0     Results for RENY NAJERA (MRN [id number]) as of 3/20/2019 12:16   Ref. Range 3/19/2019 14:29   C-Peptide Latest Ref Range: 0.9 - 6.9 ng/mL <0.1 (L)   TSH Latest Ref Range: 0.40 - 4.00 mU/L 2.95   Glucose Latest Ref Range: 70 - 99 mg/dL 148 (H)       Assessment/Treatment Plan:      Type 1 diabetes uncontrolled with pre-proliferative diabetic retinopathy: Diagnosed at the age of 18 and has been on insulin since then.  Has not been following with endocrinologist previously.  A1c throughout the years in the range of 10 and 11.  Current diabetic medications; insulin glargine 25 units daily, 2 units for every 15 g of carbohydrate with meals and correction was 1 unit for every 50 above 140.  She did not bring her meter with her today.  Has not been checking her blood sugars over the last 1 month since her meter broke.  She has not been using her correction dose over the last 1 month and also mealtime coverage is inconsistent.  The first step in this  patient is actually start checking her blood sugars therefore which was provided today.  Counseling given no type 1 diabetes and risk of hypoglycemia in the setting of insulin use and the need to check blood sugars consistently.  We are going to also have her see diabetes educator today for sensor or CGM use.  In the meantime, we cannot make an adjustment on her insulin dose until we have data on blood sugar readings.  The prognosis of type 1 diabetes, management aspect, complications and other aspects of management were discussed with the patient in detail today.  C-peptide checked at the time of her visit is low at less than 0.1 for a blood sugar of 148.  Her TSH within the normal limit at 2.95.    Plan     #1 insulin glargine 25 units daily.    Mealtime insulin 1 unit for every 10 g of carbohydrate with meals 3 times per day and with snacks.    Correction dose of insulin 1 unit for every 50 above 140.    CDE appointment today; for carbohydrate counting, additional compressive knowledge of diabetes and particularly for CGM or sensor placement.    Follow-up appointment with her meter in 3-4 weeks time for further adjustment in the meantime I have advised her to call us back if blood sugars persistently below 70.    Plantar fasciitis: Consistent with this.  Treatment plan discussed with the patient that includes NSAID use and prescription provided, icing, stretching exercises and also foot inserts.  Written information which is documented below provided to the patient.    Patient Instructions     Continue BASAGLAR INSULIN 25 UNITS DAILY.     Short acting insulin  1 unit for every 10 grams of carbohydrates three times per day with meals and snacks.       Please follow the following correction scale three times per day and at bedtime:  For Pre-Meal Blood glucose (BG) 140 - 189 give 1 unit.   For Pre-Meal  - 239 give 2 units.   For Pre-Meal  - 289 give 3 units.   For Pre-Meal  - 339 give 4 units.    For Pre-Meal BG = or > 340 give 5 units.       Please check blood glucose at least 4 times per day. Please call us at the following number if blood glucose is persistently below 70 on more than two occasions.     Patient Education     Plantar Fasciitis  Plantar fasciitis is a painful swelling of the plantar fascia. The plantar fascia is a thick, fibrous layer of tissue that covers the bones on the bottom of your foot. It supports the foot bones in an arched position.  Plantar fasciitis can happen gradually or suddenly. It usually affects one foot at a time. Heel pain can be sharp, like a knife sticking into the bottom of your foot. You may feel pain after exercising, long-distance jogging, stair climbing, long periods of standing, or after standing up.  Risk factors include: non-active lifestyle, arthritis, diabetes, obesity or recent weight gain, flat foot, high arch. Wearing high heels, loose shoes, or shoes with poor arch support for long periods of time adds to the risk. This problem is commonly found in runners and dancers. It also found in people who stand on hard surfaces for long periods of time.  Foot pain from this condition is usually worse in the morning. But it often improves with walking. By the end of the day there may be a dull aching. Treatment requires short-term rest and controlling swelling. It may take up to 9 months before all symptoms go away. Rarely, a steroid injection into the foot, or surgery, may be needed.  Home care    If you are overweight, lose weight to help healing.    Choose supportive shoes with good arch support and shock absorbency. Replace athletic shoes when they become worn out. Don t walk or run barefoot.    Premade or custom-fitted shoe inserts may be helpful. Inserts made of silicone seem to be the most effective. Custom-made inserts can be provided by foot specialist, physical therapist, or orthopedist.    Premade or custom-made night splints keep the heel stretched out  while you sleep. They may prevent morning pain.    Limit activities that stress the feet: jogging, prolonged standing or walking, contact sports, etc.    First thing in the morning and before sports, stretch the bottom of your feet. Gently flex your ankle so the toes move toward your knee.    Icing may help control heel pain. Apply an ice pack to the heel for 10 to 20 minutes as a preventive. Or ice your heel after a severe flare-up of symptoms. You may repeat this every 1 to 2 hours as needed.    You may use over-the-counter pain medicine to control pain, unless another medicine was prescribed. Anti-inflammatory pain medicines, such as ibuprofen or naproxen, may work better than acetaminophen. If you have chronic liver or kidney disease or ever had a stomach ulcer or gastrointestinal bleeding, talk with your healthcare provider before using these medicines.  Follow-up care  Follow up with your healthcare provider, or as advised.  Call for an appointment if pain worsens or there is no relief after a few weeks of home treatment. Shoe inserts, a night splint, or a special boot may be required.  If X-rays were taken, you will be told of any new findings that may affect your care.  When to seek medical advice  Call your healthcare provider right away if any of these occur:    Foot swelling    Redness or warmth with increasing pain   Date Last Reviewed: 5/1/2018 2000-2018 [employer]. [address] [place] [address], [place] [address]. All rights reserved. This information is not intended as a substitute for professional medical care. Always follow your healthcare professional's instructions.               I will contact the patient with the test results.  Return to clinic in 1 month.    Test and/or medications prescribed today:  Orders Placed This Encounter   Procedures     Hemoglobin A1c POCT     TSH with free T4 reflex     Glucose     C-peptide     DIABETES EDUCATOR REFERRAL         Siri Rhoades  MD  Staff Endocrinologist    [phone]  Division of Endocrinology and Diabetes            Again, thank you for allowing me to participate in the care of your patient.        Sincerely,        Siri Rhoades MD

## 2019-03-19 NOTE — PROGRESS NOTES
Endocrinology Clinic Visit    Chief Complaint: Diabetes     Information obtained from:Patient    Subjective:         HPI: Vickie Duque is a 33 year old female with history of type 1 diabetes who is seen in consultation at Lelo Marmolejo's request for same.    Type 1 diabetes at the age of 18; the patient has been following up with her primary care physician regarding her diabetes care.    Current diabetic medications are: Insulin glargine 25 units daily, mealtime insulin 2 units for every 15 g of carbohydrates and a correction dose of insulin 1 unit for every 50 above 140.  A1c over the years as described below has been 10.    Lab Results   Component Value Date    A1C 10.6 03/19/2019    A1C 11.6 12/20/2018    A1C 11.2 08/09/2018    A1C 8.9 11/13/2017    A1C 10.0 10/07/2017     She has diabetic retinopathy.    Her meter broke about a month ago and she has not been checking her blood sugars since.  She reports that when she was checking her blood sugars her blood sugars mostly has been about 200s.  No low blood sugars below 70.  Denies any hypoglycemia symptoms.  She reports that she is compliant with her long-acting insulin most of the time however has not been taking mealtime insulin consistently.  She has not been correcting blood sugars as she does not been checking in the first place.    Review of system negative for shortness of breath or chest pain.  Reports long-standing fatigue.  She reports foot pain more involving the left side of the foot described as discomfort throughout the day.       Allergies   Allergen Reactions     No Known Drug Allergies        Current Outpatient Medications   Medication Sig Dispense Refill     glucagon 1 MG SOLR injection Inject 1 mg Subcutaneous every 15 minutes as needed for low blood sugar 2 each 0     insulin glargine (BASAGLAR KWIKPEN) 100 UNIT/ML pen INJECT 25 UNITS UNDER THE SKIN DAILY AT BEDTIME 30 mL 0     insulin lispro (ADMELOG SOLOSTAR) 100 UNIT/ML pen Sig - Route:  "Inject 1-7 Units Subcutaneous 3 times daily (before meals) Do Not take Correction Insulin if Pre-Meal BG < than 140.   140 - 189 give 1 unit   190 - 239 give 2 units   240 - 289 give 3 units   290 - 339 give 4 units   340- 399 give 5 units   400-449 give 6 units   Greater than or equal to 450 give 7 units   Based on pre-meal blood glucose.     Notify clinic if glucose greater than or equal to 350 mg/dL after administration of correct 3 mL 0     insulin pen needle (BD TARA U/F) 32G X 4 MM Use 3 daily or as directed. 270 each 3     insulin syringe-needle U-100 (B-D INSULIN SYRINGE) 31G X 5/16\" 0.5 ML USE TO INJECT INSULIN 100 each 6     naproxen sodium (ANAPROX) 550 MG tablet Take 1 tablet (550 mg) by mouth 2 times daily (with meals) 20 tablet 0     ACE/ARB NOT PRESCRIBED, INTENTIONAL, 1 each every 3 hours ACE & ARB not prescribed due to Intolerance and Symptomatic hypotension not due to excessive diuresis (Patient not taking: Reported on 3/19/2019)       blood glucose monitoring (ACCU-CHEK SMARTVIEW) test strip TEST 4 TIMES A DAY AS DIRECTED (Patient not taking: Reported on 3/19/2019) 100 each 11     DIABETIC STERILE LANCETS device 1 Device 4 times daily. (Patient not taking: Reported on 3/19/2019) 1 Box 11       Review of Systems     11 point review system (Constitutional, HENT, Eyes, Respiratory, Cardiovascular, Gastrointestinal, Genitourinary, Musculoskeletal,Neurological, Psychiatric/Behavioural, Endocrine) is negative or is as per HPI above    Past Medical History:   Diagnosis Date     Adjustment disorder with anxious mood 11/23/2015     Anxiety 11/27/2015     ASCUS of cervix with negative high risk HPV 06/19/2008     Depressive disorder, not elsewhere classified      Diabetic eye exam (H) 12/19/14     Mixed hyperlipidemia 11/30/2006     Regional enteritis of unspecified site     Ulcerative Colitis     Type I (juvenile type) diabetes mellitus with ketoacidosis, not stated as uncontrolled(250.11) 01/01/2007    " Moderately severe intensity.     Type I (juvenile type) diabetes mellitus with ketoacidosis, uncontrolled 02/14/08     Type I (juvenile type) diabetes mellitus without mention of complication, not stated as uncontrolled     diagnosed in 2003     Ulcerative colitis, unspecified     remission. Last flare 6 yrs ago.        Past Surgical History:   Procedure Laterality Date     DILATION AND CURETTAGE SUCTION  4/2010    miscarriage     HC COLONOSCOPY W BIOPSY  08/16/06     HC COLONOSCOPY W/WO BRUSH/WASH       TUBAL LIGATION         Family History   Problem Relation Age of Onset     Hypertension Father      Diabetes Maternal Grandmother      Cancer Maternal Grandmother      Diabetes Paternal Grandfather      Diabetes Maternal Uncle      Diabetes Maternal Aunt        Social History     Socioeconomic History     Marital status: Single     Spouse name: None     Number of children: 1     Years of education: 12     Highest education level: None   Occupational History     Occupation: Nursing assistant   Social Needs     Financial resource strain: None     Food insecurity:     Worry: None     Inability: None     Transportation needs:     Medical: None     Non-medical: None   Tobacco Use     Smoking status: Current Every Day Smoker     Packs/day: 1.00     Years: 16.00     Pack years: 16.00     Types: Cigarettes     Smokeless tobacco: Never Used   Substance and Sexual Activity     Alcohol use: Yes     Alcohol/week: 0.0 oz     Comment: rare     Drug use: No     Sexual activity: Yes     Partners: Male     Birth control/protection: Surgical, Female Surgical     Comment: tubal ligation   Lifestyle     Physical activity:     Days per week: None     Minutes per session: None     Stress: None   Relationships     Social connections:     Talks on phone: None     Gets together: None     Attends Jew service: None     Active member of club or organization: None     Attends meetings of clubs or organizations: None     Relationship  status: None     Intimate partner violence:     Fear of current or ex partner: None     Emotionally abused: None     Physically abused: None     Forced sexual activity: None   Other Topics Concern      Service No     Blood Transfusions No     Caffeine Concern Yes     Comment: Advised not more than 16 ounces/day     Occupational Exposure Yes     Comment: Visiting [facility] - student online classes     Hobby Hazards No     Sleep Concern No     Stress Concern Yes     Weight Concern No     Special Diet Yes     Comment: IDDM Type I     Back Care Yes     Comment: work-related about 1 year ago     Exercise Yes     Comment: Active at work     Bike Helmet No     Seat Belt Yes     Self-Exams No     Parent/sibling w/ CABG, MI or angioplasty before 65F 55M? Not Asked   Social History Narrative    Lives in [place] with Rod gandhi and her son and their daughter.   Vickie and Rod smoke.   No indoor cats/kittens.   No concerns about domestic violence.       Objective:   /80 (BP Location: Left arm, Patient Position: Chair, Cuff Size: Adult Regular)   Pulse 87   Wt 73 kg (161 lb)   SpO2 97%   BMI 27.00 kg/m    Constitutional: Pleasant no acute cardiopulmonary distress.   EYES: anicteric, normal extra-ocular movements, no lid lag or retraction, is equal and reactive to light bilaterally.  HEENT: Mouth/Throat: Mucous membrane is moist. Oropharynx is clear.  Thyroid examination: Normal in size and no nodules appreciated.  cardiovascular: RRR, S1, S2 normal.   Pulmonary/Chest: CTAB. No wheezing or rales.   Abdominal: +BS. Non tender to palpation.  Stretch marks:   Neurological: Alert and oriented.  No tremor and reflexes are symmetrical bilaterally and within the normal limits. Muscle strength 5/5.   Extremities: Foot exam unremarkable.  No open wound; no tenderness to palpation.  Psychological: appropriate mood and affect     In House Labs:   Lab Results   Component Value Date    A1C 10.6 03/19/2019    A1C  11.6 12/20/2018    A1C 11.2 08/09/2018    A1C 8.9 11/13/2017    A1C 10.0 10/07/2017       TSH   Date Value Ref Range Status   10/07/2017 2.57 0.40 - 4.00 mU/L Final   06/26/2017 6.05 (H) 0.40 - 4.00 mU/L Final   03/05/2014 3.97 0.4 - 5.0 mU/L Final   03/16/2012 3.75 0.4 - 5.0 mU/L Final   01/29/2010 2.91 0.4 - 5.0 mU/L Final     T4 Free   Date Value Ref Range Status   06/26/2017 1.02 0.76 - 1.46 ng/dL Final   03/28/2006 1.10 0.70 - 1.85 ng/dL Final       Creatinine   Date Value Ref Range Status   09/29/2018 1.02 0.52 - 1.04 mg/dL Final   ]    Recent Labs   Lab Test 08/09/18  0935 01/29/16  0933 04/01/15  1143 07/24/14  0851   CHOL  --  207* 197 193   HDL  --  55 53 51   * 129* 121 126   TRIG  --  116 113 84   CHOLHDLRATIO  --   --  3.7 4.0     Results for RENY NAJERA (MRN [id number]) as of 3/20/2019 12:16   Ref. Range 3/19/2019 14:29   C-Peptide Latest Ref Range: 0.9 - 6.9 ng/mL <0.1 (L)   TSH Latest Ref Range: 0.40 - 4.00 mU/L 2.95   Glucose Latest Ref Range: 70 - 99 mg/dL 148 (H)       Assessment/Treatment Plan:      Type 1 diabetes uncontrolled with pre-proliferative diabetic retinopathy: Diagnosed at the age of 18 and has been on insulin since then.  Has not been following with endocrinologist previously.  A1c throughout the years in the range of 10 and 11.  Current diabetic medications; insulin glargine 25 units daily, 2 units for every 15 g of carbohydrate with meals and correction was 1 unit for every 50 above 140.  She did not bring her meter with her today.  Has not been checking her blood sugars over the last 1 month since her meter broke.  She has not been using her correction dose over the last 1 month and also mealtime coverage is inconsistent.  The first step in this patient is actually start checking her blood sugars therefore which was provided today.  Counseling given no type 1 diabetes and risk of hypoglycemia in the setting of insulin use and the need to check blood sugars consistently.   We are going to also have her see diabetes educator today for sensor or CGM use.  In the meantime, we cannot make an adjustment on her insulin dose until we have data on blood sugar readings.  The prognosis of type 1 diabetes, management aspect, complications and other aspects of management were discussed with the patient in detail today.  C-peptide checked at the time of her visit is low at less than 0.1 for a blood sugar of 148.  Her TSH within the normal limit at 2.95.    Plan     #1 insulin glargine 25 units daily.    Mealtime insulin 1 unit for every 10 g of carbohydrate with meals 3 times per day and with snacks.    Correction dose of insulin 1 unit for every 50 above 140.    CDE appointment today; for carbohydrate counting, additional compressive knowledge of diabetes and particularly for CGM or sensor placement.    Follow-up appointment with her meter in 3-4 weeks time for further adjustment in the meantime I have advised her to call us back if blood sugars persistently below 70.    Plantar fasciitis: Consistent with this.  Treatment plan discussed with the patient that includes NSAID use and prescription provided, icing, stretching exercises and also foot inserts.  Written information which is documented below provided to the patient.    Patient Instructions     Continue BASAGLAR INSULIN 25 UNITS DAILY.     Short acting insulin  1 unit for every 10 grams of carbohydrates three times per day with meals and snacks.       Please follow the following correction scale three times per day and at bedtime:  For Pre-Meal Blood glucose (BG) 140 - 189 give 1 unit.   For Pre-Meal  - 239 give 2 units.   For Pre-Meal  - 289 give 3 units.   For Pre-Meal  - 339 give 4 units.   For Pre-Meal BG = or > 340 give 5 units.       Please check blood glucose at least 4 times per day. Please call us at the following number if blood glucose is persistently below 70 on more than two occasions.     Patient Education      Plantar Fasciitis  Plantar fasciitis is a painful swelling of the plantar fascia. The plantar fascia is a thick, fibrous layer of tissue that covers the bones on the bottom of your foot. It supports the foot bones in an arched position.  Plantar fasciitis can happen gradually or suddenly. It usually affects one foot at a time. Heel pain can be sharp, like a knife sticking into the bottom of your foot. You may feel pain after exercising, long-distance jogging, stair climbing, long periods of standing, or after standing up.  Risk factors include: non-active lifestyle, arthritis, diabetes, obesity or recent weight gain, flat foot, high arch. Wearing high heels, loose shoes, or shoes with poor arch support for long periods of time adds to the risk. This problem is commonly found in runners and dancers. It also found in people who stand on hard surfaces for long periods of time.  Foot pain from this condition is usually worse in the morning. But it often improves with walking. By the end of the day there may be a dull aching. Treatment requires short-term rest and controlling swelling. It may take up to 9 months before all symptoms go away. Rarely, a steroid injection into the foot, or surgery, may be needed.  Home care    If you are overweight, lose weight to help healing.    Choose supportive shoes with good arch support and shock absorbency. Replace athletic shoes when they become worn out. Don t walk or run barefoot.    Premade or custom-fitted shoe inserts may be helpful. Inserts made of silicone seem to be the most effective. Custom-made inserts can be provided by foot specialist, physical therapist, or orthopedist.    Premade or custom-made night splints keep the heel stretched out while you sleep. They may prevent morning pain.    Limit activities that stress the feet: jogging, prolonged standing or walking, contact sports, etc.    First thing in the morning and before sports, stretch the bottom of your feet.  Gently flex your ankle so the toes move toward your knee.    Icing may help control heel pain. Apply an ice pack to the heel for 10 to 20 minutes as a preventive. Or ice your heel after a severe flare-up of symptoms. You may repeat this every 1 to 2 hours as needed.    You may use over-the-counter pain medicine to control pain, unless another medicine was prescribed. Anti-inflammatory pain medicines, such as ibuprofen or naproxen, may work better than acetaminophen. If you have chronic liver or kidney disease or ever had a stomach ulcer or gastrointestinal bleeding, talk with your healthcare provider before using these medicines.  Follow-up care  Follow up with your healthcare provider, or as advised.  Call for an appointment if pain worsens or there is no relief after a few weeks of home treatment. Shoe inserts, a night splint, or a special boot may be required.  If X-rays were taken, you will be told of any new findings that may affect your care.  When to seek medical advice  Call your healthcare provider right away if any of these occur:    Foot swelling    Redness or warmth with increasing pain   Date Last Reviewed: 5/1/2018 2000-2018 [employer]. [address] [place] [address]. All rights reserved. This information is not intended as a substitute for professional medical care. Always follow your healthcare professional's instructions.               I will contact the patient with the test results.  Return to clinic in 1 month.    Test and/or medications prescribed today:  Orders Placed This Encounter   Procedures     Hemoglobin A1c POCT     TSH with free T4 reflex     Glucose     C-peptide     DIABETES EDUCATOR REFERRAL         Siri Rhoades MD  Staff Endocrinologist    [phone]  Division of Endocrinology and Diabetes

## 2019-03-19 NOTE — PATIENT INSTRUCTIONS
Continue BASAGLAR INSULIN 25 UNITS DAILY.     Short acting insulin  1 unit for every 10 grams of carbohydrates three times per day with meals and snacks.       Please follow the following correction scale three times per day and at bedtime:  For Pre-Meal Blood glucose (BG) 140 - 189 give 1 unit.   For Pre-Meal  - 239 give 2 units.   For Pre-Meal  - 289 give 3 units.   For Pre-Meal  - 339 give 4 units.   For Pre-Meal BG = or > 340 give 5 units.       Please check blood glucose at least 4 times per day. Please call us at the following number if blood glucose is persistently below 70 on more than two occasions.     Patient Education     Plantar Fasciitis  Plantar fasciitis is a painful swelling of the plantar fascia. The plantar fascia is a thick, fibrous layer of tissue that covers the bones on the bottom of your foot. It supports the foot bones in an arched position.  Plantar fasciitis can happen gradually or suddenly. It usually affects one foot at a time. Heel pain can be sharp, like a knife sticking into the bottom of your foot. You may feel pain after exercising, long-distance jogging, stair climbing, long periods of standing, or after standing up.  Risk factors include: non-active lifestyle, arthritis, diabetes, obesity or recent weight gain, flat foot, high arch. Wearing high heels, loose shoes, or shoes with poor arch support for long periods of time adds to the risk. This problem is commonly found in runners and dancers. It also found in people who stand on hard surfaces for long periods of time.  Foot pain from this condition is usually worse in the morning. But it often improves with walking. By the end of the day there may be a dull aching. Treatment requires short-term rest and controlling swelling. It may take up to 9 months before all symptoms go away. Rarely, a steroid injection into the foot, or surgery, may be needed.  Home care    If you are overweight, lose weight to help  healing.    Choose supportive shoes with good arch support and shock absorbency. Replace athletic shoes when they become worn out. Don t walk or run barefoot.    Premade or custom-fitted shoe inserts may be helpful. Inserts made of silicone seem to be the most effective. Custom-made inserts can be provided by foot specialist, physical therapist, or orthopedist.    Premade or custom-made night splints keep the heel stretched out while you sleep. They may prevent morning pain.    Limit activities that stress the feet: jogging, prolonged standing or walking, contact sports, etc.    First thing in the morning and before sports, stretch the bottom of your feet. Gently flex your ankle so the toes move toward your knee.    Icing may help control heel pain. Apply an ice pack to the heel for 10 to 20 minutes as a preventive. Or ice your heel after a severe flare-up of symptoms. You may repeat this every 1 to 2 hours as needed.    You may use over-the-counter pain medicine to control pain, unless another medicine was prescribed. Anti-inflammatory pain medicines, such as ibuprofen or naproxen, may work better than acetaminophen. If you have chronic liver or kidney disease or ever had a stomach ulcer or gastrointestinal bleeding, talk with your healthcare provider before using these medicines.  Follow-up care  Follow up with your healthcare provider, or as advised.  Call for an appointment if pain worsens or there is no relief after a few weeks of home treatment. Shoe inserts, a night splint, or a special boot may be required.  If X-rays were taken, you will be told of any new findings that may affect your care.  When to seek medical advice  Call your healthcare provider right away if any of these occur:    Foot swelling    Redness or warmth with increasing pain   Date Last Reviewed: 5/1/2018 2000-2018 [employer]. [address] [place] [address], [place] [address]. All rights reserved. This information is not intended  as a substitute for professional medical care. Always follow your healthcare professional's instructions.

## 2019-03-20 ENCOUNTER — TELEPHONE (OUTPATIENT)
Dept: ENDOCRINOLOGY | Facility: CLINIC | Age: 34
End: 2019-03-20

## 2019-03-20 LAB — C PEPTIDE SERPL-MCNC: <0.1 NG/ML (ref 0.9–6.9)

## 2019-03-21 NOTE — TELEPHONE ENCOUNTER
No P/A needed for [facility] pharmacy, as the scripts were transferred to [facility], and they will be billing to patient's medical benefits.

## 2019-03-21 NOTE — TELEPHONE ENCOUNTER
No P/A needed for [facility] pharmacy, as the scripts were transferred to [facility], and they will be billing to patient's medical benefits.

## 2019-03-26 NOTE — PROGRESS NOTES
Diabetes Self Management Training: Individual Review Visit    Vickie Duque presents today for education on continuous glucose monitoring system options, related to Type 1 diabetes.  Pt diagnosed at age 18.    She is accompanied by self    Patient's diabetes management related comments/concerns: None at this time.     Patient's emotional response to diabetes: expresses readiness to learn    Patient would like this visit to be focused around the following diabetes-related behaviors and goals: Monitoring    ASSESSMENT:  Patient referred to Cde for education on Cgms options, by her Endo, Dr Rhoades, who met with the patient today.   Pt has a history of non-compliance with checking blood sugars. States her current meter is no longer working.   Could not state brand of meter. Admits to missing several meal-time insulin injections per week.     Current Diabetes Management per Patient:  Taking diabetes medications? Yes:  See below.      Diabetes Medication(s)     Diabetic Other       glucagon 1 MG SOLR injection    Inject 1 mg Subcutaneous every 15 minutes as needed for low blood sugar    Insulin       insulin glargine (BASAGLAR KWIKPEN) 100 UNIT/ML pen    INJECT 25 UNITS UNDER THE SKIN DAILY AT BEDTIME     insulin lispro (ADMELOG SOLOSTAR) 100 UNIT/ML pen    Sig - Route: Inject 1-7 Units Subcutaneous 3 times daily (before meals) Do Not take Correction Insulin if Pre-Meal BG < than 140.   140 - 189 give 1 unit   190 - 239 give 2 units   240 - 289 give 3 units   290 - 339 give 4 units   340- 399 give 5 units   400-449 give 6 units   Greater than or equal to 450 give 7 units   Based on pre-meal blood glucose.     Notify clinic if glucose greater than or equal to 350 mg/dL after administration of correct        Do you have any difficulty affording your medications or glucose monitoring supplies?  No    BG values are: Not in goal  Patient's most recent   Lab Results   Component Value Date    A1C 10.6 03/19/2019    is not  "meeting goal of <7.0    Vitals:  There were no vitals taken for this visit.  Estimated body mass index is 27 kg/m  as calculated from the following:    Height as of 1/2/19: 1.645 m (5' 4.75\").    Weight as of an earlier encounter on 3/19/19: 73 kg (161 lb).   Last 3 BP:   BP Readings from Last 3 Encounters:   03/19/19 127/80   01/02/19 120/82   12/20/18 90/58       History   Smoking Status     Current Every Day Smoker     Packs/day: 1.00     Years: 16.00     Types: Cigarettes   Smokeless Tobacco     Never Used     Labs:  Lab Results   Component Value Date    A1C 10.6 03/19/2019     Lab Results   Component Value Date     03/19/2019     Lab Results   Component Value Date     08/09/2018     01/29/2016     HDL Cholesterol   Date Value Ref Range Status   01/29/2016 55 >49 mg/dL Final   ]  GFR Estimate   Date Value Ref Range Status   09/29/2018 62 >60 mL/min/1.7m2 Final     Comment:     Non  GFR Calc     GFR Estimate If Black   Date Value Ref Range Status   09/29/2018 75 >60 mL/min/1.7m2 Final     Comment:      GFR Calc     Lab Results   Component Value Date    CR 1.02 09/29/2018     Cultural Influences/Ethnic Background:  American    Health Literacy/Numeracy:  \"With diabetes, it's helpful to use forms and log books to write down blood sugars and what you're eating at times to help understand how foods affect your blood sugars. With this in mind how confident are you at filling out medical forms, such as these, by yourself?  Not Assessed    Health Beliefs and Attitudes:   Patient Activation Measure Survey Score:  BYRON Score (Last Two) 3/16/2012   BYRON Raw Score 42   Activation Score 66   BYRON Level 3     Barriers to Learning: None.    INTERVENTION:   Education provided today on:  Monitoring: Discussed similarities and differences between the Kristi 14 Day and Dexcom G6 sensors.   Pt given a Contour USB meter. Meter set up.Prescription sent to pharmacy for test strips and " "lancets.     Opportunities for ongoing education and support in diabetes-self management were discussed.    Pt verbalized understanding of concepts discussed and recommendations provided today.       Education Materials Provided:  No new materials provided today    PLAN:  Monitoring: Pt requests a prescription for the Dexcom G6 be sent in for her. Rx sent to [facility].   Pt advised order process can sometimes take up to four weeks before system is shipped.     FOLLOW-UP:  Keep f/u appt with Endo on 04/23.     Ongoing plan for education and support: Pt advised to call [facility] to schedule a \"sensor start\" appt once sensor ships to her home.     Vickie Duque comes into clinic today at the request of Dr Rhoades, Ordering Provider for Pt Teaching on Cgms options.     This service provided today was under the supervising provider of the day Dr Rhoades, who was available if needed.    Time Spent: 35 minutes  Encounter Type: Individual    Any diabetes medication dose changes were made via the CDE Protocol and Collaborative Practice Agreement with the patient's endocrinology provider. A copy of this encounter was shared with the provider.  "

## 2019-04-12 ENCOUNTER — TELEPHONE (OUTPATIENT)
Dept: ENDOCRINOLOGY | Facility: CLINIC | Age: 34
End: 2019-04-12

## 2019-04-12 NOTE — TELEPHONE ENCOUNTER
Received forms for cmn clinical notes and bgl request at [facility] faxed to [place]

## 2019-04-15 ENCOUNTER — MEDICAL CORRESPONDENCE (OUTPATIENT)
Dept: HEALTH INFORMATION MANAGEMENT | Facility: CLINIC | Age: 34
End: 2019-04-15

## 2019-04-15 NOTE — TELEPHONE ENCOUNTER
DIOMEDESN completed and faxed to [facility] [phone] for Dexcom G6 system.    Maggy Gaona LPN  Diabetes Clinic Coordinator   Adult Endocrinology and Pediatric Specialty Clinics  [facility]

## 2019-04-23 ENCOUNTER — OFFICE VISIT (OUTPATIENT)
Dept: ENDOCRINOLOGY | Facility: CLINIC | Age: 34
End: 2019-04-23
Payer: COMMERCIAL

## 2019-04-23 VITALS
BODY MASS INDEX: 27.38 KG/M2 | DIASTOLIC BLOOD PRESSURE: 77 MMHG | HEART RATE: 75 BPM | WEIGHT: 163.3 LBS | OXYGEN SATURATION: 99 % | SYSTOLIC BLOOD PRESSURE: 118 MMHG

## 2019-04-23 DIAGNOSIS — E10.9 TYPE 1 DIABETES MELLITUS WITHOUT COMPLICATION (H): Primary | ICD-10-CM

## 2019-04-23 PROCEDURE — 99214 OFFICE O/P EST MOD 30 MIN: CPT | Performed by: INTERNAL MEDICINE

## 2019-04-23 NOTE — PATIENT INSTRUCTIONS
Reduce  BASAGLAR INSULIN to 22 UNITS DAILY.      Short acting insulin  1 unit for every 15 grams of carbohydrates three times per day with meals and snacks. (this is changed from 1 unit for every 10 grams of carbohydrates).         Please follow the following correction scale three times per day and at bedtime:  For Pre-Meal Blood glucose (BG) 140 - 189 give 1 unit.   For Pre-Meal  - 239 give 2 units.   For Pre-Meal  - 289 give 3 units.   For Pre-Meal  - 339 give 4 units.   For Pre-Meal BG = or > 340 give 5 units.     [facility]  Haydee Rojas RN, Diabetes Educator: [phone]  Clinic Nurses Jennifer Winter: [phone]  Clinic Fax: [phone]  On-Call Endocrine at the [facility] (after hours/weekends): [phone] option 4  Scheduling Line: [phone]    Appointment Reminders:  * Please bring meter with for staff to download  * If you are due ONLY for an A1C, it is scheduled with the nurse and will be done in clinic. You do not need to schedule a lab appointment. Fasting is not required for an A1C.  * Refill request should be submitted to your pharmacy. They will contact clinic for approval.    Once you know you will be getting Dexcom, please call to schedule set up with ROMAINE Walsh

## 2019-04-23 NOTE — PROGRESS NOTES
Endocrinology Clinic Visit    Chief Complaint: Diabetes     Information obtained from:Patient    Subjective:         HPI: Vickie Duque is a 33 year old female with history of type 1 diabetes who is here for routine follow up.     Type 1 diabetes at the age of 18; the patient has been following up with her primary care physician regarding her diabetes care. She recently established care with us and this is her second follow up.     Current diabetic medications are: Insulin glargine 25 units daily, mealtime insulin 1 units for every 10 g of carbohydrates and a correction dose of insulin 1 unit for every 50 above 140.  A1c over the years as described below has been 10.    Lab Results   Component Value Date    A1C 10.6 03/19/2019    A1C 11.6 12/20/2018    A1C 11.2 08/09/2018    A1C 8.9 11/13/2017    A1C 10.0 10/07/2017     She has diabetic retinopathy.    Glucometer downloaded and results are as follows.  98, 220, 286, 193  Fasting sugar; 77, 95, 115, 145, 116: 74.151, 19 8, 16, 92, 115, 83, 55, 101, 60, 49, 60, 49, 62, 120  Mother blood sugars throughout the day average is between .  She has been checking her blood sugars between 3-6 times per day.  .  Average was 154 and she has blood sugars below 70; 14/54.    Hypoglycemia symptoms with a blood sugar 26 otherwise did not have any symptoms with her blood sugars between 40s to 60s.      Allergies   Allergen Reactions     No Known Drug Allergies        Current Outpatient Medications   Medication Sig Dispense Refill     blood glucose (NO BRAND SPECIFIED) test strip Use to test blood sugar 4 times daily or as directed. 400 each 1     blood glucose monitoring (ACCU-CHEK SMARTVIEW) test strip TEST 4 TIMES A DAY AS DIRECTED 100 each 11     blood glucose monitoring (JAIME MICROLET) lancets Use to test blood sugar 4 times daily or as directed. 400 each 1     Continuous Blood Gluc  (DEXCOM G6 ) HAYES 1 each continuous 1 Device 0     Continuous Blood Gluc  "Sensor (DEXCOM G6 SENSOR) MISC 1 each See Admin Instructions Change sensor every 10 days. 3 each 11     Continuous Blood Gluc Transmit (DEXCOM G6 TRANSMITTER) MISC 1 each every 3 months 1 each 3     DIABETIC STERILE LANCETS device 1 Device 4 times daily. 1 Box 11     glucagon 1 MG SOLR injection Inject 1 mg Subcutaneous every 15 minutes as needed for low blood sugar 2 each 0     insulin glargine (BASAGLAR KWIKPEN) 100 UNIT/ML pen INJECT 25 UNITS UNDER THE SKIN DAILY AT BEDTIME 30 mL 0     insulin lispro (ADMELOG SOLOSTAR) 100 UNIT/ML pen Sig - Route: Inject 1-7 Units Subcutaneous 3 times daily (before meals) Do Not take Correction Insulin if Pre-Meal BG < than 140.   140 - 189 give 1 unit   190 - 239 give 2 units   240 - 289 give 3 units   290 - 339 give 4 units   340- 399 give 5 units   400-449 give 6 units   Greater than or equal to 450 give 7 units   Based on pre-meal blood glucose.     Notify clinic if glucose greater than or equal to 350 mg/dL after administration of correct 3 mL 0     insulin pen needle (BD TARA U/F) 32G X 4 MM Use 3 daily or as directed. 270 each 3     insulin syringe-needle U-100 (B-D INSULIN SYRINGE) 31G X 5/16\" 0.5 ML USE TO INJECT INSULIN 100 each 6     naproxen sodium (ANAPROX) 550 MG tablet Take 1 tablet (550 mg) by mouth 2 times daily (with meals) 20 tablet 0     ACE/ARB NOT PRESCRIBED, INTENTIONAL, 1 each every 3 hours ACE & ARB not prescribed due to Intolerance and Symptomatic hypotension not due to excessive diuresis (Patient not taking: Reported on 3/19/2019)         Review of Systems     per HPI above    Past Medical History:   Diagnosis Date     Adjustment disorder with anxious mood 11/23/2015     Anxiety 11/27/2015     ASCUS of cervix with negative high risk HPV 06/19/2008     Depressive disorder, not elsewhere classified      Diabetic eye exam (H) 12/19/14     Mixed hyperlipidemia 11/30/2006     Regional enteritis of unspecified site     Ulcerative Colitis     Type I (juvenile " type) diabetes mellitus with ketoacidosis, not stated as uncontrolled(250.11) 01/01/2007    Moderately severe intensity.     Type I (juvenile type) diabetes mellitus with ketoacidosis, uncontrolled 02/14/08     Type I (juvenile type) diabetes mellitus without mention of complication, not stated as uncontrolled     diagnosed in 2003     Ulcerative colitis, unspecified     remission. Last flare 6 yrs ago.        Past Surgical History:   Procedure Laterality Date     DILATION AND CURETTAGE SUCTION  4/2010    miscarriage     HC COLONOSCOPY W BIOPSY  08/16/06     HC COLONOSCOPY W/WO BRUSH/WASH       TUBAL LIGATION         Family History   Problem Relation Age of Onset     Hypertension Father      Diabetes Maternal Grandmother      Cancer Maternal Grandmother      Diabetes Paternal Grandfather      Diabetes Maternal Uncle      Diabetes Maternal Aunt        Social History     Socioeconomic History     Marital status: Single     Spouse name: None     Number of children: 1     Years of education: 12     Highest education level: None   Occupational History     Occupation: Nursing assistant   Social Needs     Financial resource strain: None     Food insecurity:     Worry: None     Inability: None     Transportation needs:     Medical: None     Non-medical: None   Tobacco Use     Smoking status: Current Every Day Smoker     Packs/day: 1.00     Years: 16.00     Pack years: 16.00     Types: Cigarettes     Smokeless tobacco: Never Used   Substance and Sexual Activity     Alcohol use: Yes     Alcohol/week: 0.0 oz     Comment: rare     Drug use: No     Sexual activity: Yes     Partners: Male     Birth control/protection: Surgical, Female Surgical     Comment: tubal ligation   Lifestyle     Physical activity:     Days per week: None     Minutes per session: None     Stress: None   Relationships     Social connections:     Talks on phone: None     Gets together: None     Attends Evangelical service: None     Active member of club or  organization: None     Attends meetings of clubs or organizations: None     Relationship status: None     Intimate partner violence:     Fear of current or ex partner: None     Emotionally abused: None     Physically abused: None     Forced sexual activity: None   Other Topics Concern      Service No     Blood Transfusions No     Caffeine Concern Yes     Comment: Advised not more than 16 ounces/day     Occupational Exposure Yes     Comment: Visiting [facility] - student online classes     Hobby Hazards No     Sleep Concern No     Stress Concern Yes     Weight Concern No     Special Diet Yes     Comment: IDDM Type I     Back Care Yes     Comment: work-related about 1 year ago     Exercise Yes     Comment: Active at work     Bike Helmet No     Seat Belt Yes     Self-Exams No     Parent/sibling w/ CABG, MI or angioplasty before 65F 55M? Not Asked   Social History Narrative    Lives in [place] with Rod gandhi and her son and their daughter.   Necoe and Rod smoke.   No indoor cats/kittens.   No concerns about domestic violence.       Objective:   /77 (BP Location: Left arm, Patient Position: Chair, Cuff Size: Adult Regular)   Pulse 75   Wt 74.1 kg (163 lb 4.8 oz)   SpO2 99%   BMI 27.38 kg/m    Constitutional: Pleasant no acute cardiopulmonary distress.   Psychological: appropriate mood and affect     In House Labs:   Lab Results   Component Value Date    A1C 10.6 03/19/2019    A1C 11.6 12/20/2018    A1C 11.2 08/09/2018    A1C 8.9 11/13/2017    A1C 10.0 10/07/2017       TSH   Date Value Ref Range Status   03/19/2019 2.95 0.40 - 4.00 mU/L Final   10/07/2017 2.57 0.40 - 4.00 mU/L Final   06/26/2017 6.05 (H) 0.40 - 4.00 mU/L Final   03/05/2014 3.97 0.4 - 5.0 mU/L Final   03/16/2012 3.75 0.4 - 5.0 mU/L Final     T4 Free   Date Value Ref Range Status   06/26/2017 1.02 0.76 - 1.46 ng/dL Final   03/28/2006 1.10 0.70 - 1.85 ng/dL Final       Creatinine   Date Value Ref Range Status   09/29/2018 1.02  0.52 - 1.04 mg/dL Final   ]    Recent Labs   Lab Test 08/09/18  0935 01/29/16  0933 04/01/15  1143 07/24/14  0851   CHOL  --  207* 197 193   HDL  --  55 53 51   * 129* 121 126   TRIG  --  116 113 84   CHOLHDLRATIO  --   --  3.7 4.0     Results for RENY NAJERA (MRN [id number]) as of 3/20/2019 12:16   Ref. Range 3/19/2019 14:29   C-Peptide Latest Ref Range: 0.9 - 6.9 ng/mL <0.1 (L)   TSH Latest Ref Range: 0.40 - 4.00 mU/L 2.95   Glucose Latest Ref Range: 70 - 99 mg/dL 148 (H)       Assessment/Treatment Plan:      Type 1 diabetes uncontrolled with pre-proliferative diabetic retinopathy: Diagnosed at the age of 18 and has been on insulin since then.  Has not been following with endocrinologist previously.  A1c throughout the years in the range of 10 and 11.  Current diabetic medications; insulin glargine 25 units daily, 1 units for every 10 g of carbohydrate with meals and correction was 1 unit for every 50 above 140.      Reviewed her blood sugars over the last 1 month and does describe feel the HPI; there are variations.  Her fasting blood sugar has been persistently low; numbers in the 40s-60s.  Therefore, will adjust her long-acting insulin.  There has been also fluctuation with blood sugar throughout the day mainly because of missing mealtime insulin.  And that I see readings actually following the meal correction in the low 400s therefore will adjust her mealtime insulin as follows as well.  Of note, really checked her C-peptide last time and it was low at less than 0.1 for blood sugar of 148.      Plan     Reduce insulin glargine 22 units daily.  Further reduced to 20 units daily is fasting blood sugars persistently low.    Increase mealtime insulin 1 unit for every 15 g of carbohydrate with meals 3 times per day and with snacks.    Correction dose of insulin 1 unit for every 50 above 140.    CDE appointment today; for carbohydrate counting, additional compressive knowledge of diabetes and particularly  for CGM or sensor placement.  CGM under process.    Follow-up appointment 2-3 months time for further adjustment; in the meantime I have advised her to call us back if blood sugars persistently below 70.      Patient Instructions   Reduce  BASAGLAR INSULIN to 22 UNITS DAILY.      Short acting insulin  1 unit for every 15 grams of carbohydrates three times per day with meals and snacks. (this is changed from 1 unit for every 10 grams of carbohydrates).         Please follow the following correction scale three times per day and at bedtime:  For Pre-Meal Blood glucose (BG) 140 - 189 give 1 unit.   For Pre-Meal  - 239 give 2 units.   For Pre-Meal  - 289 give 3 units.   For Pre-Meal  - 339 give 4 units.   For Pre-Meal BG = or > 340 give 5 units.     [facility]  Haydee Rojas RN, Diabetes Educator: [phone]  Clinic Nurses Jennifer Winter: [phone]  Clinic Fax: [phone]  On-Call Endocrine at [facility] (after hours/weekends): [phone] option 4  Scheduling Line: [phone]    Appointment Reminders:  * Please bring meter with for staff to download  * If you are due ONLY for an A1C, it is scheduled with the nurse and will be done in clinic. You do not need to schedule a lab appointment. Fasting is not required for an A1C.  * Refill request should be submitted to your pharmacy. They will contact clinic for approval.    Once you know you will be getting Dexcom, please call to schedule set up with ROMAINE Walsh      I will contact the patient with the test results.  Return to clinic in 2-3  month.    Test and/or medications prescribed today:  No orders of the defined types were placed in this encounter.        Siri Rhoades MD  Staff Endocrinologist    [phone]  Division of Endocrinology and Diabetes

## 2019-04-23 NOTE — LETTER
4/23/2019         RE: Vickie Duque  [address] [facility] [address]        Dear Colleague,    Thank you for referring your patient, Vickie Duque, to the [facility]. Please see a copy of my visit note below.    Endocrinology Clinic Visit    Chief Complaint: Diabetes     Information obtained from:Patient    Subjective:         HPI: Vickie Duque is a 33 year old female with history of type 1 diabetes who is here for routine follow up.     Type 1 diabetes at the age of 18; the patient has been following up with her primary care physician regarding her diabetes care. She recently established care with us and this is her second follow up.     Current diabetic medications are: Insulin glargine 25 units daily, mealtime insulin 1 units for every 10 g of carbohydrates and a correction dose of insulin 1 unit for every 50 above 140.  A1c over the years as described below has been 10.    Lab Results   Component Value Date    A1C 10.6 03/19/2019    A1C 11.6 12/20/2018    A1C 11.2 08/09/2018    A1C 8.9 11/13/2017    A1C 10.0 10/07/2017     She has diabetic retinopathy.    Glucometer downloaded and results are as follows.  98, 220, 286, 193  Fasting sugar; 77, 95, 115, 145, 116: 74.151, 19 8, 16, 92, 115, 83, 55, 101, 60, 49, 60, 49, 62, 120  Mother blood sugars throughout the day average is between .  She has been checking her blood sugars between 3-6 times per day.  .  Average was 154 and she has blood sugars below 70; 14/54.    Hypoglycemia symptoms with a blood sugar 26 otherwise did not have any symptoms with her blood sugars between 40s to 60s.      Allergies   Allergen Reactions     No Known Drug Allergies        Current Outpatient Medications   Medication Sig Dispense Refill     blood glucose (NO BRAND SPECIFIED) test strip Use to test blood sugar 4 times daily or as directed. 400 each 1     blood glucose monitoring (ACCU-CHEK SMARTVIEW) test strip TEST 4 TIMES A DAY AS DIRECTED 100 each 11      "blood glucose monitoring (Picateers MICROLET) lancets Use to test blood sugar 4 times daily or as directed. 400 each 1     Continuous Blood Gluc  (DEXCOM G6 ) HAYES 1 each continuous 1 Device 0     Continuous Blood Gluc Sensor (DEXCOM G6 SENSOR) MISC 1 each See Admin Instructions Change sensor every 10 days. 3 each 11     Continuous Blood Gluc Transmit (DEXCOM G6 TRANSMITTER) MISC 1 each every 3 months 1 each 3     DIABETIC STERILE LANCETS device 1 Device 4 times daily. 1 Box 11     glucagon 1 MG SOLR injection Inject 1 mg Subcutaneous every 15 minutes as needed for low blood sugar 2 each 0     insulin glargine (BASAGLAR KWIKPEN) 100 UNIT/ML pen INJECT 25 UNITS UNDER THE SKIN DAILY AT BEDTIME 30 mL 0     insulin lispro (ADMELOG SOLOSTAR) 100 UNIT/ML pen Sig - Route: Inject 1-7 Units Subcutaneous 3 times daily (before meals) Do Not take Correction Insulin if Pre-Meal BG < than 140.   140 - 189 give 1 unit   190 - 239 give 2 units   240 - 289 give 3 units   290 - 339 give 4 units   340- 399 give 5 units   400-449 give 6 units   Greater than or equal to 450 give 7 units   Based on pre-meal blood glucose.     Notify clinic if glucose greater than or equal to 350 mg/dL after administration of correct 3 mL 0     insulin pen needle (BD TARA U/F) 32G X 4 MM Use 3 daily or as directed. 270 each 3     insulin syringe-needle U-100 (B-D INSULIN SYRINGE) 31G X 5/16\" 0.5 ML USE TO INJECT INSULIN 100 each 6     naproxen sodium (ANAPROX) 550 MG tablet Take 1 tablet (550 mg) by mouth 2 times daily (with meals) 20 tablet 0     ACE/ARB NOT PRESCRIBED, INTENTIONAL, 1 each every 3 hours ACE & ARB not prescribed due to Intolerance and Symptomatic hypotension not due to excessive diuresis (Patient not taking: Reported on 3/19/2019)         Review of Systems     per HPI above    Past Medical History:   Diagnosis Date     Adjustment disorder with anxious mood 11/23/2015     Anxiety 11/27/2015     ASCUS of cervix with negative " high risk HPV 06/19/2008     Depressive disorder, not elsewhere classified      Diabetic eye exam (H) 12/19/14     Mixed hyperlipidemia 11/30/2006     Regional enteritis of unspecified site     Ulcerative Colitis     Type I (juvenile type) diabetes mellitus with ketoacidosis, not stated as uncontrolled(250.11) 01/01/2007    Moderately severe intensity.     Type I (juvenile type) diabetes mellitus with ketoacidosis, uncontrolled 02/14/08     Type I (juvenile type) diabetes mellitus without mention of complication, not stated as uncontrolled     diagnosed in 2003     Ulcerative colitis, unspecified     remission. Last flare 6 yrs ago.        Past Surgical History:   Procedure Laterality Date     DILATION AND CURETTAGE SUCTION  4/2010    miscarriage     HC COLONOSCOPY W BIOPSY  08/16/06     HC COLONOSCOPY W/WO BRUSH/WASH       TUBAL LIGATION         Family History   Problem Relation Age of Onset     Hypertension Father      Diabetes Maternal Grandmother      Cancer Maternal Grandmother      Diabetes Paternal Grandfather      Diabetes Maternal Uncle      Diabetes Maternal Aunt        Social History     Socioeconomic History     Marital status: Single     Spouse name: None     Number of children: 1     Years of education: 12     Highest education level: None   Occupational History     Occupation: Nursing assistant   Social Needs     Financial resource strain: None     Food insecurity:     Worry: None     Inability: None     Transportation needs:     Medical: None     Non-medical: None   Tobacco Use     Smoking status: Current Every Day Smoker     Packs/day: 1.00     Years: 16.00     Pack years: 16.00     Types: Cigarettes     Smokeless tobacco: Never Used   Substance and Sexual Activity     Alcohol use: Yes     Alcohol/week: 0.0 oz     Comment: rare     Drug use: No     Sexual activity: Yes     Partners: Male     Birth control/protection: Surgical, Female Surgical     Comment: tubal ligation   Lifestyle     Physical  activity:     Days per week: None     Minutes per session: None     Stress: None   Relationships     Social connections:     Talks on phone: None     Gets together: None     Attends Scientology service: None     Active member of club or organization: None     Attends meetings of clubs or organizations: None     Relationship status: None     Intimate partner violence:     Fear of current or ex partner: None     Emotionally abused: None     Physically abused: None     Forced sexual activity: None   Other Topics Concern      Service No     Blood Transfusions No     Caffeine Concern Yes     Comment: Advised not more than 16 ounces/day     Occupational Exposure Yes     Comment: Visiting [employer] - student online classes     Hobby Hazards No     Sleep Concern No     Stress Concern Yes     Weight Concern No     Special Diet Yes     Comment: IDDM Type I     Back Care Yes     Comment: work-related about 1 year ago     Exercise Yes     Comment: Active at work     Bike Helmet No     Seat Belt Yes     Self-Exams No     Parent/sibling w/ CABG, MI or angioplasty before 65F 55M? Not Asked   Social History Narrative    Lives in [place] with Rod gandhi and her son and their daughter.   Necoe and Rod smoke.   No indoor cats/kittens.   No concerns about domestic violence.       Objective:   /77 (BP Location: Left arm, Patient Position: Chair, Cuff Size: Adult Regular)   Pulse 75   Wt 74.1 kg (163 lb 4.8 oz)   SpO2 99%   BMI 27.38 kg/m     Constitutional: Pleasant no acute cardiopulmonary distress.   Psychological: appropriate mood and affect     In House Labs:   Lab Results   Component Value Date    A1C 10.6 03/19/2019    A1C 11.6 12/20/2018    A1C 11.2 08/09/2018    A1C 8.9 11/13/2017    A1C 10.0 10/07/2017       TSH   Date Value Ref Range Status   03/19/2019 2.95 0.40 - 4.00 mU/L Final   10/07/2017 2.57 0.40 - 4.00 mU/L Final   06/26/2017 6.05 (H) 0.40 - 4.00 mU/L Final   03/05/2014 3.97 0.4 - 5.0 mU/L  Final   03/16/2012 3.75 0.4 - 5.0 mU/L Final     T4 Free   Date Value Ref Range Status   06/26/2017 1.02 0.76 - 1.46 ng/dL Final   03/28/2006 1.10 0.70 - 1.85 ng/dL Final       Creatinine   Date Value Ref Range Status   09/29/2018 1.02 0.52 - 1.04 mg/dL Final   ]    Recent Labs   Lab Test 08/09/18  0935 01/29/16  0933 04/01/15  1143 07/24/14  0851   CHOL  --  207* 197 193   HDL  --  55 53 51   * 129* 121 126   TRIG  --  116 113 84   CHOLHDLRATIO  --   --  3.7 4.0     Results for RENY NAJERA (MRN [id number]) as of 3/20/2019 12:16   Ref. Range 3/19/2019 14:29   C-Peptide Latest Ref Range: 0.9 - 6.9 ng/mL <0.1 (L)   TSH Latest Ref Range: 0.40 - 4.00 mU/L 2.95   Glucose Latest Ref Range: 70 - 99 mg/dL 148 (H)       Assessment/Treatment Plan:      Type 1 diabetes uncontrolled with pre-proliferative diabetic retinopathy: Diagnosed at the age of 18 and has been on insulin since then.  Has not been following with endocrinologist previously.  A1c throughout the years in the range of 10 and 11.  Current diabetic medications; insulin glargine 25 units daily, 1 units for every 10 g of carbohydrate with meals and correction was 1 unit for every 50 above 140.      Reviewed her blood sugars over the last 1 month and does describe feel the HPI; there are variations.  Her fasting blood sugar has been persistently low; numbers in the 40s-60s.  Therefore, will adjust her long-acting insulin.  There has been also fluctuation with blood sugar throughout the day mainly because of missing mealtime insulin.  And that I see readings actually following the meal correction in the low 400s therefore will adjust her mealtime insulin as follows as well.  Of note, really checked her C-peptide last time and it was low at less than 0.1 for blood sugar of 148.      Plan     Reduce insulin glargine 22 units daily.  Further reduced to 20 units daily is fasting blood sugars persistently low.    Increase mealtime insulin 1 unit for every 15 g  of carbohydrate with meals 3 times per day and with snacks.    Correction dose of insulin 1 unit for every 50 above 140.    CDE appointment today; for carbohydrate counting, additional compressive knowledge of diabetes and particularly for CGM or sensor placement.  CGM under process.    Follow-up appointment 2-3 months time for further adjustment; in the meantime I have advised her to call us back if blood sugars persistently below 70.      Patient Instructions   Reduce  BASAGLAR INSULIN to 22 UNITS DAILY.      Short acting insulin  1 unit for every 15 grams of carbohydrates three times per day with meals and snacks. (this is changed from 1 unit for every 10 grams of carbohydrates).         Please follow the following correction scale three times per day and at bedtime:  For Pre-Meal Blood glucose (BG) 140 - 189 give 1 unit.   For Pre-Meal  - 239 give 2 units.   For Pre-Meal  - 289 give 3 units.   For Pre-Meal  - 339 give 4 units.   For Pre-Meal BG = or > 340 give 5 units.     [facility]  Haydee Rojas RN, Diabetes Educator: [phone]  Clinic Nurses Jennifer Winter: [phone]  Clinic Fax: [phone]  On-Call Endocrine at [facility] (after hours/weekends): [phone] option 4  Scheduling Line: [phone]    Appointment Reminders:  * Please bring meter with for staff to download  * If you are due ONLY for an A1C, it is scheduled with the nurse and will be done in clinic. You do not need to schedule a lab appointment. Fasting is not required for an A1C.  * Refill request should be submitted to your pharmacy. They will contact clinic for approval.    Once you know you will be getting Dexcom, please call to schedule set up with ROMAINE Walsh      I will contact the patient with the test results.  Return to clinic in 2-3  month.    Test and/or medications prescribed today:  No orders of the defined types were placed in this  encounter.        Siri Rhoades MD  Staff Endocrinologist    [phone]  Division of Endocrinology and Diabetes          Again, thank you for allowing me to participate in the care of your patient.        Sincerely,        Siri Rhoades MD

## 2019-04-23 NOTE — NURSING NOTE
Vickie Duque's goals for this visit include:   Chief Complaint   Patient presents with     Diabetes       She requests these members of her care team be copied on today's visit information: Yes    PCP: Lelo Marmolejo    Referring Provider:  No referring provider defined for this encounter.    /77 (BP Location: Left arm, Patient Position: Chair, Cuff Size: Adult Regular)   Pulse 75   Wt 74.1 kg (163 lb 4.8 oz)   SpO2 99%   BMI 27.38 kg/m      Do you need any medication refills at today's visit? No

## 2019-05-01 ENCOUNTER — DOCUMENTATION ONLY (OUTPATIENT)
Dept: FAMILY MEDICINE | Facility: OTHER | Age: 34
End: 2019-05-01

## 2019-05-01 NOTE — PROGRESS NOTES
Diabetic Review Team Follow up  Gaps identified on last contact:  Medication management/non-compliance  Plan from last contact:  Pt had scheduled with endocrinology in March 2019 - plan was to wait to see if patient attended appointment.  Progress: Pt has seen endocrinology twice and has been working with CDE in [place]. Pt had insulin adjusted and plan is for patient to obtain Dexcom CGM. Follow-up scheduled with endocrinology and she is supposed to schedule with CDE when Dexcom arrives.  Lab Results   Component Value Date    A1C 10.6 03/19/2019    A1C 11.6 12/20/2018    A1C 11.2 08/09/2018    A1C 8.9 11/13/2017    A1C 10.0 10/07/2017     New Gaps Identified: unknown when CGM is supposed to arrive  New/Continuing plan:    Diabetes Review Team will no long follow pt as she seeing endocrine at [place].     Haydee Putnam RN,BSN  Clinical Care Coordinator      Catarina Aguirre [facility]  Care Coordination     Moncho Aponte PharmD  [facility] pharmacist    Megan Aguirre RN  [facility] Nurse

## 2019-05-15 ENCOUNTER — ALLIED HEALTH/NURSE VISIT (OUTPATIENT)
Dept: EDUCATION SERVICES | Facility: CLINIC | Age: 34
End: 2019-05-15
Payer: COMMERCIAL

## 2019-05-15 DIAGNOSIS — E11.9 DIABETES MELLITUS WITHOUT COMPLICATION (H): Primary | ICD-10-CM

## 2019-05-15 PROCEDURE — 99207 ZZC DROP WITH A PROCEDURE: CPT

## 2019-05-15 PROCEDURE — G0108 DIAB MANAGE TRN  PER INDIV: HCPCS

## 2019-05-15 NOTE — PROGRESS NOTES
Diabetes Self Management Training: Follow-up Visit    Vickie Duque presents today for initiation of the Dexcom G6 continuous glucose monitoring system, related to Type 1 diabetes.    She is accompanied by her significant other, Rock.     Patient's diabetes management related comments/concerns: None at this time.     Patient's emotional response to diabetes: expresses readiness to learn    Patient would like this visit to be focused around the following diabetes-related behaviors and goals: Monitoring    ASSESSMENT:  Patient presents to clinic today to meet with Cde for Dexcom G6 sensor start appt. Pt last seen by Ernst in 19.   Pt diagnosed with Type 1 DM at age 18. Has mild retinopathy and hypoglycemia unawareness.     Current Diabetes Management per Patient:  Taking diabetes medications? Current insulin doses: Lantus: 22 units daily. Novolou:15g carbs with B/L/D, plus sliding scale: 1u:50 > 140.      Diabetes Medication(s)     Diabetic Other       glucagon 1 MG SOLR injection    Inject 1 mg Subcutaneous every 15 minutes as needed for low blood sugar    Insulin       insulin glargine (BASAGLAR KWIKPEN) 100 UNIT/ML pen    INJECT 25 UNITS UNDER THE SKIN DAILY AT BEDTIME     insulin lispro (ADMELOG SOLOSTAR) 100 UNIT/ML pen    Sig - Route: Inject 1-7 Units Subcutaneous 3 times daily (before meals) Do Not take Correction Insulin if Pre-Meal BG < than 140.   140 - 189 give 1 unit   190 - 239 give 2 units   240 - 289 give 3 units   290 - 339 give 4 units   340- 399 give 5 units   400-449 give 6 units   Greater than or equal to 450 give 7 units   Based on pre-meal blood glucose.     Notify clinic if glucose greater than or equal to 350 mg/dL after administration of correct        Do you have any difficulty affording your medications or glucose monitoring supplies?  No    BG results: Bg readings not reviewed today. Will download Dexcom in 6 weeks.     BG values are: Not in goal  Patient's most recent   Lab  "Results   Component Value Date    A1C 10.6 03/19/2019    is not meeting goal of <7.0    Vitals:  There were no vitals taken for this visit.  Estimated body mass index is 27.38 kg/m  as calculated from the following:    Height as of 1/2/19: 1.645 m (5' 4.75\").    Weight as of 4/23/19: 74.1 kg (163 lb 4.8 oz).   Last 3 BP:   BP Readings from Last 3 Encounters:   04/23/19 118/77   03/19/19 127/80   01/02/19 120/82       History   Smoking Status     Current Every Day Smoker     Packs/day: 1.00     Years: 16.00     Types: Cigarettes   Smokeless Tobacco     Never Used     Labs:  Lab Results   Component Value Date    A1C 10.6 03/19/2019     Lab Results   Component Value Date     03/19/2019     Lab Results   Component Value Date     08/09/2018     01/29/2016     HDL Cholesterol   Date Value Ref Range Status   01/29/2016 55 >49 mg/dL Final   ]  GFR Estimate   Date Value Ref Range Status   09/29/2018 62 >60 mL/min/1.7m2 Final     Comment:     Non  GFR Calc     GFR Estimate If Black   Date Value Ref Range Status   09/29/2018 75 >60 mL/min/1.7m2 Final     Comment:      GFR Calc     Lab Results   Component Value Date    CR 1.02 09/29/2018     Socio/Economic/Cultural Considerations:    Support system: significant other, Rock.     Cultural Influences/Ethnic Background:  American    Health Literacy/Numeracy:  \"With diabetes, it's helpful to use forms and log books to write down blood sugars and what you're eating at times to help understand how foods affect your blood sugars. With this in mind how confident are you at filling out medical forms, such as these, by yourself?  Not Assessed    Health Beliefs and Attitudes:   Patient Activation Measure Survey Score:  BYRON Score (Last Two) 3/16/2012   BYRON Raw Score 42   Activation Score 66   BYRON Level 3     Stage of Change: ACTION (Actively working towards change)    Diabetes knowledge and skills assessment:     Patient is knowledgeable " in diabetes management concepts related to: Healthy Eating, Being Active, Monitoring and Taking Medication    Patient needs further education on the following diabetes management concepts: n/a.     Barriers to Learning Assessment: No Barriers identified    Based on learning assessment above, most appropriate setting for further diabetes education would be: Individual setting.    Labs:  Lab Results   Component Value Date    A1C 10.6 03/19/2019     Lab Results   Component Value Date     03/19/2019       INTERVENTION:   Dexcom G6 Sensor Start:     Patient was able to demonstrate ability to insert and start sensor without difficulty. Pt inserted sensor into right side of abdomen without difficulty.     Education provided on:  Dexcom CGM system - Dexcom sensor, transmitter, , Dexcom mobile applications, sensor glucose versus blood glucose, trends and graphs, alarms and alerts, Dexcom sensor insertion, calibrating, stopping sensor and changing sensor, Dexcom Clarity software.   Pt plans to set up Dexcom.com albertina, in phone, at home.     CGM initial settings:   Dexcom: Low Glucose Alarm: On (fixed at 55 mg/dL and cannot be changed)  High Glucose Alert: On at 300mg/dL  Low Glucose Alert: On at 65mg/dL  Rise Alert: On at 3mg/dL  Fall Alert: On at 3mg/dL  Out of Range Alert: On at 20 minutes    PLAN:  Change sensor, as directed.  Return to see Cde in 6 weeks for sensor download.     Follow-up:    Keep f/u appt with Ernst in July.     Time Spent: 45 minutes  Encounter Type: Individual    Any diabetes medication dose changes were made via the CDE Protocol and Collaborative Practice Agreement with the patient's endocrinology provider. A copy of this encounter was shared with the provider.    Vickie Duque comes into clinic today at the request of Dr Cao, Ordering Provider for Pt Teaching on cgms use.     This service provided today was under the supervising provider of the day Dr Cao, who was available if  needed.    Haydee Rojas, RN, BSN, CDE   [facility]

## 2019-06-26 ENCOUNTER — ALLIED HEALTH/NURSE VISIT (OUTPATIENT)
Dept: EDUCATION SERVICES | Facility: CLINIC | Age: 34
End: 2019-06-26
Payer: COMMERCIAL

## 2019-06-26 ENCOUNTER — TRANSFERRED RECORDS (OUTPATIENT)
Dept: HEALTH INFORMATION MANAGEMENT | Facility: CLINIC | Age: 34
End: 2019-06-26

## 2019-06-26 DIAGNOSIS — E11.9 DIABETES MELLITUS WITHOUT COMPLICATION (H): Primary | ICD-10-CM

## 2019-06-26 PROCEDURE — 99207 ZZC DROP WITH A PROCEDURE: CPT

## 2019-06-26 PROCEDURE — G0108 DIAB MANAGE TRN  PER INDIV: HCPCS

## 2019-06-26 NOTE — PATIENT INSTRUCTIONS
1. Decrease Basaglar dose from 20 to 18 units daily.     2. Drink Mountain Dew only with meals and include the carbs in the Mountain Dew in your total carb content and take insulin for total carb intake (food and beverage).      3. Return to see Diabetes Educator on 09/24 at 11am.     Haydee Rojas RN, BSN, CDE   [facility]

## 2019-06-27 ENCOUNTER — HOSPITAL ENCOUNTER (EMERGENCY)
Facility: CLINIC | Age: 34
Discharge: HOME | End: 2019-06-27
Attending: EMERGENCY MEDICINE | Admitting: EMERGENCY MEDICINE
Payer: COMMERCIAL

## 2019-06-27 ENCOUNTER — APPOINTMENT (OUTPATIENT)
Dept: CT IMAGING | Facility: CLINIC | Age: 34
End: 2019-06-27
Attending: EMERGENCY MEDICINE
Payer: COMMERCIAL

## 2019-06-27 VITALS
DIASTOLIC BLOOD PRESSURE: 70 MMHG | WEIGHT: 161 LBS | OXYGEN SATURATION: 100 % | TEMPERATURE: 97.1 F | BODY MASS INDEX: 27 KG/M2 | SYSTOLIC BLOOD PRESSURE: 123 MMHG

## 2019-06-27 DIAGNOSIS — S20.212A CONTUSION OF LEFT CHEST WALL, INITIAL ENCOUNTER: ICD-10-CM

## 2019-06-27 DIAGNOSIS — N30.91 HEMORRHAGIC CYSTITIS: ICD-10-CM

## 2019-06-27 LAB
ALBUMIN UR-MCNC: 30 MG/DL
ANION GAP SERPL CALCULATED.3IONS-SCNC: 6 MMOL/L (ref 3–14)
APPEARANCE UR: ABNORMAL
BASOPHILS # BLD AUTO: 0 10E9/L (ref 0–0.2)
BASOPHILS NFR BLD AUTO: 0 %
BILIRUB UR QL STRIP: NEGATIVE
BUN SERPL-MCNC: 19 MG/DL (ref 7–30)
CALCIUM SERPL-MCNC: 8.5 MG/DL (ref 8.5–10.1)
CHLORIDE SERPL-SCNC: 108 MMOL/L (ref 94–109)
CK SERPL-CCNC: 140 U/L (ref 30–225)
CO2 SERPL-SCNC: 25 MMOL/L (ref 20–32)
COLOR UR AUTO: ABNORMAL
CREAT SERPL-MCNC: 0.94 MG/DL (ref 0.52–1.04)
DIFFERENTIAL METHOD BLD: ABNORMAL
EOSINOPHIL # BLD AUTO: 0 10E9/L (ref 0–0.7)
EOSINOPHIL NFR BLD AUTO: 0 %
ERYTHROCYTE [DISTWIDTH] IN BLOOD BY AUTOMATED COUNT: 13.8 % (ref 10–15)
GFR SERPL CREATININE-BSD FRML MDRD: 79 ML/MIN/{1.73_M2}
GLUCOSE SERPL-MCNC: 158 MG/DL (ref 70–99)
GLUCOSE UR STRIP-MCNC: 30 MG/DL
HCG UR QL: NEGATIVE
HCT VFR BLD AUTO: 40 % (ref 35–47)
HGB BLD-MCNC: 13 G/DL (ref 11.7–15.7)
HGB UR QL STRIP: ABNORMAL
HYALINE CASTS #/AREA URNS LPF: 5 /LPF (ref 0–2)
IMM GRANULOCYTES # BLD: 0 10E9/L (ref 0–0.4)
IMM GRANULOCYTES NFR BLD: 0.2 %
KETONES UR STRIP-MCNC: 5 MG/DL
LEUKOCYTE ESTERASE UR QL STRIP: ABNORMAL
LYMPHOCYTES # BLD AUTO: 2.7 10E9/L (ref 0.8–5.3)
LYMPHOCYTES NFR BLD AUTO: 27.5 %
MCH RBC QN AUTO: 26.4 PG (ref 26.5–33)
MCHC RBC AUTO-ENTMCNC: 32.5 G/DL (ref 31.5–36.5)
MCV RBC AUTO: 81 FL (ref 78–100)
MONOCYTES # BLD AUTO: 0.6 10E9/L (ref 0–1.3)
MONOCYTES NFR BLD AUTO: 6.5 %
MUCOUS THREADS #/AREA URNS LPF: PRESENT /LPF
NEUTROPHILS # BLD AUTO: 6.5 10E9/L (ref 1.6–8.3)
NEUTROPHILS NFR BLD AUTO: 65.8 %
NITRATE UR QL: NEGATIVE
NRBC # BLD AUTO: 0 10*3/UL
NRBC BLD AUTO-RTO: 0 /100
PH UR STRIP: 5.5 PH (ref 5–7)
PLATELET # BLD AUTO: 221 10E9/L (ref 150–450)
POTASSIUM SERPL-SCNC: 3.6 MMOL/L (ref 3.4–5.3)
RBC # BLD AUTO: 4.93 10E12/L (ref 3.8–5.2)
RBC #/AREA URNS AUTO: >182 /HPF (ref 0–2)
SODIUM SERPL-SCNC: 139 MMOL/L (ref 133–144)
SOURCE: ABNORMAL
SP GR UR STRIP: 1.03 (ref 1–1.03)
SQUAMOUS #/AREA URNS AUTO: 2 /HPF (ref 0–1)
UROBILINOGEN UR STRIP-MCNC: 2 MG/DL (ref 0–2)
WBC # BLD AUTO: 9.8 10E9/L (ref 4–11)
WBC #/AREA URNS AUTO: 111 /HPF (ref 0–5)

## 2019-06-27 PROCEDURE — 87088 URINE BACTERIA CULTURE: CPT | Performed by: EMERGENCY MEDICINE

## 2019-06-27 PROCEDURE — 25000128 H RX IP 250 OP 636: Performed by: EMERGENCY MEDICINE

## 2019-06-27 PROCEDURE — 85025 COMPLETE CBC W/AUTO DIFF WBC: CPT | Performed by: EMERGENCY MEDICINE

## 2019-06-27 PROCEDURE — 99284 EMERGENCY DEPT VISIT MOD MDM: CPT | Mod: 25 | Performed by: EMERGENCY MEDICINE

## 2019-06-27 PROCEDURE — 25000132 ZZH RX MED GY IP 250 OP 250 PS 637: Performed by: EMERGENCY MEDICINE

## 2019-06-27 PROCEDURE — 99284 EMERGENCY DEPT VISIT MOD MDM: CPT | Mod: Z6 | Performed by: EMERGENCY MEDICINE

## 2019-06-27 PROCEDURE — 82550 ASSAY OF CK (CPK): CPT | Performed by: EMERGENCY MEDICINE

## 2019-06-27 PROCEDURE — 87186 SC STD MICRODIL/AGAR DIL: CPT | Performed by: EMERGENCY MEDICINE

## 2019-06-27 PROCEDURE — 81001 URINALYSIS AUTO W/SCOPE: CPT | Performed by: EMERGENCY MEDICINE

## 2019-06-27 PROCEDURE — 25000125 ZZHC RX 250: Performed by: EMERGENCY MEDICINE

## 2019-06-27 PROCEDURE — 80048 BASIC METABOLIC PNL TOTAL CA: CPT | Performed by: EMERGENCY MEDICINE

## 2019-06-27 PROCEDURE — 74177 CT ABD & PELVIS W/CONTRAST: CPT

## 2019-06-27 PROCEDURE — 87086 URINE CULTURE/COLONY COUNT: CPT | Performed by: EMERGENCY MEDICINE

## 2019-06-27 PROCEDURE — 81025 URINE PREGNANCY TEST: CPT | Performed by: EMERGENCY MEDICINE

## 2019-06-27 RX ORDER — IOPAMIDOL 755 MG/ML
100 INJECTION, SOLUTION INTRAVASCULAR ONCE
Status: COMPLETED | OUTPATIENT
Start: 2019-06-27 | End: 2019-06-27

## 2019-06-27 RX ORDER — IBUPROFEN 600 MG/1
600 TABLET, FILM COATED ORAL EVERY 8 HOURS PRN
Qty: 20 TABLET | Refills: 0 | Status: SHIPPED | OUTPATIENT
Start: 2019-06-27 | End: 2021-06-04

## 2019-06-27 RX ORDER — ACETAMINOPHEN 325 MG/1
975 TABLET ORAL ONCE
Status: COMPLETED | OUTPATIENT
Start: 2019-06-27 | End: 2019-06-27

## 2019-06-27 RX ORDER — HYDROCODONE BITARTRATE AND ACETAMINOPHEN 5; 325 MG/1; MG/1
1 TABLET ORAL EVERY 6 HOURS PRN
Qty: 10 TABLET | Refills: 0 | Status: SHIPPED | OUTPATIENT
Start: 2019-06-27 | End: 2019-07-22

## 2019-06-27 RX ORDER — NITROFURANTOIN 25; 75 MG/1; MG/1
100 CAPSULE ORAL 2 TIMES DAILY
Qty: 14 CAPSULE | Refills: 0 | Status: SHIPPED | OUTPATIENT
Start: 2019-06-27 | End: 2019-06-29

## 2019-06-27 RX ADMIN — ACETAMINOPHEN 975 MG: 325 TABLET, FILM COATED ORAL at 15:12

## 2019-06-27 RX ADMIN — IOPAMIDOL 79 ML: 755 INJECTION, SOLUTION INTRAVENOUS at 15:40

## 2019-06-27 RX ADMIN — SODIUM CHLORIDE 60 ML: 9 INJECTION, SOLUTION INTRAVENOUS at 15:41

## 2019-06-27 ASSESSMENT — ENCOUNTER SYMPTOMS
HEMATURIA: 1
FEVER: 0

## 2019-06-27 NOTE — DISCHARGE INSTRUCTIONS
There is no evidence for injury to your kidney  Your symptoms might be related to an infection in your urine  Take antibiotics as directed, a urine culture was sent.  I reviewed your previous urine cultures and the bacteria were sensitive to just about every antibiotic  Follow-up with your doctor/clinic

## 2019-06-27 NOTE — ED PROVIDER NOTES
History     Chief Complaint   Patient presents with     Flank Pain     possible kidney damage, fell down the stairs at home on sunday     The history is provided by the patient and medical records.     Vickie Duque is a 33 year old female who reports that she fell on Sunday on her left side and sustained 1 perhaps 2 left-sided lower rib fractures.  1 or 2 days after the fall, she began developing gross hematuria.  She has never had this before.  She went to an urgent care but did a urinalysis and an x-ray that showed rib fractures and referred her to the Emergency Department for advanced imaging.  She is a diabetic, her blood sugars have been well controlled.  She has a history of ulcerative colitis, which has been stable.  She is not anticoagulated.  She has not had fevers.  He has a history of renal insufficiency.  She has no other injuries other than lower chest wall pain and the gross hematuria.  She describes the urine now as brownish-red in color.    This part of the medical record was transcribed by Norma Briggs Medical Scribe, from a dictation done by Travis Schaeffer MD.      Past Medical History:   Diagnosis Date     Adjustment disorder with anxious mood 11/23/2015     Anxiety 11/27/2015     ASCUS of cervix with negative high risk HPV 06/19/2008     Depressive disorder, not elsewhere classified      Diabetic eye exam (H) 12/19/14     Mixed hyperlipidemia 11/30/2006     Regional enteritis of unspecified site     Ulcerative Colitis     Type I (juvenile type) diabetes mellitus with ketoacidosis, not stated as uncontrolled(250.11) 01/01/2007    Moderately severe intensity.     Type I (juvenile type) diabetes mellitus with ketoacidosis, uncontrolled 02/14/08     Type I (juvenile type) diabetes mellitus without mention of complication, not stated as uncontrolled     diagnosed in 2003     Ulcerative colitis, unspecified     remission. Last flare 6 yrs ago.        Past Surgical History:   Procedure Laterality  "Date     DILATION AND CURETTAGE SUCTION  4/2010    miscarriage     HC COLONOSCOPY W BIOPSY  08/16/06     HC COLONOSCOPY W/WO BRUSH/WASH       TUBAL LIGATION         Family History   Problem Relation Age of Onset     Hypertension Father      Diabetes Maternal Grandmother      Cancer Maternal Grandmother      Diabetes Paternal Grandfather      Diabetes Maternal Uncle      Diabetes Maternal Aunt        Social History     Tobacco Use     Smoking status: Current Every Day Smoker     Packs/day: 1.00     Years: 16.00     Pack years: 16.00     Types: Cigarettes     Smokeless tobacco: Never Used   Substance Use Topics     Alcohol use: Yes     Alcohol/week: 0.0 oz     Comment: occassionally       No current facility-administered medications for this encounter.      Current Outpatient Medications   Medication     Continuous Blood Gluc  (DEXCOM G6 ) HAYES     Continuous Blood Gluc Sensor (DEXCOM G6 SENSOR) MISC     Continuous Blood Gluc Transmit (DEXCOM G6 TRANSMITTER) MISC     HYDROcodone-acetaminophen (NORCO) 5-325 MG tablet     ibuprofen (ADVIL/MOTRIN) 600 MG tablet     insulin glargine (BASAGLAR KWIKPEN) 100 UNIT/ML pen     insulin lispro (ADMELOG SOLOSTAR) 100 UNIT/ML pen     nitroFURantoin macrocrystal-monohydrate (MACROBID) 100 MG capsule     glucagon 1 MG SOLR injection     insulin pen needle (BD TARA U/F) 32G X 4 MM     insulin syringe-needle U-100 (B-D INSULIN SYRINGE) 31G X 5/16\" 0.5 ML        Allergies   Allergen Reactions     No Known Drug Allergies        I have reviewed the Medications, Allergies, Past Medical and Surgical History, and Social History in the Epic system.    Review of Systems   Constitutional: Negative for fever.   Cardiovascular:        Positive for lower chest wall pain   Genitourinary: Positive for hematuria.   Musculoskeletal:        Positive for left rib fracture   All other systems reviewed and are negative.      Physical Exam   BP: 123/70  Heart Rate: 83  Temp: 97.1  F (36.2 "  C)  Weight: 73 kg (161 lb)  SpO2: 100 %      Physical Exam   Constitutional: She is oriented to person, place, and time. She appears well-developed and well-nourished. No distress.   HENT:   Head: Normocephalic and atraumatic.   Eyes: Pupils are equal, round, and reactive to light. Conjunctivae are normal. No scleral icterus.   Cardiovascular: Normal rate and regular rhythm.   Pulmonary/Chest: Effort normal and breath sounds normal. No respiratory distress. She has no wheezes. She exhibits tenderness. She exhibits no crepitus.       Abdominal: Soft. She exhibits no distension. There is no tenderness.   Neurological: She is alert and oriented to person, place, and time. No cranial nerve deficit.   Skin: Skin is warm and dry.   Psychiatric: She has a normal mood and affect. Her behavior is normal.   Nursing note and vitals reviewed.      ED Course        Procedures        Results for orders placed or performed during the hospital encounter of 06/27/19   CT Abdomen Pelvis w Contrast    Narrative    CT ABDOMEN AND PELVIS WITH CONTRAST   6/27/2019 3:42 PM     HISTORY: Fall. Left-sided pain. Gross hematuria.    TECHNIQUE: 79mL of isovue 370 IV were administered. After contrast  administration, volumetric helical sections were acquired from the  lung bases to the ischial tuberosities. Coronal images were also  reconstructed. Radiation dose for this scan was reduced using  automated exposure control, adjustment of the mA and/or kV according  to patient size, or iterative reconstruction technique.    COMPARISON: CT of the abdomen and pelvis performed 11/16/2015.     FINDINGS: The liver, gallbladder, spleen, adrenal glands, pancreas,  and kidneys are unremarkable. No hydronephrosis. No evidence for solid  organ injury. Mild bowel wall thickening near the splenic flexure of  the colon suggests a mild nonspecific colitis. There is a moderate  amount of stool throughout the colon. No bowel obstruction. No free  intraperitoneal  air. Unremarkable appendix. No free fluid in the  pelvis. The uterus appears retroverted. Mild atherosclerotic  aortoiliac calcification. The visualized lung bases are clear.      Impression    IMPRESSION:   1. Mild colonic __________ wall thickening in the region of the  splenic flexure suggests a nonspecific colitis, most likely infectious  or inflammatory in etiology. Please clinically correlate.  2. Moderate amount of stool throughout the colon.         Medications   acetaminophen (TYLENOL) tablet 975 mg (975 mg Oral Given 6/27/19 1512)   iopamidol (ISOVUE-370) solution 100 mL (79 mLs Intravenous Given 6/27/19 1540)            Labs Ordered and Resulted from Time of ED Arrival Up to the Time of Departure from the ED   ROUTINE UA WITH MICROSCOPIC - Abnormal; Notable for the following components:       Result Value    Glucose Urine 30 (*)     Ketones Urine 5 (*)     Blood Urine Large (*)     Protein Albumin Urine 30 (*)     Leukocyte Esterase Urine Large (*)     WBC Urine 111 (*)     RBC Urine >182 (*)     Squamous Epithelial /HPF Urine 2 (*)     Mucous Urine Present (*)     Hyaline Casts 5 (*)     All other components within normal limits   CBC WITH PLATELETS DIFFERENTIAL - Abnormal; Notable for the following components:    MCH 26.4 (*)     All other components within normal limits   BASIC METABOLIC PANEL - Abnormal; Notable for the following components:    Glucose 158 (*)     All other components within normal limits   HCG QUALITATIVE URINE   CK TOTAL   FREE WATER            Assessments & Plan (with Medical Decision Making)   This is a 33-year-old female who reports 5 days ago she fell onto her left side and bruised her ribs and perhaps fractured a rib based on her urgent care x-ray that was done 2 days after the fall.  She developed blood-tinged urine.  She was referred here for evaluation.  We did a CT of the abdomen with IV contrast for concern of possible kidney injury.  The only positive findings are  inflammation of the colon on the left side which she is mostly asymptomatic before.  She does have a history of ulcerative colitis.  Her symptoms are clearly related to the lateral chest wall in the area of the contusion and not in her abdomen.  I checked a creatinine kinase, this was negative.  The urine does show possibility of infection so I sent a urine culture.  She has had previous urinary infections with 2 cultures that are pansensitive.  Without evidence for solid organ injury, I would treat her as a hemorrhagic cystitis with Macrobid for 7 days.  She requested something stronger for pain at work at night.  She was given Vicodin 10 tablets.  I would like her to follow-up with her primary care clinic in the next week or 2.  Urine culture was sent.  She should follow-up on these results.    This part of the medical record was transcribed by Norma Briggs Medical Scribe, from a dictation done by Travis Schaeffer MD.    I have reviewed the nursing notes.    I have reviewed the findings, diagnosis, plan and need for follow up with the patient.       Medication List      Started    HYDROcodone-acetaminophen 5-325 MG tablet  Commonly known as:  NORCO  1 tablet, Oral, EVERY 6 HOURS PRN     ibuprofen 600 MG tablet  Commonly known as:  ADVIL/MOTRIN  600 mg, Oral, EVERY 8 HOURS PRN     nitroFURantoin macrocrystal-monohydrate 100 MG capsule  Commonly known as:  MACROBID  100 mg, Oral, 2 TIMES DAILY            Final diagnoses:   Hemorrhagic cystitis   Contusion of left chest wall, initial encounter       6/27/2019   [facility] [place], EMERGENCY DEPARTMENT     Travis Schaeffer MD  06/28/19 0718

## 2019-06-27 NOTE — ED TRIAGE NOTES
Pt lost footing at home on Sunday evening and fell down the stairs. Went to urgent care in [place]. Possible kidney injury as she knows she has at least one broken rib and has been having blood in her urine.

## 2019-06-27 NOTE — ED AVS SNAPSHOT
[facility], [place], Emergency Department  [address]  [facility] [address]  Phone:  [phone]  Fax:  [phone]                                    Vickie Duque   MRN: [id number]    Department:  [facility], Emergency Department   Date of Visit:  6/27/2019           After Visit Summary Signature Page    I have received my discharge instructions, and my questions have been answered. I have discussed any challenges I see with this plan with the nurse or doctor.    ..........................................................................................................................................  Patient/Patient Representative Signature      ..........................................................................................................................................  Patient Representative Print Name and Relationship to Patient    ..................................................               ................................................  Date                                   Time    ..........................................................................................................................................  Reviewed by Signature/Title    ...................................................              ..............................................  Date                                               Time          22EPIC Rev 08/18

## 2019-06-28 ENCOUNTER — TRANSFERRED RECORDS (OUTPATIENT)
Dept: HEALTH INFORMATION MANAGEMENT | Facility: CLINIC | Age: 34
End: 2019-06-28

## 2019-06-28 DIAGNOSIS — E10.65 UNCONTROLLED TYPE 1 DIABETES MELLITUS WITH HYPERGLYCEMIA (H): ICD-10-CM

## 2019-06-28 LAB
BACTERIA SPEC CULT: ABNORMAL
Lab: ABNORMAL
SPECIMEN SOURCE: ABNORMAL

## 2019-06-28 RX ORDER — INSULIN GLARGINE 100 [IU]/ML
INJECTION, SOLUTION SUBCUTANEOUS
Qty: 30 ML | Refills: 0 | Status: SHIPPED | OUTPATIENT
Start: 2019-06-28 | End: 2019-07-08

## 2019-06-28 NOTE — TELEPHONE ENCOUNTER
Faxed refill request received from [facility].   Medication Requested: Basaglar  Directions: Inject 25 units under the skin daily at bedtime  Quantity: 30ml  Last Office Visit: 4/23/19  Next Appointment Scheduled for: 7/22/19

## 2019-06-28 NOTE — RESULT ENCOUNTER NOTE
Await final culture report per [facility] ED Lab Result protocol.

## 2019-06-29 ENCOUNTER — TELEPHONE (OUTPATIENT)
Dept: EMERGENCY MEDICINE | Facility: CLINIC | Age: 34
End: 2019-06-29

## 2019-06-29 DIAGNOSIS — N39.0 URINARY TRACT INFECTION: ICD-10-CM

## 2019-06-29 RX ORDER — SULFAMETHOXAZOLE/TRIMETHOPRIM 800-160 MG
1 TABLET ORAL 2 TIMES DAILY
Qty: 6 TABLET | Refills: 0 | Status: SHIPPED | OUTPATIENT
Start: 2019-06-29 | End: 2019-07-22

## 2019-06-29 NOTE — TELEPHONE ENCOUNTER
[facility] [[facility]    [place] ED lab result protocol used  Urine culture    Reason for call  Notify of lab results, assess symptoms,  review ED providers recommendations/discharge instructions (if necessary) and advise per ED lab result f/u protocol    Lab Result (including Rx patient on, if applicable)  Final Urine Culture Report on 6/28/19  Emergency Dept/Urgent Care discharge antibiotic prescribed: Nitrofurantoin Macrocrystal-Monohydrate (Macrobid) 100 mg PO capsule, 1 capsule (100 mg) by mouth 2 times daily for 7 days.  #1. Bacteria, 50,000 - 100,000 colonies/mL Klebsiella pneumoniae,  is [INTERMEDIATE SUSCEPTIBLE] to antibiotic.   Change in treatment as per [facility] ED Lab result protocol.    Information table from ED Provider visit on 6/27/19  Symptoms reported at ED visit (Chief complaint, HPI) Flank Pain        possible kidney damage, fell down the stairs at home on sunday      The history is provided by the patient and medical records.      Vickie Duque is a 33 year old female who reports that she fell on Sunday on her left side and sustained 1 perhaps 2 left-sided lower rib fractures.  1 or 2 days after the fall, she began developing gross hematuria.  She has never had this before.  She went to an urgent care but did a urinalysis and an x-ray that showed rib fractures and referred her to the Emergency Department for advanced imaging.  She is a diabetic, her blood sugars have been well controlled.  She has a history of ulcerative colitis, which has been stable.  She is not anticoagulated.  She has not had fevers.  He has a history of renal insufficiency.  She has no other injuries other than lower chest wall pain and the gross hematuria.  She describes the urine now as brownish-red in color.     Significant Medical hx, if applicable (i.e. CKD, diabetes) Type 1 diabetes   Allergies Allergies   Allergen Reactions     No Known Drug Allergies        Weight, if applicable Wt Readings from Last 2 Encounters:   06/27/19 73 kg (161 lb)   04/23/19 74.1 kg (163 lb 4.8 oz)      Coumadin/Warfarin [Yes /No] No   Creatinine Level (mg/dl) Creatinine   Date Value Ref Range Status   06/27/2019 0.94 0.52 - 1.04 mg/dL Final      Creatinine clearance (ml/min), if applicable Serum creatinine: 0.94 mg/dL 06/27/19 1430  Estimated creatinine clearance: 83.1 mL/min   Pregnant (Yes/No/NA) No   Breastfeeding (Yes/No/NA) No   ED providers Impression and Plan (applicable information) This is a 33-year-old female who reports 5 days ago she fell onto her left side and bruised her ribs and perhaps fractured a rib based on her urgent care x-ray that was done 2 days after the fall.  She developed blood-tinged urine.  She was referred here for evaluation.  We did a CT of the abdomen with IV contrast for concern of possible kidney injury.  The only positive findings are inflammation of the colon on the left side which she is mostly asymptomatic before.  She does have a history of ulcerative colitis.  Her symptoms are clearly related to the lateral chest wall in the area of the contusion and not in her abdomen.  I checked a creatinine kinase, this was negative.  The urine does show possibility of infection so I sent a urine culture.  She has had previous urinary infections with 2 cultures that are pansensitive.  Without evidence for solid organ injury, I would treat her as a hemorrhagic cystitis with Macrobid for 7 days.  She requested something stronger for pain at work at night.  She was given Vicodin 10 tablets.  I would like her to follow-up with her primary care clinic in the next week or 2.  Urine culture was sent.  She should follow-up on these results   ED diagnosis Hemorrhagic cystitis   Contusion of left chest wall, initial encounter         ED provider Travis Schaeffer MD      RN Assessment (Patient s current Symptoms), include time called.  [Insert Left message here  "if message left]  11:43AM: Spoke with Patient. She states that her urine looks better, no blood noted, \"it's less dark\". Denies fever, vomiting. Continues to have rib pain due to the fracture, has been taking pain medication at night and was able to sleep last night.     RN Recommendations/Instructions per [facility] ED lab result protocol  Patient notified of lab result and treatment recommendations.  Rx for Sulfamethoxazole-Trimethoprim (Bactrim DS, Septra DS) 800-160 mg PO tablet, 1 tablet by mouth 2 times daily for 3 days sent to [Pharmacy - [facility] in [place]].  RN reviewed information about UTI's. Advised to increase fluids.  Advised to stop Macrobid and start Bactrim.   Advised to follow up with PCP as directed by the ED provider.  Patient is comfortable with the plan of care and has no further questions.     Please Contact your PCP clinic or return to the Emergency department if your:    Symptoms worsen or other concerning symptom's.    PCP follow-up Questions asked: YES       [RN Name]  Monica Mccabe RN  [facility] RN  Lung Nodule and ED Lab Result RN  Epic pool (ED late result f/u RN): [id number]  [facility] INCIDENTAL RADIOLOGY F/U NURSES: P [id number]  # [phone]    Copy of Lab result   Urine Culture [[id number]] (Order [id number])   Exam Information     Exam Date Exam Time Accession # Results    6/27/19  2:30 PM [id number]    Component Results     Specimen Information: Urine clean catch; Midstream Urine        Component Collected Lab   Specimen Description 06/27/2019  2:30    Midstream Urine    Special Requests 06/27/2019  2:30    Specimen received in preservative    Culture Micro Abnormal  06/27/2019  2:30    50,000 to 100,000 colonies/mL   Klebsiella pneumoniae    Susceptibility     Klebsiella pneumoniae     Antibiotic Interpretation Sensitivity Method Status   AMPICILLIN Resistant >=32 ug/mL ANTONI Final    Intrinsically Resistant   AMPICILLIN/SULBACTAM Sensitive 4 ug/mL ANTONI " Final   CEFAZOLIN Sensitive <=4 ug/mL ANTONI Final    Cefazolin ANTONI breakpoints are for the treatment of uncomplicated urinary tract   infections.  For the treatment of systemic infections, please contact the   laboratory for additional testing.   CEFEPIME Sensitive <=1 ug/mL ANTONI Final   CEFOXITIN Sensitive <=4 ug/mL ANTONI Final   CEFTAZIDIME Sensitive <=1 ug/mL ANTONI Final   CEFTRIAXONE Sensitive <=1 ug/mL ANTONI Final   CIPROFLOXACIN Sensitive <=0.25 ug/mL ANTONI Final   GENTAMICIN Sensitive <=1 ug/mL ANTONI Final   LEVOFLOXACIN Sensitive <=0.12 ug/mL ANTONI Final   NITROFURANTOIN Intermediate 64 ug/mL ANOTNI Final   Piperacillin/Tazo Sensitive <=4 ug/mL ANTOIN Final   TOBRAMYCIN Sensitive <=1 ug/mL ANTONI Final   Trimethoprim/Sulfa Sensitive <=1/19 ug/mL ANTONI Final

## 2019-07-08 ENCOUNTER — TELEPHONE (OUTPATIENT)
Dept: ENDOCRINOLOGY | Facility: CLINIC | Age: 34
End: 2019-07-08

## 2019-07-08 DIAGNOSIS — E10.65 TYPE 1 DIABETES MELLITUS WITH HYPERGLYCEMIA (H): Primary | ICD-10-CM

## 2019-07-08 NOTE — TELEPHONE ENCOUNTER
Message from [facility] pharmacy in [facility] is not covered by the patient's insurance. Please advise on change to preferred medication Lantus and send new prescription. Note states the patient is currently out of medication.    Mary Kate Sanchez [facility]  Adult Endocrinology  [facility]

## 2019-07-08 NOTE — TELEPHONE ENCOUNTER
Patient informed of insulin change. She will use up current supply of Basaglar and then make change.    Maggy Gaona LPN  Diabetes Clinic Coordinator   Adult Endocrinology and Pediatric Specialty Clinics  [facility]

## 2019-07-11 NOTE — PROGRESS NOTES
Diabetes Self Management Training: Follow-up Visit    Vickie Duque presents today for evaluation of glucose control related to Type 1 diabetes.    She is accompanied by self    Patient's diabetes management related comments/concerns: None at this time.     Patient's emotional response to diabetes: expresses readiness to learn    Patient would like this visit to be focused around the following diabetes-related behaviors and goals: Taking Medication    ASSESSMENT:  Patient presents to clinic to meet with Cde for bg review. Pt met with Cde on 05/15/19 for Dexcom G6 training. Pt diagnosed with Type 1 Diabetes at age 18. Currently doing mdi therapy.     Current Diabetes Management per Patient:  Taking diabetes medications? yes:  See below.   Diabetes Medication(s)     Diabetic Other       glucagon 1 MG SOLR injection    Inject 1 mg Subcutaneous every 15 minutes as needed for low blood sugar    Insulin       insulin glargine (LANTUS SOLOSTAR PEN) 100 UNIT/ML pen    Inject 22 units daily + 2 units for priming of pen     insulin lispro (ADMELOG SOLOSTAR) 100 UNIT/ML pen    Sig - Route: Inject 1-7 Units Subcutaneous 3 times daily (before meals) Do Not take Correction Insulin if Pre-Meal BG < than 140.   140 - 189 give 1 unit   190 - 239 give 2 units   240 - 289 give 3 units   290 - 339 give 4 units   340- 399 give 5 units   400-449 give 6 units   Greater than or equal to 450 give 7 units   Based on pre-meal blood glucose.     Notify clinic if glucose greater than or equal to 350 mg/dL after administration of correct        Do you have any difficulty affording your medications or glucose monitoring supplies?  No     Patient glucose self monitoring as follows: Using Dexcom G6 Cgms.  BG results: Per Sensor Download:   Ave B. 68% readings above target, 25% in target, 7% below target.   Pattern revealed of middle of the night hypoglycemia and hyperglycemia during the day, primarily post meal.     Patient's most recent  "  Lab Results   Component Value Date    A1C 10.6 03/19/2019       Nutrition:  Patient admits to drinking regular Mountain Dew all day long.     Vitals:  LMP 06/02/2019 (Exact Date)   Estimated body mass index is 27 kg/m  as calculated from the following:    Height as of 1/2/19: 1.645 m (5' 4.75\").    Weight as of 6/27/19: 73 kg (161 lb).   Last 3 BP:   BP Readings from Last 3 Encounters:   06/27/19 123/70   04/23/19 118/77   03/19/19 127/80       History   Smoking Status     Current Every Day Smoker     Packs/day: 1.00     Years: 16.00     Types: Cigarettes   Smokeless Tobacco     Never Used       Labs:  Lab Results   Component Value Date    A1C 10.6 03/19/2019     Lab Results   Component Value Date     06/27/2019     Lab Results   Component Value Date     08/09/2018     01/29/2016     HDL Cholesterol   Date Value Ref Range Status   01/29/2016 55 >49 mg/dL Final   ]  GFR Estimate   Date Value Ref Range Status   06/27/2019 79 >60 mL/min/[1.73_m2] Final     Comment:     Non  GFR Calc  Starting 12/18/2018, serum creatinine based estimated GFR (eGFR) will be   calculated using the Chronic Kidney Disease Epidemiology Collaboration   (CKD-EPI) equation.       GFR Estimate If Black   Date Value Ref Range Status   06/27/2019 >90 >60 mL/min/[1.73_m2] Final     Comment:      GFR Calc  Starting 12/18/2018, serum creatinine based estimated GFR (eGFR) will be   calculated using the Chronic Kidney Disease Epidemiology Collaboration   (CKD-EPI) equation.       Lab Results   Component Value Date    CR 0.94 06/27/2019     Cultural Influences/Ethnic Background:  American    Health Beliefs and Attitudes:   Patient Activation Measure Survey Score:  BYRON Score (Last Two) 3/16/2012   BYRON Raw Score 42   Activation Score 66   BYRON Level 3     Barriers to Learning: None    INTERVENTION:   Education provided today on:  Healthy Eating: discussed effects of Mountain Dew on bg levels. "     Opportunities for ongoing education and support in diabetes-self management were discussed.    Pt verbalized understanding of concepts discussed and recommendations provided today.       Education Materials Provided:  No new materials provided today    PLAN:  - Healthy Eating: Pt agrees to drink Mountain Dew only with meals, count those carbs and take insulin for carbs consumed. Pt agrees to substitute ICE beverage for regular Mountain Dew when needing to sip on a beverage during the day.   - Taking Medication: Decrease Basaglar from 20 to 18 units daily due to hypoglycemia in the middle of the night.     FOLLOW-UP:  Keep f/u appt with Endo in July.     Ongoing plan for education and support: Return to see Cde in Sept.     Time Spent: 45 minutes  Encounter Type: Individual    Any diabetes medication dose changes were made via the CDE Protocol and Collaborative Practice Agreement with the patient's endocrinology provider. A copy of this encounter was shared with the provider.    Vickie Duque comes into clinic today at the request of Dr Rhoades, Ordering Provider for Pt Teaching and bg review.     This service provided today was under the supervising provider of the day Dr Cao, who was available if needed.    Haydee Rojas, RN, BSN, CDE   [facility]

## 2019-07-22 ENCOUNTER — OFFICE VISIT (OUTPATIENT)
Dept: ENDOCRINOLOGY | Facility: CLINIC | Age: 34
End: 2019-07-22
Payer: COMMERCIAL

## 2019-07-22 VITALS
TEMPERATURE: 98.6 F | DIASTOLIC BLOOD PRESSURE: 74 MMHG | WEIGHT: 158 LBS | HEART RATE: 82 BPM | OXYGEN SATURATION: 100 % | SYSTOLIC BLOOD PRESSURE: 108 MMHG | BODY MASS INDEX: 26.5 KG/M2

## 2019-07-22 DIAGNOSIS — E10.319 TYPE 1 DIABETES MELLITUS WITH RETINOPATHY, MACULAR EDEMA PRESENCE UNSPECIFIED, UNSPECIFIED LATERALITY, UNSPECIFIED RETINOPATHY SEVERITY (H): ICD-10-CM

## 2019-07-22 DIAGNOSIS — E10.65 UNCONTROLLED TYPE 1 DIABETES MELLITUS WITH HYPERGLYCEMIA (H): ICD-10-CM

## 2019-07-22 DIAGNOSIS — E10.65 TYPE 1 DIABETES MELLITUS WITH HYPERGLYCEMIA (H): ICD-10-CM

## 2019-07-22 LAB — HBA1C MFR BLD: 8.9 % (ref 0–5.6)

## 2019-07-22 PROCEDURE — 99214 OFFICE O/P EST MOD 30 MIN: CPT | Performed by: INTERNAL MEDICINE

## 2019-07-22 PROCEDURE — 36415 COLL VENOUS BLD VENIPUNCTURE: CPT | Performed by: INTERNAL MEDICINE

## 2019-07-22 PROCEDURE — 83036 HEMOGLOBIN GLYCOSYLATED A1C: CPT | Performed by: INTERNAL MEDICINE

## 2019-07-22 RX ORDER — INSULIN LISPRO 100 U/ML
INJECTION, SOLUTION SUBCUTANEOUS
Qty: 45 ML | Refills: 1 | Status: SHIPPED | OUTPATIENT
Start: 2019-07-22 | End: 2019-07-23

## 2019-07-22 NOTE — LETTER
7/22/2019         RE: Vikcie Duque  [address] [facility] [address]        Dear Colleague,    Thank you for referring your patient, Vickie Duque, to the [facility]. Please see a copy of my visit note below.    Endocrinology Clinic Visit    Chief Complaint: Diabetes     Information obtained from:Patient    Subjective:         HPI: Vickie Duque is a 33 year old female with history of type 1 diabetes who is here for routine follow up.     Type 1 diabetes at the age of 18; the patient has been following up with her primary care physician regarding her diabetes care. She recently established care with us.    Current diabetic medications are: Insulin glargine 16 units daily, mealtime insulin 1 units for every 15 g of carbohydrates and a correction dose of insulin 1 unit for every 50 above 140.      Lab Results   Component Value Date    A1C 10.6 03/19/2019    A1C 11.6 12/20/2018    A1C 11.2 08/09/2018    A1C 8.9 11/13/2017    A1C 10.0 10/07/2017     Her A1c today is 8.9 which is improved compared to what it was previously.  She has Dexcom  She has diabetic retinopathy.    She has Dexcom clarity which was downloaded today :  Days with CGM 79%  Average blood sugar for the.  Was 182 with a standard deviation of 85.  Overall the pattern looks like she has had blood sugars between 11 AM to 6 PM.  Overall her blood sugars were within the target range 42% of the time.  She was above range 49% of the time.  She has had blood sugars below 70 about 4% of the time.  The low blood sugars were morning lows and that was prior to reducing her insulin glargine 16 units daily.  Previously she was on 22 units daily which was further reduced to 18 units and then now to 16 units daily.  Since the dose adjustment she did not have any further low numbers in the morning.    On her best day she had average blood sugar of 122 with a standard deviation of 55.  She had target blood sugar 62% of the time.  No severe  hypoglycemia.      Review of system reports runny nose, sore throat, cough productive of yellowish sputum without chest pain or shortness of breath.      Allergies   Allergen Reactions     No Known Drug Allergies        Current Outpatient Medications   Medication Sig Dispense Refill     Continuous Blood Gluc  (DEXCOM G6 ) HAYES 1 each continuous 1 Device 0     Continuous Blood Gluc Sensor (DEXCOM G6 SENSOR) MISC 1 each See Admin Instructions Change sensor every 10 days. 3 each 11     Continuous Blood Gluc Transmit (DEXCOM G6 TRANSMITTER) MISC 1 each every 3 months 1 each 3     ibuprofen (ADVIL/MOTRIN) 600 MG tablet Take 1 tablet (600 mg) by mouth every 8 hours as needed for moderate pain 20 tablet 0     insulin glargine (LANTUS SOLOSTAR PEN) 100 UNIT/ML pen Inject 22 units daily + 2 units for priming of pen 30 mL 3     insulin lispro (ADMELOG SOLOSTAR) 100 UNIT/ML pen Uses up to 40 units daily for carb coverage, correction, and priming 45 mL 1     insulin lispro (ADMELOG SOLOSTAR) 100 UNIT/ML pen Sig - Route: Inject 1-7 Units Subcutaneous 3 times daily (before meals) Do Not take Correction Insulin if Pre-Meal BG < than 140.   140 - 189 give 1 unit   190 - 239 give 2 units   240 - 289 give 3 units   290 - 339 give 4 units   340- 399 give 5 units   400-449 give 6 units   Greater than or equal to 450 give 7 units   Based on pre-meal blood glucose.     Notify clinic if glucose greater than or equal to 350 mg/dL after administration of correct 3 mL 0     insulin pen needle (BD TARA U/F) 32G X 4 MM miscellaneous Use 3 daily or as directed. 270 each 3     glucagon 1 MG SOLR injection Inject 1 mg Subcutaneous every 15 minutes as needed for low blood sugar (Patient not taking: Reported on 7/22/2019) 2 each 0       Review of Systems     per HPI above    Past Medical History:   Diagnosis Date     Adjustment disorder with anxious mood 11/23/2015     Anxiety 11/27/2015     ASCUS of cervix with negative high risk  HPV 06/19/2008     Depressive disorder, not elsewhere classified      Diabetic eye exam (H) 12/19/14     Mixed hyperlipidemia 11/30/2006     Regional enteritis of unspecified site     Ulcerative Colitis     Type I (juvenile type) diabetes mellitus with ketoacidosis, not stated as uncontrolled(250.11) 01/01/2007    Moderately severe intensity.     Type I (juvenile type) diabetes mellitus with ketoacidosis, uncontrolled 02/14/08     Type I (juvenile type) diabetes mellitus without mention of complication, not stated as uncontrolled     diagnosed in 2003     Ulcerative colitis, unspecified     remission. Last flare 6 yrs ago.        Past Surgical History:   Procedure Laterality Date     DILATION AND CURETTAGE SUCTION  4/2010    miscarriage     HC COLONOSCOPY W BIOPSY  08/16/06     HC COLONOSCOPY W/WO BRUSH/WASH       TUBAL LIGATION         Family History   Problem Relation Age of Onset     Hypertension Father      Diabetes Maternal Grandmother      Cancer Maternal Grandmother      Diabetes Paternal Grandfather      Diabetes Maternal Uncle      Diabetes Maternal Aunt        Social History     Socioeconomic History     Marital status: Single     Spouse name: None     Number of children: 1     Years of education: 12     Highest education level: None   Occupational History     Occupation: Nursing assistant   Social Needs     Financial resource strain: None     Food insecurity:     Worry: None     Inability: None     Transportation needs:     Medical: None     Non-medical: None   Tobacco Use     Smoking status: Current Every Day Smoker     Packs/day: 1.00     Years: 16.00     Pack years: 16.00     Types: Cigarettes     Smokeless tobacco: Never Used   Substance and Sexual Activity     Alcohol use: Yes     Alcohol/week: 0.0 oz     Comment: rare     Drug use: No     Sexual activity: Yes     Partners: Male     Birth control/protection: Surgical, Female Surgical     Comment: tubal ligation   Lifestyle     Physical activity:      Days per week: None     Minutes per session: None     Stress: None   Relationships     Social connections:     Talks on phone: None     Gets together: None     Attends Christian service: None     Active member of club or organization: None     Attends meetings of clubs or organizations: None     Relationship status: None     Intimate partner violence:     Fear of current or ex partner: None     Emotionally abused: None     Physically abused: None     Forced sexual activity: None   Other Topics Concern      Service No     Blood Transfusions No     Caffeine Concern Yes     Comment: Advised not more than 16 ounces/day     Occupational Exposure Yes     Comment: [facility] - student online classes     Hobby Hazards No     Sleep Concern No     Stress Concern Yes     Weight Concern No     Special Diet Yes     Comment: IDDM Type I     Back Care Yes     Comment: work-related about 1 year ago     Exercise Yes     Comment: Active at work     Bike Helmet No     Seat Belt Yes     Self-Exams No     Parent/sibling w/ CABG, MI or angioplasty before 65F 55M? Not Asked   Social History Narrative    Lives in [place] with Rod gandhi and her son and their daughter.   [place] and Rod smoke.   No indoor cats/kittens.   No concerns about domestic violence.       Objective:   /74 (BP Location: Left arm, Patient Position: Sitting, Cuff Size: Adult Regular)   Pulse 82   Temp 98.6  F (37  C)   Wt 71.7 kg (158 lb)   SpO2 100%   BMI 26.50 kg/m     Constitutional: Pleasant no acute cardiopulmonary distress.   cardiovascular: RRR, S1, S2 normal.   Pulmonary/Chest: CTAB. No wheezing or rales.   Abdominal: +BS. Non tender to palpation.  Stretch marks:   Neurological: Alert and oriented.  No tremor and reflexes are symmetrical bilaterally and within the normal limits. Muscle strength 5/5.   Psychological: appropriate mood and affect   In House Labs:   Lab Results   Component Value Date    A1C 10.6 03/19/2019     A1C 11.6 12/20/2018    A1C 11.2 08/09/2018    A1C 8.9 11/13/2017    A1C 10.0 10/07/2017       TSH   Date Value Ref Range Status   03/19/2019 2.95 0.40 - 4.00 mU/L Final   10/07/2017 2.57 0.40 - 4.00 mU/L Final   06/26/2017 6.05 (H) 0.40 - 4.00 mU/L Final   03/05/2014 3.97 0.4 - 5.0 mU/L Final   03/16/2012 3.75 0.4 - 5.0 mU/L Final     T4 Free   Date Value Ref Range Status   06/26/2017 1.02 0.76 - 1.46 ng/dL Final   03/28/2006 1.10 0.70 - 1.85 ng/dL Final       Creatinine   Date Value Ref Range Status   06/27/2019 0.94 0.52 - 1.04 mg/dL Final   ]    Recent Labs   Lab Test 08/09/18  0935 01/29/16  0933 04/01/15  1143 07/24/14  0851   CHOL  --  207* 197 193   HDL  --  55 53 51   * 129* 121 126   TRIG  --  116 113 84   CHOLHDLRATIO  --   --  3.7 4.0     Results for RENY NAJERA (MRN [id number]) as of 3/20/2019 12:16   Ref. Range 3/19/2019 14:29   C-Peptide Latest Ref Range: 0.9 - 6.9 ng/mL <0.1 (L)   TSH Latest Ref Range: 0.40 - 4.00 mU/L 2.95   Glucose Latest Ref Range: 70 - 99 mg/dL 148 (H)       Assessment/Treatment Plan:      Type 1 diabetes uncontrolled with pre-proliferative diabetic retinopathy: Diagnosed at the age of 18 and has been on insulin since then.  Has not been following with endocrinologist previously.  A1c throughout the years in the range of 10 and 11.  Her A1c today was 8.9.  Current diabetic medications; insulin glargine 16  units daily, 1 units for every 15 g of carbohydrate with meals and correction was 1 unit for every 50 above 140.      Continuous glucose monitoring data was reviewed today: Overall high's following breakfast and lunchtime.  She has had lows in the morning; which has resolved but since her Lantus dose was reduced to 16 units daily.      Plan adjusted insulin as follows.  We are increasing mealtime carbohydrate coverage for lunch and breakfast.  We are also adjusting her correction dose from 1 unit for every 50 to 1 unit for every 40.    BASAGLAR INSULIN 16 UNITS DAILY.   Will consider changing her long-acting insulin to Tresiba(if she is going to have persistent low fasting blood sugars); lower hypoglycemia risk with this long-acting insulin.    Short acting insulin  1 unit for every 15 grams of carbohydrates with dinner and snacks.     At lunch time use 1 unit for every 12 grams of carbohydrates.     Breakfast time 1 unit for every 13 grams of carbohydrates   Please follow the following correction scale three times per day and at bedtime:  For Pre-Meal Blood glucose (BG) -179 give 1 unit.  -219 give 2 units.  -259 give 3 units.  -299 give 4 units.  -339 give 5 units.  BG >/= 340 give 6 units.    Upper respiratory tract infection: Vitals including temperature within normal limits.  Chest exam was unremarkable.  If worsening of symptoms advised to follow-up with her primary care physician    Patient Instructions   BASAGLAR INSULIN 16 UNITS DAILY.      Short acting insulin  1 unit for every 15 grams of carbohydrates with dinner and snacks.   At lunch time use 1 unit for every 12 grams of carbohydrates.   Breakfast time 1 unit for every 13 grams of carbohydrates      Please follow the following correction scale three times per day and at bedtime:  For Pre-Meal Blood glucose (BG) -179 give 1 unit.  -219 give 2 units.  -259 give 3 units.  -299 give 4 units.  -339 give 5 units.  BG >/= 340 give 6 units.        I will contact the patient with the test results.  Return to clinic in 3  month.    Test and/or medications prescribed today:  No orders of the defined types were placed in this encounter.        Siri Rhoades MD  Staff Endocrinologist    [phone]  Division of Endocrinology and Diabetes        Again, thank you for allowing me to participate in the care of your patient.        Sincerely,        Siri Rhoades MD

## 2019-07-22 NOTE — PROGRESS NOTES
Endocrinology Clinic Visit    Chief Complaint: Diabetes     Information obtained from:Patient    Subjective:         HPI: Vickie Duque is a 33 year old female with history of type 1 diabetes who is here for routine follow up.     Type 1 diabetes at the age of 18; the patient has been following up with her primary care physician regarding her diabetes care. She recently established care with us.    Current diabetic medications are: Insulin glargine 16 units daily, mealtime insulin 1 units for every 15 g of carbohydrates and a correction dose of insulin 1 unit for every 50 above 140.      Lab Results   Component Value Date    A1C 10.6 03/19/2019    A1C 11.6 12/20/2018    A1C 11.2 08/09/2018    A1C 8.9 11/13/2017    A1C 10.0 10/07/2017     Her A1c today is 8.9 which is improved compared to what it was previously.  She has Dexcom  She has diabetic retinopathy.    She has Dexcom clarity which was downloaded today :  Days with CGM 79%  Average blood sugar for the.  Was 182 with a standard deviation of 85.  Overall the pattern looks like she has had blood sugars between 11 AM to 6 PM.  Overall her blood sugars were within the target range 42% of the time.  She was above range 49% of the time.  She has had blood sugars below 70 about 4% of the time.  The low blood sugars were morning lows and that was prior to reducing her insulin glargine 16 units daily.  Previously she was on 22 units daily which was further reduced to 18 units and then now to 16 units daily.  Since the dose adjustment she did not have any further low numbers in the morning.    On her best day she had average blood sugar of 122 with a standard deviation of 55.  She had target blood sugar 62% of the time.  No severe hypoglycemia.      Review of system reports runny nose, sore throat, cough productive of yellowish sputum without chest pain or shortness of breath.      Allergies   Allergen Reactions     No Known Drug Allergies        Current Outpatient  Medications   Medication Sig Dispense Refill     Continuous Blood Gluc  (DEXCOM G6 ) HAYES 1 each continuous 1 Device 0     Continuous Blood Gluc Sensor (DEXCOM G6 SENSOR) MISC 1 each See Admin Instructions Change sensor every 10 days. 3 each 11     Continuous Blood Gluc Transmit (DEXCOM G6 TRANSMITTER) MISC 1 each every 3 months 1 each 3     ibuprofen (ADVIL/MOTRIN) 600 MG tablet Take 1 tablet (600 mg) by mouth every 8 hours as needed for moderate pain 20 tablet 0     insulin glargine (LANTUS SOLOSTAR PEN) 100 UNIT/ML pen Inject 22 units daily + 2 units for priming of pen 30 mL 3     insulin lispro (ADMELOG SOLOSTAR) 100 UNIT/ML pen Uses up to 40 units daily for carb coverage, correction, and priming 45 mL 1     insulin lispro (ADMELOG SOLOSTAR) 100 UNIT/ML pen Sig - Route: Inject 1-7 Units Subcutaneous 3 times daily (before meals) Do Not take Correction Insulin if Pre-Meal BG < than 140.   140 - 189 give 1 unit   190 - 239 give 2 units   240 - 289 give 3 units   290 - 339 give 4 units   340- 399 give 5 units   400-449 give 6 units   Greater than or equal to 450 give 7 units   Based on pre-meal blood glucose.     Notify clinic if glucose greater than or equal to 350 mg/dL after administration of correct 3 mL 0     insulin pen needle (BD TARA U/F) 32G X 4 MM miscellaneous Use 3 daily or as directed. 270 each 3     glucagon 1 MG SOLR injection Inject 1 mg Subcutaneous every 15 minutes as needed for low blood sugar (Patient not taking: Reported on 7/22/2019) 2 each 0       Review of Systems     per HPI above    Past Medical History:   Diagnosis Date     Adjustment disorder with anxious mood 11/23/2015     Anxiety 11/27/2015     ASCUS of cervix with negative high risk HPV 06/19/2008     Depressive disorder, not elsewhere classified      Diabetic eye exam (H) 12/19/14     Mixed hyperlipidemia 11/30/2006     Regional enteritis of unspecified site     Ulcerative Colitis     Type I (juvenile type) diabetes  mellitus with ketoacidosis, not stated as uncontrolled(250.11) 01/01/2007    Moderately severe intensity.     Type I (juvenile type) diabetes mellitus with ketoacidosis, uncontrolled 02/14/08     Type I (juvenile type) diabetes mellitus without mention of complication, not stated as uncontrolled     diagnosed in 2003     Ulcerative colitis, unspecified     remission. Last flare 6 yrs ago.        Past Surgical History:   Procedure Laterality Date     DILATION AND CURETTAGE SUCTION  4/2010    miscarriage     HC COLONOSCOPY W BIOPSY  08/16/06     HC COLONOSCOPY W/WO BRUSH/WASH       TUBAL LIGATION         Family History   Problem Relation Age of Onset     Hypertension Father      Diabetes Maternal Grandmother      Cancer Maternal Grandmother      Diabetes Paternal Grandfather      Diabetes Maternal Uncle      Diabetes Maternal Aunt        Social History     Socioeconomic History     Marital status: Single     Spouse name: None     Number of children: 1     Years of education: 12     Highest education level: None   Occupational History     Occupation: Nursing assistant   Social Needs     Financial resource strain: None     Food insecurity:     Worry: None     Inability: None     Transportation needs:     Medical: None     Non-medical: None   Tobacco Use     Smoking status: Current Every Day Smoker     Packs/day: 1.00     Years: 16.00     Pack years: 16.00     Types: Cigarettes     Smokeless tobacco: Never Used   Substance and Sexual Activity     Alcohol use: Yes     Alcohol/week: 0.0 oz     Comment: rare     Drug use: No     Sexual activity: Yes     Partners: Male     Birth control/protection: Surgical, Female Surgical     Comment: tubal ligation   Lifestyle     Physical activity:     Days per week: None     Minutes per session: None     Stress: None   Relationships     Social connections:     Talks on phone: None     Gets together: None     Attends Rastafari service: None     Active member of club or organization: None      Attends meetings of clubs or organizations: None     Relationship status: None     Intimate partner violence:     Fear of current or ex partner: None     Emotionally abused: None     Physically abused: None     Forced sexual activity: None   Other Topics Concern      Service No     Blood Transfusions No     Caffeine Concern Yes     Comment: Advised not more than 16 ounces/day     Occupational Exposure Yes     Comment: Visiting [facility] - student online classes     Hobby Hazards No     Sleep Concern No     Stress Concern Yes     Weight Concern No     Special Diet Yes     Comment: IDDM Type I     Back Care Yes     Comment: work-related about 1 year ago     Exercise Yes     Comment: Active at work     Bike Helmet No     Seat Belt Yes     Self-Exams No     Parent/sibling w/ CABG, MI or angioplasty before 65F 55M? Not Asked   Social History Narrative    Lives in [place] with Rod gandhi and her son and their daughter.   Vickie and Rod smoke.   No indoor cats/kittens.   No concerns about domestic violence.       Objective:   /74 (BP Location: Left arm, Patient Position: Sitting, Cuff Size: Adult Regular)   Pulse 82   Temp 98.6  F (37  C)   Wt 71.7 kg (158 lb)   SpO2 100%   BMI 26.50 kg/m    Constitutional: Pleasant no acute cardiopulmonary distress.   cardiovascular: RRR, S1, S2 normal.   Pulmonary/Chest: CTAB. No wheezing or rales.   Abdominal: +BS. Non tender to palpation.  Stretch marks:   Neurological: Alert and oriented.  No tremor and reflexes are symmetrical bilaterally and within the normal limits. Muscle strength 5/5.   Psychological: appropriate mood and affect   In House Labs:   Lab Results   Component Value Date    A1C 10.6 03/19/2019    A1C 11.6 12/20/2018    A1C 11.2 08/09/2018    A1C 8.9 11/13/2017    A1C 10.0 10/07/2017       TSH   Date Value Ref Range Status   03/19/2019 2.95 0.40 - 4.00 mU/L Final   10/07/2017 2.57 0.40 - 4.00 mU/L Final   06/26/2017 6.05 (H) 0.40 - 4.00  mU/L Final   03/05/2014 3.97 0.4 - 5.0 mU/L Final   03/16/2012 3.75 0.4 - 5.0 mU/L Final     T4 Free   Date Value Ref Range Status   06/26/2017 1.02 0.76 - 1.46 ng/dL Final   03/28/2006 1.10 0.70 - 1.85 ng/dL Final       Creatinine   Date Value Ref Range Status   06/27/2019 0.94 0.52 - 1.04 mg/dL Final   ]    Recent Labs   Lab Test 08/09/18  0935 01/29/16  0933 04/01/15  1143 07/24/14  0851   CHOL  --  207* 197 193   HDL  --  55 53 51   * 129* 121 126   TRIG  --  116 113 84   CHOLHDLRATIO  --   --  3.7 4.0     Results for RENY NAJERA (MRN [id number]) as of 3/20/2019 12:16   Ref. Range 3/19/2019 14:29   C-Peptide Latest Ref Range: 0.9 - 6.9 ng/mL <0.1 (L)   TSH Latest Ref Range: 0.40 - 4.00 mU/L 2.95   Glucose Latest Ref Range: 70 - 99 mg/dL 148 (H)       Assessment/Treatment Plan:      Type 1 diabetes uncontrolled with pre-proliferative diabetic retinopathy: Diagnosed at the age of 18 and has been on insulin since then.  Has not been following with endocrinologist previously.  A1c throughout the years in the range of 10 and 11.  Her A1c today was 8.9.  Current diabetic medications; insulin glargine 16  units daily, 1 units for every 15 g of carbohydrate with meals and correction was 1 unit for every 50 above 140.      Continuous glucose monitoring data was reviewed today: Overall high's following breakfast and lunchtime.  She has had lows in the morning; which has resolved but since her Lantus dose was reduced to 16 units daily.      Plan adjusted insulin as follows.  We are increasing mealtime carbohydrate coverage for lunch and breakfast.  We are also adjusting her correction dose from 1 unit for every 50 to 1 unit for every 40.    BASAGLAR INSULIN 16 UNITS DAILY.  Will consider changing her long-acting insulin to Tresiba(if she is going to have persistent low fasting blood sugars); lower hypoglycemia risk with this long-acting insulin.    Short acting insulin  1 unit for every 15 grams of carbohydrates  with dinner and snacks.     At lunch time use 1 unit for every 12 grams of carbohydrates.     Breakfast time 1 unit for every 13 grams of carbohydrates   Please follow the following correction scale three times per day and at bedtime:  For Pre-Meal Blood glucose (BG) -179 give 1 unit.  -219 give 2 units.  -259 give 3 units.  -299 give 4 units.  -339 give 5 units.  BG >/= 340 give 6 units.    Upper respiratory tract infection: Vitals including temperature within normal limits.  Chest exam was unremarkable.  If worsening of symptoms advised to follow-up with her primary care physician    Patient Instructions   BASAGLAR INSULIN 16 UNITS DAILY.      Short acting insulin  1 unit for every 15 grams of carbohydrates with dinner and snacks.   At lunch time use 1 unit for every 12 grams of carbohydrates.   Breakfast time 1 unit for every 13 grams of carbohydrates      Please follow the following correction scale three times per day and at bedtime:  For Pre-Meal Blood glucose (BG) -179 give 1 unit.  -219 give 2 units.  -259 give 3 units.  -299 give 4 units.  -339 give 5 units.  BG >/= 340 give 6 units.        I will contact the patient with the test results.  Return to clinic in 3  month.    Test and/or medications prescribed today:  No orders of the defined types were placed in this encounter.        Siri Rhoades MD  Staff Endocrinologist    [phone]  Division of Endocrinology and Diabetes

## 2019-07-22 NOTE — PATIENT INSTRUCTIONS
BASAGLAR INSULIN 16 UNITS DAILY.      Short acting insulin  1 unit for every 15 grams of carbohydrates with dinner and snacks.   At lunch time use 1 unit for every 12 grams of carbohydrates.   Breakfast time 1 unit for every 13 grams of carbohydrates      Please follow the following correction scale three times per day and at bedtime:  For Pre-Meal Blood glucose (BG) -179 give 1 unit.  -219 give 2 units.  -259 give 3 units.  -299 give 4 units.  -339 give 5 units.  BG >/= 340 give 6 units.

## 2019-07-22 NOTE — NURSING NOTE
Vickie Duque's goals for this visit include:   Chief Complaint   Patient presents with     Diabetes     She requests these members of her care team be copied on today's visit information: Yes    PCP: Lelo Marmolejo    Referring Provider:  RAJESH Batista CNP  [address] [place] [address]    /74 (BP Location: Left arm, Patient Position: Sitting, Cuff Size: Adult Regular)   Pulse 82   Wt 71.7 kg (158 lb)   SpO2 100%   BMI 26.50 kg/m      Do you need any medication refills at today's visit? Yes

## 2019-07-23 ENCOUNTER — TELEPHONE (OUTPATIENT)
Dept: ENDOCRINOLOGY | Facility: CLINIC | Age: 34
End: 2019-07-23

## 2019-07-23 DIAGNOSIS — E10.65 TYPE 1 DIABETES MELLITUS WITH HYPERGLYCEMIA (H): Primary | ICD-10-CM

## 2019-07-23 RX ORDER — INSULIN ASPART 100 [IU]/ML
INJECTION, SOLUTION INTRAVENOUS; SUBCUTANEOUS
Qty: 45 ML | Refills: 3 | Status: CANCELLED | OUTPATIENT
Start: 2019-07-23

## 2019-07-23 RX ORDER — INSULIN ASPART 100 [IU]/ML
INJECTION, SOLUTION INTRAVENOUS; SUBCUTANEOUS
Qty: 45 ML | Refills: 3 | Status: SHIPPED | OUTPATIENT
Start: 2019-07-23 | End: 2019-10-14

## 2019-07-23 NOTE — TELEPHONE ENCOUNTER
Prescription changed to Novolog. Patient updated.    Maggy Gaona LPN  Diabetes Clinic Coordinator   Adult Endocrinology and Pediatric Specialty Clinics  [facility]

## 2019-07-23 NOTE — TELEPHONE ENCOUNTER
Left message for patient to return call.    Maggy Gaona LPN  Diabetes Clinic Coordinator  Adult Endocrinology and Pediatric Specialty Clinics  [facility]

## 2019-07-23 NOTE — TELEPHONE ENCOUNTER
Central Prior Authorization Team   Phone: [phone]      [facility] requires a prior authorization, would you like to proceed or change the medication?

## 2019-07-28 ENCOUNTER — HOSPITAL ENCOUNTER (EMERGENCY)
Facility: CLINIC | Age: 34
Discharge: HOME | End: 2019-07-28
Attending: PHYSICIAN ASSISTANT | Admitting: PHYSICIAN ASSISTANT
Payer: COMMERCIAL

## 2019-07-28 VITALS
BODY MASS INDEX: 26.16 KG/M2 | DIASTOLIC BLOOD PRESSURE: 65 MMHG | OXYGEN SATURATION: 99 % | WEIGHT: 156 LBS | TEMPERATURE: 98.2 F | HEART RATE: 80 BPM | SYSTOLIC BLOOD PRESSURE: 105 MMHG | RESPIRATION RATE: 16 BRPM

## 2019-07-28 DIAGNOSIS — J20.9 ACUTE BRONCHITIS: ICD-10-CM

## 2019-07-28 PROCEDURE — 99284 EMERGENCY DEPT VISIT MOD MDM: CPT | Mod: Z6 | Performed by: PHYSICIAN ASSISTANT

## 2019-07-28 PROCEDURE — 99282 EMERGENCY DEPT VISIT SF MDM: CPT | Performed by: PHYSICIAN ASSISTANT

## 2019-07-28 RX ORDER — AZITHROMYCIN 250 MG/1
TABLET, FILM COATED ORAL
Qty: 6 TABLET | Refills: 0 | Status: SHIPPED | OUTPATIENT
Start: 2019-07-28 | End: 2019-10-14

## 2019-07-28 RX ORDER — ALBUTEROL SULFATE 90 UG/1
2 AEROSOL, METERED RESPIRATORY (INHALATION) EVERY 4 HOURS PRN
Qty: 1 INHALER | Refills: 0 | Status: SHIPPED | OUTPATIENT
Start: 2019-07-28 | End: 2020-11-23

## 2019-07-28 NOTE — ED AVS SNAPSHOT
[facility] Emergency Department  [address] [place] [address]  Phone:  [phone]  Fax:  [phone]                                    Vickie Duque   MRN: [id number]    Department:  [facility] Emergency Department   Date of Visit:  7/28/2019           After Visit Summary Signature Page    I have received my discharge instructions, and my questions have been answered. I have discussed any challenges I see with this plan with the nurse or doctor.    ..........................................................................................................................................  Patient/Patient Representative Signature      ..........................................................................................................................................  Patient Representative Print Name and Relationship to Patient    ..................................................               ................................................  Date                                   Time    ..........................................................................................................................................  Reviewed by Signature/Title    ...................................................              ..............................................  Date                                               Time          22EPIC Rev 08/18

## 2019-07-29 NOTE — ED TRIAGE NOTES
"Pt presents with runny nose, sore throat and productive cough for 3 weeks, states that she is coughing up \"brownish-yellow\" phelgm, feels run down.   "

## 2019-07-29 NOTE — ED PROVIDER NOTES
History     Chief Complaint   Patient presents with     URI     HPI  Vickie Duque is a 33 year old female who presents for evaluation of a cough for the past 3 weeks which has progressively been worsening.  She feels fatigued.  Cough productive of a yellow/green phlegm at times.  She is a 1 pack/day smoker on a regular basis.  Notes purulent rhinorrhea and some frontal sinus congestion developing over the last 2 to 3 days as well.  No fevers or chills.  No prior history of pneumonia.  Denies any dyspnea.  No lower extremity edema or calf pain.    Allergies:  Allergies   Allergen Reactions     No Known Drug Allergies        Problem List:    Patient Active Problem List    Diagnosis Date Noted     Hyperglycemia 10/08/2017     Priority: Medium     Major depressive disorder, recurrent episode, moderate (H) 10/04/2016     Priority: Medium     Ulcerative colitis without complications (H) 01/29/2016     Priority: Medium     Anxiety 11/27/2015     Priority: Medium     Noncompliance with medication regimen 11/03/2015     Priority: Medium     Uncontrolled type 1 diabetes mellitus (H) 05/30/2014     Priority: Medium     Problem list name updated by automated process. 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 EMB normal. Plan: cotest in 3 years.       Sprain of back 03/10/2008     Priority: Medium     Problem list name updated by automated process. Provider to review          Past Medical History:    Past Medical History:   Diagnosis Date     Adjustment disorder with anxious mood 11/23/2015     Anxiety 11/27/2015     ASCUS of cervix with negative high risk HPV 06/19/2008     Depressive disorder, not elsewhere classified      Diabetic eye exam (H) 12/19/14     Mixed hyperlipidemia 11/30/2006     Regional enteritis of unspecified site      Type I (juvenile type) diabetes mellitus with ketoacidosis, not stated as uncontrolled(250.11) 01/01/2007     Type I (juvenile type) diabetes mellitus with ketoacidosis, uncontrolled 02/14/08     Type I (juvenile type) diabetes mellitus without mention of complication, not stated as uncontrolled      Ulcerative colitis, unspecified        Past Surgical History:    Past Surgical History:   Procedure Laterality Date     DILATION AND CURETTAGE SUCTION  4/2010    miscarriage     HC COLONOSCOPY W BIOPSY  08/16/06     HC COLONOSCOPY W/WO BRUSH/WASH       TUBAL LIGATION         Family History:    Family History   Problem Relation Age of Onset     Hypertension Father      Diabetes Maternal Grandmother      Cancer Maternal Grandmother      Diabetes Paternal Grandfather      Diabetes Maternal Uncle      Diabetes Maternal Aunt        Social History:  Marital Status:  Single [1]  Social History     Tobacco Use     Smoking status: Current Every Day Smoker     Packs/day: 1.00     Years: 16.00     Pack years: 16.00     Types: Cigarettes     Smokeless tobacco: Never Used   Substance Use Topics     Alcohol use: Yes     Alcohol/week: 0.0 oz     Comment: occassionally     Drug use: No        Medications:      albuterol (PROAIR HFA/PROVENTIL HFA/VENTOLIN HFA) 108 (90 Base) MCG/ACT inhaler   azithromycin (ZITHROMAX Z-JUDIT) 250 MG tablet   Continuous Blood Gluc   (DEXCOM G6 ) HAYES   Continuous Blood Gluc Sensor (DEXCOM G6 SENSOR) MISC   Continuous Blood Gluc Transmit (DEXCOM G6 TRANSMITTER) MISC   glucagon 1 MG SOLR injection   ibuprofen (ADVIL/MOTRIN) 600 MG tablet   insulin glargine (LANTUS SOLOSTAR PEN) 100 UNIT/ML pen   insulin pen needle (BD TARA U/F) 32G X 4 MM miscellaneous   NOVOLOG FLEXPEN 100 UNIT/ML soln         Review of Systems   All other systems reviewed and are negative.      Physical Exam   BP: 105/65  Pulse: 80  Temp: 98.2  F (36.8  C)  Resp: 16  Weight: 70.8 kg (156 lb)  SpO2: 99 %      Physical Exam   Constitutional: She is oriented to person, place, and time. No distress.   HENT:   Head: Normocephalic and atraumatic.   Right Ear: External ear normal.   Left Ear: External ear normal.   Nose: Nose normal.   Mouth/Throat: Oropharynx is clear and moist. No oropharyngeal exudate.   Eyes: Pupils are equal, round, and reactive to light. Conjunctivae and EOM are normal. Right eye exhibits no discharge. Left eye exhibits no discharge. No scleral icterus.   Neck: Normal range of motion. Neck supple. No thyromegaly present.   Cardiovascular: Normal rate, regular rhythm and normal heart sounds.   No murmur heard.  Pulmonary/Chest: Effort normal and breath sounds normal. No respiratory distress. She has no wheezes. She has no rales. She exhibits no tenderness.   Abdominal: Soft. Bowel sounds are normal. She exhibits no distension and no mass. There is no tenderness. There is no rebound and no guarding.   Musculoskeletal: Normal range of motion. She exhibits no edema, tenderness or deformity.   Lymphadenopathy:     She has no cervical adenopathy.   Neurological: She is alert and oriented to person, place, and time. No cranial nerve deficit.   Skin: Skin is warm and dry. Capillary refill takes less than 2 seconds. No rash noted. She is not diaphoretic. No erythema.   Psychiatric: She has a normal mood and affect. Her behavior is normal. Thought content normal.    Nursing note and vitals reviewed.      ED Course        Procedures               Critical Care time:  none               No results found for this or any previous visit (from the past 24 hour(s)).    Medications - No data to display    Assessments & Plan (with Medical Decision Making)  Acute bronchitis     33 year old smoker who presents for evaluation of progressively worsening respiratory symptoms of the past 3 weeks including a productive cough of yellow/green phlegm, purulent rhinorrhea, and some frontal sinus congestion.  On exam she is afebrile.  Exam is essentially normal.  Given her prolonged illness and the fact that she is a smoker, I think it is wise to treat her with antibiotic therapy in the form of a azithromycin as well as an albuterol MDI.  Conservative care measures discussed.  Push clear fluids.  Increase rest.  The importance of smoking cessation reviewed with her in great detail.  Indications for return reviewed with her.  Patient was in agreement with this plan and was suitable for discharge.     I have reviewed the nursing notes.    I have reviewed the findings, diagnosis, plan and need for follow up with the patient.          Medication List      Started    albuterol 108 (90 Base) MCG/ACT inhaler  Commonly known as:  PROAIR HFA/PROVENTIL HFA/VENTOLIN HFA  2 puffs, Inhalation, EVERY 4 HOURS PRN     azithromycin 250 MG tablet  Commonly known as:  ZITHROMAX Z-JUDIT  Two tablets on the first day, then one tablet daily for the next 4 days            Final diagnoses:   Acute bronchitis       Disclaimer: This note consists of symbols derived from keyboarding, dictation and/or voice recognition software. As a result, there may be errors in the script that have gone undetected. Please consider this when interpreting information found in this chart.      7/28/2019   Dangelo Doshi PA-C   [facility] EMERGENCY DEPARTMENT     Dangelo Doshi PA-C  07/29/19 0155

## 2019-09-03 ENCOUNTER — TELEPHONE (OUTPATIENT)
Dept: FAMILY MEDICINE | Facility: CLINIC | Age: 34
End: 2019-09-03

## 2019-09-03 NOTE — TELEPHONE ENCOUNTER
Panel Management Review      Patient has the following on her problem list:     Depression / Dysthymia review    Measure:  Needs PHQ-9 score of 4 or less during index window.  Administer PHQ-9 and if score is 5 or more, send encounter to provider for next steps.    5 - 7 month window range:     PHQ-9 SCORE 11/13/2017 8/9/2018 12/20/2018   PHQ-9 Total Score 8 10 7       If PHQ-9 recheck is 5 or more, route to provider for next steps.    Patient is due for:  PHQ9    Diabetes    ASA: Passed    Last A1C  Lab Results   Component Value Date    A1C 8.9 07/22/2019    A1C 10.6 03/19/2019    A1C 11.6 12/20/2018    A1C 11.2 08/09/2018    A1C 8.9 11/13/2017     A1C tested: FAILED    Last LDL:    Lab Results   Component Value Date    CHOL 207 01/29/2016     Lab Results   Component Value Date    HDL 55 01/29/2016     Lab Results   Component Value Date     08/09/2018     01/29/2016     Lab Results   Component Value Date    TRIG 116 01/29/2016     Lab Results   Component Value Date    CHOLHDLRATIO 3.7 04/01/2015     Lab Results   Component Value Date    NHDL 152 01/29/2016       Is the patient on a Statin? NO             Is the patient on Aspirin? NO        Last three blood pressure readings:  BP Readings from Last 3 Encounters:   07/28/19 105/65   07/22/19 108/74   06/27/19 123/70       Date of last diabetes office visit: 01/02/2019     Tobacco History:     History   Smoking Status     Current Every Day Smoker     Packs/day: 1.00     Years: 16.00     Types: Cigarettes   Smokeless Tobacco     Never Used           Composite cancer screening  Chart review shows that this patient is due/due soon for the following None  Summary:    Patient is due/failing the following:   A1C, LDL and PHQ9    Action needed:   Patient needs office visit for physical/ DM recheck. and Patient needs to do PHQ9.    Type of outreach:    Sent letter.    Questions for provider review:    None                                                                                                                                     Mara Lindsey [facility]      Chart routed to Care Team .

## 2019-09-03 NOTE — LETTER
[address] [address]  [phone]        September 3, 2019    Vickie Duque  [address] [place] [id number]          Dear Vickie,    We were looking through you chart and noticed that you haven't been seen in a while, we would like to schedule an office visit for an annual physical and diabetes recheck when you have time. Please give the clinic a call at [phone] and schedule at your earliest convienence.  Thank you so much, and we look forward to seeing you soon.     I have also attached a PHQ-9 (its our depression and anxiety screening) please fill this out and send it back with the enclosed pre-paid envelope.  Thank you so much for your time.         Sincerely,        Son Wright MD

## 2019-09-25 ENCOUNTER — TELEPHONE (OUTPATIENT)
Dept: FAMILY MEDICINE | Facility: CLINIC | Age: 34
End: 2019-09-25

## 2019-09-25 NOTE — TELEPHONE ENCOUNTER
Patient is due for a PHQ-9.  Index start date:6/10/2019  Index end date:10/08/2019    Please call patient.

## 2019-09-27 ENCOUNTER — TRANSFERRED RECORDS (OUTPATIENT)
Dept: HEALTH INFORMATION MANAGEMENT | Facility: CLINIC | Age: 34
End: 2019-09-27

## 2019-09-28 ENCOUNTER — HEALTH MAINTENANCE LETTER (OUTPATIENT)
Age: 34
End: 2019-09-28

## 2019-10-02 ASSESSMENT — PATIENT HEALTH QUESTIONNAIRE - PHQ9: SUM OF ALL RESPONSES TO PHQ QUESTIONS 1-9: 0

## 2019-10-02 NOTE — TELEPHONE ENCOUNTER
I have contacted the pt and completed a PHQ-9.    PHQ-9 SCORE 10/2/2019   PHQ-9 Total Score 0     Gabrielle Galo CMA ([facility])

## 2019-10-14 ENCOUNTER — OFFICE VISIT (OUTPATIENT)
Dept: ENDOCRINOLOGY | Facility: CLINIC | Age: 34
End: 2019-10-14
Payer: COMMERCIAL

## 2019-10-14 ENCOUNTER — ALLIED HEALTH/NURSE VISIT (OUTPATIENT)
Dept: EDUCATION SERVICES | Facility: CLINIC | Age: 34
End: 2019-10-14
Payer: COMMERCIAL

## 2019-10-14 VITALS
OXYGEN SATURATION: 98 % | BODY MASS INDEX: 26.68 KG/M2 | SYSTOLIC BLOOD PRESSURE: 121 MMHG | WEIGHT: 159.1 LBS | DIASTOLIC BLOOD PRESSURE: 81 MMHG | HEART RATE: 70 BPM

## 2019-10-14 DIAGNOSIS — E10.319 TYPE 1 DIABETES MELLITUS WITH RETINOPATHY, MACULAR EDEMA PRESENCE UNSPECIFIED, UNSPECIFIED LATERALITY, UNSPECIFIED RETINOPATHY SEVERITY (H): Primary | ICD-10-CM

## 2019-10-14 DIAGNOSIS — E11.9 DIABETES MELLITUS WITHOUT COMPLICATION (H): Primary | ICD-10-CM

## 2019-10-14 DIAGNOSIS — E10.65 UNCONTROLLED TYPE 1 DIABETES MELLITUS WITH HYPERGLYCEMIA (H): ICD-10-CM

## 2019-10-14 DIAGNOSIS — E10.65 TYPE 1 DIABETES MELLITUS WITH HYPERGLYCEMIA (H): ICD-10-CM

## 2019-10-14 LAB — HBA1C MFR BLD: 8.3 % (ref 0–5.6)

## 2019-10-14 PROCEDURE — 83036 HEMOGLOBIN GLYCOSYLATED A1C: CPT | Performed by: INTERNAL MEDICINE

## 2019-10-14 PROCEDURE — 36415 COLL VENOUS BLD VENIPUNCTURE: CPT | Performed by: INTERNAL MEDICINE

## 2019-10-14 PROCEDURE — 99207 ZZC DROP WITH A PROCEDURE: CPT

## 2019-10-14 PROCEDURE — G0108 DIAB MANAGE TRN  PER INDIV: HCPCS

## 2019-10-14 PROCEDURE — 99213 OFFICE O/P EST LOW 20 MIN: CPT | Performed by: INTERNAL MEDICINE

## 2019-10-14 RX ORDER — INSULIN ASPART 100 [IU]/ML
INJECTION, SOLUTION INTRAVENOUS; SUBCUTANEOUS
Qty: 45 ML | Refills: 1 | Status: SHIPPED | OUTPATIENT
Start: 2019-10-14 | End: 2020-08-07

## 2019-10-14 NOTE — NURSING NOTE
Vickie Duque's goals for this visit include:   Chief Complaint   Patient presents with     Diabetes     She requests these members of her care team be copied on today's visit information: No    PCP: No Ref-Primary, Physician    Referring Provider:  No referring provider defined for this encounter.    /81 (BP Location: Left arm, Patient Position: Sitting, Cuff Size: Adult Regular)   Pulse 70   Wt 72.2 kg (159 lb 1.6 oz)   SpO2 98%   BMI 26.68 kg/m      Do you need any medication refills at today's visit? Yes

## 2019-10-14 NOTE — PATIENT INSTRUCTIONS
LANTUS INSULIN 9 UNITS DAILY.      Short acting insulin  1 unit for every 20 grams of carbohydrates with dinner and snacks.   At lunch time use 1 unit for every 15 grams of carbohydrates.   Breakfast time 1 unit for every 15 grams of carbohydrates      Please follow the following correction scale three times per day and at bedtime:  For Pre-Meal  - 189 give 1 unit.   For Pre-Meal  - 239 give 2 units.   For Pre-Meal  - 289 give 3 units.   For Pre-Meal  - 339 give 4 units.   For Pre-Meal BG = or > 340 give 5 units.

## 2019-10-14 NOTE — LETTER
10/14/2019         RE: Vickie Duque  [address] [facility] [address]        Dear Colleague,    Thank you for referring your patient, Vickie Duque, to the [facility]. Please see a copy of my visit note below.    Endocrinology Clinic Visit    Chief Complaint: Diabetes     Information obtained from:Patient    Subjective:         HPI: Vickie Duque is a 33 year old female with history of type 1 diabetes who is here for routine follow up.     Type 1 diabetes at the age of 18; the patient has been following up with her primary care physician regarding her diabetes care. She recently established care with us.    Current diabetic medications are: Insulin glargine 10 units daily, mealtime insulin 1 units for every 12, 12, 15 g of carbohydrates and a correction dose of insulin 1 unit for every 50 above 140.   At her last visit her Lantus dose was experienced daily however she has reduced it by herself at home to 10 units since she has been having a lot of lows early in the morning and throughout the day.    Hemoglobin A1C   Date Value Ref Range Status   10/14/2019 8.3 (A) 0 - 5.6 % Final   07/22/2019 8.9 (A) 0 - 5.6 % Final   03/19/2019 10.6 (A) 0 - 5.6 % Final     She has diabetic retinopathy.    She has Dexcom clarity which was downloaded today : Wednesday, September 11, 2019-Thursday October 10 2019  She has been using CGM more than 90% of the time.  Average blood sugar was 179.  She has been within the target range about 48% of the time.   She has had high blood sugars mostly following lows-after over correcting for lows.  The number of lows she has had below 70 was 5%.  She has had no blood sugars below 54 mg/dL about 2% of the time.     Overall looking at the daytime patterns she usually goes high following mealtime bolus at breakfast and lunchtime.  The highest would be between 12 PM to 3 PM in the afternoon.  She has had high blood sugars typically given therapy as they begin CPM to 9 PM.  The  lows are mostly ongoing dinnertime bolus.  Overall the glucose management indicator is about 7.8%.  She has been using % of the time.  Again typically her highs would be more of in the afternoon again between 12 PM to 6 -7 PM in the evening.      Allergies   Allergen Reactions     No Known Drug Allergies        Current Outpatient Medications   Medication Sig Dispense Refill     albuterol (PROAIR HFA/PROVENTIL HFA/VENTOLIN HFA) 108 (90 Base) MCG/ACT inhaler Inhale 2 puffs into the lungs every 4 hours as needed 1 Inhaler 0     Continuous Blood Gluc  (DEXCOM G6 ) HAYES 1 each continuous 1 Device 0     Continuous Blood Gluc Sensor (DEXCOM G6 SENSOR) MISC 1 each See Admin Instructions Change sensor every 10 days. 3 each 11     Continuous Blood Gluc Transmit (DEXCOM G6 TRANSMITTER) MISC 1 each every 3 months 1 each 3     ibuprofen (ADVIL/MOTRIN) 600 MG tablet Take 1 tablet (600 mg) by mouth every 8 hours as needed for moderate pain 20 tablet 0     insulin glargine (LANTUS SOLOSTAR) 100 UNIT/ML pen Inject 9 Units Subcutaneous At Bedtime 9 mL 1     insulin pen needle (BD TARA U/F) 32G X 4 MM miscellaneous Use 3 daily or as directed. 270 each 3     NOVOLOG FLEXPEN 100 UNIT/ML soln Inject 1 unit for every 15 grams of carbohydrates with breakfast and lunch and 1 unit for every 20 grams of CHO with dinner and snacks. 45 mL 1     glucagon 1 MG SOLR injection Inject 1 mg Subcutaneous every 15 minutes as needed for low blood sugar (Patient not taking: Reported on 7/22/2019) 2 each 0       Review of Systems     per HPI above    Past Medical History:   Diagnosis Date     Adjustment disorder with anxious mood 11/23/2015     Anxiety 11/27/2015     ASCUS of cervix with negative high risk HPV 06/19/2008     Depressive disorder, not elsewhere classified      Diabetic eye exam (H) 12/19/14     Mixed hyperlipidemia 11/30/2006     Regional enteritis of unspecified site     Ulcerative Colitis     Type I (juvenile type)  diabetes mellitus with ketoacidosis, not stated as uncontrolled(250.11) 01/01/2007    Moderately severe intensity.     Type I (juvenile type) diabetes mellitus with ketoacidosis, uncontrolled 02/14/08     Type I (juvenile type) diabetes mellitus without mention of complication, not stated as uncontrolled     diagnosed in 2003     Ulcerative colitis, unspecified     remission. Last flare 6 yrs ago.        Past Surgical History:   Procedure Laterality Date     DILATION AND CURETTAGE SUCTION  4/2010    miscarriage     HC COLONOSCOPY W BIOPSY  08/16/06     HC COLONOSCOPY W/WO BRUSH/WASH       TUBAL LIGATION         Family History   Problem Relation Age of Onset     Hypertension Father      Diabetes Maternal Grandmother      Cancer Maternal Grandmother      Diabetes Paternal Grandfather      Diabetes Maternal Uncle      Diabetes Maternal Aunt        Social History     Socioeconomic History     Marital status: Single     Spouse name: None     Number of children: 1     Years of education: 12     Highest education level: None   Occupational History     Occupation: Nursing assistant   Social Needs     Financial resource strain: None     Food insecurity:     Worry: None     Inability: None     Transportation needs:     Medical: None     Non-medical: None   Tobacco Use     Smoking status: Current Every Day Smoker     Packs/day: 1.00     Years: 16.00     Pack years: 16.00     Types: Cigarettes     Smokeless tobacco: Never Used   Substance and Sexual Activity     Alcohol use: Yes     Alcohol/week: 0.0 oz     Comment: rare     Drug use: No     Sexual activity: Yes     Partners: Male     Birth control/protection: Surgical, Female Surgical     Comment: tubal ligation   Lifestyle     Physical activity:     Days per week: None     Minutes per session: None     Stress: None   Relationships     Social connections:     Talks on phone: None     Gets together: None     Attends [employer] service: None     Active member of club or  organization: None     Attends meetings of clubs or organizations: None     Relationship status: None     Intimate partner violence:     Fear of current or ex partner: None     Emotionally abused: None     Physically abused: None     Forced sexual activity: None   Other Topics Concern      Service No     Blood Transfusions No     Caffeine Concern Yes     Comment: Advised not more than 16 ounces/day     Occupational Exposure Yes     Comment: Visiting [facility] - student online classes     Hobby Hazards No     Sleep Concern No     Stress Concern Yes     Weight Concern No     Special Diet Yes     Comment: IDDM Type I     Back Care Yes     Comment: work-related about 1 year ago     Exercise Yes     Comment: Active at work     Bike Helmet No     Seat Belt Yes     Self-Exams No     Parent/sibling w/ CABG, MI or angioplasty before 65F 55M? Not Asked   Social History Narrative    Lives in [place] with Rod gandhi and her son and their daughter.   Necoe and Rod smoke.   No indoor cats/kittens.   No concerns about domestic violence.       Objective:   /81 (BP Location: Left arm, Patient Position: Sitting, Cuff Size: Adult Regular)   Pulse 70   Wt 72.2 kg (159 lb 1.6 oz)   SpO2 98%   BMI 26.68 kg/m     Constitutional: Pleasant no acute cardiopulmonary distress.   Neurological: Alert and oriented.    Psychological: appropriate mood and affect   In House Labs:   Lab Results   Component Value Date    A1C 10.6 03/19/2019    A1C 11.6 12/20/2018    A1C 11.2 08/09/2018    A1C 8.9 11/13/2017    A1C 10.0 10/07/2017       TSH   Date Value Ref Range Status   03/19/2019 2.95 0.40 - 4.00 mU/L Final   10/07/2017 2.57 0.40 - 4.00 mU/L Final   06/26/2017 6.05 (H) 0.40 - 4.00 mU/L Final   03/05/2014 3.97 0.4 - 5.0 mU/L Final   03/16/2012 3.75 0.4 - 5.0 mU/L Final     T4 Free   Date Value Ref Range Status   06/26/2017 1.02 0.76 - 1.46 ng/dL Final   03/28/2006 1.10 0.70 - 1.85 ng/dL Final       Creatinine   Date  Value Ref Range Status   06/27/2019 0.94 0.52 - 1.04 mg/dL Final   ]    Recent Labs   Lab Test 08/09/18  0935 01/29/16  0933 04/01/15  1143 07/24/14  0851   CHOL  --  207* 197 193   HDL  --  55 53 51   * 129* 121 126   TRIG  --  116 113 84   CHOLHDLRATIO  --   --  3.7 4.0     Results for RNEY NAJERA (MRN [id number]) as of 3/20/2019 12:16   Ref. Range 3/19/2019 14:29   C-Peptide Latest Ref Range: 0.9 - 6.9 ng/mL <0.1 (L)   TSH Latest Ref Range: 0.40 - 4.00 mU/L 2.95   Glucose Latest Ref Range: 70 - 99 mg/dL 148 (H)       Assessment/Treatment Plan:      Type 1 diabetes uncontrolled with pre-proliferative diabetic retinopathy: Diagnosed at the age of 18 and has been on insulin since then.  Has not been following with endocrinologist previously.  A1c throughout the years in the range of 10 and 11.  Her A1c is improving since she started following up here but is not within the target range yet.      She has made some adjustments at home because of her diabetic medications form since she has reduced her basal insulin by more than 40%.        Hemoglobin A1C   Date Value Ref Range Status   10/14/2019 8.3 (A) 0 - 5.6 % Final   07/22/2019 8.9 (A) 0 - 5.6 % Final   03/19/2019 10.6 (A) 0 - 5.6 % Final   We have adjusted her insulin dose as follows.  I think patient has been frustrated with the lows she has been having and trying to change the dose and ending up with highs taking the afternoon.  She tells me that most of the highs in the afternoon is correcting for the dose.  I therefore I have agreed to keep her basal insulin had low rate for now.  We have adjusted her carbohydrates as follows and also correction scale as follows to help with the with the lows she has been having.  Once we correct the boluses and correction doses; I think she might need a higher dose of basal insulin this will be determined at the time of her follow-up over the phone with the CDE in a few weeks.  The following plan was discussed with  the patient.  She is due for cholesterol and other labs which were ordered today.  Might be reasonable to initiate cholesterol medication in this patient earlier than the age of 38 due to her long-standing diabetes and also history of retinopathy.  Further plan based on results.    Patient Instructions   LANTUS INSULIN 9 UNITS DAILY.      Short acting insulin  1 unit for every 20 grams of carbohydrates with dinner and snacks.   At lunch time use 1 unit for every 15 grams of carbohydrates.   Breakfast time 1 unit for every 15 grams of carbohydrates      Please follow the following correction scale three times per day and at bedtime:  For Pre-Meal  - 189 give 1 unit.   For Pre-Meal  - 239 give 2 units.   For Pre-Meal  - 289 give 3 units.   For Pre-Meal  - 339 give 4 units.   For Pre-Meal BG = or > 340 give 5 units.       I will contact the patient with the test results.  Return to clinic in 3  month.  He will follow-up with CDE in 6 weeks.    Test and/or medications prescribed today:  Orders Placed This Encounter   Procedures     Lipid panel reflex to direct LDL Fasting     TSH with free T4 reflex     Albumin Random Urine Quantitative with Creat Ratio         Siri Rhoades MD  Staff Endocrinologist    [phone]  Division of Endocrinology and Diabetes      Again, thank you for allowing me to participate in the care of your patient.        Sincerely,        Siri Rhoades MD

## 2019-10-16 NOTE — PROGRESS NOTES
Endocrinology Clinic Visit    Chief Complaint: Diabetes     Information obtained from:Patient    Subjective:         HPI: Vickie Duque is a 33 year old female with history of type 1 diabetes who is here for routine follow up.     Type 1 diabetes at the age of 18; the patient has been following up with her primary care physician regarding her diabetes care. She recently established care with us.    Current diabetic medications are: Insulin glargine 10 units daily, mealtime insulin 1 units for every 12, 12, 15 g of carbohydrates and a correction dose of insulin 1 unit for every 50 above 140.   At her last visit her Lantus dose was experienced daily however she has reduced it by herself at home to 10 units since she has been having a lot of lows early in the morning and throughout the day.    Hemoglobin A1C   Date Value Ref Range Status   10/14/2019 8.3 (A) 0 - 5.6 % Final   07/22/2019 8.9 (A) 0 - 5.6 % Final   03/19/2019 10.6 (A) 0 - 5.6 % Final     She has diabetic retinopathy.    She has Dexcom clarity which was downloaded today : Wednesday, September 11, 2019-Thursday October 10 2019  She has been using CGM more than 90% of the time.  Average blood sugar was 179.  She has been within the target range about 48% of the time.   She has had high blood sugars mostly following lows-after over correcting for lows.  The number of lows she has had below 70 was 5%.  She has had no blood sugars below 54 mg/dL about 2% of the time.     Overall looking at the daytime patterns she usually goes high following mealtime bolus at breakfast and lunchtime.  The highest would be between 12 PM to 3 PM in the afternoon.  She has had high blood sugars typically given therapy as they begin CPM to 9 PM.  The lows are mostly ongoing dinnertime bolus.  Overall the glucose management indicator is about 7.8%.  She has been using % of the time.  Again typically her highs would be more of in the afternoon again between 12 PM to 6 -7 PM in  the evening.      Allergies   Allergen Reactions     No Known Drug Allergies        Current Outpatient Medications   Medication Sig Dispense Refill     albuterol (PROAIR HFA/PROVENTIL HFA/VENTOLIN HFA) 108 (90 Base) MCG/ACT inhaler Inhale 2 puffs into the lungs every 4 hours as needed 1 Inhaler 0     Continuous Blood Gluc  (DEXCOM G6 ) HAYES 1 each continuous 1 Device 0     Continuous Blood Gluc Sensor (DEXCOM G6 SENSOR) MISC 1 each See Admin Instructions Change sensor every 10 days. 3 each 11     Continuous Blood Gluc Transmit (DEXCOM G6 TRANSMITTER) MISC 1 each every 3 months 1 each 3     ibuprofen (ADVIL/MOTRIN) 600 MG tablet Take 1 tablet (600 mg) by mouth every 8 hours as needed for moderate pain 20 tablet 0     insulin glargine (LANTUS SOLOSTAR) 100 UNIT/ML pen Inject 9 Units Subcutaneous At Bedtime 9 mL 1     insulin pen needle (BD TARA U/F) 32G X 4 MM miscellaneous Use 3 daily or as directed. 270 each 3     NOVOLOG FLEXPEN 100 UNIT/ML soln Inject 1 unit for every 15 grams of carbohydrates with breakfast and lunch and 1 unit for every 20 grams of CHO with dinner and snacks. 45 mL 1     glucagon 1 MG SOLR injection Inject 1 mg Subcutaneous every 15 minutes as needed for low blood sugar (Patient not taking: Reported on 7/22/2019) 2 each 0       Review of Systems     per HPI above    Past Medical History:   Diagnosis Date     Adjustment disorder with anxious mood 11/23/2015     Anxiety 11/27/2015     ASCUS of cervix with negative high risk HPV 06/19/2008     Depressive disorder, not elsewhere classified      Diabetic eye exam (H) 12/19/14     Mixed hyperlipidemia 11/30/2006     Regional enteritis of unspecified site     Ulcerative Colitis     Type I (juvenile type) diabetes mellitus with ketoacidosis, not stated as uncontrolled(250.11) 01/01/2007    Moderately severe intensity.     Type I (juvenile type) diabetes mellitus with ketoacidosis, uncontrolled 02/14/08     Type I (juvenile type) diabetes  mellitus without mention of complication, not stated as uncontrolled     diagnosed in 2003     Ulcerative colitis, unspecified     remission. Last flare 6 yrs ago.        Past Surgical History:   Procedure Laterality Date     DILATION AND CURETTAGE SUCTION  4/2010    miscarriage     HC COLONOSCOPY W BIOPSY  08/16/06     HC COLONOSCOPY W/WO BRUSH/WASH       TUBAL LIGATION         Family History   Problem Relation Age of Onset     Hypertension Father      Diabetes Maternal Grandmother      Cancer Maternal Grandmother      Diabetes Paternal Grandfather      Diabetes Maternal Uncle      Diabetes Maternal Aunt        Social History     Socioeconomic History     Marital status: Single     Spouse name: None     Number of children: 1     Years of education: 12     Highest education level: None   Occupational History     Occupation: Nursing assistant   Social Needs     Financial resource strain: None     Food insecurity:     Worry: None     Inability: None     Transportation needs:     Medical: None     Non-medical: None   Tobacco Use     Smoking status: Current Every Day Smoker     Packs/day: 1.00     Years: 16.00     Pack years: 16.00     Types: Cigarettes     Smokeless tobacco: Never Used   Substance and Sexual Activity     Alcohol use: Yes     Alcohol/week: 0.0 oz     Comment: rare     Drug use: No     Sexual activity: Yes     Partners: Male     Birth control/protection: Surgical, Female Surgical     Comment: tubal ligation   Lifestyle     Physical activity:     Days per week: None     Minutes per session: None     Stress: None   Relationships     Social connections:     Talks on phone: None     Gets together: None     Attends Anabaptist service: None     Active member of club or organization: None     Attends meetings of clubs or organizations: None     Relationship status: None     Intimate partner violence:     Fear of current or ex partner: None     Emotionally abused: None     Physically abused: None     Forced  sexual activity: None   Other Topics Concern      Service No     Blood Transfusions No     Caffeine Concern Yes     Comment: Advised not more than 16 ounces/day     Occupational Exposure Yes     Comment: Visiting [facility] - student online classes     Hobby Hazards No     Sleep Concern No     Stress Concern Yes     Weight Concern No     Special Diet Yes     Comment: IDDM Type I     Back Care Yes     Comment: work-related about 1 year ago     Exercise Yes     Comment: Active at work     Bike Helmet No     Seat Belt Yes     Self-Exams No     Parent/sibling w/ CABG, MI or angioplasty before 65F 55M? Not Asked   Social History Narrative    Lives in [place] with Rod gandhi and her son and their daughter.   Vickie and Rod smoke.   No indoor cats/kittens.   No concerns about domestic violence.       Objective:   /81 (BP Location: Left arm, Patient Position: Sitting, Cuff Size: Adult Regular)   Pulse 70   Wt 72.2 kg (159 lb 1.6 oz)   SpO2 98%   BMI 26.68 kg/m    Constitutional: Pleasant no acute cardiopulmonary distress.   Neurological: Alert and oriented.    Psychological: appropriate mood and affect   In House Labs:   Lab Results   Component Value Date    A1C 10.6 03/19/2019    A1C 11.6 12/20/2018    A1C 11.2 08/09/2018    A1C 8.9 11/13/2017    A1C 10.0 10/07/2017       TSH   Date Value Ref Range Status   03/19/2019 2.95 0.40 - 4.00 mU/L Final   10/07/2017 2.57 0.40 - 4.00 mU/L Final   06/26/2017 6.05 (H) 0.40 - 4.00 mU/L Final   03/05/2014 3.97 0.4 - 5.0 mU/L Final   03/16/2012 3.75 0.4 - 5.0 mU/L Final     T4 Free   Date Value Ref Range Status   06/26/2017 1.02 0.76 - 1.46 ng/dL Final   03/28/2006 1.10 0.70 - 1.85 ng/dL Final       Creatinine   Date Value Ref Range Status   06/27/2019 0.94 0.52 - 1.04 mg/dL Final   ]    Recent Labs   Lab Test 08/09/18  0935 01/29/16  0933 04/01/15  1143 07/24/14  0851   CHOL  --  207* 197 193   HDL  --  55 53 51   * 129* 121 126   TRIG  --  116 113 84    CHOLHDLRATIO  --   --  3.7 4.0     Results for RENY NAJERA (MRN [id number]) as of 3/20/2019 12:16   Ref. Range 3/19/2019 14:29   C-Peptide Latest Ref Range: 0.9 - 6.9 ng/mL <0.1 (L)   TSH Latest Ref Range: 0.40 - 4.00 mU/L 2.95   Glucose Latest Ref Range: 70 - 99 mg/dL 148 (H)       Assessment/Treatment Plan:      Type 1 diabetes uncontrolled with pre-proliferative diabetic retinopathy: Diagnosed at the age of 18 and has been on insulin since then.  Has not been following with endocrinologist previously.  A1c throughout the years in the range of 10 and 11.  Her A1c is improving since she started following up here but is not within the target range yet.      She has made some adjustments at home because of her diabetic medications form since she has reduced her basal insulin by more than 40%.        Hemoglobin A1C   Date Value Ref Range Status   10/14/2019 8.3 (A) 0 - 5.6 % Final   07/22/2019 8.9 (A) 0 - 5.6 % Final   03/19/2019 10.6 (A) 0 - 5.6 % Final   We have adjusted her insulin dose as follows.  I think patient has been frustrated with the lows she has been having and trying to change the dose and ending up with highs taking the afternoon.  She tells me that most of the highs in the afternoon is correcting for the dose.  I therefore I have agreed to keep her basal insulin had low rate for now.  We have adjusted her carbohydrates as follows and also correction scale as follows to help with the with the lows she has been having.  Once we correct the boluses and correction doses; I think she might need a higher dose of basal insulin this will be determined at the time of her follow-up over the phone with the CDE in a few weeks.  The following plan was discussed with the patient.  She is due for cholesterol and other labs which were ordered today.  Might be reasonable to initiate cholesterol medication in this patient earlier than the age of 38 due to her long-standing diabetes and also history of retinopathy.   Further plan based on results.    Patient Instructions   LANTUS INSULIN 9 UNITS DAILY.      Short acting insulin  1 unit for every 20 grams of carbohydrates with dinner and snacks.   At lunch time use 1 unit for every 15 grams of carbohydrates.   Breakfast time 1 unit for every 15 grams of carbohydrates      Please follow the following correction scale three times per day and at bedtime:  For Pre-Meal  - 189 give 1 unit.   For Pre-Meal  - 239 give 2 units.   For Pre-Meal  - 289 give 3 units.   For Pre-Meal  - 339 give 4 units.   For Pre-Meal BG = or > 340 give 5 units.       I will contact the patient with the test results.  Return to clinic in 3  month.  He will follow-up with CDE in 6 weeks.    Test and/or medications prescribed today:  Orders Placed This Encounter   Procedures     Lipid panel reflex to direct LDL Fasting     TSH with free T4 reflex     Albumin Random Urine Quantitative with Creat Ratio         Siri Rhoades MD  Staff Endocrinologist    [phone]  Division of Endocrinology and Diabetes

## 2019-10-16 NOTE — PROGRESS NOTES
Diabetes Self Management Training: Individual Review Visit    Vickie Duque presents today for evaluation of glucose control related to Type 1 diabetes.    She is accompanied by her mother, Coty.     Patient's diabetes management related comments/concerns: None at this time.     Patient's emotional response to diabetes: expresses readiness to learn    Patient would like this visit to be focused around the following diabetes-related behaviors and goals: Taking Medication    ASSESSMENT:  Patient presents to clinic to meet with Cde for diabetes self mgmt education and bg review. Pt was seen by her Endo, Dr Rhoades on . Pt has an appt with her Endo today after she meets with Cde. Pt diagnosed with Type 1 Diabetes at age 18. Pt on mdi therapy with use of the Dexcom G5 cgms.     Current Diabetes Management per Patient:  Taking diabetes medications? Yes: see below.    Diabetes Medication(s)     Diabetic Other       glucagon 1 MG SOLR injection    Inject 1 mg Subcutaneous every 15 minutes as needed for low blood sugar     Patient not taking:  Reported on 2019    Insulin       insulin glargine (LANTUS SOLOSTAR) 100 UNIT/ML pen    Inject 9 Units Subcutaneous At Bedtime     NOVOLOG FLEXPEN 100 UNIT/ML soln    Inject 1 unit for every 15 grams of carbohydrates with breakfast and lunch and 1 unit for every 20 grams of CHO with dinner and snacks.        Do you have any difficulty affording your medications or glucose monitoring supplies? No.     Patient glucose self monitoring as follows: Uses Dexcom G5 sensor, which needs to be calibrated twice a day.    BG Results: Per Dexcom Download Report:  Ave B. 51% readings above target, 44% in target, 5% below target. Standard Deviation: 76.  Patterns shown of late night mild hypoglycemia and post dinner mild hyperglycemia.       Patient's most recent   Lab Results   Component Value Date    A1C 8.3 10/14/2019     Nutrition:  Patient's nutrition status not discussed  "today.     Physical Activity:    Being Active not discussed today.     Relevant co-morbidities and related health problems:  Significant for: None.    Diabetes Complications:  Not discussed today.    Recent health service and resource utilization related to diabetes (hyperglycemia, hypoglycemia, etc):   None    Vitals:  There were no vitals taken for this visit.  Estimated body mass index is 26.68 kg/m  as calculated from the following:    Height as of 1/2/19: 1.645 m (5' 4.75\").    Weight as of an earlier encounter on 10/14/19: 72.2 kg (159 lb 1.6 oz).   Last 3 BP:   BP Readings from Last 3 Encounters:   10/14/19 121/81   07/28/19 105/65   07/22/19 108/74       History   Smoking Status     Current Every Day Smoker     Packs/day: 1.00     Years: 16.00     Types: Cigarettes   Smokeless Tobacco     Never Used       Labs:  Lab Results   Component Value Date    A1C 8.3 10/14/2019     Lab Results   Component Value Date     06/27/2019     Lab Results   Component Value Date     08/09/2018     01/29/2016     HDL Cholesterol   Date Value Ref Range Status   01/29/2016 55 >49 mg/dL Final   ]  GFR Estimate   Date Value Ref Range Status   06/27/2019 79 >60 mL/min/[1.73_m2] Final     Comment:     Non  GFR Calc  Starting 12/18/2018, serum creatinine based estimated GFR (eGFR) will be   calculated using the Chronic Kidney Disease Epidemiology Collaboration   (CKD-EPI) equation.       GFR Estimate If Black   Date Value Ref Range Status   06/27/2019 >90 >60 mL/min/[1.73_m2] Final     Comment:      GFR Calc  Starting 12/18/2018, serum creatinine based estimated GFR (eGFR) will be   calculated using the Chronic Kidney Disease Epidemiology Collaboration   (CKD-EPI) equation.       Lab Results   Component Value Date    CR 0.94 06/27/2019       Socio/Economic/Cultural Considerations:    Support system: Significant other: Pierre, mom: Coty and family.     Cultural Influences/Ethnic " "Background:  American    Health Literacy/Numeracy:  \"With diabetes, it's helpful to use forms and log books to write down blood sugars and what you're eating at times to help understand how foods affect your blood sugars. With this in mind how confident are you at filling out medical forms, such as these, by yourself?  Not Assessed    Health Beliefs and Attitudes:   Patient Activation Measure Survey Score:  BYRON Score (Last Two) 3/16/2012   BYRON Raw Score 42   Activation Score 66   BYRON Level 3       Stage of Change: ACTION (Actively working towards change)      Diabetes knowledge and skills assessment:     Patient is knowledgeable in diabetes management concepts related to: Monitoring and Taking Medication    Patient needs further education on the following diabetes management concepts: Healthy Eating, Being Active and Problem Solving    Barriers to Learning Assessment: No Barriers identified    Based on learning assessment above, most appropriate setting for further diabetes education would be: Individual setting.    INTERVENTION:   Education provided today on:  Taking Medication: Insulin dose changes made based on sensor report.     Opportunities for ongoing education and support in diabetes-self management were discussed.    Pt verbalized understanding of concepts discussed and recommendations provided today.       Education Materials Provided:  No new materials provided today    PLAN:  Taking Medication: Basaglar: 10 to 9 units. Dinner Novolou:15g carbs to 1u:12g carbs. Pt advised these dose changes will be subject to Endo approval.     FOLLOW-UP:  Return to see Endo in 3 months.     Ongoing plan for education and support: As needed.     Time Spent: 30 minutes  Encounter Type: Individual    Any diabetes medication dose changes were made via the CDE Protocol and Collaborative Practice Agreement with the patient's endocrinology provider. A copy of this encounter was shared with the provider.    Vickie Duque " comes into clinic today at the request of Dr Rhoades, Ordering Provider for Pt Teaching and bg review.     This service provided today was under the supervising provider of the day Dr Fischer, who was available if needed.    Haydee Rojas, RN, BSN, CDE   [facility]

## 2019-10-22 ENCOUNTER — DOCUMENTATION ONLY (OUTPATIENT)
Dept: ENDOCRINOLOGY | Facility: CLINIC | Age: 34
End: 2019-10-22

## 2019-10-22 ENCOUNTER — MEDICAL CORRESPONDENCE (OUTPATIENT)
Dept: HEALTH INFORMATION MANAGEMENT | Facility: CLINIC | Age: 34
End: 2019-10-22

## 2019-10-22 NOTE — PROGRESS NOTES
CMN and chart notes completed and faxed to [facility] [phone] for Dexcom G6 transmitter and sensors.    Maggy Gaona LPN  Diabetes Clinic Coordinator   Adult Endocrinology and Pediatric Specialty Clinics  [facility]

## 2019-12-06 ENCOUNTER — TELEPHONE (OUTPATIENT)
Dept: ENDOCRINOLOGY | Facility: CLINIC | Age: 34
End: 2019-12-06

## 2019-12-06 ENCOUNTER — APPOINTMENT (OUTPATIENT)
Dept: EDUCATION SERVICES | Facility: CLINIC | Age: 34
End: 2019-12-06
Payer: COMMERCIAL

## 2019-12-06 NOTE — TELEPHONE ENCOUNTER
Cde spoke with pt for a bg report. Pt states she does not have readings to report at this time (driving) and has not been checking bg as usual. [facility] she had a lapse in insurance and was not able to refill Dexcom sensors due to this. [facility] insurance was reinstated on Dec 1st. Has not yet,but plans to re-order sensors. Pt advised to order sensors as soon as she can, keep return appt with Endo in Jan and call Cde if questions or concerns arise.     Haydee Rojas, RN, BSN, CDE   [facility]

## 2019-12-13 ENCOUNTER — MEDICAL CORRESPONDENCE (OUTPATIENT)
Dept: HEALTH INFORMATION MANAGEMENT | Facility: CLINIC | Age: 34
End: 2019-12-13

## 2019-12-13 NOTE — TELEPHONE ENCOUNTER
CMN for Dexcom G6 Transmitters and Sensors completed and faxed with chart notes to [facility] at [phone].    Maggy Gaona LPN  Diabetes Clinic Coordinator   Adult Endocrinology and Pediatric Specialty Clinics  [facility]

## 2020-01-03 ENCOUNTER — TELEPHONE (OUTPATIENT)
Dept: ENDOCRINOLOGY | Facility: CLINIC | Age: 35
End: 2020-01-03

## 2020-01-03 NOTE — TELEPHONE ENCOUNTER
[facility] Call Center    Phone Message    May a detailed message be left on voicemail: yes    Reason for Call: Patient called wanting to discuss her blood work with Haydee Rojas. Please advise. Thank you.    Action Taken: Message routed to:  Adult Clinics: Endocrinology p [phone]

## 2020-01-06 ENCOUNTER — OFFICE VISIT (OUTPATIENT)
Dept: FAMILY MEDICINE | Facility: CLINIC | Age: 35
End: 2020-01-06
Payer: COMMERCIAL

## 2020-01-06 VITALS
BODY MASS INDEX: 27.31 KG/M2 | HEIGHT: 64 IN | OXYGEN SATURATION: 99 % | HEART RATE: 80 BPM | WEIGHT: 160 LBS | TEMPERATURE: 98.5 F | SYSTOLIC BLOOD PRESSURE: 112 MMHG | RESPIRATION RATE: 18 BRPM | DIASTOLIC BLOOD PRESSURE: 70 MMHG

## 2020-01-06 DIAGNOSIS — Z11.1 SCREENING EXAMINATION FOR PULMONARY TUBERCULOSIS: Primary | ICD-10-CM

## 2020-01-06 DIAGNOSIS — Z72.0 TOBACCO ABUSE: ICD-10-CM

## 2020-01-06 DIAGNOSIS — Z23 ENCOUNTER FOR IMMUNIZATION: ICD-10-CM

## 2020-01-06 PROCEDURE — 90471 IMMUNIZATION ADMIN: CPT | Performed by: FAMILY MEDICINE

## 2020-01-06 PROCEDURE — 86481 TB AG RESPONSE T-CELL SUSP: CPT | Performed by: FAMILY MEDICINE

## 2020-01-06 PROCEDURE — 90682 RIV4 VACC RECOMBINANT DNA IM: CPT | Performed by: FAMILY MEDICINE

## 2020-01-06 PROCEDURE — 99213 OFFICE O/P EST LOW 20 MIN: CPT | Mod: 25 | Performed by: FAMILY MEDICINE

## 2020-01-06 PROCEDURE — 36415 COLL VENOUS BLD VENIPUNCTURE: CPT | Performed by: FAMILY MEDICINE

## 2020-01-06 ASSESSMENT — MIFFLIN-ST. JEOR: SCORE: 1400.76

## 2020-01-06 ASSESSMENT — PAIN SCALES - GENERAL: PAINLEVEL: NO PAIN (0)

## 2020-01-08 LAB
GAMMA INTERFERON BACKGROUND BLD IA-ACNC: 0.03 IU/ML
M TB IFN-G BLD-IMP: NEGATIVE
M TB IFN-G CD4+ BCKGRND COR BLD-ACNC: >10 IU/ML
MITOGEN IGNF BCKGRD COR BLD-ACNC: 0 IU/ML
MITOGEN IGNF BCKGRD COR BLD-ACNC: 0 IU/ML

## 2020-01-08 NOTE — PROGRESS NOTES
"Subjective     Vickie Duque is a 34 year old female who presents to clinic today for the following health issues:    HPI           Patient here today to establish care with primary care provider and to get orders for TB blood testing.  She has had past reactions to mantoux tests and has been recommended to not get mantoux tests anymore.  She has had negative chest x-ray follow-ups for previous positive mantoux.  Does not feel ill.  No flu like symptoms.      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    Reviewed and updated as needed this visit by Provider  Tobacco  Allergies  Meds  Problems  Med Hx  Surg Hx  Fam Hx         Review of Systems   ROS COMP: Constitutional, HEENT, cardiovascular, pulmonary, gi and gu systems are negative, except as otherwise noted.      Objective    /70   Pulse 80   Temp 98.5  F (36.9  C) (Temporal)   Resp 18   Ht 1.626 m (5' 4\")   Wt 72.6 kg (160 lb)   SpO2 99%   Breastfeeding No   BMI 27.46 kg/m    Body mass index is 27.46 kg/m .  Physical Exam  Constitutional:       Appearance: She is well-developed.   Cardiovascular:      Rate and Rhythm: Normal rate and regular rhythm.      Heart sounds: Normal heart sounds, S1 normal and S2 normal. No murmur.   Pulmonary:      Effort: Pulmonary effort is normal. No respiratory distress.      Breath sounds: Normal breath sounds. No wheezing, rhonchi or rales.   Neurological:      Mental Status: She is alert.                    Assessment & Plan       ICD-10-CM    1. Screening examination for pulmonary tuberculosis Z11.1 Quantiferon TB Gold Plus   2. Encounter for immunization Z23 FLU VAC, QUADRIVALENT (RIV4) RECOMBINANT DNA, IM   3. Tobacco abuse Z72.0      Testing ordered as noted above.  We will contact her with results.  Influenza vaccine administered as well.       Tobacco Cessation:   reports that she has been smoking cigarettes. She has a 16.00 pack-year smoking history. She has never " "used smokeless tobacco.  Tobacco Cessation Action Plan: Information offered: Patient not interested at this time      BMI:   Estimated body mass index is 27.46 kg/m  as calculated from the following:    Height as of this encounter: 1.626 m (5' 4\").    Weight as of this encounter: 72.6 kg (160 lb).               Return in about 6 months (around 7/6/2020) for next preventative visit (Physical).    Alvaro Guerrero MD  [facility]    "

## 2020-01-09 NOTE — RESULT ENCOUNTER NOTE
Necoe,  Your results are normal.  Please let me know if you have any questions.    Sincerely,  Dr. Guerrero

## 2020-01-13 ENCOUNTER — OFFICE VISIT (OUTPATIENT)
Dept: ENDOCRINOLOGY | Facility: CLINIC | Age: 35
End: 2020-01-13
Payer: COMMERCIAL

## 2020-01-13 VITALS
WEIGHT: 164 LBS | HEIGHT: 64 IN | OXYGEN SATURATION: 96 % | DIASTOLIC BLOOD PRESSURE: 74 MMHG | HEART RATE: 73 BPM | SYSTOLIC BLOOD PRESSURE: 111 MMHG | BODY MASS INDEX: 28 KG/M2

## 2020-01-13 DIAGNOSIS — E10.65 TYPE 1 DIABETES MELLITUS WITH HYPERGLYCEMIA (H): Primary | ICD-10-CM

## 2020-01-13 DIAGNOSIS — E16.2 HYPOGLYCEMIA: ICD-10-CM

## 2020-01-13 LAB — HBA1C MFR BLD: 9.5 % (ref 0–5.6)

## 2020-01-13 PROCEDURE — 83036 HEMOGLOBIN GLYCOSYLATED A1C: CPT | Performed by: INTERNAL MEDICINE

## 2020-01-13 PROCEDURE — 36415 COLL VENOUS BLD VENIPUNCTURE: CPT | Performed by: INTERNAL MEDICINE

## 2020-01-13 PROCEDURE — 99214 OFFICE O/P EST MOD 30 MIN: CPT | Performed by: INTERNAL MEDICINE

## 2020-01-13 ASSESSMENT — MIFFLIN-ST. JEOR: SCORE: 1418.9

## 2020-01-13 NOTE — LETTER
1/13/2020         RE: Vickie Duque  [address] [facility] [address]        Dear Colleague,    Thank you for referring your patient, Vickie Duque, to the [facility]. Please see a copy of my visit note below.    Endocrinology Clinic Visit    Chief Complaint: Diabetes     Information obtained from:Patient    Subjective:         HPI: Vickie Duque is a 33 year old female with history of type 1 diabetes who is here for routine follow up.     Type 1 diabetes since the age of 18.    Current diabetic medications are: Insulin glargine 10 units daily, mealtime insulin 1 units for every 12, 12, 12 g of carbohydrates and a correction dose of insulin 1 unit for every 50 above 140.       At her last visit based on low's; we have adjusted mealtime insulin however she has not been following that.     Hemoglobin A1C   Date Value Ref Range Status   01/13/2020 9.5 (A) 0 - 5.6 % Final   10/14/2019 8.3 (A) 0 - 5.6 % Final   07/22/2019 8.9 (A) 0 - 5.6 % Final     She has diabetic retinopathy.    Unfortunately, Vickie did not have Dexcom or any other means of checking her blood sugars for a couple of months due to insurance issues.  This has been corrected since.  Over the last 2 weeks she has been using her Dexcom clarity which were reviewed in detail as below.  Due to problem with the Dexcom and not checking her blood sugars regularly; her A1c has worsened up as documented above.    We noted that her Dexcom date setting was wrong as it stated 1/13/2019.  This was corrected.      Over the last 1 week -she had only the Dexcom on for about 10 days.    The average blood sugar for the time over the last 1 week was 174 mg/dL with a standard duration of 79.  She has been within the target range between  8054% of the time.  She did not have any low blood sugars below 54.  She has had about 3% of blood sugars below the range of 70.  She has had blood sugars readings above 180 mg/dL about 42% of the time.    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    Allergies   Allergen Reactions     No Known Drug Allergies        Current Outpatient Medications   Medication Sig Dispense Refill     albuterol (PROAIR HFA/PROVENTIL HFA/VENTOLIN HFA) 108 (90 Base) MCG/ACT inhaler Inhale 2 puffs into the lungs every 4 hours as needed 1 Inhaler 0     Continuous Blood Gluc  (DEXCOM G6 ) HAYES 1 each continuous 1 Device 0     Continuous Blood Gluc Sensor (DEXCOM G6 SENSOR) MISC 1 each See Admin Instructions Change sensor every 10 days. 3 each 11     Continuous Blood Gluc Transmit (DEXCOM G6 TRANSMITTER) MISC 1 each every 3 months 1 each 3     glucagon 1 MG SOLR injection Inject 1 mg Subcutaneous every 15 minutes as needed for low blood sugar 2 each 0     ibuprofen (ADVIL/MOTRIN) 600 MG tablet Take 1 tablet (600 mg) by mouth every 8 hours as needed for moderate pain 20 tablet 0     insulin degludec (TRESIBA) 100 UNIT/ML pen Inject 10 Units Subcutaneous daily THIS REPLACES LANTUS INSULIN. 9 mL 1     insulin pen needle (BD TARA U/F) 32G X 4 MM miscellaneous Use 3 daily or as directed. 270 each 3     NOVOLOG FLEXPEN 100 UNIT/ML soln Inject 1 unit for every 15 grams of carbohydrates with breakfast and lunch and 1 unit for every 20 grams of CHO with dinner and snacks. 45 mL 1       Review of Systems     per HPI above  No chest pain, shortness of breath or loss of consciousness from hypoglycemia.    Past Medical History:   Diagnosis Date     Adjustment disorder with anxious mood 11/23/2015     Anxiety 11/27/2015     ASCUS of cervix with negative high risk HPV 06/19/2008     Depressive disorder, not elsewhere classified      Diabetic eye exam (H) 12/19/14     Mixed hyperlipidemia 11/30/2006     Regional enteritis of unspecified site     Ulcerative Colitis     Type I (juvenile type) diabetes mellitus with ketoacidosis, not stated as uncontrolled(250.11) 01/01/2007    Moderately severe intensity.     Type I (juvenile type)  diabetes mellitus with ketoacidosis, uncontrolled 02/14/08     Type I (juvenile type) diabetes mellitus without mention of complication, not stated as uncontrolled     diagnosed in 2003     Ulcerative colitis, unspecified     remission. Last flare 6 yrs ago.        Past Surgical History:   Procedure Laterality Date     DILATION AND CURETTAGE SUCTION  4/2010    miscarriage     HC COLONOSCOPY W BIOPSY  08/16/06     HC COLONOSCOPY W/WO BRUSH/WASH       TUBAL LIGATION         Family History   Problem Relation Age of Onset     Hypertension Father      Diabetes Maternal Grandmother      Cancer Maternal Grandmother      Diabetes Paternal Grandfather      Diabetes Maternal Uncle      Diabetes Maternal Aunt        Social History     Socioeconomic History     Marital status: Single     Spouse name: None     Number of children: 1     Years of education: 12     Highest education level: None   Occupational History     Occupation: Nursing assistant   Social Needs     Financial resource strain: None     Food insecurity:     Worry: None     Inability: None     Transportation needs:     Medical: None     Non-medical: None   Tobacco Use     Smoking status: Current Every Day Smoker     Packs/day: 1.00     Years: 16.00     Pack years: 16.00     Types: Cigarettes     Smokeless tobacco: Never Used   Substance and Sexual Activity     Alcohol use: Yes     Alcohol/week: 0.0 oz     Comment: rare     Drug use: No     Sexual activity: Yes     Partners: Male     Birth control/protection: Surgical, Female Surgical     Comment: tubal ligation   Lifestyle     Physical activity:     Days per week: None     Minutes per session: None     Stress: None   Relationships     Social connections:     Talks on phone: None     Gets together: None     Attends Jainism service: None     Active member of club or organization: None     Attends meetings of clubs or organizations: None     Relationship status: None     Intimate partner violence:     Fear of  "current or ex partner: None     Emotionally abused: None     Physically abused: None     Forced sexual activity: None   Other Topics Concern      Service No     Blood Transfusions No     Caffeine Concern Yes     Comment: Advised not more than 16 ounces/day     Occupational Exposure Yes     Comment: Visiting [facility] - student online classes     Hobby Hazards No     Sleep Concern No     Stress Concern Yes     Weight Concern No     Special Diet Yes     Comment: IDDM Type I     Back Care Yes     Comment: work-related about 1 year ago     Exercise Yes     Comment: Active at work     Bike Helmet No     Seat Belt Yes     Self-Exams No     Parent/sibling w/ CABG, MI or angioplasty before 65F 55M? Not Asked   Social History Narrative    Lives in [place] with Rod gandhi and her son and their daughter.   Necoe and Rod smoke.   No indoor cats/kittens.   No concerns about domestic violence.       Objective:   /74   Pulse 73   Ht 1.626 m (5' 4\")   Wt 74.4 kg (164 lb)   LMP 01/08/2020   SpO2 96%   BMI 28.15 kg/m     Constitutional: Pleasant no acute cardiopulmonary distress.   Neurological: Alert and oriented.    Psychological: appropriate mood and affect   Cardiovascular system: S1 and S2 were heard.  Regular rate and rhythm.  No murmurs appreciated.  Sensory exam of the foot is normal, tested with the monofilament. Good pulses, no lesions or ulcers, good peripheral pulses.    In House Labs:   Lab Results   Component Value Date    A1C 10.6 03/19/2019    A1C 11.6 12/20/2018    A1C 11.2 08/09/2018    A1C 8.9 11/13/2017    A1C 10.0 10/07/2017       TSH   Date Value Ref Range Status   03/19/2019 2.95 0.40 - 4.00 mU/L Final   10/07/2017 2.57 0.40 - 4.00 mU/L Final   06/26/2017 6.05 (H) 0.40 - 4.00 mU/L Final   03/05/2014 3.97 0.4 - 5.0 mU/L Final   03/16/2012 3.75 0.4 - 5.0 mU/L Final     T4 Free   Date Value Ref Range Status   06/26/2017 1.02 0.76 - 1.46 ng/dL Final   03/28/2006 1.10 0.70 - 1.85 " ng/dL Final       Creatinine   Date Value Ref Range Status   06/27/2019 0.94 0.52 - 1.04 mg/dL Final   ]    Recent Labs   Lab Test 08/09/18  0935 01/29/16  0933 04/01/15  1143 07/24/14  0851   CHOL  --  207* 197 193   HDL  --  55 53 51   * 129* 121 126   TRIG  --  116 113 84   CHOLHDLRATIO  --   --  3.7 4.0     Results for RENY NAJERA (MRN [id number]) as of 3/20/2019 12:16   Ref. Range 3/19/2019 14:29   C-Peptide Latest Ref Range: 0.9 - 6.9 ng/mL <0.1 (L)   TSH Latest Ref Range: 0.40 - 4.00 mU/L 2.95   Glucose Latest Ref Range: 70 - 99 mg/dL 148 (H)       Assessment/Treatment Plan:      Type 1 diabetes uncontrolled with pre-proliferative diabetic retinopathy: Diagnosed at the age of 18 and has been on insulin since then.      Unfortunately, Reny do not have her Dexcom for over couple of months due to insurance issues.  Now she has it back for the last 10 days.  We reviewed her Dexcom readings in detail.  Her A1c has gone up to 9.5.\\    Reviewing her Dexcom readings over the last 1 week, overall reasonable control with average blood sugar 174.  However, noted that she has had multiple lows following correction scale and following that she overcorrects and will have high blood sugars.  For that reason we have adjusted her correction scale.  We will going to continue her mealtime insulin without any change.  She has had multiple blood sugar readings which are high about an hour of 2 prior to her Lantus injection indicating that her Lantus might have been running out at 20-22 hours instead of lasting 24 hours.  For that reason I have changed her long-acting insulin to degludec or Tresiba from Lantus.  I have advised multiple times to stop the Lantus injection as soon as she starts to see above.  Should be injecting Tresiba at the same time she has been doing the Lantus injection to avoid any overlap of long-acting insulin.  Patient verbalized understanding.  The following written information provided to the  patient.     Blood pressure well controlled.  We will update lipid panel and microalbuminuria/these were ordered at her last visit.  Of note, there is been an issue with her Dexcom setting symptoms of the date.  We have corrected the date.  Initial download was off due to date settings.    Patient Instructions   Please complete blood work for cholesterol today.     degludec/TRESIBA  10 UNITS DAILY - THIS REPLACES LANTUS. DON'T TAKE BOTH LANTUS AND TRESIBA.     1 unit for every 12 grams of carbohydrates with meals three times per day and snacks.      Please follow the following correction scale three times per day and at bedtime:  For  to 180 give 0.5 units.   For  to 210 give 1 units.   For  to 240 give 1.5 units.   For  to 270 give 2 units.   For  to 300 give 2.5 units.   For  to 330 give 3 units.   For  to 360 give 3.5 units.  For BG >360 give 4 units.        I will contact the patient with the test results.  Return to clinic in 3  month.  She will follow-up with CDE in 4 weeks.    Test and/or medications prescribed today:  Lipid panel was ordered at her last visit however she has not completed it yet.  Reminded to complete it at her convenience.  BMP checked over the last 1 year and has been within the normal limits.      Siri Rhoades MD  Staff Endocrinologist    [phone]  Division of Endocrinology and Diabetes      Again, thank you for allowing me to participate in the care of your patient.        Sincerely,        Siri Rhoades MD

## 2020-01-13 NOTE — PROGRESS NOTES
Endocrinology Clinic Visit    Chief Complaint: Diabetes     Information obtained from:Patient    Subjective:         HPI: Vickie Duque is a 33 year old female with history of type 1 diabetes who is here for routine follow up.     Type 1 diabetes since the age of 18.    Current diabetic medications are: Insulin glargine 10 units daily, mealtime insulin 1 units for every 12, 12, 12 g of carbohydrates and a correction dose of insulin 1 unit for every 50 above 140.       At her last visit based on low's; we have adjusted mealtime insulin however she has not been following that.     Hemoglobin A1C   Date Value Ref Range Status   01/13/2020 9.5 (A) 0 - 5.6 % Final   10/14/2019 8.3 (A) 0 - 5.6 % Final   07/22/2019 8.9 (A) 0 - 5.6 % Final     She has diabetic retinopathy.    Unfortunately, Vickie did not have Dexcom or any other means of checking her blood sugars for a couple of months due to insurance issues.  This has been corrected since.  Over the last 2 weeks she has been using her Dexcom clarity which were reviewed in detail as below.  Due to problem with the Dexcom and not checking her blood sugars regularly; her A1c has worsened up as documented above.    We noted that her Dexcom date setting was wrong as it stated 1/13/2019.  This was corrected.      Over the last 1 week -she had only the Dexcom on for about 10 days.    The average blood sugar for the time over the last 1 week was 174 mg/dL with a standard duration of 79.  She has been within the target range between  8054% of the time.  She did not have any low blood sugars below 54.  She has had about 3% of blood sugars below the range of 70.  She has had blood sugars readings above 180 mg/dL about 42% of the time.    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    Allergies   Allergen Reactions     No Known Drug Allergies        Current Outpatient Medications   Medication Sig Dispense Refill     albuterol (PROAIR HFA/PROVENTIL HFA/VENTOLIN  HFA) 108 (90 Base) MCG/ACT inhaler Inhale 2 puffs into the lungs every 4 hours as needed 1 Inhaler 0     Continuous Blood Gluc  (DEXCOM G6 ) HAYES 1 each continuous 1 Device 0     Continuous Blood Gluc Sensor (DEXCOM G6 SENSOR) MISC 1 each See Admin Instructions Change sensor every 10 days. 3 each 11     Continuous Blood Gluc Transmit (DEXCOM G6 TRANSMITTER) MISC 1 each every 3 months 1 each 3     glucagon 1 MG SOLR injection Inject 1 mg Subcutaneous every 15 minutes as needed for low blood sugar 2 each 0     ibuprofen (ADVIL/MOTRIN) 600 MG tablet Take 1 tablet (600 mg) by mouth every 8 hours as needed for moderate pain 20 tablet 0     insulin degludec (TRESIBA) 100 UNIT/ML pen Inject 10 Units Subcutaneous daily THIS REPLACES LANTUS INSULIN. 9 mL 1     insulin pen needle (BD TARA U/F) 32G X 4 MM miscellaneous Use 3 daily or as directed. 270 each 3     NOVOLOG FLEXPEN 100 UNIT/ML soln Inject 1 unit for every 15 grams of carbohydrates with breakfast and lunch and 1 unit for every 20 grams of CHO with dinner and snacks. 45 mL 1       Review of Systems     per HPI above  No chest pain, shortness of breath or loss of consciousness from hypoglycemia.    Past Medical History:   Diagnosis Date     Adjustment disorder with anxious mood 11/23/2015     Anxiety 11/27/2015     ASCUS of cervix with negative high risk HPV 06/19/2008     Depressive disorder, not elsewhere classified      Diabetic eye exam (H) 12/19/14     Mixed hyperlipidemia 11/30/2006     Regional enteritis of unspecified site     Ulcerative Colitis     Type I (juvenile type) diabetes mellitus with ketoacidosis, not stated as uncontrolled(250.11) 01/01/2007    Moderately severe intensity.     Type I (juvenile type) diabetes mellitus with ketoacidosis, uncontrolled 02/14/08     Type I (juvenile type) diabetes mellitus without mention of complication, not stated as uncontrolled     diagnosed in 2003     Ulcerative colitis, unspecified     remission.  Last flare 6 yrs ago.        Past Surgical History:   Procedure Laterality Date     DILATION AND CURETTAGE SUCTION  4/2010    miscarriage     HC COLONOSCOPY W BIOPSY  08/16/06     HC COLONOSCOPY W/WO BRUSH/WASH       TUBAL LIGATION         Family History   Problem Relation Age of Onset     Hypertension Father      Diabetes Maternal Grandmother      Cancer Maternal Grandmother      Diabetes Paternal Grandfather      Diabetes Maternal Uncle      Diabetes Maternal Aunt        Social History     Socioeconomic History     Marital status: Single     Spouse name: None     Number of children: 1     Years of education: 12     Highest education level: None   Occupational History     Occupation: Nursing assistant   Social Needs     Financial resource strain: None     Food insecurity:     Worry: None     Inability: None     Transportation needs:     Medical: None     Non-medical: None   Tobacco Use     Smoking status: Current Every Day Smoker     Packs/day: 1.00     Years: 16.00     Pack years: 16.00     Types: Cigarettes     Smokeless tobacco: Never Used   Substance and Sexual Activity     Alcohol use: Yes     Alcohol/week: 0.0 oz     Comment: rare     Drug use: No     Sexual activity: Yes     Partners: Male     Birth control/protection: Surgical, Female Surgical     Comment: tubal ligation   Lifestyle     Physical activity:     Days per week: None     Minutes per session: None     Stress: None   Relationships     Social connections:     Talks on phone: None     Gets together: None     Attends Caodaism service: None     Active member of club or organization: None     Attends meetings of clubs or organizations: None     Relationship status: None     Intimate partner violence:     Fear of current or ex partner: None     Emotionally abused: None     Physically abused: None     Forced sexual activity: None   Other Topics Concern      Service No     Blood Transfusions No     Caffeine Concern Yes     Comment: Advised not  "more than 16 ounces/day     Occupational Exposure Yes     Comment: Visiting [facility] - student online classes     Hobby Hazards No     Sleep Concern No     Stress Concern Yes     Weight Concern No     Special Diet Yes     Comment: IDDM Type I     Back Care Yes     Comment: work-related about 1 year ago     Exercise Yes     Comment: Active at work     Bike Helmet No     Seat Belt Yes     Self-Exams No     Parent/sibling w/ CABG, MI or angioplasty before 65F 55M? Not Asked   Social History Narrative    Lives in [place] with Rod gandhi and her son and their daughter.   Necoe and Rod smoke.   No indoor cats/kittens.   No concerns about domestic violence.       Objective:   /74   Pulse 73   Ht 1.626 m (5' 4\")   Wt 74.4 kg (164 lb)   LMP 01/08/2020   SpO2 96%   BMI 28.15 kg/m    Constitutional: Pleasant no acute cardiopulmonary distress.   Neurological: Alert and oriented.    Psychological: appropriate mood and affect   Cardiovascular system: S1 and S2 were heard.  Regular rate and rhythm.  No murmurs appreciated.  Sensory exam of the foot is normal, tested with the monofilament. Good pulses, no lesions or ulcers, good peripheral pulses.    In House Labs:   Lab Results   Component Value Date    A1C 10.6 03/19/2019    A1C 11.6 12/20/2018    A1C 11.2 08/09/2018    A1C 8.9 11/13/2017    A1C 10.0 10/07/2017       TSH   Date Value Ref Range Status   03/19/2019 2.95 0.40 - 4.00 mU/L Final   10/07/2017 2.57 0.40 - 4.00 mU/L Final   06/26/2017 6.05 (H) 0.40 - 4.00 mU/L Final   03/05/2014 3.97 0.4 - 5.0 mU/L Final   03/16/2012 3.75 0.4 - 5.0 mU/L Final     T4 Free   Date Value Ref Range Status   06/26/2017 1.02 0.76 - 1.46 ng/dL Final   03/28/2006 1.10 0.70 - 1.85 ng/dL Final       Creatinine   Date Value Ref Range Status   06/27/2019 0.94 0.52 - 1.04 mg/dL Final   ]    Recent Labs   Lab Test 08/09/18  0935 01/29/16  0933 04/01/15  1143 07/24/14  0851   CHOL  --  207* 197 193   HDL  --  55 53 51   * " 129* 121 126   TRIG  --  116 113 84   CHOLHDLRATIO  --   --  3.7 4.0     Results for RENY NAJERA (MRN [id number]) as of 3/20/2019 12:16   Ref. Range 3/19/2019 14:29   C-Peptide Latest Ref Range: 0.9 - 6.9 ng/mL <0.1 (L)   TSH Latest Ref Range: 0.40 - 4.00 mU/L 2.95   Glucose Latest Ref Range: 70 - 99 mg/dL 148 (H)       Assessment/Treatment Plan:      Type 1 diabetes uncontrolled with pre-proliferative diabetic retinopathy: Diagnosed at the age of 18 and has been on insulin since then.      Unfortunately, Reny do not have her Dexcom for over couple of months due to insurance issues.  Now she has it back for the last 10 days.  We reviewed her Dexcom readings in detail.  Her A1c has gone up to 9.5.\\    Reviewing her Dexcom readings over the last 1 week, overall reasonable control with average blood sugar 174.  However, noted that she has had multiple lows following correction scale and following that she overcorrects and will have high blood sugars.  For that reason we have adjusted her correction scale.  We will going to continue her mealtime insulin without any change.  She has had multiple blood sugar readings which are high about an hour of 2 prior to her Lantus injection indicating that her Lantus might have been running out at 20-22 hours instead of lasting 24 hours.  For that reason I have changed her long-acting insulin to degludec or Tresiba from Lantus.  I have advised multiple times to stop the Lantus injection as soon as she starts to see above.  Should be injecting Tresiba at the same time she has been doing the Lantus injection to avoid any overlap of long-acting insulin.  Patient verbalized understanding.  The following written information provided to the patient.     Blood pressure well controlled.  We will update lipid panel and microalbuminuria/these were ordered at her last visit.  Of note, there is been an issue with her Dexcom setting symptoms of the date.  We have corrected the date.   Initial download was off due to date settings.    Patient Instructions   Please complete blood work for cholesterol today.     degludec/TRESIBA  10 UNITS DAILY - THIS REPLACES LANTUS. DON'T TAKE BOTH LANTUS AND TRESIBA.     1 unit for every 12 grams of carbohydrates with meals three times per day and snacks.      Please follow the following correction scale three times per day and at bedtime:  For  to 180 give 0.5 units.   For  to 210 give 1 units.   For  to 240 give 1.5 units.   For  to 270 give 2 units.   For  to 300 give 2.5 units.   For  to 330 give 3 units.   For  to 360 give 3.5 units.  For BG >360 give 4 units.        I will contact the patient with the test results.  Return to clinic in 3  month.  She will follow-up with CDE in 4 weeks.    Test and/or medications prescribed today:  Lipid panel was ordered at her last visit however she has not completed it yet.  Reminded to complete it at her convenience.  BMP checked over the last 1 year and has been within the normal limits.      Siri Rhoades MD  Staff Endocrinologist    [phone]  Division of Endocrinology and Diabetes

## 2020-01-13 NOTE — PATIENT INSTRUCTIONS
Please complete blood work for cholesterol today.     degludec/TRESIBA  10 UNITS DAILY - THIS REPLACES LANTUS. DON'T TAKE BOTH LANTUS AND TRESIBA.     1 unit for every 12 grams of carbohydrates with meals three times per day and snacks.      Please follow the following correction scale three times per day and at bedtime:  For  to 180 give 0.5 units.   For  to 210 give 1 units.   For  to 240 give 1.5 units.   For  to 270 give 2 units.   For  to 300 give 2.5 units.   For  to 330 give 3 units.   For  to 360 give 3.5 units.  For BG >360 give 4 units.

## 2020-01-13 NOTE — NURSING NOTE
"Vickie Duque's goals for this visit include: Follow up Diabetes  She requests these members of her care team be copied on today's visit information: YES    PCP: Alvaro Guerrero    Referring Provider:  No referring provider defined for this encounter.    Ht 1.626 m (5' 4\")   Wt 74.4 kg (164 lb)   LMP 01/08/2020   BMI 28.15 kg/m      Do you need any medication refills at today's visit? NO    "

## 2020-01-16 ENCOUNTER — TELEPHONE (OUTPATIENT)
Dept: ENDOCRINOLOGY | Facility: CLINIC | Age: 35
End: 2020-01-16

## 2020-01-16 DIAGNOSIS — E10.65 TYPE 1 DIABETES MELLITUS WITH HYPERGLYCEMIA (H): ICD-10-CM

## 2020-01-16 DIAGNOSIS — E16.2 HYPOGLYCEMIA: ICD-10-CM

## 2020-01-16 NOTE — TELEPHONE ENCOUNTER
Notification received from Nicole Arnold for Prior Authorization request for Tresiba U100.    PA completed via [employer].    Key: [id number]    Will await determination from [employer].    Maggy Gaona LPN  Diabetes Clinic Coordinator   Adult Endocrinology and Pediatric Specialty Clinics  [facility]

## 2020-01-16 NOTE — TELEPHONE ENCOUNTER
Prior authorization approval received from: [facility].    Valid: 1/1/2020-1/16/2021    Patient and pharmacy notified via updated prescription.    Maggy Gaona LPN  Diabetes Clinic Coordinator   Adult Endocrinology and Pediatric Speciality Clinics  [facility]

## 2020-01-16 NOTE — TELEPHONE ENCOUNTER
Additional clinical information form completed and faxed with progress notes to [employer] [phone].    Maggy Gaona LPN  Diabetes Clinic Coordinator   Adult Endocrinology and Pediatric Specialty Clinics  [facility]

## 2020-02-06 ENCOUNTER — MYC MEDICAL ADVICE (OUTPATIENT)
Dept: ENDOCRINOLOGY | Facility: CLINIC | Age: 35
End: 2020-02-06

## 2020-02-06 ENCOUNTER — TELEPHONE (OUTPATIENT)
Dept: ENDOCRINOLOGY | Facility: CLINIC | Age: 35
End: 2020-02-06

## 2020-02-06 DIAGNOSIS — E10.9 TYPE 1 DIABETES MELLITUS (H): Primary | ICD-10-CM

## 2020-02-06 NOTE — TELEPHONE ENCOUNTER
Cde spoke with pt. Advised we can put pt back on Lantus and will send new Rx today. Advsised Rx will state - take 12 units daily -  but patient should take 10 units daily. Advised Cde wrote Rx for 12 units just in case dose adjustments need to be made. Pt advised Cde will call her on Monday, as scheduled, to review bg levels. Pt verbalized understanding.     Haydee Rojas RN, BSN, CDE   [facility]

## 2020-02-10 ENCOUNTER — TELEPHONE (OUTPATIENT)
Dept: ENDOCRINOLOGY | Facility: CLINIC | Age: 35
End: 2020-02-10

## 2020-02-10 ENCOUNTER — APPOINTMENT (OUTPATIENT)
Dept: EDUCATION SERVICES | Facility: CLINIC | Age: 35
End: 2020-02-10
Payer: COMMERCIAL

## 2020-02-10 NOTE — TELEPHONE ENCOUNTER
Cde called pt to discuss bg readings since transitoning back to [facility] from [facility], three days ago. Pt states bg readings have been up and down, but feels levels have somewhat improved since the transition. Denies hypoglycemia.  Fasting bg this morning was 99. Pt advised to continue taking 10 units daily. Call Cde if issues or concerns arise. Pt verbalized understanding.     'Haydee Rojas RN, BSN, CDE   [facility]

## 2020-04-09 DIAGNOSIS — E10.9 TYPE 1 DIABETES MELLITUS (H): ICD-10-CM

## 2020-04-14 RX ORDER — PROCHLORPERAZINE 25 MG/1
SUPPOSITORY RECTAL
Qty: 1 EACH | Refills: 3 | Status: SHIPPED | OUTPATIENT
Start: 2020-04-14 | End: 2020-11-23

## 2020-04-14 RX ORDER — PROCHLORPERAZINE 25 MG/1
SUPPOSITORY RECTAL
Qty: 3 EACH | Refills: 11 | Status: SHIPPED | OUTPATIENT
Start: 2020-04-14 | End: 2020-11-23

## 2020-04-14 NOTE — TELEPHONE ENCOUNTER
Will forward to ROMAINE Walsh for approval.    Maggy Gaona LPN  Diabetes Clinic Coordinator   Adult Endocrinology and Pediatric Specialty Clinics  [facility]

## 2020-04-21 ENCOUNTER — MYC MEDICAL ADVICE (OUTPATIENT)
Dept: ENDOCRINOLOGY | Facility: CLINIC | Age: 35
End: 2020-04-21

## 2020-04-27 ENCOUNTER — TELEPHONE (OUTPATIENT)
Dept: ENDOCRINOLOGY | Facility: CLINIC | Age: 35
End: 2020-04-27

## 2020-04-27 NOTE — TELEPHONE ENCOUNTER
Attempted to contact patient at phone number provided. Patient not available with Larry, and no verbal release on file. Larry requested that I call patient on her phone.    Contacted patient and left detailed voicemail to call back to discuss sending DMV form (due 5/3 per Larry) an to convert tomorrow's appointment to virtual.      Jennifer Mcconnell, RN  Endocrine Care Coordinator  [facility]

## 2020-04-27 NOTE — TELEPHONE ENCOUNTER
[facility] Call Center    Phone Message    May a detailed message be left on voicemail: yes     Reason for Call: Form or Letter   Needs form signed by  For drivers license. Appt tomorrow but was told it needed to be changed to a televisit - wanting to discuss how this paperwork is going to be filled out since she is diabetic and needs to be able to drive. Please call to advise. Wanting to bring to clinic today, and wait for it to be signed. Stated due tomorrow?      Action Taken: Message routed to:  Adult Clinics: Endocrinology p [phone]    Travel Screening: Not Applicable

## 2020-04-27 NOTE — TELEPHONE ENCOUNTER
Patient returned call. Patient is going to send her DMV form via [employer] and it will be forwarded to Dr. Rhoades to review and sign. Clinic can then fax to DMV by 5/3/20.      Jennifer Mcconnell RN  Endocrine Care Coordinator  [facility]

## 2020-04-28 ENCOUNTER — VIRTUAL VISIT (OUTPATIENT)
Dept: ENDOCRINOLOGY | Facility: CLINIC | Age: 35
End: 2020-04-28
Payer: COMMERCIAL

## 2020-04-28 DIAGNOSIS — E10.65 TYPE 1 DIABETES MELLITUS WITH HYPERGLYCEMIA (H): Primary | ICD-10-CM

## 2020-04-28 PROCEDURE — 99441 ZZC PHYSICIAN TELEPHONE EVALUATION 5-10 MIN: CPT | Performed by: INTERNAL MEDICINE

## 2020-04-28 PROCEDURE — 95251 CONT GLUC MNTR ANALYSIS I&R: CPT | Performed by: INTERNAL MEDICINE

## 2020-04-28 NOTE — PROGRESS NOTES
Recent blood sugars   8 pm 4/27/20.    124  6 pm.                      190  4pm.                       220  12pm.                    300

## 2020-04-28 NOTE — PATIENT INSTRUCTIONS
Try to upload data DEXCOM and we will contact you after reviewing the data.   The driving licence form has been completed.  We will do blood work prior to your next visit; please schedule blood work and office visit on the same day.

## 2020-04-28 NOTE — PROGRESS NOTES
"Vickie Duque is a 34 year old female who is being evaluated via a billable telephone visit.      The patient has been notified of following:     \"This telephone visit will be conducted via a call between you and your physician/provider. We have found that certain health care needs can be provided without the need for a physical exam.  This service lets us provide the care you need with a short phone conversation.  If a prescription is necessary we can send it directly to your pharmacy.  If lab work is needed we can place an order for that and you can then stop by our lab to have the test done at a later time.    Telephone visits are billed at different rates depending on your insurance coverage. During this emergency period, for some insurers they may be billed the same as an in-person visit.  Please reach out to your insurance provider with any questions.    If during the course of the call the physician/provider feels a telephone visit is not appropriate, you will not be charged for this service.\"    Patient has given verbal consent for Telephone visit?  Yes    How would you like to obtain your AVS? Srinivashart    Subjective     Endocrinology telephone Visit    Chief Complaint: Diabetes     Information obtained from:Patient    Subjective:         HPI: Vickie Duque is a 34 year old female with history of type 1 diabetes who is here for routine follow up.     Type 1 diabetes since the age of 18.    Current diabetic medications are: Insulin glargine 12 units daily, mealtime insulin 1 units for every 15, 15, 20 g of carbohydrates and a correction dose of:  For  to 180 give 0.5 units.   For  to 210 give 1 units.   For  to 240 give 1.5 units.   For  to 270 give 2 units.   For  to 300 give 2.5 units.   For  to 330 give 3 units.   For  to 360 give 3.5 units.  For BG >360 give 4 units.    Vickie reports that sometimes she doesn't bolus the full amount and blood sugar can be high " following that.     Hemoglobin A1C   Date Value Ref Range Status   01/13/2020 9.5 (A) 0 - 5.6 % Final   10/14/2019 8.3 (A) 0 - 5.6 % Final   07/22/2019 8.9 (A) 0 - 5.6 % Final     She has diabetic retinopathy.      Form for driving license completed today.     Allergies   Allergen Reactions     No Known Drug Allergies        Current Outpatient Medications   Medication Sig Dispense Refill     albuterol (PROAIR HFA/PROVENTIL HFA/VENTOLIN HFA) 108 (90 Base) MCG/ACT inhaler Inhale 2 puffs into the lungs every 4 hours as needed 1 Inhaler 0     Continuous Blood Gluc  (DEXCOM G6 ) HAYES 1 each continuous 1 Device 0     Continuous Blood Gluc Sensor (DEXCOM G6 SENSOR) MISC CHANGE EVERY 10 DAYS 3 each 11     Continuous Blood Gluc Transmit (DEXCOM G6 TRANSMITTER) MISC CHANGE EVERY 3 MONTHS 1 each 3     ibuprofen (ADVIL/MOTRIN) 600 MG tablet Take 1 tablet (600 mg) by mouth every 8 hours as needed for moderate pain 20 tablet 0     insulin glargine (LANTUS SOLOSTAR) 100 UNIT/ML pen Inject 12 Units Subcutaneous At Bedtime Add 2 units to prime. 15 mL 3     insulin pen needle (BD TARA U/F) 32G X 4 MM miscellaneous Use 3 daily or as directed. 270 each 3     NOVOLOG FLEXPEN 100 UNIT/ML soln Inject 1 unit for every 15 grams of carbohydrates with breakfast and lunch and 1 unit for every 20 grams of CHO with dinner and snacks. 45 mL 1     glucagon 1 MG SOLR injection Inject 1 mg Subcutaneous every 15 minutes as needed for low blood sugar 2 each 0       Review of Systems     per HPI above    Past Medical History:   Diagnosis Date     Adjustment disorder with anxious mood 11/23/2015     Anxiety 11/27/2015     ASCUS of cervix with negative high risk HPV 06/19/2008     Depressive disorder, not elsewhere classified      Diabetic eye exam (H) 12/19/14     Mixed hyperlipidemia 11/30/2006     Regional enteritis of unspecified site     Ulcerative Colitis     Type I (juvenile type) diabetes mellitus with ketoacidosis, not stated as  uncontrolled(250.11) 01/01/2007    Moderately severe intensity.     Type I (juvenile type) diabetes mellitus with ketoacidosis, uncontrolled 02/14/08     Type I (juvenile type) diabetes mellitus without mention of complication, not stated as uncontrolled     diagnosed in 2003     Ulcerative colitis, unspecified     remission. Last flare 6 yrs ago.        Past Surgical History:   Procedure Laterality Date     DILATION AND CURETTAGE SUCTION  4/2010    miscarriage     HC COLONOSCOPY W BIOPSY  08/16/06     HC COLONOSCOPY W/WO BRUSH/WASH       TUBAL LIGATION         Family History   Problem Relation Age of Onset     Hypertension Father      Diabetes Maternal Grandmother      Cancer Maternal Grandmother      Diabetes Paternal Grandfather      Diabetes Maternal Uncle      Diabetes Maternal Aunt        Social History     Socioeconomic History     Marital status: Single     Spouse name: None     Number of children: 1     Years of education: 12     Highest education level: None   Occupational History     Occupation: Nursing assistant   Social Needs     Financial resource strain: None     Food insecurity:     Worry: None     Inability: None     Transportation needs:     Medical: None     Non-medical: None   Tobacco Use     Smoking status: Current Every Day Smoker     Packs/day: 1.00     Years: 16.00     Pack years: 16.00     Types: Cigarettes     Smokeless tobacco: Never Used   Substance and Sexual Activity     Alcohol use: Yes     Alcohol/week: 0.0 oz     Comment: rare     Drug use: No     Sexual activity: Yes     Partners: Male     Birth control/protection: Surgical, Female Surgical     Comment: tubal ligation   Lifestyle     Physical activity:     Days per week: None     Minutes per session: None     Stress: None   Relationships     Social connections:     Talks on phone: None     Gets together: None     Attends Sikhism service: None     Active member of club or organization: None     Attends meetings of clubs or  organizations: None     Relationship status: None     Intimate partner violence:     Fear of current or ex partner: None     Emotionally abused: None     Physically abused: None     Forced sexual activity: None   Other Topics Concern      Service No     Blood Transfusions No     Caffeine Concern Yes     Comment: Advised not more than 16 ounces/day     Occupational Exposure Yes     Comment: Visiting [facility] - student online classes     Hobby Hazards No     Sleep Concern No     Stress Concern Yes     Weight Concern No     Special Diet Yes     Comment: IDDM Type I     Back Care Yes     Comment: work-related about 1 year ago     Exercise Yes     Comment: Active at work     Bike Helmet No     Seat Belt Yes     Self-Exams No     Parent/sibling w/ CABG, MI or angioplasty before 65F 55M? Not Asked   Social History Narrative    Lives in [place] with Rod gandhi and her son and their daughter.   Necoe and Rod smoke.   No indoor cats/kittens.   No concerns about domestic violence.       Objective:   There were no vitals taken for this visit.  Constitutional: Pleasant .   Neurological: Alert and oriented.    Psychological: appropriate mood and affect   In House Labs:   Lab Results   Component Value Date    A1C 10.6 03/19/2019    A1C 11.6 12/20/2018    A1C 11.2 08/09/2018    A1C 8.9 11/13/2017    A1C 10.0 10/07/2017       TSH   Date Value Ref Range Status   03/19/2019 2.95 0.40 - 4.00 mU/L Final   10/07/2017 2.57 0.40 - 4.00 mU/L Final   06/26/2017 6.05 (H) 0.40 - 4.00 mU/L Final   03/05/2014 3.97 0.4 - 5.0 mU/L Final   03/16/2012 3.75 0.4 - 5.0 mU/L Final     T4 Free   Date Value Ref Range Status   06/26/2017 1.02 0.76 - 1.46 ng/dL Final   03/28/2006 1.10 0.70 - 1.85 ng/dL Final       Creatinine   Date Value Ref Range Status   06/27/2019 0.94 0.52 - 1.04 mg/dL Final   ]    Recent Labs   Lab Test 08/09/18  0935 01/29/16  0933 04/01/15  1143 07/24/14  0851   CHOL  --  207* 197 193   HDL  --  55 53 51   LDL  113* 129* 121 126   TRIG  --  116 113 84   CHOLHDLRATIO  --   --  3.7 4.0     Results for RENY NAJERA (MRN [id number]) as of 3/20/2019 12:16   Ref. Range 3/19/2019 14:29   C-Peptide Latest Ref Range: 0.9 - 6.9 ng/mL <0.1 (L)   TSH Latest Ref Range: 0.40 - 4.00 mU/L 2.95   Glucose Latest Ref Range: 70 - 99 mg/dL 148 (H)       Assessment/Treatment Plan:      Type 1 diabetes uncontrolled with pre-proliferative diabetic retinopathy: Diagnosed at the age of 18 and has been on insulin since then.      Addendum: We reviewed the CGM report for the last 4 weeks in detail. Overall, improved glycemic control. There are high's following meals and this is likely inadequate mealtime coverage. She sometimes misses mealtime insulin or doesn't bolus the whole amount. Pt has indicated that she will work on this. April 23 was the day when she had excellent glycemic control and on this day all BG readings were within the target range except small rise after dinner. If persistently high above dinner will reduce mealtime insulin to 1:15 from 1:20 at dinner time. Will continue current settings without any change for now.     Blood pressure well controlled.  We will update lipid panel and microalbuminuria/these were ordered today again.    Per patient's report blood sugars are running within the target range when she boluses for her meals.  Therefore, we will continue her current insulin therapy without any change.    Continue: Insulin glargine 12 units daily, mealtime insulin 1 units for every 15, 15, 20 g of carbohydrates and a correction dose of:  For  to 180 give 0.5 units.   For  to 210 give 1 units.   For  to 240 give 1.5 units.   For  to 270 give 2 units.   For  to 300 give 2.5 units.   For  to 330 give 3 units.   For  to 360 give 3.5 units.  For BG >360 give 4 units.    Patient Instructions   Try to upload data DEXCOM and we will contact you after reviewing the data.   The driving  licence form has been completed.  We will do blood work prior to your next visit; please schedule blood work and office visit on the same day.         Return to clinic in 3  month.        Siri Rhoades MD  Staff Endocrinologist    [phone]  Division of Endocrinology and Diabetes      Return in about 3 months (around 7/28/2020).      Phone call duration:  12 minutes (called patient for additional discussion on 5/4/20 after CGM report available to discuss next steps).

## 2020-04-29 NOTE — TELEPHONE ENCOUNTER
Information placed in virtual visit 4/28/20.  Mary Kate Sanchez [facility]  Adult Endocrinology  [facility]

## 2020-05-04 NOTE — ADDENDUM NOTE
Addended by: AYANNA IVERSON on: 5/4/2020 10:23 AM     Modules accepted: Orders, Level of Service

## 2020-05-11 ENCOUNTER — NURSE TRIAGE (OUTPATIENT)
Dept: NURSING | Facility: CLINIC | Age: 35
End: 2020-05-11

## 2020-05-11 ENCOUNTER — OFFICE VISIT (OUTPATIENT)
Dept: URGENT CARE | Facility: URGENT CARE | Age: 35
End: 2020-05-11
Payer: COMMERCIAL

## 2020-05-11 VITALS — DIASTOLIC BLOOD PRESSURE: 77 MMHG | HEART RATE: 72 BPM | TEMPERATURE: 96.8 F | SYSTOLIC BLOOD PRESSURE: 114 MMHG

## 2020-05-11 DIAGNOSIS — E10.65 UNCONTROLLED TYPE 1 DIABETES MELLITUS WITH HYPERGLYCEMIA (H): Primary | ICD-10-CM

## 2020-05-11 DIAGNOSIS — L03.211 FACIAL CELLULITIS: ICD-10-CM

## 2020-05-11 DIAGNOSIS — A46 ERYSIPELAS: ICD-10-CM

## 2020-05-11 PROCEDURE — 99214 OFFICE O/P EST MOD 30 MIN: CPT | Mod: 25 | Performed by: PHYSICIAN ASSISTANT

## 2020-05-11 PROCEDURE — 96372 THER/PROPH/DIAG INJ SC/IM: CPT | Performed by: PHYSICIAN ASSISTANT

## 2020-05-11 RX ORDER — CEFTRIAXONE SODIUM 1 G
1 VIAL (EA) INJECTION ONCE
Status: COMPLETED | OUTPATIENT
Start: 2020-05-11 | End: 2020-05-11

## 2020-05-11 RX ADMIN — Medication 1 G: at 14:00

## 2020-05-11 NOTE — PROGRESS NOTES
Clinic Administered Medication Documentation      Injectable Medication Documentation    Patient was given Ceftriaxone Sodium (Rocephin). Prior to medication administration, verified patients identity using patient s name and date of birth. Please see MAR and medication order for additional information. Patient instructed to remain in clinic for 15 minutes and report any adverse reaction to staff immediately .      Was entire vial of medication used? Yes  Vial/Syringe: Single dose vial  Expiration Date:  04/2021`  Was this medication supplied by the patient? No

## 2020-05-11 NOTE — TELEPHONE ENCOUNTER
Pt states her nose is swollen, into her cheeks and Saturday it felt like someone punched her in the nose.   Pt states the swelling is getting worse.   Mostly into the left cheek but into both cheeks.   No recent injury. There may be a sore on the nose, pt states it does not look open.   Pt seemed frustrated with wait times to speak to RN.    +redness, slightly warm  Oozing clear fluid.   Denies fever.      RN recommended urgent care visit due to needing someone to look at the possible sore on her nose to rr/o infection.    Pt agreed to plan and was transferred to scheduling to make appointment at urgent care clinic.     Pt agreed to plan.     Shaila Goodwin RN   [facility] RN Triage       Additional Information    Black (necrotic) or blisters develop in wound     Pt states there is a possible blister where there is a possible sore on her nose.    Negative: Bright red, wide-spread, sunburn-like rash    Negative: Stitches and not infected    Protocols used: WOUND INFECTION-A-OH

## 2020-05-11 NOTE — PROGRESS NOTES
SUBJECTIVE:   Vickie Duque is a 34 year old female presenting with a chief complaint of rash on nose and left side of face.  Onset of symptoms was 3 day(s) ago.  Course of illness is worsening.    Severity moderate  Current and Associated symptoms: swollen, erythematous nose and swollen/tender left cheek  Treatment measures tried include none.  Predisposing factors include diabetes.    Past Medical History:   Diagnosis Date     Adjustment disorder with anxious mood 11/23/2015     Anxiety 11/27/2015     ASCUS of cervix with negative high risk HPV 06/19/2008     Depressive disorder, not elsewhere classified      Diabetic eye exam (H) 12/19/14     Mixed hyperlipidemia 11/30/2006     Regional enteritis of unspecified site     Ulcerative Colitis     Type I (juvenile type) diabetes mellitus with ketoacidosis, not stated as uncontrolled(250.11) 01/01/2007    Moderately severe intensity.     Type I (juvenile type) diabetes mellitus with ketoacidosis, uncontrolled 02/14/08     Type I (juvenile type) diabetes mellitus without mention of complication, not stated as uncontrolled     diagnosed in 2003     Ulcerative colitis, unspecified     remission. Last flare 6 yrs ago.         Allergies   Allergen Reactions     No Known Drug Allergies      Family History   Problem Relation Age of Onset     Hypertension Father      Diabetes Maternal Grandmother      Cancer Maternal Grandmother      Diabetes Paternal Grandfather      Diabetes Maternal Uncle      Diabetes Maternal Aunt        Social History     Tobacco Use     Smoking status: Current Every Day Smoker     Packs/day: 1.00     Years: 16.00     Pack years: 16.00     Types: Cigarettes     Smokeless tobacco: Never Used   Substance Use Topics     Alcohol use: Yes     Alcohol/week: 0.0 standard drinks     Comment: occassionally       ROS:  CONSTITUTIONAL:NEGATIVE for fever, chills, change in weight  INTEGUMENTARY/SKIN: POSITIVE for rash on nose, left cheek  EYES: NEGATIVE for vision  changes or irritation  ENT/MOUTH: POSITIVE for tenderness left cheek  RESP:NEGATIVE for significant cough or SOB  CV: NEGATIVE for chest pain, palpitations or peripheral edema  GI: NEGATIVE for nausea, abdominal pain, heartburn, or change in bowel habits  : negative for and dysuria  MUSCULOSKELETAL: NEGATIVE for significant arthralgias or myalgia  NEURO: NEGATIVE for weakness, dizziness or paresthesias    OBJECTIVE  :/77   Pulse 72   Temp 96.8  F (36  C) (Tympanic)   GENERAL APPEARANCE: healthy, alert and no distress  EYES: EOMI,  PERRL, conjunctiva clear  HENT: ear canals and TM's normal.  Nose and mouth without ulcers, erythema or lesions  NECK: supple, nontender, no lymphadenopathy  RESP: lungs clear to auscultation - no rales, rhonchi or wheezes  CV: regular rates and rhythm, normal S1 S2, no murmur noted  ABDOMEN:  soft, nontender, no HSM or masses and bowel sounds normal  Extremities: no peripheral edema or tenderness, peripheral pulses normal  MS: extremities normal- no gross deformities noted, no erythema, FROM noted in all extremities  NEURO: Normal strength and tone, sensory exam grossly normal,  normal speech and mentation  SKIN: Positive for left cheek swelling, erythema, induration.  Positive for rash on nose with tenderness    ASSESSMENT/PLAN      ICD-10-CM    1. Uncontrolled type 1 diabetes mellitus with hyperglycemia (H)  E10.65 cefTRIAXone (ROCEPHIN) injection 1 g     amoxicillin-clavulanate (AUGMENTIN) 875-125 MG tablet   2. Erysipelas  A46 cefTRIAXone (ROCEPHIN) injection 1 g     amoxicillin-clavulanate (AUGMENTIN) 875-125 MG tablet   3. Facial cellulitis  L03.211 cefTRIAXone (ROCEPHIN) injection 1 g     amoxicillin-clavulanate (AUGMENTIN) 875-125 MG tablet     CEFTRIAXONE NA INJ /250MG     INJECTION INTRAMUSCULAR OR SUB-Q       Orders Placed This Encounter     CEFTRIAXONE NA INJ /250MG     INJECTION INTRAMUSCULAR OR SUB-Q     Naproxen Sodium (ALEVE PO)     cefTRIAXone (ROCEPHIN)  injection 1 g     amoxicillin-clavulanate (AUGMENTIN) 875-125 MG tablet     Patient given shot of rocephin 1 gram IM  Naprosyn for inflammation  Started on augmentin for cellulitis  Monitor symptoms closely, if they worsen, fever develops then go to the ED

## 2020-05-12 ENCOUNTER — NURSE TRIAGE (OUTPATIENT)
Dept: NURSING | Facility: CLINIC | Age: 35
End: 2020-05-12

## 2020-05-12 ENCOUNTER — HOSPITAL ENCOUNTER (EMERGENCY)
Facility: CLINIC | Age: 35
Discharge: HOME | End: 2020-05-12
Attending: EMERGENCY MEDICINE | Admitting: EMERGENCY MEDICINE
Payer: COMMERCIAL

## 2020-05-12 ENCOUNTER — APPOINTMENT (OUTPATIENT)
Dept: CT IMAGING | Facility: CLINIC | Age: 35
End: 2020-05-12
Attending: EMERGENCY MEDICINE
Payer: COMMERCIAL

## 2020-05-12 ENCOUNTER — MYC MEDICAL ADVICE (OUTPATIENT)
Dept: ENDOCRINOLOGY | Facility: CLINIC | Age: 35
End: 2020-05-12

## 2020-05-12 VITALS
RESPIRATION RATE: 16 BRPM | WEIGHT: 165 LBS | BODY MASS INDEX: 28.32 KG/M2 | TEMPERATURE: 98 F | DIASTOLIC BLOOD PRESSURE: 78 MMHG | SYSTOLIC BLOOD PRESSURE: 112 MMHG | HEART RATE: 79 BPM | OXYGEN SATURATION: 100 %

## 2020-05-12 DIAGNOSIS — L03.211 CELLULITIS OF FACE: ICD-10-CM

## 2020-05-12 LAB
ANION GAP SERPL CALCULATED.3IONS-SCNC: 5 MMOL/L (ref 3–14)
BASOPHILS # BLD AUTO: 0 10E9/L (ref 0–0.2)
BASOPHILS NFR BLD AUTO: 0 %
BUN SERPL-MCNC: 16 MG/DL (ref 7–30)
CALCIUM SERPL-MCNC: 8.5 MG/DL (ref 8.5–10.1)
CHLORIDE SERPL-SCNC: 107 MMOL/L (ref 94–109)
CO2 SERPL-SCNC: 25 MMOL/L (ref 20–32)
CREAT SERPL-MCNC: 0.93 MG/DL (ref 0.52–1.04)
DIFFERENTIAL METHOD BLD: NORMAL
EOSINOPHIL NFR BLD AUTO: 0 %
ERYTHROCYTE [DISTWIDTH] IN BLOOD BY AUTOMATED COUNT: 13.9 % (ref 10–15)
GFR SERPL CREATININE-BSD FRML MDRD: 79 ML/MIN/{1.73_M2}
GLUCOSE SERPL-MCNC: 210 MG/DL (ref 70–99)
HCT VFR BLD AUTO: 38.6 % (ref 35–47)
HGB BLD-MCNC: 12.4 G/DL (ref 11.7–15.7)
IMM GRANULOCYTES # BLD: 0 10E9/L (ref 0–0.4)
IMM GRANULOCYTES NFR BLD: 0.4 %
LYMPHOCYTES # BLD AUTO: 1.3 10E9/L (ref 0.8–5.3)
LYMPHOCYTES NFR BLD AUTO: 16.9 %
MCH RBC QN AUTO: 26.7 PG (ref 26.5–33)
MCHC RBC AUTO-ENTMCNC: 32.1 G/DL (ref 31.5–36.5)
MCV RBC AUTO: 83 FL (ref 78–100)
MONOCYTES # BLD AUTO: 0.6 10E9/L (ref 0–1.3)
MONOCYTES NFR BLD AUTO: 7.2 %
NEUTROPHILS # BLD AUTO: 6 10E9/L (ref 1.6–8.3)
NEUTROPHILS NFR BLD AUTO: 75.5 %
NRBC # BLD AUTO: 0 10*3/UL
NRBC BLD AUTO-RTO: 0 /100
PLATELET # BLD AUTO: 190 10E9/L (ref 150–450)
POTASSIUM SERPL-SCNC: 4 MMOL/L (ref 3.4–5.3)
RBC # BLD AUTO: 4.65 10E12/L (ref 3.8–5.2)
SODIUM SERPL-SCNC: 137 MMOL/L (ref 133–144)
WBC # BLD AUTO: 7.9 10E9/L (ref 4–11)

## 2020-05-12 PROCEDURE — 25800030 ZZH RX IP 258 OP 636: Performed by: EMERGENCY MEDICINE

## 2020-05-12 PROCEDURE — 25000128 H RX IP 250 OP 636: Performed by: EMERGENCY MEDICINE

## 2020-05-12 PROCEDURE — 87070 CULTURE OTHR SPECIMN AEROBIC: CPT | Performed by: EMERGENCY MEDICINE

## 2020-05-12 PROCEDURE — 80048 BASIC METABOLIC PNL TOTAL CA: CPT | Performed by: EMERGENCY MEDICINE

## 2020-05-12 PROCEDURE — 99285 EMERGENCY DEPT VISIT HI MDM: CPT | Mod: 25 | Performed by: EMERGENCY MEDICINE

## 2020-05-12 PROCEDURE — 96375 TX/PRO/DX INJ NEW DRUG ADDON: CPT | Performed by: EMERGENCY MEDICINE

## 2020-05-12 PROCEDURE — 25000125 ZZHC RX 250: Performed by: EMERGENCY MEDICINE

## 2020-05-12 PROCEDURE — 99284 EMERGENCY DEPT VISIT MOD MDM: CPT | Mod: Z6 | Performed by: EMERGENCY MEDICINE

## 2020-05-12 PROCEDURE — 96365 THER/PROPH/DIAG IV INF INIT: CPT | Mod: 59 | Performed by: EMERGENCY MEDICINE

## 2020-05-12 PROCEDURE — 70491 CT SOFT TISSUE NECK W/DYE: CPT

## 2020-05-12 PROCEDURE — 85025 COMPLETE CBC W/AUTO DIFF WBC: CPT | Performed by: EMERGENCY MEDICINE

## 2020-05-12 RX ORDER — CLINDAMYCIN PHOSPHATE 600 MG/50ML
600 INJECTION, SOLUTION INTRAVENOUS ONCE
Status: COMPLETED | OUTPATIENT
Start: 2020-05-12 | End: 2020-05-12

## 2020-05-12 RX ORDER — SODIUM CHLORIDE 9 MG/ML
1000 INJECTION, SOLUTION INTRAVENOUS CONTINUOUS
Status: DISCONTINUED | OUTPATIENT
Start: 2020-05-12 | End: 2020-05-12 | Stop reason: HOSPADM

## 2020-05-12 RX ORDER — IOPAMIDOL 755 MG/ML
100 INJECTION, SOLUTION INTRAVASCULAR ONCE
Status: COMPLETED | OUTPATIENT
Start: 2020-05-12 | End: 2020-05-12

## 2020-05-12 RX ORDER — HYDROCODONE BITARTRATE AND ACETAMINOPHEN 5; 325 MG/1; MG/1
1 TABLET ORAL EVERY 4 HOURS PRN
Qty: 15 TABLET | Refills: 0 | Status: SHIPPED | OUTPATIENT
Start: 2020-05-12 | End: 2020-11-23

## 2020-05-12 RX ORDER — CLINDAMYCIN HCL 300 MG
300 CAPSULE ORAL 4 TIMES DAILY
Qty: 40 CAPSULE | Refills: 0 | Status: SHIPPED | OUTPATIENT
Start: 2020-05-12 | End: 2020-05-22

## 2020-05-12 RX ORDER — HYDROMORPHONE HYDROCHLORIDE 1 MG/ML
0.5 INJECTION, SOLUTION INTRAMUSCULAR; INTRAVENOUS; SUBCUTANEOUS
Status: DISCONTINUED | OUTPATIENT
Start: 2020-05-12 | End: 2020-05-12 | Stop reason: HOSPADM

## 2020-05-12 RX ADMIN — SODIUM CHLORIDE 68 ML: 9 INJECTION, SOLUTION INTRAVENOUS at 12:32

## 2020-05-12 RX ADMIN — HYDROMORPHONE HYDROCHLORIDE 0.5 MG: 1 INJECTION, SOLUTION INTRAMUSCULAR; INTRAVENOUS; SUBCUTANEOUS at 12:46

## 2020-05-12 RX ADMIN — CLINDAMYCIN PHOSPHATE 600 MG: 600 INJECTION, SOLUTION INTRAVENOUS at 12:13

## 2020-05-12 RX ADMIN — SODIUM CHLORIDE 1000 ML: 9 INJECTION, SOLUTION INTRAVENOUS at 11:59

## 2020-05-12 RX ADMIN — IOPAMIDOL 80 ML: 755 INJECTION, SOLUTION INTRAVENOUS at 12:32

## 2020-05-12 NOTE — TELEPHONE ENCOUNTER
"Seen yesterday in [facility] for facial swelling, put on abx and now swelling is worse. Diagnosed with cellulitis.     Patient states she woke up this morning and her face is more swollen and very painful.   -not more painful than yesterday  -rates it 7/10    Patient wonders why the antibiotics are not working  -explained that it takes time for antibiotics to work, 24-72 hours.     Patient is very upset and states that \"Everytime I come to you, you people never help me.\"    Patient is worried because she is supposed to go to work  -Advised that cellulitis can be contagious and she should check with her employer before going to work   -If she needs a note from the doctor, she should call her primary.     Patient is very upset.     Attempted to explain the course of the illness, patient became more upset.     Advised that it is her choice if she wants to go back to ED or , but at the very least she should follow up with PCP. Patient states she only has an endocrinologist. Offered to transfer call to scheduling to set up New Patient appointment. Patient got even more upset and disconnected the call after saying some vulgar words to this RN.       Reason for Disposition    [1] Taking antibiotic < 24 hours AND [2] cellulitis symptoms are WORSE (e.g., spreading redness, pain, swelling, drainage) AND [3] no fever    Caller hangs up    Additional Information    Negative: Shock suspected (e.g., cold/pale/clammy skin, too weak to stand, low BP, rapid pulse)    Negative: Sounds like a life-threatening emergency to the triager    Negative:  surgical wound infection suspected (post-op)    Negative: Surgical wound infection suspected (post-op)    Negative: [1] Widespread rash AND [2] drug rash suspected (i.e., allergic reaction to antibiotic)    Negative: Animal bite wound infection suspected    Negative: Violent behavior, or threatening to physically hurt or kill someone    Negative: [1] Caller is very confused " AND [2] no other adult (e.g., friend or family member) available    Negative: Sounds like a life-threatening emergency to the triager    Negative: Child abuse suspected    Negative: Suicide concerns    Negative: Patient sounds very sick or weak to the triager    Negative: [1] [place] worried caller AND [2] second call within 4 hours about the same medical problem    Negative: [1] [place] worried caller AND [2] third call within 48 hours about the same medical problem    Negative: [1] [place] worried caller AND [2] can't be reassured by triager    Negative: [1] Caller demands to speak with the PCP AND [2] about sick adult (or sick caller)    Negative: [1] Angry or rude caller AND [2] doesn't respond to 5 minutes of triager counseling AND [3] sick adult (or caller)    Negative: [1] Caller mainly has complaints about past medical care, billing, etc AND [2] has mild symptoms or is well    Negative: Difficult caller responded to phone counseling    Negative: Caller is requesting health information that triager feels is unethical or illegal    Negative: [1] Caller continues to be verbally abusive AND [2] after triager sets BOUNDARIES AND [3] Triager ends call    Protocols used: CELLULITIS ON ANTIBIOTIC FOLLOW-UP CALL-CHEVY, DIFFICULT CALLER-CHEVY    Mague Fagan RN on 5/12/2020 at 4:35 AM

## 2020-05-12 NOTE — DISCHARGE INSTRUCTIONS
The CT scan did not show any abscess.  Your labs were reassuring.  I have started you on clindamycin and I sent the prescription to [facility].  I also sent some pain pills.  Do not drive if taking the pain pills.  You may also use ice on your face for relief.  Take the antibiotics as directed.  Stop the amoxicillin.  Return to the ER if fever or worsening symptoms.

## 2020-05-12 NOTE — ED AVS SNAPSHOT
[facility] Emergency Department  [address]   [place] [address]  Phone:  [phone]  Fax:  [phone]                                    Vickie Duque   MRN: [id number]    Department:  [facility] Emergency Department   Date of Visit:  5/12/2020           After Visit Summary Signature Page    I have received my discharge instructions, and my questions have been answered. I have discussed any challenges I see with this plan with the nurse or doctor.    ..........................................................................................................................................  Patient/Patient Representative Signature      ..........................................................................................................................................  Patient Representative Print Name and Relationship to Patient    ..................................................               ................................................  Date                                   Time    ..........................................................................................................................................  Reviewed by Signature/Title    ...................................................              ..............................................  Date                                               Time          22EPIC Rev 08/18

## 2020-05-12 NOTE — TELEPHONE ENCOUNTER
This writer called patient to follow-up the messages below.  Patient is currently in the emergency room for the treatment of the facial swelling.

## 2020-05-12 NOTE — ED PROVIDER NOTES
History     Chief Complaint   Patient presents with     Facial Swelling     HPI  Vickie Duque is a 34 year old female who presents to the ER with left facial swelling, redness, pain and oozing.  She is a type I diabetic.  Her blood sugars are in the 200s.  She uses insulin.  She has not had fever, vomiting, confusion, shortness of breath, chest pain or other new symptoms.  This is progressively increased over the last couple of days.  She was seen in urgent care yesterday and given a shot of Rocephin and put on amoxicillin.  It worsened overnight and became more swollen and red and painful.  She does have some oozing from the wound.  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    Allergies:  Allergies   Allergen Reactions     No Known Drug Allergies        Problem List:    Patient Active Problem List    Diagnosis Date Noted     Hyperglycemia 10/08/2017     Priority: Medium     Major depressive disorder, recurrent episode, moderate (H) 10/04/2016     Priority: Medium     Ulcerative colitis without complications (H) 01/29/2016     Priority: Medium     Anxiety 11/27/2015     Priority: Medium     Noncompliance with medication regimen 11/03/2015     Priority: Medium     Uncontrolled type 1 diabetes mellitus (H) 05/30/2014     Priority: Medium     Problem list name updated by automated process. Provider to review       Viral URI with cough 05/30/2014     Priority: Medium     DKA (diabetic ketoacidoses) (H) 05/29/2014     Priority: Medium     Advanced directives, counseling/discussion 05/29/2014     Priority: Medium     DVT prophylaxis 05/29/2014     Priority: Medium     Tobacco abuse 05/29/2014     Priority: Medium     SIRS (systemic inflammatory response syndrome) (H) 05/29/2014     Priority: Medium     Facial paralysis/[place] palsy 04/27/2012     Priority: Medium     Type 1 diabetes mellitus with hyperglycemia (H) 10/31/2010     Priority: Medium     HYPERLIPIDEMIA LDL GOAL <100  10/31/2010     Priority: Medium     ASCUS of cervix with negative high risk HPV 06/19/2008     Priority: Medium     6/19/08 ASCUS pap/neg HPV  2009 and 2012 both NIL paps  6/28/17 NIL pap/neg HR HPV. EMB normal. Plan: cotest in 3 years.       Sprain of back 03/10/2008     Priority: Medium     Problem list name updated by automated process. Provider to review          Past Medical History:    Past Medical History:   Diagnosis Date     Adjustment disorder with anxious mood 11/23/2015     Anxiety 11/27/2015     ASCUS of cervix with negative high risk HPV 06/19/2008     Depressive disorder, not elsewhere classified      Diabetic eye exam (H) 12/19/14     Mixed hyperlipidemia 11/30/2006     Regional enteritis of unspecified site      Type I (juvenile type) diabetes mellitus with ketoacidosis, not stated as uncontrolled(250.11) 01/01/2007     Type I (juvenile type) diabetes mellitus with ketoacidosis, uncontrolled 02/14/08     Type I (juvenile type) diabetes mellitus without mention of complication, not stated as uncontrolled      Ulcerative colitis, unspecified        Past Surgical History:    Past Surgical History:   Procedure Laterality Date     DILATION AND CURETTAGE SUCTION  4/2010    miscarriage     HC COLONOSCOPY W BIOPSY  08/16/06     HC COLONOSCOPY W/WO BRUSH/WASH       TUBAL LIGATION         Family History:    Family History   Problem Relation Age of Onset     Hypertension Father      Diabetes Maternal Grandmother      Cancer Maternal Grandmother      Diabetes Paternal Grandfather      Diabetes Maternal Uncle      Diabetes Maternal Aunt        Social History:  Marital Status:  Single [1]  Social History     Tobacco Use     Smoking status: Current Every Day Smoker     Packs/day: 1.00     Years: 16.00     Pack years: 16.00     Types: Cigarettes     Smokeless tobacco: Never Used   Substance Use Topics     Alcohol use: Yes     Alcohol/week: 0.0 standard drinks     Comment: occassionally     Drug use: No         Medications:    clindamycin (CLEOCIN) 300 MG capsule  HYDROcodone-acetaminophen (NORCO) 5-325 MG tablet  albuterol (PROAIR HFA/PROVENTIL HFA/VENTOLIN HFA) 108 (90 Base) MCG/ACT inhaler  amoxicillin-clavulanate (AUGMENTIN) 875-125 MG tablet  Continuous Blood Gluc  (DEXCOM G6 ) HAYES  Continuous Blood Gluc Sensor (DEXCOM G6 SENSOR) MISC  Continuous Blood Gluc Transmit (DEXCOM G6 TRANSMITTER) MISC  glucagon 1 MG SOLR injection  ibuprofen (ADVIL/MOTRIN) 600 MG tablet  insulin glargine (LANTUS SOLOSTAR) 100 UNIT/ML pen  insulin pen needle (BD TARA U/F) 32G X 4 MM miscellaneous  Naproxen Sodium (ALEVE PO)  NOVOLOG FLEXPEN 100 UNIT/ML soln          Review of Systems   All other systems reviewed and are negative.      Physical Exam   BP: 139/85  Pulse: 80  Heart Rate: 73  Temp: 98  F (36.7  C)  Resp: 16  Weight: 74.8 kg (165 lb)  SpO2: 100 %      Physical Exam    ED Course        Procedures                   Results for orders placed or performed during the hospital encounter of 05/12/20 (from the past 24 hour(s))   Wound Culture Aerobic Bacterial    Specimen: Face    Left   Result Value Ref Range    Specimen Description Face Left     Special Requests Specimen collected in eSwab transport (white cap)     Culture Micro PENDING    CBC with platelets differential   Result Value Ref Range    WBC 7.9 4.0 - 11.0 10e9/L    RBC Count 4.65 3.8 - 5.2 10e12/L    Hemoglobin 12.4 11.7 - 15.7 g/dL    Hematocrit 38.6 35.0 - 47.0 %    MCV 83 78 - 100 fl    MCH 26.7 26.5 - 33.0 pg    MCHC 32.1 31.5 - 36.5 g/dL    RDW 13.9 10.0 - 15.0 %    Platelet Count 190 150 - 450 10e9/L    Diff Method Automated Method     % Neutrophils 75.5 %    % Lymphocytes 16.9 %    % Monocytes 7.2 %    % Eosinophils 0.0 %    % Basophils 0.0 %    % Immature Granulocytes 0.4 %    Nucleated RBCs 0 0 /100    Absolute Neutrophil 6.0 1.6 - 8.3 10e9/L    Absolute Lymphocytes 1.3 0.8 - 5.3 10e9/L    Absolute Monocytes 0.6 0.0 - 1.3 10e9/L    Absolute  Basophils 0.0 0.0 - 0.2 10e9/L    Abs Immature Granulocytes 0.0 0 - 0.4 10e9/L    Absolute Nucleated RBC 0.0    Basic metabolic panel   Result Value Ref Range    Sodium 137 133 - 144 mmol/L    Potassium 4.0 3.4 - 5.3 mmol/L    Chloride 107 94 - 109 mmol/L    Carbon Dioxide 25 20 - 32 mmol/L    Anion Gap 5 3 - 14 mmol/L    Glucose 210 (H) 70 - 99 mg/dL    Urea Nitrogen 16 7 - 30 mg/dL    Creatinine 0.93 0.52 - 1.04 mg/dL    GFR Estimate 79 >60 mL/min/[1.73_m2]    GFR Estimate If Black >90 >60 mL/min/[1.73_m2]    Calcium 8.5 8.5 - 10.1 mg/dL   Soft tissue neck CT w contrast    Narrative    CT SCAN OF THE NECK WITH CONTRAST  5/12/2020 12:41 PM     HISTORY: facial cellulitis, rule out abscess.    TECHNIQUE:  Axial images and coronal reformations. Radiation dose for  this scan was reduced using automated exposure control, adjustment of  the mA and/or kV according to patient size, or iterative  reconstruction technique. Isovue-370, 80mL IV.    COMPARISON: None.    FINDINGS: There is skin thickening and soft tissue swelling and  subcutaneous edema over the left cheek region. This extends from the  level of the orbit inferiorly 2 the inferior mandible. No discrete  fluid like collections are identified. No soft tissue abscess is seen.  The appearance is consistent with cellulitis.    Visualized sinuses, nasopharynx and orbits: Post septal structures in  the orbits appear normal. There is no evidence for any orbital  abscess.      Tongue, oral cavity and oropharynx:  Normal.      Hypopharynx: Normal.      Larynx and trachea: Normal.      Thyroid: Normal.    Submandibular glands: Normal.      Parotid glands: Normal.        Lymph nodes: Normal.      Vasculature: Normal.      Upper mediastinum and lungs: Normal.      Bones: Normal.      Impression    IMPRESSION:     1. Preseptal soft tissue swelling over the left orbit and left cheek.  The pattern is consistent with cellulitis.    2. No soft tissue or orbital abscess is  identified.    TATA ROWLAND MD       Medications   clindamycin (CLEOCIN) infusion 600 mg (0 mg Intravenous Stopped 5/12/20 1330)   0.9% sodium chloride BOLUS (0 mLs Intravenous Stopped 5/12/20 1401)   sodium chloride (PF) 0.9% PF flush 3 mL (3 mLs Intravenous Given 5/12/20 1231)   iopamidol (ISOVUE-370) solution 100 mL (80 mLs Intravenous Given 5/12/20 1232)   sodium chloride 0.9 % bag 500mL for CT scan flush use (68 mLs As instructed Given 5/12/20 1232)       Assessments & Plan (with Medical Decision Making)  34-year-old diabetic with facial cellulitis and no evidence for abscess.  Due to the oozing nature of the wound we sent a wound culture to rule out MRSA.  I gave her a dose of IV clindamycin and sent her home on oral clindamycin.  I discussed the risks and benefits and treatment options and the patient agreed with this plan.  She understands the risk of C. difficile colitis and other possible side effects versus the risk of not treating this is MRSA.  We will await wound cultures.  Return to ER precautions and follow-up precautions discussed.       I have reviewed the nursing notes.    I have reviewed the findings, diagnosis, plan and need for follow up with the patient.      Discharge Medication List as of 5/12/2020  2:08 PM      START taking these medications    Details   clindamycin (CLEOCIN) 300 MG capsule Take 1 capsule (300 mg) by mouth 4 times daily for 10 days, Disp-40 capsule,R-0, E-Prescribe      HYDROcodone-acetaminophen (NORCO) 5-325 MG tablet Take 1 tablet by mouth every 4 hours as needed for pain, Disp-15 tablet,R-0, E-Prescribe             Final diagnoses:   Cellulitis of face       5/12/2020   [facility] EMERGENCY DEPARTMENT     Pepe Dubose MD  05/12/20 204

## 2020-05-12 NOTE — ED TRIAGE NOTES
Pt comes in with swelling and redness on the L side of her face. Pt states this started on Sunday. Pt received IM rocephin yesterday and was given Amoxicillin PO prescription.

## 2020-05-14 ENCOUNTER — NURSE TRIAGE (OUTPATIENT)
Dept: NURSING | Facility: CLINIC | Age: 35
End: 2020-05-14

## 2020-05-14 ENCOUNTER — TELEPHONE (OUTPATIENT)
Dept: DERMATOLOGY | Facility: CLINIC | Age: 35
End: 2020-05-14

## 2020-05-14 LAB
BACTERIA SPEC CULT: NO GROWTH
Lab: NORMAL
SPECIMEN SOURCE: NORMAL

## 2020-05-14 NOTE — TELEPHONE ENCOUNTER
"Specimen Description  Face Left     Special Requests  Specimen collected in eSwab transport (white cap)     Culture Micro  No growth      She wanted to know the results of her face swab, as listed above.  Rocephin shot on Monday. On clindamycin via IV. Took doses yesterday and today.  She doesn't know what to do about the swelling in the face. I advised she contact her primary MD to discuss next steps. She said she doesn't have a primary MD. I then advised she contact her endocrinologist to discuss next steps.  I advised she complete the course of antibiotics and get seen again if she's not seeing improvement. She said \"ok. Thanks\".  Chante Shaikh RN  [facility]    Additional Information    Negative: Nursing judgment    Negative: Nursing judgment    Negative: Nursing judgment    Negative: Nursing judgment    Information only question and nurse able to answer    Protocols used: NO PROTOCOL AVAILABLE - INFORMATION ONLY-A-[place]      "

## 2020-05-14 NOTE — RESULT ENCOUNTER NOTE
Final wound culture report is NEGATIVE.    No treatment or change in treatment per [facility] ED Lab Result protocol.

## 2020-05-14 NOTE — TELEPHONE ENCOUNTER
Left a voicemail for Vickie informing her that we are doing virtual visits due to covid-19.  Clinic number provided for her to call back to schedule.

## 2020-05-15 ENCOUNTER — VIRTUAL VISIT (OUTPATIENT)
Dept: DERMATOLOGY | Facility: CLINIC | Age: 35
End: 2020-05-15
Payer: COMMERCIAL

## 2020-05-15 DIAGNOSIS — B02.22 TRIGEMINAL HERPES ZOSTER: Primary | ICD-10-CM

## 2020-05-15 DIAGNOSIS — L03.211 FACIAL CELLULITIS: ICD-10-CM

## 2020-05-15 DIAGNOSIS — R51.9 FACIAL PAIN: ICD-10-CM

## 2020-05-15 RX ORDER — VALACYCLOVIR HYDROCHLORIDE 1 G/1
1000 TABLET, FILM COATED ORAL 3 TIMES DAILY
Qty: 21 TABLET | Refills: 0 | Status: SHIPPED | OUTPATIENT
Start: 2020-05-15 | End: 2020-11-23

## 2020-05-15 RX ORDER — DOXYCYCLINE 100 MG/1
100 CAPSULE ORAL 2 TIMES DAILY
Qty: 20 CAPSULE | Refills: 1 | Status: SHIPPED | OUTPATIENT
Start: 2020-05-15 | End: 2020-11-23

## 2020-05-15 ASSESSMENT — PAIN SCALES - GENERAL: PAINLEVEL: EXTREME PAIN (8)

## 2020-05-15 NOTE — TELEPHONE ENCOUNTER
Patient had virtual visit 5/15/20.    Mary Kate Sanchez CMA  Adult Endocrinology  [facility]

## 2020-05-15 NOTE — PROGRESS NOTES
[facility] ([facility] ((National Emergency Concerning the CORONAVIRUS (COVID 19), preferred for return patients. )    Video visit with photos.     Image quality and interpretability: acceptable    Physician has received verbal consent for a Video/Photos Visit from the patient? Yes    In-person dermatology visit recommendation: no    Consent has been obtained for this service by 1 care team member: yes.     Teledermatology information:  - Location of patient: Home  - Location of teledermatologist:  ([facility] DERMATOLOGY (Dr. Shultz, [place], [place])  - Reason teledermatology is appropriate:  of National Emergency Regarding Coronavirus disease (COVID 19) Outbreak  - Method of transmission:  Store and Forward ((National Emergency Concerning the CORONAVIRUS (COVID 19), preferred for return patients.   - Date of images: 05/15/20  - Service start time: 0838  - Service end time:0852  - Date of report: 05/15/20      ___________________________________________________________________________  Dermatology Clinic Note    Dermatology Problem List:  1. Soft tissue infection of the L cheek      Assessment and Plan:    1. Soft tissue infection of the L cheek. Differential diagnosis includes cellulitis, erysipelas. Even though the eruption crosses dermatomes for several of the lesions I would also consider zoster given the unilateral distribution, lack of improvement on antibiotics and presence of scattered erosions.     I have concern for antibiotic resistance with both ceftriaxone and clindamycin in the case of staph. Clindamycin and ceftriaxone should have offered benefit in the setting of group A strep which is most common in erysipelas, but lesions have not improved. Negative culture results are surprising given the facial crusting, but may not be positive in the setting of cellulitis or erysipelas.     Ms. Duque has had no eye pain or visual compromise. I recommended the following plan:  - doxycycline  monohydrate (MONODOX) 100 MG capsule; Take 1 capsule (100 mg) by mouth 2 times daily  Dispense: 20 capsule; Refill: 1  - valACYclovir (VALTREX) 1000 mg tablet; Take 1 tablet (1,000 mg) by mouth 3 times daily for 7 days  Dispense: 21 tablet; Refill: 0  - Wash area daily to remove crusting  - Cool compresses  - Return to ED if fevers, eye pain/vision changes, worsened swelling, especially given underlying DM1 which is a risk factor for soft tissue infection.     Video visit on Monday.     Thank you for involving me in this patient's care.     Maggy Shultz MD  Dermatology Staff    CC: Edis Stringer MD  No address on file    Alvaro Guerrero MD    ____________________________________________________________________________________________________________________________________________    CC: Patient presents with:  Derm Problem: Vickie is having a visit for a concern of a red rash on face    HPI: Vickie Duque is a 34 year old female presenting for initial evaluation of facial rash. She notes that she developed tinging and pain on the side of her nose about 4 days ago. This spread to cheek and caused swelling of the eyelid. She was seen by pcp on the 11th and given IM rocephin. She worsened and presented to the ED on the 12th and was given IV clindamycin. She was discharged home with po clindamycin. Despite this she notes ongoing pain and swelling with oozing of the skin. She has underlying history of DM1 and tobacco abuse. Bacterial swab was obtained in the ED and has no growth. She has been applying warm and cool compresses.       Patient Active Problem List   Diagnosis     Sprain of back     Type 1 diabetes mellitus with hyperglycemia (H)     HYPERLIPIDEMIA LDL GOAL <100     Facial paralysis/[place] palsy     DKA (diabetic ketoacidoses) (H)     Advanced directives, counseling/discussion     DVT prophylaxis     Tobacco abuse     SIRS (systemic inflammatory response syndrome) (H)     Uncontrolled type 1  diabetes mellitus (H)     Viral URI with cough     Noncompliance with medication regimen     Anxiety     Ulcerative colitis without complications (H)     Major depressive disorder, recurrent episode, moderate (H)     ASCUS of cervix with negative high risk HPV     Hyperglycemia       Allergies   Allergen Reactions     No Known Drug Allergies          Current Outpatient Medications   Medication     amoxicillin-clavulanate (AUGMENTIN) 875-125 MG tablet     clindamycin (CLEOCIN) 300 MG capsule     Continuous Blood Gluc  (DEXCOM G6 ) HAYES     Continuous Blood Gluc Sensor (DEXCOM G6 SENSOR) MISC     Continuous Blood Gluc Transmit (DEXCOM G6 TRANSMITTER) MISC     doxycycline monohydrate (MONODOX) 100 MG capsule     HYDROcodone-acetaminophen (NORCO) 5-325 MG tablet     insulin glargine (LANTUS SOLOSTAR) 100 UNIT/ML pen     insulin pen needle (BD TARA U/F) 32G X 4 MM miscellaneous     Naproxen Sodium (ALEVE PO)     NOVOLOG FLEXPEN 100 UNIT/ML soln     valACYclovir (VALTREX) 1000 mg tablet     albuterol (PROAIR HFA/PROVENTIL HFA/VENTOLIN HFA) 108 (90 Base) MCG/ACT inhaler     glucagon 1 MG SOLR injection     ibuprofen (ADVIL/MOTRIN) 600 MG tablet     No current facility-administered medications for this visit.        Family History   Problem Relation Age of Onset     Hypertension Father      Diabetes Maternal Grandmother      Cancer Maternal Grandmother      Diabetes Paternal Grandfather      Diabetes Maternal Uncle      Diabetes Maternal Aunt        Social History     Tobacco Use     Smoking status: Current Every Day Smoker     Packs/day: 1.00     Years: 16.00     Pack years: 16.00     Types: Cigarettes     Smokeless tobacco: Never Used   Substance Use Topics     Alcohol use: Yes     Alcohol/week: 0.0 standard drinks     Comment: occassionally     Drug use: No           ROS: Patient in normal state of health and is feeling well on complete ROS. No fevers, cough, vision changes, headaches, other skin changes,  rhinorrhea.     EXAM:  [facility] 05/12/2020 (Exact Date)   GEN: Alert, no distress  HEENT: Conjunctiva clear. Eyelid swelling  Psych: Normal mood/affect  NEURO: A/O x3  SKIN:   --Indurated pink plaque with erosions on the L central cheek, L nasal sidewall, nasal tip with swelling of the upper and lower eyelids and yellow crusting with severe induration to the cheek that is well marginated with sparing of the nasal dorsum and upper cutaneous lip

## 2020-05-15 NOTE — PATIENT INSTRUCTIONS
[facility] Teledermatology Visit    Thank you for allowing us to participate in your care. Your findings, instructions and follow-up plan are as follows:  Your cheek is worrisome for bacterial skin infection and  shingles. I recommend starting Valtrex. Stop clindamycin and switch to the doxycycline as some bacteria are resistant. . Cool compresses. I will see you on Monday with video visit. Go to the ER if eye pain, spreading rash, fevers.    When should I call my doctor?    If you are worsening or not improving, please, contact us or seek urgent care as noted below.     Who should I call with questions (adults)?    [facility] (adult and pediatric): [phone]     [facility] (adult): [phone]    For urgent needs outside of business hours call the [facility] at [phone] and ask for the dermatology resident on call    If this is a medical emergency and you are unable to reach an ER, Call [phone]      Who should I call with questions (pediatric)?  [facility]- Pediatric Dermatology  Dr. Awilda Cho, Dr. Karina Chavez, Dr. Maggy Shultz, Danica Campos, PA  Dr. Tonya Marroquin, Dr. Lilly Lawson & Dr. Martínez Guy  Non Urgent  Nurse Triage Line; [phone]- Isabela and Chela [facility] Care Coordinators   Kellen (/Complex ) [phone]    If you need a prescription refill, please contact your pharmacy. Refills are approved or denied by our Physicians during normal business hours, Monday through Fridays  Per office policy, refills will not be granted if you have not been seen within the past year (or sooner depending on your child's condition)    Scheduling Information:  Pediatric Appointment Scheduling and Call Center [phone]  Radiology Scheduling- [phone]  Sedation Unit Scheduling- [phone]  [place] Scheduling- General [phone]; Pediatric  Dermatology [phone]  Main  Services: [phone]  [place]: [phone]  Tongan: [phone]  Hmong/Spanish/Azeri: [phone]  Preadmission Nursing Department Fax Number: [phone] (Fax all pre-operative paperwork to this number)    For urgent matters arising during evenings, weekends, or holidays that cannot wait for normal business hours please call [phone] and ask for the Dermatology Resident On-Call to be paged.

## 2020-05-18 ENCOUNTER — VIRTUAL VISIT (OUTPATIENT)
Dept: DERMATOLOGY | Facility: CLINIC | Age: 35
End: 2020-05-18
Attending: DERMATOLOGY
Payer: COMMERCIAL

## 2020-05-18 DIAGNOSIS — L03.211 FACIAL CELLULITIS: Primary | ICD-10-CM

## 2020-05-18 NOTE — NURSING NOTE
Chief Complaint   Patient presents with     Teledermatology     Teledermatology with photo review.          Carmen Power CMA  May 18, 2020

## 2020-05-18 NOTE — PROGRESS NOTES
"Vickie who is being evaluated via a billable teledermatology visit.             The patient has been notified of following:            \"We have asked you to send in photos via [employer] or e-mail. These photos will be seen and reviewed by an MD or PAGurinderC.  A telederm visit is not as thorough as an in-person visit, photo assessment does not replace an in-person skin exam.  The quality of the photograph sent may not be of the same quality as that taken by the dermatology clinic. With that being said, we have found that certain health care needs can be provided without the need for a physical exam.  This service lets us provide the care you need with a short phone conversation. If prescriptions are needed we can send directly to your pharmacy.If lab work is needed we can place an order for that and you can then stop by our lab to have the test done at a later time. An MD/PA/Resident will call you around the time of your visit. This may be from a blocked number.     This is a billable visit. If during the course of the call the physician/provider feels a telephone visit is not appropriate, you will not be charged for this service.            Patient has given verbal consent for Telephone visit?  Yes           The patient would like to proceed with an teledermatology because of the COVID Pandemic.     Patient complains of    Shingles       ALLERGIES REVIEWED?  yes    Pediatric Dermatology- Review of Systems Questions (return patient)          Goal for today's visit? Treatment progress     IN THE LAST 2 WEEKS     Fever- no     Mouth/Throat Sores- no/no     Weight Gain/Loss - no/no     Cough/Wheezing- no/no     Change in Appetite- no     Chest Discomfort/Heartburn - no/no     Bone Pain- no     Nausea/Vomiting - no/no     Joint Pain/Swelling - no/no     Constipation/Diarrhea - no/no     Headaches/Dizziness/Change in Vision- no/no/no     Pain with Urination- no     Ear Pain/Hearing Loss- no/no     Nasal Discharge/Bleeding- " no/yes    Sadness/Irritability- no/yes     Anxiety/Moodiness-no/no

## 2020-05-18 NOTE — PROGRESS NOTES
[facility] ([facility] ((National Emergency Concerning the CORONAVIRUS (COVID 19), preferred for return patients. )    Image quality and interpretability: acceptable    Physician has received verbal consent for a Video/Photos Visit from the patient? Yes    In-person dermatology visit recommendation: no    Consent has been obtained for this service by 1 care team member: yes.     Teledermatology information:  - Location of patient: Home  - Location of teledermatologist:  ([facility] (Dr. Shultz, [place], [place])  - Reason teledermatology is appropriate:  of National Emergency Regarding Coronavirus disease (COVID 19) Outbreak  - Method of transmission:  Store and Forward ((National Emergency Concerning the CORONAVIRUS (COVID 19), preferred for return patients.   - Date of images: 05/18/20  - Service start time: 1503  - Service end time: 1508  - Date of report: 05/18/20      ___________________________________________________________________________  Dermatalogy Clinic Note    Dermatology Problem List:  1. Soft tissue infection of the L cheek      Assessment and Plan:    1. Soft tissue infection of the L cheek. Worsened after rocephin and clindamycin, improved with valtrex and doxycycline. Bacterial swab in the ED was negative. I suspect cellulitis with resistant staph as opposed to a superinfected zoster given the distribution, but suggested that patient obtain a zoster vaccine when able. Patient should complete her course of doxycycline.     Thank you for involving me in this patient's care.     Maggy Shultz MD  Dermatology Staff    CC: Referred MD Myke  No address on file    Alvaro Guerrero MD    ____________________________________________________________________________________________________________________________________________    CC: Patient presents with:  Teledermatology: Teledermatology with photo review.     HPI: Vickie Duque is a 34 year old female presenting for  reevaluation of soft tissue infection of the L cheek. Last seen by video visit 3 days ago. Transitioned to doxycycline and valtrex. Patient notes that the pain and swelling in the face has significantly improved. No new lesions.       Patient Active Problem List   Diagnosis     Sprain of back     Type 1 diabetes mellitus with hyperglycemia (H)     HYPERLIPIDEMIA LDL GOAL <100     Facial paralysis/[place] palsy     DKA (diabetic ketoacidoses) (H)     Advanced directives, counseling/discussion     DVT prophylaxis     Tobacco abuse     SIRS (systemic inflammatory response syndrome) (H)     Uncontrolled type 1 diabetes mellitus (H)     Viral URI with cough     Noncompliance with medication regimen     Anxiety     Ulcerative colitis without complications (H)     Major depressive disorder, recurrent episode, moderate (H)     ASCUS of cervix with negative high risk HPV     Hyperglycemia       Allergies   Allergen Reactions     No Known Drug Allergies          Current Outpatient Medications   Medication     albuterol (PROAIR HFA/PROVENTIL HFA/VENTOLIN HFA) 108 (90 Base) MCG/ACT inhaler     amoxicillin-clavulanate (AUGMENTIN) 875-125 MG tablet     clindamycin (CLEOCIN) 300 MG capsule     Continuous Blood Gluc  (DEXCOM G6 ) HAYES     Continuous Blood Gluc Sensor (DEXCOM G6 SENSOR) MISC     Continuous Blood Gluc Transmit (DEXCOM G6 TRANSMITTER) MISC     doxycycline monohydrate (MONODOX) 100 MG capsule     HYDROcodone-acetaminophen (NORCO) 5-325 MG tablet     insulin glargine (LANTUS SOLOSTAR) 100 UNIT/ML pen     insulin pen needle (BD TARA U/F) 32G X 4 MM miscellaneous     Naproxen Sodium (ALEVE PO)     NOVOLOG FLEXPEN 100 UNIT/ML soln     valACYclovir (VALTREX) 1000 mg tablet     glucagon 1 MG SOLR injection     ibuprofen (ADVIL/MOTRIN) 600 MG tablet     No current facility-administered medications for this visit.        Family History   Problem Relation Age of Onset     Hypertension Father      Diabetes Maternal  Grandmother      Cancer Maternal Grandmother      Diabetes Paternal Grandfather      Diabetes Maternal Uncle      Diabetes Maternal Aunt        Social History     Tobacco Use     Smoking status: Current Every Day Smoker     Packs/day: 1.00     Years: 16.00     Pack years: 16.00     Types: Cigarettes     Smokeless tobacco: Never Used   Substance Use Topics     Alcohol use: Yes     Alcohol/week: 0.0 standard drinks     Comment: occassionally     Drug use: No       ROS: Patient in normal state of health and is feeling well on complete ROS. No fevers, cough, vision changes, headaches, other skin changes, rhinorrhea.     EXAM:  LMP 05/12/2020 (Exact Date)   GEN: Alert, no distress  HEENT: Conjunctiva clear. Eyelid swelling improved.   Psych: Normal mood/affect  NEURO: A/O x3  SKIN:   --Induration and erythema to the L mid cheek and nasal tip with resolution of crusting (noted on video visit prior to video failing).

## 2020-05-18 NOTE — PATIENT INSTRUCTIONS
[facility]- Pediatric Dermatology  Dr. Awilda Cho, Dr. Karina Chavez, Dr. Maggy Marroquin, Dr. Lilly Lawson & Dr. Martínez Guy       Non Urgent  Nurse Triage Line; [phone]- Isabela and Chela RN Care Coordinators        If you need a prescription refill, please contact your pharmacy. Refills are approved or denied by our Physicians during normal business hours, Monday through Fridays    Per office policy, refills will not be granted if you have not been seen within the past year (or sooner depending on your child's condition)      Scheduling Information:     Pediatric Appointment Scheduling and Call Center [phone]   Radiology Scheduling- [phone]     Sedation Unit Scheduling- [phone]    [place] Scheduling- [facility] [phone]; Pediatric Dermatology [phone]    Main  Services: [phone]   Serbian: [phone]   Central African: [phone]   Hmong/Syriac/Greek: [phone]      Preadmission Nursing Department Fax Number: [phone] (Fax all pre-operative paperwork to this number)      For urgent matters arising during evenings, weekends, or holidays that cannot wait for normal business hours please call [phone] and ask for the Dermatology Resident On-Call to be paged.           -Finish antibiotics. Continue gently washing the area to remove any crusting. Call if worsening.

## 2020-06-15 DIAGNOSIS — E10.9 TYPE 1 DIABETES MELLITUS (H): ICD-10-CM

## 2020-06-15 NOTE — TELEPHONE ENCOUNTER
Refill received from Nicole Arnold for Lantus  Pt states dose increased    Will route to Haydee Cruz MA  Adult Endocrinology  [facility]

## 2020-08-06 DIAGNOSIS — E10.65 TYPE 1 DIABETES MELLITUS WITH HYPERGLYCEMIA (H): ICD-10-CM

## 2020-08-06 NOTE — TELEPHONE ENCOUNTER
Will forward to ROMAINE Walsh to review 8/7/2020.    Maggy Gaona LPN  Diabetes Clinic Coordinator   Adult Endocrinology and Pediatric Specialty Clinics  [facility]

## 2020-08-06 NOTE — TELEPHONE ENCOUNTER
NOVOLOG FLEXPEN 100 UNIT/ML soln         Last Written Prescription Date:  10/14/19  Last Fill Quantity: 45mL,   # refills: 1  Last Office Visit : 04/28/20  Future Office visit:  11/23/20    Routing refill request to provider for review/approval because:  Insulin - refilled per clinic

## 2020-08-07 RX ORDER — INSULIN ASPART 100 [IU]/ML
INJECTION, SOLUTION INTRAVENOUS; SUBCUTANEOUS
Qty: 45 ML | Refills: 1 | Status: SHIPPED | OUTPATIENT
Start: 2020-08-07 | End: 2020-11-23

## 2020-11-18 ENCOUNTER — HOSPITAL ENCOUNTER (EMERGENCY)
Facility: CLINIC | Age: 35
Discharge: HOME | End: 2020-11-18
Attending: FAMILY MEDICINE | Admitting: FAMILY MEDICINE
Payer: COMMERCIAL

## 2020-11-18 VITALS
BODY MASS INDEX: 26.61 KG/M2 | WEIGHT: 155 LBS | TEMPERATURE: 98.2 F | SYSTOLIC BLOOD PRESSURE: 127 MMHG | HEART RATE: 65 BPM | RESPIRATION RATE: 15 BRPM | OXYGEN SATURATION: 99 % | DIASTOLIC BLOOD PRESSURE: 80 MMHG

## 2020-11-18 DIAGNOSIS — G43.009 MIGRAINE WITHOUT AURA AND WITHOUT STATUS MIGRAINOSUS, NOT INTRACTABLE: ICD-10-CM

## 2020-11-18 PROCEDURE — 99284 EMERGENCY DEPT VISIT MOD MDM: CPT | Performed by: FAMILY MEDICINE

## 2020-11-18 PROCEDURE — 99284 EMERGENCY DEPT VISIT MOD MDM: CPT | Mod: 25 | Performed by: FAMILY MEDICINE

## 2020-11-18 PROCEDURE — 258N000003 HC RX IP 258 OP 636: Performed by: FAMILY MEDICINE

## 2020-11-18 PROCEDURE — 96375 TX/PRO/DX INJ NEW DRUG ADDON: CPT | Performed by: FAMILY MEDICINE

## 2020-11-18 PROCEDURE — 250N000011 HC RX IP 250 OP 636: Performed by: FAMILY MEDICINE

## 2020-11-18 PROCEDURE — 96361 HYDRATE IV INFUSION ADD-ON: CPT | Performed by: FAMILY MEDICINE

## 2020-11-18 PROCEDURE — 96374 THER/PROPH/DIAG INJ IV PUSH: CPT | Performed by: FAMILY MEDICINE

## 2020-11-18 RX ORDER — METOCLOPRAMIDE HYDROCHLORIDE 5 MG/ML
10 INJECTION INTRAMUSCULAR; INTRAVENOUS ONCE
Status: COMPLETED | OUTPATIENT
Start: 2020-11-18 | End: 2020-11-18

## 2020-11-18 RX ORDER — DIPHENHYDRAMINE HYDROCHLORIDE 50 MG/ML
25 INJECTION INTRAMUSCULAR; INTRAVENOUS ONCE
Status: COMPLETED | OUTPATIENT
Start: 2020-11-18 | End: 2020-11-18

## 2020-11-18 RX ORDER — SODIUM CHLORIDE 9 MG/ML
INJECTION, SOLUTION INTRAVENOUS CONTINUOUS
Status: DISCONTINUED | OUTPATIENT
Start: 2020-11-18 | End: 2020-11-18 | Stop reason: HOSPADM

## 2020-11-18 RX ORDER — KETOROLAC TROMETHAMINE 30 MG/ML
30 INJECTION, SOLUTION INTRAMUSCULAR; INTRAVENOUS ONCE
Status: COMPLETED | OUTPATIENT
Start: 2020-11-18 | End: 2020-11-18

## 2020-11-18 RX ORDER — MAGNESIUM SULFATE 1 G/100ML
1 INJECTION INTRAVENOUS ONCE
Status: COMPLETED | OUTPATIENT
Start: 2020-11-18 | End: 2020-11-18

## 2020-11-18 RX ADMIN — METOCLOPRAMIDE HYDROCHLORIDE 10 MG: 5 INJECTION INTRAMUSCULAR; INTRAVENOUS at 07:44

## 2020-11-18 RX ADMIN — MAGNESIUM SULFATE IN DEXTROSE 1 G: 10 INJECTION, SOLUTION INTRAVENOUS at 07:50

## 2020-11-18 RX ADMIN — DIPHENHYDRAMINE HYDROCHLORIDE 25 MG: 50 INJECTION, SOLUTION INTRAMUSCULAR; INTRAVENOUS at 07:40

## 2020-11-18 RX ADMIN — KETOROLAC TROMETHAMINE 30 MG: 30 INJECTION, SOLUTION INTRAMUSCULAR at 07:42

## 2020-11-18 RX ADMIN — SODIUM CHLORIDE 1000 ML: 9 INJECTION, SOLUTION INTRAVENOUS at 07:39

## 2020-11-18 NOTE — ED PROVIDER NOTES
History     Chief Complaint   Patient presents with     Headache     HPI  Vickie Duque is a 35 year old female who presents with a headache this been going on for the past couple of days.  Patient has a history of headaches but has never been formally diagnosed with migraines.  This headache seems a little bit worse than the other ones.  Patient has tried some ibuprofen and Tylenol and some leftover Percocets with no effect.  Patient did not sleep well last night at all.  Denies any recent fevers or chills.  Patient is having photophobia but denies any nausea.  Denies any recent dysuria or hematuria.  Patient states he has been eating and drinking normally.  Denies any recent fevers or chills.  Denies a cough, sore throat or shortness of breath.    Allergies:  Allergies   Allergen Reactions     No Known Drug Allergies        Problem List:    Patient Active Problem List    Diagnosis Date Noted     Hyperglycemia 10/08/2017     Priority: Medium     Major depressive disorder, recurrent episode, moderate (H) 10/04/2016     Priority: Medium     Ulcerative colitis without complications (H) 01/29/2016     Priority: Medium     Anxiety 11/27/2015     Priority: Medium     Noncompliance with medication regimen 11/03/2015     Priority: Medium     Uncontrolled type 1 diabetes mellitus (H) 05/30/2014     Priority: Medium     Problem list name updated by automated process. Provider to review       Viral URI with cough 05/30/2014     Priority: Medium     DKA (diabetic ketoacidoses) (H) 05/29/2014     Priority: Medium     Advanced directives, counseling/discussion 05/29/2014     Priority: Medium     DVT prophylaxis 05/29/2014     Priority: Medium     Tobacco abuse 05/29/2014     Priority: Medium     SIRS (systemic inflammatory response syndrome) (H) 05/29/2014     Priority: Medium     Facial paralysis/[place] palsy 04/27/2012     Priority: Medium     Type 1 diabetes mellitus with hyperglycemia (H) 10/31/2010     Priority: Medium      HYPERLIPIDEMIA LDL GOAL <100 10/31/2010     Priority: Medium     ASCUS of cervix with negative high risk HPV 06/19/2008     Priority: Medium     6/19/08 ASCUS pap/neg HPV  2009 and 2012 both NIL paps  6/28/17 NIL pap/neg HR HPV. EMB normal. Plan: cotest in 3 years.       Sprain of back 03/10/2008     Priority: Medium     Problem list name updated by automated process. Provider to review          Past Medical History:    Past Medical History:   Diagnosis Date     Adjustment disorder with anxious mood 11/23/2015     Anxiety 11/27/2015     ASCUS of cervix with negative high risk HPV 06/19/2008     Depressive disorder, not elsewhere classified      Diabetic eye exam (H) 12/19/14     Mixed hyperlipidemia 11/30/2006     Regional enteritis of unspecified site      Type I (juvenile type) diabetes mellitus with ketoacidosis, not stated as uncontrolled(250.11) 01/01/2007     Type I (juvenile type) diabetes mellitus with ketoacidosis, uncontrolled 02/14/08     Type I (juvenile type) diabetes mellitus without mention of complication, not stated as uncontrolled      Ulcerative colitis, unspecified        Past Surgical History:    Past Surgical History:   Procedure Laterality Date     DILATION AND CURETTAGE SUCTION  4/2010    miscarriage     HC COLONOSCOPY W BIOPSY  08/16/06     HC COLONOSCOPY W/WO BRUSH/WASH       TUBAL LIGATION         Family History:    Family History   Problem Relation Age of Onset     Hypertension Father      Diabetes Maternal Grandmother      Cancer Maternal Grandmother      Diabetes Paternal Grandfather      Diabetes Maternal Uncle      Diabetes Maternal Aunt        Social History:  Marital Status:  Single [1]  Social History     Tobacco Use     Smoking status: Current Every Day Smoker     Packs/day: 0.50     Years: 16.00     Pack years: 8.00     Types: Cigarettes     Smokeless tobacco: Never Used   Substance Use Topics     Alcohol use: Yes     Alcohol/week: 0.0 standard drinks     Comment: occassionally      Drug use: No        Medications:         albuterol (PROAIR HFA/PROVENTIL HFA/VENTOLIN HFA) 108 (90 Base) MCG/ACT inhaler       Continuous Blood Gluc  (DEXCOM G6 ) HAYES       Continuous Blood Gluc Sensor (DEXCOM G6 SENSOR) MISC       Continuous Blood Gluc Transmit (DEXCOM G6 TRANSMITTER) MISC       doxycycline monohydrate (MONODOX) 100 MG capsule       glucagon 1 MG SOLR injection       HYDROcodone-acetaminophen (NORCO) 5-325 MG tablet       ibuprofen (ADVIL/MOTRIN) 600 MG tablet       insulin glargine (LANTUS SOLOSTAR) 100 UNIT/ML pen       insulin pen needle (BD TARA U/F) 32G X 4 MM miscellaneous       Naproxen Sodium (ALEVE PO)       NOVOLOG FLEXPEN 100 UNIT/ML soln       valACYclovir (VALTREX) 1000 mg tablet          Review of Systems   All other systems reviewed and are negative.      Physical Exam   BP: (!) 144/88  Pulse: 74  Temp: 98.2  F (36.8  C)  Resp: 16  Weight: 70.3 kg (155 lb)  SpO2: 99 %      Physical Exam  Vitals signs and nursing note reviewed.   Constitutional:       General: She is not in acute distress.     Appearance: She is well-developed. She is not diaphoretic.   HENT:      Head: Normocephalic and atraumatic.      Nose: Nose normal.      Mouth/Throat:      Pharynx: No oropharyngeal exudate.   Eyes:      Conjunctiva/sclera: Conjunctivae normal.   Neck:      Musculoskeletal: Normal range of motion and neck supple.   Cardiovascular:      Rate and Rhythm: Normal rate and regular rhythm.      Heart sounds: Normal heart sounds. No murmur. No friction rub.   Pulmonary:      Effort: Pulmonary effort is normal. No respiratory distress.      Breath sounds: Normal breath sounds. No stridor. No wheezing or rales.   Abdominal:      General: Bowel sounds are normal. There is no distension.      Palpations: Abdomen is soft. There is no mass.      Tenderness: There is no abdominal tenderness. There is no guarding.   Musculoskeletal: Normal range of motion.         General: No tenderness.    Skin:     General: Skin is warm and dry.      Capillary Refill: Capillary refill takes less than 2 seconds.      Findings: No erythema.   Neurological:      General: No focal deficit present.      Mental Status: She is alert and oriented to person, place, and time.   Psychiatric:         Judgment: Judgment normal.         ED Course        Procedures      No results found for this or any previous visit (from the past 24 hour(s)).    Medications   0.9% sodium chloride BOLUS (1,000 mLs Intravenous New Bag 11/18/20 0739)     Followed by   sodium chloride 0.9% infusion (has no administration in time range)   metoclopramide (REGLAN) injection 10 mg (10 mg Intravenous Given 11/18/20 0744)   ketorolac (TORADOL) injection 30 mg (30 mg Intravenous Given 11/18/20 0742)   magnesium sulfate 1 gm in 100mL D5W intermittent infusion (0 g Intravenous Stopped 11/18/20 0805)   diphenhydrAMINE (BENADRYL) injection 25 mg (25 mg Intravenous Given 11/18/20 0740)     Patient presents with a headache that sounds like a migraine headache.  Patient has no red flag symptoms to indicate advanced imaging.  Patient feels much better after the above medications.  Patient will be discharged home at this time.    Assessments & Plan (with Medical Decision Making)  Migraine headache     I have reviewed the nursing notes.    I have reviewed the findings, diagnosis, plan and need for follow up with the patient.      Discharge Medication List as of 11/18/2020  9:33 AM          Final diagnoses:   Migraine without aura and without status migrainosus, not intractable       11/18/2020   [facility] EMERGENCY DEPT     Brian Gonzales MD  11/18/20 4783

## 2020-11-18 NOTE — ED AVS SNAPSHOT
[facility] Emergency Dept  [address] [place] [address]  Phone: [phone]  Fax: [phone]                                    Vickie Duque   MRN: [id number]    Department: [facility]   Date of Visit: 11/18/2020           After Visit Summary Signature Page    I have received my discharge instructions, and my questions have been answered. I have discussed any challenges I see with this plan with the nurse or doctor.    ..........................................................................................................................................  Patient/Patient Representative Signature      ..........................................................................................................................................  Patient Representative Print Name and Relationship to Patient    ..................................................               ................................................  Date                                   Time    ..........................................................................................................................................  Reviewed by Signature/Title    ...................................................              ..............................................  Date                                               Time          22EPIC Rev 08/18

## 2020-11-23 ENCOUNTER — OFFICE VISIT (OUTPATIENT)
Dept: ENDOCRINOLOGY | Facility: CLINIC | Age: 35
End: 2020-11-23
Payer: COMMERCIAL

## 2020-11-23 ENCOUNTER — VIRTUAL VISIT (OUTPATIENT)
Dept: FAMILY MEDICINE | Facility: CLINIC | Age: 35
End: 2020-11-23
Payer: COMMERCIAL

## 2020-11-23 VITALS
WEIGHT: 156.8 LBS | BODY MASS INDEX: 26.91 KG/M2 | SYSTOLIC BLOOD PRESSURE: 122 MMHG | DIASTOLIC BLOOD PRESSURE: 74 MMHG | OXYGEN SATURATION: 99 % | HEART RATE: 77 BPM

## 2020-11-23 DIAGNOSIS — E10.319 DIABETIC RETINOPATHY OF LEFT EYE ASSOCIATED WITH TYPE 1 DIABETES MELLITUS, MACULAR EDEMA PRESENCE UNSPECIFIED, UNSPECIFIED RETINOPATHY SEVERITY (H): ICD-10-CM

## 2020-11-23 DIAGNOSIS — G43.009 MIGRAINE WITHOUT AURA AND WITHOUT STATUS MIGRAINOSUS, NOT INTRACTABLE: Primary | ICD-10-CM

## 2020-11-23 DIAGNOSIS — E10.39 TYPE 1 DIABETES MELLITUS WITH OTHER OPHTHALMIC COMPLICATION (H): Primary | ICD-10-CM

## 2020-11-23 PROCEDURE — 99214 OFFICE O/P EST MOD 30 MIN: CPT | Mod: 95 | Performed by: FAMILY MEDICINE

## 2020-11-23 PROCEDURE — 83036 HEMOGLOBIN GLYCOSYLATED A1C: CPT | Performed by: INTERNAL MEDICINE

## 2020-11-23 PROCEDURE — 99214 OFFICE O/P EST MOD 30 MIN: CPT | Performed by: INTERNAL MEDICINE

## 2020-11-23 RX ORDER — AMITRIPTYLINE HYDROCHLORIDE 10 MG/1
10 TABLET ORAL AT BEDTIME
Qty: 90 TABLET | Refills: 1 | Status: SHIPPED | OUTPATIENT
Start: 2020-11-23 | End: 2021-01-15

## 2020-11-23 RX ORDER — INSULIN ASPART 100 [IU]/ML
INJECTION, SOLUTION INTRAVENOUS; SUBCUTANEOUS
Qty: 30 ML | Refills: 1 | Status: SHIPPED | OUTPATIENT
Start: 2020-11-23 | End: 2021-05-11

## 2020-11-23 RX ORDER — SUMATRIPTAN 50 MG/1
50 TABLET, FILM COATED ORAL
Qty: 18 TABLET | Refills: 4 | Status: SHIPPED | OUTPATIENT
Start: 2020-11-23 | End: 2023-05-12

## 2020-11-23 ASSESSMENT — PATIENT HEALTH QUESTIONNAIRE - PHQ9: SUM OF ALL RESPONSES TO PHQ QUESTIONS 1-9: 1

## 2020-11-23 NOTE — PATIENT INSTRUCTIONS
Nghia Johnston 12 UNITS DAILY      1 unit for every 15 grams of carbohydrates with meals three times per day and snacks.      Please follow the following correction scale three times per day and at bedtime:  For  to 180 give 0.5 units.   For  to 210 give 1 units.   For  to 240 give 1.5 units.   For  to 270 give 2 units.   For  to 300 give 2.5 units.   For  to 330 give 3 units.   For  to 360 give 3.5 units.  For BG >360 give 4 units.    [facility]  Haydee Rojas RN, Diabetes Educator: [phone]  Maggy Gaona LPN Diabetes Clinic Coordinator: [phone]  Clinic Line:[phone]  Clinic Fax: [phone]  On-Call Endocrine Provider at the [place] (after hours/weekends): [phone] option 4  Scheduling Line: [phone]    Appointment Reminders:  * Please bring meter and/or CGM device with for staff to download  * If you are due ONLY for an A1C, it is scheduled with the nurse and will be done in clinic. You do not need to schedule a lab appointment. Fasting is not required for an A1C.  * Refill request should be submitted to your pharmacy. They will contact clinic for approval.

## 2020-11-23 NOTE — NURSING NOTE
Vickie Duque's goals for this visit include:   Chief Complaint   Patient presents with     Diabetes     She requests these members of her care team be copied on today's visit information: Yes    PCP: Alvaro Guerrero    Referring Provider:  Alvaro Guerrero MD  [address] [address]    /74 (BP Location: Left arm, Patient Position: Sitting, Cuff Size: Adult Regular)   Pulse 77   Wt 71.1 kg (156 lb 12.8 oz)   LMP 11/09/2020   SpO2 99%   BMI 26.91 kg/m      Do you need any medication refills at today's visit? Yes

## 2020-11-23 NOTE — PROGRESS NOTES
Endocrinology Clinic Visit    Chief Complaint: Diabetes     Information obtained from:Patient    Subjective:         HPI: Vickie Duque is a 35 year old female with history of type 1 diabetes who is here for routine follow up.     Type 1 diabetes since the age of 18.    Current diabetic medications are: Insulin glargine 10 units daily, mealtime insulin 1 units for every 15, 15, 15 g of carbohydrates and a correction dose of insulin 1 unit for every 60 above 150.       She has diabetic retinopathy.    Unfortunately, Vickie did not have Dexcom or any other means of checking her blood sugars for a couple of months and don't have meaningful BG readings to adjust her insulin regimen. Off note, she didn't adjust her insulin regimen per our discussion from her last visit.    She states that she will call Dexcom today.   No clinical hypoglycemia.     She works in construction.    Allergies   Allergen Reactions     No Known Drug Allergies        Current Outpatient Medications   Medication Sig Dispense Refill     ibuprofen (ADVIL/MOTRIN) 600 MG tablet Take 1 tablet (600 mg) by mouth every 8 hours as needed for moderate pain 20 tablet 0     insulin glargine (LANTUS SOLOSTAR) 100 UNIT/ML pen Inject 12 Units Subcutaneous At Bedtime Add 2 units to prime. 15 mL 1     insulin pen needle (BD TARA U/F) 32G X 4 MM miscellaneous Use 3 daily or as directed. 270 each 3     Naproxen Sodium (ALEVE PO) Take by mouth as needed for moderate pain       NOVOLOG FLEXPEN 100 UNIT/ML soln Inject 1 unit for every 15 grams of carbohydrates plus correction dose; uses up to 30 units daily. 30 mL 1     albuterol (PROAIR HFA/PROVENTIL HFA/VENTOLIN HFA) 108 (90 Base) MCG/ACT inhaler Inhale 2 puffs into the lungs every 4 hours as needed (Patient not taking: Reported on 11/23/2020) 1 Inhaler 0     Continuous Blood Gluc  (DEXCOM G6 ) HAYES 1 each continuous (Patient not taking: Reported on 11/23/2020) 1 Device 0     Continuous Blood Gluc  Sensor (DEXCOM G6 SENSOR) MISC CHANGE EVERY 10 DAYS (Patient not taking: Reported on 11/23/2020) 3 each 11     Continuous Blood Gluc Transmit (DEXCOM G6 TRANSMITTER) MISC CHANGE EVERY 3 MONTHS (Patient not taking: Reported on 11/23/2020) 1 each 3     glucagon 1 MG SOLR injection Inject 1 mg Subcutaneous every 15 minutes as needed for low blood sugar (Patient not taking: Reported on 5/11/2020) 2 each 0       Review of Systems     per HPI above      Past Medical History:   Diagnosis Date     Adjustment disorder with anxious mood 11/23/2015     Anxiety 11/27/2015     ASCUS of cervix with negative high risk HPV 06/19/2008     Depressive disorder, not elsewhere classified      Diabetic eye exam (H) 12/19/14     Mixed hyperlipidemia 11/30/2006     Regional enteritis of unspecified site     Ulcerative Colitis     Type I (juvenile type) diabetes mellitus with ketoacidosis, not stated as uncontrolled(250.11) 01/01/2007    Moderately severe intensity.     Type I (juvenile type) diabetes mellitus with ketoacidosis, uncontrolled 02/14/08     Type I (juvenile type) diabetes mellitus without mention of complication, not stated as uncontrolled     diagnosed in 2003     Ulcerative colitis, unspecified     remission. Last flare 6 yrs ago.        Past Surgical History:   Procedure Laterality Date     DILATION AND CURETTAGE SUCTION  4/2010    miscarriage     HC COLONOSCOPY W BIOPSY  08/16/06     HC COLONOSCOPY W/WO BRUSH/WASH       TUBAL LIGATION         Family History   Problem Relation Age of Onset     Hypertension Father      Diabetes Maternal Grandmother      Cancer Maternal Grandmother      Diabetes Paternal Grandfather      Diabetes Maternal Uncle      Diabetes Maternal Aunt        Social History     Socioeconomic History     Marital status: Single     Spouse name: None     Number of children: 1     Years of education: 12     Highest education level: None   Occupational History     Occupation: Nursing assistant       Objective:    /74 (BP Location: Left arm, Patient Position: Sitting, Cuff Size: Adult Regular)   Pulse 77   Wt 71.1 kg (156 lb 12.8 oz)   LMP 11/09/2020   SpO2 99%   BMI 26.91 kg/m    Constitutional: Pleasant no acute cardiopulmonary distress.   Neurological: Alert and oriented.    Psychological: appropriate mood and affect   In House Labs:     TSH   Date Value Ref Range Status   03/19/2019 2.95 0.40 - 4.00 mU/L Final   10/07/2017 2.57 0.40 - 4.00 mU/L Final   06/26/2017 6.05 (H) 0.40 - 4.00 mU/L Final   03/05/2014 3.97 0.4 - 5.0 mU/L Final   03/16/2012 3.75 0.4 - 5.0 mU/L Final     T4 Free   Date Value Ref Range Status   06/26/2017 1.02 0.76 - 1.46 ng/dL Final   03/28/2006 1.10 0.70 - 1.85 ng/dL Final       Creatinine   Date Value Ref Range Status   05/12/2020 0.93 0.52 - 1.04 mg/dL Final     Hemoglobin A1C   Date Value Ref Range Status   01/13/2020 9.5 (A) 0 - 5.6 % Final   10/14/2019 8.3 (A) 0 - 5.6 % Final   07/22/2019 8.9 (A) 0 - 5.6 % Final         Assessment/Treatment Plan:      Type 1 diabetes uncontrolled with pre-proliferative diabetic retinopathy: Diagnosed at the age of 18 and has been on insulin since then.      Unfortunately, Vickie didn't have on her Dexcom for over couple of months and no BG readings today to adjust her insulin regimen. No hypoglycemia symptoms.     Start using CGM or check BG manually at least 4 times per day.   Send readings via [employer] for adjustment of insulin regimen (download DEXcom).   In the meantime, increase Lantus to 12 units daily if morning -fasting BG higher than 130.   Continue mealtime and correction dose insulin at the same dose for now.      Blood pressure well controlled. Check lipid panel and microalbuminuria today.      Patient Instructions   Necoe,     Lantus 12 UNITS DAILY      1 unit for every 15 grams of carbohydrates with meals three times per day and snacks.      Please follow the following correction scale three times per day and at bedtime:  For  to  180 give 0.5 units.   For  to 210 give 1 units.   For  to 240 give 1.5 units.   For  to 270 give 2 units.   For  to 300 give 2.5 units.   For  to 330 give 3 units.   For  to 360 give 3.5 units.  For BG >360 give 4 units.        I will contact the patient with the test results.  Return to clinic in 3  month.  BG review in 3-4 weeks.      Siri Rhoades MD  Staff Endocrinologist    [phone]  Division of Endocrinology and Diabetes

## 2020-11-23 NOTE — LETTER
11/23/2020         RE: Vickie Duque  [address] [facility] [address]        Dear Colleague,    Thank you for referring your patient, Vickie Duque, to the [facility]. Please see a copy of my visit note below.    Endocrinology Clinic Visit    Chief Complaint: Diabetes     Information obtained from:Patient    Subjective:         HPI: Vickie Duque is a 35 year old female with history of type 1 diabetes who is here for routine follow up.     Type 1 diabetes since the age of 18.    Current diabetic medications are: Insulin glargine 10 units daily, mealtime insulin 1 units for every 15, 15, 15 g of carbohydrates and a correction dose of insulin 1 unit for every 60 above 150.       She has diabetic retinopathy.    Unfortunately, Vickie did not have Dexcom or any other means of checking her blood sugars for a couple of months and don't have meaningful BG readings to adjust her insulin regimen. Off note, she didn't adjust her insulin regimen per our discussion from her last visit.    She states that she will call Dexcom today.   No clinical hypoglycemia.     She works in construction.    Allergies   Allergen Reactions     No Known Drug Allergies        Current Outpatient Medications   Medication Sig Dispense Refill     ibuprofen (ADVIL/MOTRIN) 600 MG tablet Take 1 tablet (600 mg) by mouth every 8 hours as needed for moderate pain 20 tablet 0     insulin glargine (LANTUS SOLOSTAR) 100 UNIT/ML pen Inject 12 Units Subcutaneous At Bedtime Add 2 units to prime. 15 mL 1     insulin pen needle (BD TARA U/F) 32G X 4 MM miscellaneous Use 3 daily or as directed. 270 each 3     Naproxen Sodium (ALEVE PO) Take by mouth as needed for moderate pain       NOVOLOG FLEXPEN 100 UNIT/ML soln Inject 1 unit for every 15 grams of carbohydrates plus correction dose; uses up to 30 units daily. 30 mL 1     albuterol (PROAIR HFA/PROVENTIL HFA/VENTOLIN HFA) 108 (90 Base) MCG/ACT inhaler Inhale 2 puffs into the lungs every  4 hours as needed (Patient not taking: Reported on 11/23/2020) 1 Inhaler 0     Continuous Blood Gluc  (DEXCOM G6 ) HAYES 1 each continuous (Patient not taking: Reported on 11/23/2020) 1 Device 0     Continuous Blood Gluc Sensor (DEXCOM G6 SENSOR) MISC CHANGE EVERY 10 DAYS (Patient not taking: Reported on 11/23/2020) 3 each 11     Continuous Blood Gluc Transmit (DEXCOM G6 TRANSMITTER) MISC CHANGE EVERY 3 MONTHS (Patient not taking: Reported on 11/23/2020) 1 each 3     glucagon 1 MG SOLR injection Inject 1 mg Subcutaneous every 15 minutes as needed for low blood sugar (Patient not taking: Reported on 5/11/2020) 2 each 0       Review of Systems     per HPI above      Past Medical History:   Diagnosis Date     Adjustment disorder with anxious mood 11/23/2015     Anxiety 11/27/2015     ASCUS of cervix with negative high risk HPV 06/19/2008     Depressive disorder, not elsewhere classified      Diabetic eye exam (H) 12/19/14     Mixed hyperlipidemia 11/30/2006     Regional enteritis of unspecified site     Ulcerative Colitis     Type I (juvenile type) diabetes mellitus with ketoacidosis, not stated as uncontrolled(250.11) 01/01/2007    Moderately severe intensity.     Type I (juvenile type) diabetes mellitus with ketoacidosis, uncontrolled 02/14/08     Type I (juvenile type) diabetes mellitus without mention of complication, not stated as uncontrolled     diagnosed in 2003     Ulcerative colitis, unspecified     remission. Last flare 6 yrs ago.        Past Surgical History:   Procedure Laterality Date     DILATION AND CURETTAGE SUCTION  4/2010    miscarriage     HC COLONOSCOPY W BIOPSY  08/16/06     HC COLONOSCOPY W/WO BRUSH/WASH       TUBAL LIGATION         Family History   Problem Relation Age of Onset     Hypertension Father      Diabetes Maternal Grandmother      Cancer Maternal Grandmother      Diabetes Paternal Grandfather      Diabetes Maternal Uncle      Diabetes Maternal Aunt        Social History      Socioeconomic History     Marital status: Single     Spouse name: None     Number of children: 1     Years of education: 12     Highest education level: None   Occupational History     Occupation: Nursing assistant       Objective:   /74 (BP Location: Left arm, Patient Position: Sitting, Cuff Size: Adult Regular)   Pulse 77   Wt 71.1 kg (156 lb 12.8 oz)   LMP 11/09/2020   SpO2 99%   BMI 26.91 kg/m    Constitutional: Pleasant no acute cardiopulmonary distress.   Neurological: Alert and oriented.    Psychological: appropriate mood and affect   In House Labs:     TSH   Date Value Ref Range Status   03/19/2019 2.95 0.40 - 4.00 mU/L Final   10/07/2017 2.57 0.40 - 4.00 mU/L Final   06/26/2017 6.05 (H) 0.40 - 4.00 mU/L Final   03/05/2014 3.97 0.4 - 5.0 mU/L Final   03/16/2012 3.75 0.4 - 5.0 mU/L Final     T4 Free   Date Value Ref Range Status   06/26/2017 1.02 0.76 - 1.46 ng/dL Final   03/28/2006 1.10 0.70 - 1.85 ng/dL Final       Creatinine   Date Value Ref Range Status   05/12/2020 0.93 0.52 - 1.04 mg/dL Final     Hemoglobin A1C   Date Value Ref Range Status   01/13/2020 9.5 (A) 0 - 5.6 % Final   10/14/2019 8.3 (A) 0 - 5.6 % Final   07/22/2019 8.9 (A) 0 - 5.6 % Final         Assessment/Treatment Plan:      Type 1 diabetes uncontrolled with pre-proliferative diabetic retinopathy: Diagnosed at the age of 18 and has been on insulin since then.      Unfortunately, Vickie didn't have on her Dexcom for over couple of months and no BG readings today to adjust her insulin regimen. No hypoglycemia symptoms.     Start using CGM or check BG manually at least 4 times per day.   Send readings via [employer] for adjustment of insulin regimen (download DEXcom).   In the meantime, increase Lantus to 12 units daily if morning -fasting BG higher than 130.   Continue mealtime and correction dose insulin at the same dose for now.      Blood pressure well controlled. Check lipid panel and microalbuminuria today.      Patient  Instructions   Nghia Johnston 12 UNITS DAILY      1 unit for every 15 grams of carbohydrates with meals three times per day and snacks.      Please follow the following correction scale three times per day and at bedtime:  For  to 180 give 0.5 units.   For  to 210 give 1 units.   For  to 240 give 1.5 units.   For  to 270 give 2 units.   For  to 300 give 2.5 units.   For  to 330 give 3 units.   For  to 360 give 3.5 units.  For BG >360 give 4 units.        I will contact the patient with the test results.  Return to clinic in 3  month.  BG review in 3-4 weeks.      Siri Rhoades MD  Staff Endocrinologist    [phone]  Division of Endocrinology and Diabetes      Again, thank you for allowing me to participate in the care of your patient.        Sincerely,        Siri Rhoades MD

## 2020-11-23 NOTE — PROGRESS NOTES
"Vickie Duque is a 35 year old female who is being evaluated via a billable video visit.      The patient has been notified of following:     \"This video visit will be conducted via a call between you and your physician/provider. We have found that certain health care needs can be provided without the need for an in-person physical exam.  This service lets us provide the care you need with a video conversation.  If a prescription is necessary we can send it directly to your pharmacy.  If lab work is needed we can place an order for that and you can then stop by our lab to have the test done at a later time.    Video visits are billed at different rates depending on your insurance coverage.  Please reach out to your insurance provider with any questions.    If during the course of the call the physician/provider feels a video visit is not appropriate, you will not be charged for this service.\"    Patient has given verbal consent for Video visit? Yes  How would you like to obtain your AVS? MyChart  If you are dropped from the video visit, the video invite should be resent to: Text to cell phone:   [phone]  Will anyone else be joining your video visit? No      Subjective     Vickie Duque is a 35 year old female who presents today via video visit for the following health issues:    Seen in ED on 11/18/2020 for a migraine.         [facility]     ED/UC Followup:    Facility:  [facility]  Date of visit: 11/18/2020  Reason for visit: migraine  Current Status: easier to manage with Advil migraine            Video Start Time: 3:47 PM      Has had migraines that start at bilateral eyes and radiate to the back of the head for the past 3 weeks.  Occurring twice daily.  Improves some with Advil migraine.  History of similar headaches in the past, but not nearly as frequent.  Also having some tension pulling/headaches at base of skull.  No upper back pain issues.    Vision blurs with headaches.  Patient also has diabetic retinopathy that " "is currently be worked on with intraocular injections and she has surgery to manage scar tissue in 2 months.      Review of Systems   Constitutional, HEENT, cardiovascular, pulmonary, gi and gu systems are negative, except as otherwise noted.      Objective           Vitals:  No vitals were obtained today due to virtual visit.    Physical Exam     GENERAL: Healthy, alert and no distress  EYES: Eyes grossly normal to inspection.  No discharge or erythema, or obvious scleral/conjunctival abnormalities.  RESP: No audible wheeze, cough, or visible cyanosis.  No visible retractions or increased work of breathing.    SKIN: Visible skin clear. No significant rash, abnormal pigmentation or lesions.  NEURO: Cranial nerves grossly intact.  Mentation and speech appropriate for age.  PSYCH: Mentation appears normal, affect normal/bright, judgement and insight intact, normal speech and appearance well-groomed.              Assessment & Plan     ASSESSMENT/ORDERS:    ICD-10-CM    1. Migraine without aura and without status migrainosus, not intractable  G43.009 SUMAtriptan (IMITREX) 50 MG tablet     amitriptyline (ELAVIL) 10 MG tablet   2. Diabetic retinopathy of left eye associated with type 1 diabetes mellitus, macular edema presence unspecified, unspecified retinopathy severity (H)  E10.319      PLAN:  1.  Started on amitriptyline today.  Titrate up to 30 mg/day.  Imitrex for breakthrough migraines.  Started with 50 mg dosing.        BMI:   Estimated body mass index is 26.91 kg/m  as calculated from the following:    Height as of 1/13/20: 1.626 m (5' 4\").    Weight as of an earlier encounter on 11/23/20: 71.1 kg (156 lb 12.8 oz).                Return in about 4 weeks (around 12/21/2020) for migraine recheck.    Alvaro Guerrero MD  [facility]      Video-Visit Details    Type of service:  Video Visit    Video End Time:4:03 PM    Originating Location (pt. Location): Home    Distant Location (provider " location):  [facility]     Platform used for Video Visit: Clara

## 2020-11-24 ENCOUNTER — TELEPHONE (OUTPATIENT)
Dept: ENDOCRINOLOGY | Facility: CLINIC | Age: 35
End: 2020-11-24

## 2020-11-24 NOTE — TELEPHONE ENCOUNTER
"Writer visited with patient at 11/23/2020 office visit at the request of provider due to Dexcom issues. Patient reports that she has had five sensors fail within the past few months after four days of wear. They will work on/off for a few hours and then will receive \"sensor error\" message. Patient does use overlay so does not believe it is an adherence issue.     Writer reached out to Dexcom Rep, Nathaniel Crow, for further assistance. She will have tech support reach out to patient.    Maggy Gaona LPN  Diabetes Clinic Coordinator   Adult Endocrinology and Pediatric Specialty Clinics  [facility]        "

## 2020-11-29 ENCOUNTER — HOSPITAL ENCOUNTER (EMERGENCY)
Facility: CLINIC | Age: 35
Discharge: HOME | End: 2020-11-29
Attending: PHYSICIAN ASSISTANT | Admitting: PHYSICIAN ASSISTANT
Payer: COMMERCIAL

## 2020-11-29 VITALS
OXYGEN SATURATION: 98 % | HEART RATE: 79 BPM | RESPIRATION RATE: 18 BRPM | SYSTOLIC BLOOD PRESSURE: 123 MMHG | DIASTOLIC BLOOD PRESSURE: 84 MMHG | TEMPERATURE: 98.6 F

## 2020-11-29 DIAGNOSIS — H33.21: ICD-10-CM

## 2020-11-29 DIAGNOSIS — H54.61 VISION LOSS OF RIGHT EYE: ICD-10-CM

## 2020-11-29 PROCEDURE — 76512 OPH US DX B-SCAN: CPT | Mod: 26 | Performed by: EMERGENCY MEDICINE

## 2020-11-29 PROCEDURE — 99284 EMERGENCY DEPT VISIT MOD MDM: CPT | Mod: 25 | Performed by: EMERGENCY MEDICINE

## 2020-11-29 PROCEDURE — 76512 OPH US DX B-SCAN: CPT | Mod: RT | Performed by: EMERGENCY MEDICINE

## 2020-11-29 ASSESSMENT — VISUAL ACUITY
OD: LESS THAN 20/400;WITHOUT CORRECTIVE LENSES
OS: 20/50;WITHOUT CORRECTIVE LENSES

## 2020-11-29 NOTE — ED AVS SNAPSHOT
[facility]  [address] [place] [address]  Phone: [phone]  Fax: [phone]                                    Vickie Duque   MRN: [id number]    Department: [facility]   Date of Visit: 11/29/2020           After Visit Summary Signature Page    I have received my discharge instructions, and my questions have been answered. I have discussed any challenges I see with this plan with the nurse or doctor.    ..........................................................................................................................................  Patient/Patient Representative Signature      ..........................................................................................................................................  Patient Representative Print Name and Relationship to Patient    ..................................................               ................................................  Date                                   Time    ..........................................................................................................................................  Reviewed by Signature/Title    ...................................................              ..............................................  Date                                               Time          22EPIC Rev 08/18

## 2020-11-29 NOTE — LETTER
November 29, 2020      Vickie Duque  [address] [facility] [address]        To Whom It May Concern:    Vickie Duque  was seen on 11/29/2020.  Please excuse her from work on 11/30 due to requiring urgent follow-up for illness.        Sincerely,        Keisha Daley PA-C

## 2020-11-29 NOTE — ED PROVIDER NOTES
"  History     Chief Complaint   Patient presents with     Eye Problem     HPI  Vickie Duque is a 35 year old female who presents to the emergency department with vision loss in the right eye. The patient is a type 1 diabetic with a history of retinopathy in the left eye.  Patient reports last night she suddenly lost vision completely in her right eye.  She states when the eye is open she just sees black and when closed she sees red.  She denies any associated pain with this.  No headaches or nausea/vomiting.  She states \"it is the blood vessels.\"  She has a history of vision issues due to her diabetes requiring steroid shots in the eye.  Denies fevers.  Denies any trauma to the eye.    Allergies:  Allergies   Allergen Reactions     No Known Drug Allergies        Problem List:    Patient Active Problem List    Diagnosis Date Noted     Diabetic retinopathy of left eye associated with type 1 diabetes mellitus, macular edema presence unspecified, unspecified retinopathy severity (H) 11/23/2020     Priority: Medium     Hyperglycemia 10/08/2017     Priority: Medium     Major depressive disorder, recurrent episode, moderate (H) 10/04/2016     Priority: Medium     Ulcerative colitis without complications (H) 01/29/2016     Priority: Medium     Anxiety 11/27/2015     Priority: Medium     Noncompliance with medication regimen 11/03/2015     Priority: Medium     Uncontrolled type 1 diabetes mellitus (H) 05/30/2014     Priority: Medium     Problem list name updated by automated process. Provider to review       Viral URI with cough 05/30/2014     Priority: Medium     DKA (diabetic ketoacidoses) (H) 05/29/2014     Priority: Medium     Advanced directives, counseling/discussion 05/29/2014     Priority: Medium     DVT prophylaxis 05/29/2014     Priority: Medium     Tobacco abuse 05/29/2014     Priority: Medium     SIRS (systemic inflammatory response syndrome) (H) 05/29/2014     Priority: Medium     Facial paralysis/[place] palsy " 04/27/2012     Priority: Medium     Type 1 diabetes mellitus with hyperglycemia (H) 10/31/2010     Priority: Medium     HYPERLIPIDEMIA LDL GOAL <100 10/31/2010     Priority: Medium     ASCUS of cervix with negative high risk HPV 06/19/2008     Priority: Medium     6/19/08 ASCUS pap/neg HPV  2009 and 2012 both NIL paps  6/28/17 NIL pap/neg HR HPV. EMB normal. Plan: cotest in 3 years.       Sprain of back 03/10/2008     Priority: Medium     Problem list name updated by automated process. Provider to review          Past Medical History:    Past Medical History:   Diagnosis Date     Adjustment disorder with anxious mood 11/23/2015     Anxiety 11/27/2015     ASCUS of cervix with negative high risk HPV 06/19/2008     Depressive disorder, not elsewhere classified      Diabetic eye exam (H) 12/19/14     Mixed hyperlipidemia 11/30/2006     Regional enteritis of unspecified site      Type I (juvenile type) diabetes mellitus with ketoacidosis, not stated as uncontrolled(250.11) 01/01/2007     Type I (juvenile type) diabetes mellitus with ketoacidosis, uncontrolled 02/14/08     Type I (juvenile type) diabetes mellitus without mention of complication, not stated as uncontrolled      Ulcerative colitis, unspecified        Past Surgical History:    Past Surgical History:   Procedure Laterality Date     DILATION AND CURETTAGE SUCTION  4/2010    miscarriage     HC COLONOSCOPY W BIOPSY  08/16/06     HC COLONOSCOPY W/WO BRUSH/WASH       TUBAL LIGATION         Family History:    Family History   Problem Relation Age of Onset     Hypertension Father      Diabetes Maternal Grandmother      Cancer Maternal Grandmother      Diabetes Paternal Grandfather      Diabetes Maternal Uncle      Diabetes Maternal Aunt        Social History:  Marital Status:  Single [1]  Social History     Tobacco Use     Smoking status: Current Every Day Smoker     Packs/day: 0.50     Years: 16.00     Pack years: 8.00     Types: Cigarettes     Smokeless tobacco:  Never Used   Substance Use Topics     Alcohol use: Yes     Alcohol/week: 0.0 standard drinks     Comment: occassionally     Drug use: No        Medications:         amitriptyline (ELAVIL) 10 MG tablet       glucagon 1 MG SOLR injection       Glucagon 3 MG/DOSE POWD       ibuprofen (ADVIL/MOTRIN) 600 MG tablet       insulin glargine (LANTUS SOLOSTAR) 100 UNIT/ML pen       insulin pen needle (BD TARA U/F) 32G X 4 MM miscellaneous       Naproxen Sodium (ALEVE PO)       NOVOLOG FLEXPEN 100 UNIT/ML soln       SUMAtriptan (IMITREX) 50 MG tablet          Review of Systems   All other systems reviewed and are negative.      Physical Exam   BP: 123/84  Pulse: 79  Temp: 98.6  F (37  C)  Resp: 18  SpO2: 98 %      Physical Exam  Vitals signs and nursing note reviewed.   Constitutional:       General: She is not in acute distress.     Appearance: Normal appearance. She is normal weight. She is not ill-appearing, toxic-appearing or diaphoretic.   HENT:      Head: Normocephalic and atraumatic.      Nose: Nose normal.      Mouth/Throat:      Mouth: Mucous membranes are moist.      Pharynx: Oropharynx is clear.   Eyes:      Extraocular Movements: Extraocular movements intact.      Conjunctiva/sclera: Conjunctivae normal.      Pupils: Pupils are equal, round, and reactive to light.      Comments: Bedside ultrasound performed by Dr. Dubose demonstrating findings concerning for partial retinal detachment. See his documentation for details.  Visual field on right abnormal, patient reports no sight.   Neck:      Musculoskeletal: Neck supple.   Pulmonary:      Effort: Pulmonary effort is normal. No respiratory distress.   Skin:     General: Skin is warm and dry.   Neurological:      Mental Status: She is alert and oriented to person, place, and time. Mental status is at baseline.   Psychiatric:         Mood and Affect: Mood normal.         Behavior: Behavior normal.         ED Course        Procedures    No results found for this or any  previous visit (from the past 24 hour(s)).    Medications - No data to display       Assessments & Plan (with Medical Decision Making)  Vickie Duque is a 35 year old female who presented to the ED complaining of abrupt onset right vision loss that occurred last night.  Denies any symptoms of pain, headache, nausea vomiting, or trauma.  She is vitally stable on arrival.  No acute abnormalities seen on exam.  Patient unable to complete visual acuity for her right eye due to complete loss of vision.  I had Dr. Dubose assist with forming bedside ultrasound to evaluate for vitreous hemorrhage or retinal detachment.  This did show findings concerning for partial retinal detachment.  Called and spoke with on-call ophthalmologist at [facility] Dr. Thao.  He advised patient follow-up with her ophthalmologist first thing tomorrow for further evaluation and management, no urgent intervention warranted at this time.  Patient was comfortable with this plan.  She will call Dr. Diallo, her ophthalmologist she sees for her ongoing diabetic retinopathy issues first thing in the morning.  Also gave her the on-call number for her clinic to call as well today to give them a heads up.  She was advised to return to the ED in the meantime for any worsening concerns.  All questions answered and patient discharged home in suitable condition.     I have reviewed the nursing notes.    I have reviewed the findings, diagnosis, plan and need for follow up with the patient.    New Prescriptions    No medications on file       Final diagnoses:   Vision loss of right eye   Partial retinal detachment of right eye     Note: Chart documentation done in part with Dragon Voice Recognition software. Although reviewed after completion, some word and grammatical errors may remain.    11/29/2020   [facility] EMERGENCY DEPT     Keisha Daley PA-C  11/29/20 [id number]

## 2020-11-29 NOTE — DISCHARGE INSTRUCTIONS
It looks like you may have a partially detached retina.  See pamphlet regarding this diagnosis.    You will need to follow-up urgently tomorrow with your eye doctor for further evaluation and management.  Please call the clinic today to speak with on-call ophthalmologist regarding this ER visit and call in the morning if you are unable today to schedule appointment for tomorrow.    If you have any worsening concerns in the meantime please return to the emergency department.    Thank you for choosing [facility]'s Emergency Department. It was a pleasure taking care of you today. If you have any questions, please call [phone].    Keisha Daley PA-C

## 2020-11-30 ENCOUNTER — PATIENT OUTREACH (OUTPATIENT)
Dept: CARE COORDINATION | Facility: CLINIC | Age: 35
End: 2020-11-30

## 2020-11-30 DIAGNOSIS — Z71.89 OTHER SPECIFIED COUNSELING: ICD-10-CM

## 2020-11-30 NOTE — PROGRESS NOTES
Clinic Care Coordination Contact  Community Health Worker Initial Outreach    CHW Initial Information Gathering:  Referral Source: Care Team  Preferred Hospital: [facility] [place]  [phone]  Current living arrangement:: I live in a private home  Supplies used at home:: Diabetic Supplies  No PCP office visit in Past Year: No(11/23/20 with Dr. Marie)  Transportation means:: Accessible car  CHW Additional Questions  MyChart active?: Yes  Patient sent Social Determinants of Health questionnaire?: No    Patient accepts CC: Yes. Patient scheduled for assessment with [facility] on 12/1/20 at 10:00. Patient noted desire to discuss Help with getting insulin pump and reducing A1C.

## 2020-12-01 ENCOUNTER — PATIENT OUTREACH (OUTPATIENT)
Dept: FAMILY MEDICINE | Facility: CLINIC | Age: 35
End: 2020-12-01
Payer: COMMERCIAL

## 2020-12-01 ASSESSMENT — ACTIVITIES OF DAILY LIVING (ADL): DEPENDENT_IADLS:: INDEPENDENT

## 2020-12-01 NOTE — LETTER
[facility] CARE COORDIN[facility]  [address]   [address]  Tel. [phone] / Fax [phone]  December 1, 2020    Vickie Duque  [address] [place] [address]      Dear Vickie,    I am a clinic care coordinator who works with Alvaro Guerrero MD at [facility]. I wanted to thank you for spending the time to talk with me.  Below is a description of clinic care coordination and how I can further assist you.      The clinic care coordination team is made up of a registered nurse,  and community health worker who understand the health care system. The goal of clinic care coordination is to help you manage your health and improve access to the health care system in the most efficient manner. The team can assist you in meeting your health care goals by providing education, coordinating services, strengthening the communication among your providers and supporting you with any resource needs.    Please feel free to contact me at [phone] with any questions or concerns. We are focused on providing you with the highest-quality healthcare experience possible and that all starts with you.     Sincerely,     Haydee CONDON, RN, PHN, CCM  Primary Clinic Care Coordination    [facility]  Primary Care [facility]  [email or web address]   Office: [phone]  Employed by [facility]          Enclosed: I have enclosed a copy of the Complex Care Plan. This has helpful information and goals that we have talked about. Please keep this in an easy to access place to use as needed.

## 2020-12-01 NOTE — PROGRESS NOTES
Clinic Care Coordination Contact  Care Team Conversations    Pt would like a referral to GYN specialist review and advise      Haydee CONDON, RN, PHN, CCM  Primary Clinic Care Coordination    [facility]  Primary Care Clinics  [email or web address]   Office: [phone]  Employed by [facility]

## 2020-12-01 NOTE — LETTER
[facility]  Complex Care Plan  About Me:    Patient Name:  Vickie Duque    YOB: 1985  Age:         35 year old   [facility] MRN:    [id number] Telephone Information:  Home Phone [phone]   Mobile [phone]       Address:  [address] [place] [address] Email address:  [email or web address]      Emergency Contact(s)    Name Relationship Lgl Grd Work Phone Home Phone Mobile Phone   1. MIESHA DUQUE Mother   [phone]   2. CIELO, SHANNA* Significant ot*   [phone]           Primary language:  English     needed? No   [facility] [employer]:  [phone] op. 1  Other communication barriers: None  Preferred Method of Communication:  Mail  Current living arrangement: I live in a private home  Mobility Status/ Medical Equipment: Independent    Health Maintenance  Health Maintenance Reviewed: Not assessed    My Access Plan  Medical Emergency 911   Primary Clinic Line [facility] [phone]   24 Hour Appointment Line [phone] or  [phone]) (toll-free)   24 Hour Nurse Line [phone] (toll-free)   Preferred Urgent Care     Preferred Hospital [facility]  [phone]   Preferred Pharmacy [facility] [place], [place] - [address]      Behavioral Health Crisis Line The National Suicide Prevention Lifeline at 1-556.387.5852 or 911             My Care Team Members  Patient Care Team       Relationship Specialty Notifications Start End    Alvaro Guerrero MD PCP - General Family Practice  1/6/20     Phone: [phone] Fax: [phone]         [address] [place] [address]    Pacheco Stephenson DPM MD Podiatry Admissions 9/19/17     Phone: [phone] Fax: [phone]         [address] [place] [address]    Haydee Rojas, RN Diabetes Educator Diabetes Education  5/15/19     Phone: [phone] Fax: [phone]        Cesar  Alvaro [place] MD Assigned PCP   1/19/20     Phone: [phone] Fax: [phone] [address] [place] [address]    Siri Rhoades MD Assigned Endocrinology Provider   10/23/20     Phone: [phone] Pager: [phone] Fax: [phone] [address] [place] [address]    Maggy Shultz MD Assigned Surgical Provider   11/15/20     Phone: [phone] Fax: [phone] [address] [place] [address]    Dede [facility], RN Lead Care Coordinator Primary Care - CC Admissions 12/1/20     Phone: [phone]                 My Care Plans  Self Management and Treatment Plan  Goals and (Comments)  Goals        General    Medical     Goal Statement:I would like to build to better manage my blood sugars and further complications from diabetes.  Date Goal set: 12/1/2020  Barriers: Unknown  Strengths: Motivated and engaged  Date to Achieve By: 1/1/2021  Patient expressed understanding of goal: Yes  Action steps to achieve this goal:  1. I will follow-up with my endocrinologist as recommended  2. I will work with diabetic educator to be able to receive the insulin pump  3. I will's make an appointment with OB/GYN to discuss options due to  blood sugar spikes during periods.           Action Plans on File:   Depression    Advance Care Plans/Directives Type:        My Medical and Care Information  Problem List   Patient Active Problem List   Diagnosis     Sprain of back     Type 1 diabetes mellitus with hyperglycemia (H)     HYPERLIPIDEMIA LDL GOAL <100     Facial paralysis/[place] palsy     DKA (diabetic ketoacidoses) (H)     Advanced directives, counseling/discussion     DVT prophylaxis     Tobacco abuse     SIRS (systemic inflammatory response syndrome) (H)     Uncontrolled type 1 diabetes mellitus (H)     Viral URI with cough     Noncompliance with medication regimen     Anxiety     Ulcerative colitis without complications (H)     Major depressive disorder, recurrent  episode, moderate (H)     ASCUS of cervix with negative high risk HPV     Hyperglycemia     Diabetic retinopathy of left eye associated with type 1 diabetes mellitus, macular edema presence unspecified, unspecified retinopathy severity (H)      Current Medications and Allergies:  See printed Medication Report.    Care Coordination Start Date: 12/1/2020   Frequency of Care Coordination: monthly   Form Last Updated: 12/01/2020

## 2020-12-01 NOTE — PROGRESS NOTES
"Clinic Care Coordination Contact    Clinic Care Coordination Contact  OUTREACH    Referral Information:  Referral Source: Care Team    Primary Diagnosis: Diabetes    Chief Complaint   Patient presents with     Clinic Care Coordination - [facility]     RNCC assessment-ED follow up     Universal Utilization:   Clinic Utilization  Difficulty keeping appointments:: No  Compliance Concerns: No  No-Show Concerns: No  No PCP office visit in Past Year: (11/23/20 with Dr. Marie)  Utilization    Last refreshed: 11/30/2020 11:57 AM: Hospital Admissions 0           Last refreshed: 11/30/2020 11:57 AM: ED Visits 3           Last refreshed: 11/30/2020 11:57 AM: No Show Count (past year) 0              Current as of: 11/30/2020 11:57 AM            Clinical Concerns:    Current Medical Concerns: Called and spoke with pt, introduced self and role.  Patient was recently in the emergency room for loss in her right eye.  States she was very frustrated with the emergency room visit days ago as she had told him \"it was a broken blood vessel\" and they had told her her retina was detaching.  Patient did follow-up with her eye doctor and her report it he said it was a broken blood vessel just as I had told the ER\".  States she got a prednisone injection and that I and she will have to go back again for another injection.  Patient states she does have vision in both eyes and at this point is still legally able to drive.  Patient states her blood sugars have been and has been keeping it under 200.  Patient states she is working with endocrinology on management and has asked for insulin pump.  She feels this will be able to better manage her diabetes and not have so many ups and downs.  Reports that around the time of her.  Her blood sugars automatically go up to about 300.  States she does give her self more insulin but does not seem to make a difference.  States this happens every month and is wondering if there is something she can do " "to not have periods where the spike happen.  Patient states she is done having kids and would like to know what she can do to hopefully prevent the spikes.    Current Behavioral Concerns: Started a conversation patient was very upset and angry at her recent ED visit stating she has been working with [employer] for many years and does not feel like she is getting anywhere with her diabetes management.  States that \"nobody is listening to me and hearing what I am saying\" I again explained my role as primary care clinic RN care coordinator with Dr. Guerrero offered to her sister and managing her different specialties get further evaluations she is looking for.  Did calm down and was thankful to have the assistance and help provide a more efficient and better outcome.  Education Provided to patient: Offered referral to OB/GYN to discuss options of sterilization or hormonal surges that may be contributing to her spike in blood sugars during her periods.  Pain  Pain (GOAL):: No  Health Maintenance Reviewed: Not assessed  Clinical Pathway: None    Medication Management:  Medication reconciliation status: Medications reviewed and reconciled.  Continue medications without change.     Functional Status:  Dependent ADLs:: Independent  Dependent IADLs:: Independent  Bed or wheelchair confined:: No  Mobility Status: Independent  Fallen 2 or more times in the past year?: No  Any fall with injury in the past year?: No    Living Situation:  Current living arrangement:: I live in a private home    Lifestyle & Psychosocial Needs:     Diet:: Diabetic diet  Inadequate nutrition (GOAL):: No  Tube Feeding: No  Inadequate activity/exercise (GOAL):: No  Significant changes in sleep pattern (GOAL): No  Transportation means:: Accessible car     Mental health DX:: Yes  Mental health DX how managed:: None  Mental health management concern (GOAL):: No  Informal Support system:: Family   Socioeconomic History     Marital status: Single     Spouse " name: Not on file     Number of children: 1     Years of education: 12     Highest education level: Not on file   Occupational History     Occupation: Nursing assistant     Tobacco Use     Smoking status: Current Every Day Smoker     Packs/day: 0.50     Years: 16.00     Pack years: 8.00     Types: Cigarettes     Smokeless tobacco: Never Used   Substance and Sexual Activity     Alcohol use: Yes     Alcohol/week: 0.0 standard drinks     Comment: occassionally     Drug use: No     Sexual activity: Yes     Partners: Male     Birth control/protection: Surgical, Female Surgical     Comment: tubal ligation        Resources and Interventions:  Current Resources:      Community Resources: None  Supplies used at home:: Diabetic Supplies  Equipment Currently Used at Home: none      Advance Care Plan/Directive  Advanced Care Plans/Directives on file:: No  Advanced Care Plan/Directive Status: Not Applicable    Referrals Placed: None     Goals       Medical      Goal Statement:I would like to build to better manage my blood sugars and further complications from diabetes.  Date Goal set: 12/1/2020  Barriers: Unknown  Strengths: Motivated and engaged  Date to Achieve By: 1/1/2021  Patient expressed understanding of goal: Yes  Action steps to achieve this goal:  1. I will follow-up with my endocrinologist as recommended  2. I will work with diabetic educator to be able to receive the insulin pump  3. I will's make an appointment with OB/GYN to discuss options due to  blood sugar spikes during periods.          Patient/Caregiver understanding: Pt verbalizes understanding of follow up instructions and when scheduled appt date and times.     Outreach Frequency: monthly  Future Appointments              In 1 month Alvaro Guerrero MD [facility]    In 2 months Siri Rhoades MD; MG ANAYA NURSE [facility]          Plan:   RN cc will send message to  provider regarding GYN referral  RNCC will follow up in 2 weeks      Haydee CONDON, RN, PHN, CCM  Primary Clinic Care Coordination    [facility]  Primary Care Clinics  [email or web address]   Office: [phone]  Employed by [facility]

## 2020-12-03 NOTE — TELEPHONE ENCOUNTER
Patient should set up appointment with me to discuss her referral request or she can set up appointment her self with OB/GYN if she does not require referral.  Will have staff notify patient and set up appointment.    Alvaro Guerrero MD

## 2020-12-08 NOTE — TELEPHONE ENCOUNTER
----- Message from Siri Rhoades MD sent at 11/23/2020  3:23 PM CST -----  Please check with [employer]. Thank you, Siri hRoades MD

## 2020-12-08 NOTE — TELEPHONE ENCOUNTER
Left message for patient to return call. Please ask if patient was able to connect with [employer].    Maggy Gaona LPN  Diabetes Clinic Coordinator  Adult Endocrinology and Pediatric Specialty Clinics  [facility]

## 2020-12-15 NOTE — TELEPHONE ENCOUNTER
Patient received sensor replacements but never received call back from [employer] regarding transmitter.    Patient reports typically only check BG in AM. Ranges from 115-240's.     Writer will email Nathaniel Crow [employer] rafael for follow up assistance.    Maggy Gaona LPN  Diabetes Clinic Coordinator   Adult Endocrinology and Pediatric Specialty Clinics  [facility]

## 2020-12-17 NOTE — TELEPHONE ENCOUNTER
Email response from Nathaniel Crow:  The last time we spoke to the patient was back on 11/24 and looks like we documented 5 sensor errors.    I did speak to her, and the transmitter she was using was sent to her back in May, please see below for our transmitter warranties:    Either 90 Days of usage from the date/time of activation with  or albertina, or use within 5 months of receiving the Transmitter from their Distributor.    I went ahead and booked a transmitter to be overnighted to the patient.    No follow up required.    Maggy Gaona LPN  Diabetes Clinic Coordinator   Adult Endocrinology and Pediatric Specialty Clinics  [facility]

## 2021-01-04 ENCOUNTER — OFFICE VISIT (OUTPATIENT)
Dept: OBGYN | Facility: CLINIC | Age: 36
End: 2021-01-04
Payer: COMMERCIAL

## 2021-01-04 ENCOUNTER — PATIENT OUTREACH (OUTPATIENT)
Dept: FAMILY MEDICINE | Facility: CLINIC | Age: 36
End: 2021-01-04
Payer: COMMERCIAL

## 2021-01-04 VITALS
SYSTOLIC BLOOD PRESSURE: 110 MMHG | BODY MASS INDEX: 25.71 KG/M2 | DIASTOLIC BLOOD PRESSURE: 60 MMHG | HEART RATE: 70 BPM | OXYGEN SATURATION: 98 % | TEMPERATURE: 98 F | WEIGHT: 149.8 LBS

## 2021-01-04 DIAGNOSIS — Z30.430 ENCOUNTER FOR INSERTION OF INTRAUTERINE CONTRACEPTIVE DEVICE: ICD-10-CM

## 2021-01-04 DIAGNOSIS — Z11.3 SCREEN FOR STD (SEXUALLY TRANSMITTED DISEASE): ICD-10-CM

## 2021-01-04 DIAGNOSIS — N93.9 ABNORMAL UTERINE BLEEDING (AUB): Primary | ICD-10-CM

## 2021-01-04 LAB
ERYTHROCYTE [DISTWIDTH] IN BLOOD BY AUTOMATED COUNT: 13.3 % (ref 10–15)
HCT VFR BLD AUTO: 38.6 % (ref 35–47)
HGB BLD-MCNC: 12.4 G/DL (ref 11.7–15.7)
MCH RBC QN AUTO: 26.7 PG (ref 26.5–33)
MCHC RBC AUTO-ENTMCNC: 32.1 G/DL (ref 31.5–36.5)
MCV RBC AUTO: 83 FL (ref 78–100)
PLATELET # BLD AUTO: 197 10E9/L (ref 150–450)
RBC # BLD AUTO: 4.65 10E12/L (ref 3.8–5.2)
TSH SERPL DL<=0.005 MIU/L-ACNC: 3.96 MU/L (ref 0.4–4)
WBC # BLD AUTO: 9.2 10E9/L (ref 4–11)

## 2021-01-04 PROCEDURE — 58100 BIOPSY OF UTERUS LINING: CPT | Performed by: OBSTETRICS & GYNECOLOGY

## 2021-01-04 PROCEDURE — 36415 COLL VENOUS BLD VENIPUNCTURE: CPT | Performed by: OBSTETRICS & GYNECOLOGY

## 2021-01-04 PROCEDURE — 58300 INSERT INTRAUTERINE DEVICE: CPT | Performed by: OBSTETRICS & GYNECOLOGY

## 2021-01-04 PROCEDURE — 99204 OFFICE O/P NEW MOD 45 MIN: CPT | Mod: 25 | Performed by: OBSTETRICS & GYNECOLOGY

## 2021-01-04 PROCEDURE — 84443 ASSAY THYROID STIM HORMONE: CPT | Performed by: OBSTETRICS & GYNECOLOGY

## 2021-01-04 PROCEDURE — 87491 CHLMYD TRACH DNA AMP PROBE: CPT | Performed by: OBSTETRICS & GYNECOLOGY

## 2021-01-04 PROCEDURE — 85027 COMPLETE CBC AUTOMATED: CPT | Performed by: OBSTETRICS & GYNECOLOGY

## 2021-01-04 PROCEDURE — 88305 TISSUE EXAM BY PATHOLOGIST: CPT | Performed by: PATHOLOGY

## 2021-01-04 PROCEDURE — 87591 N.GONORRHOEAE DNA AMP PROB: CPT | Performed by: OBSTETRICS & GYNECOLOGY

## 2021-01-04 NOTE — PROGRESS NOTES
Clinic Care Coordination Contact    Follow Up Progress Note      Assessment: Called and spoke with pt, she did have an appt with OB today and was happy with the provider. States she is having more testing done.  States she also has an appt with her PCP coming up.  This time patient declines any further needs or resources.    Goals addressed this encounter:   Goals Addressed                 This Visit's Progress      COMPLETED: Medical        Goal Statement:I would like to build to better manage my blood sugars and further complications from diabetes.  Date Goal set: 12/1/2020  Barriers: Unknown  Strengths: Motivated and engaged  Date to Achieve By: 12/1/2021  Patient expressed understanding of goal: Yes  Action steps to achieve this goal:  1. I will follow-up with my endocrinologist as recommended  2. I will work with diabetic educator to be able to receive the insulin pump  3. I will's make an appointment with OB/GYN to discuss options due to  blood sugar spikes during periods.     Intervention/Education provided during outreach: Follow-up with providers as recommended.          Plan:   RN CC close to care coordination patient declined any further resources or support this time.    Haydee CONDON, RN, PHN, CCM  Primary Clinic Care Coordination    [facility]  Primary Care Clinics  [email or web address]   Office: [phone]  Employed by [facility], [place]

## 2021-01-04 NOTE — PROGRESS NOTES
Consult Note    Consult requested by Alvaro Guerrero   Chief Complaint   Patient presents with     Consult     period causing bs to be high      HPI:  Vickie Duque is a 35 year old  woman with history of IDDM and ulcerative colitis who presents for evaluation of heavy periods, she states that she is a brittle diabetic, and that her heavy cycles cause her BG control to be poor.     Periods monthly, vaginal bleeding up to 8-9 days though some shorter lengths, heavy flow with regular flooding, painful, can miss work. Periods have been like this since last child 8 years ago. Used Depo in the remote past with adequate though not perfect control.   She had a pelvic Ultrasound in  showing a 1.2 cm echogenic structure in the endometrium, possible polyp, there was no follow up for this.   No vaginal symptoms, no issues with voiding nor BMs.   She was not anemic as of last April.     IDDM: lantus 10u HS, as well as  sliding scale insulin. Last A1c 9.5 a year ago, patient reports a 9.1 more recently.     Ob Hx:  s/p  x3, s/p tubal ligation in   Gyn Hx: Patient's last menstrual period was 2020.  Menses as aabove   Last pap NIL, HPV- ()   Using sterilization for birth control  PMH:   Past Medical History:   Diagnosis Date     Adjustment disorder with anxious mood 2015     Anxiety 2015     ASCUS of cervix with negative high risk HPV 2008     Depressive disorder, not elsewhere classified      Diabetic eye exam (H) 12     Mixed hyperlipidemia 2006     Regional enteritis of unspecified site     Ulcerative Colitis     Type I (juvenile type) diabetes mellitus with ketoacidosis, not stated as uncontrolled(250.11) 2007    Moderately severe intensity.     Type I (juvenile type) diabetes mellitus with ketoacidosis, uncontrolled 08     Type I (juvenile type) diabetes mellitus without mention of complication, not stated as uncontrolled     diagnosed in       Ulcerative colitis, unspecified     remission. Last flare 6 yrs ago.      SurgHx:   Past Surgical History:   Procedure Laterality Date     DILATION AND CURETTAGE SUCTION  4/2010    miscarriage     HC COLONOSCOPY W BIOPSY  08/16/06     HC COLONOSCOPY W/WO BRUSH/WASH       TUBAL LIGATION       FamHx:   Family History   Problem Relation Age of Onset     Hypertension Father      Diabetes Maternal Grandmother      Cancer Maternal Grandmother      Diabetes Paternal Grandfather      Diabetes Maternal Uncle      Diabetes Maternal Aunt      SocHx:   Social History     Socioeconomic History     Marital status: Single     Spouse name: None     Number of children: 1     Years of education: 12     Highest education level: None   Occupational History     Occupation: Nursing assistant   Social Needs     Financial resource strain: None     Food insecurity     Worry: None     Inability: None     Transportation needs     Medical: None     Non-medical: None   Tobacco Use     Smoking status: Current Every Day Smoker     Packs/day: 0.50     Years: 16.00     Pack years: 8.00     Types: Cigarettes     Smokeless tobacco: Never Used     Tobacco comment: working on quiting   Substance and Sexual Activity     Alcohol use: Yes     Alcohol/week: 0.0 standard drinks     Comment: occassionally     Drug use: No     Sexual activity: Yes     Partners: Male     Birth control/protection: Surgical, Female Surgical     Comment: tubal ligation   Lifestyle     Physical activity     Days per week: None     Minutes per session: None     Stress: None   Relationships     Social connections     Talks on phone: None     Gets together: None     Attends Pent[employer]ism service: None     Active member of club or organization: None     Attends meetings of clubs or organizations: None     Relationship status: None     Intimate partner violence     Fear of current or ex partner: None     Emotionally abused: None     Physically abused: None     Forced sexual activity: None    Other Topics Concern      Service No     Blood Transfusions No     Caffeine Concern Yes     Comment: Advised not more than 16 ounces/day     Occupational Exposure Yes     Comment: Visiting [facility] - student online classes     Hobby Hazards No     Sleep Concern No     Stress Concern Yes     Weight Concern No     Special Diet Yes     Comment: IDDM Type I     Back Care Yes     Comment: work-related about 1 year ago     Exercise Yes     Comment: Active at work     Bike Helmet No     Seat Belt Yes     Self-Exams No     Parent/sibling w/ CABG, MI or angioplasty before 65F 55M? Not Asked   Social History Narrative    Lives in [place] with Rod gandhi and her son and their daughter.   Vickie and Rod smoke.   No indoor cats/kittens.   No concerns about domestic violence.     Allergies:   No known drug allergies  Current Medications:   Current Outpatient Medications   Medication Sig Dispense Refill     amitriptyline (ELAVIL) 10 MG tablet Take 1 tablet (10 mg) by mouth At Bedtime 90 tablet 1     glucagon 1 MG SOLR injection Inject 1 mg Subcutaneous every 15 minutes as needed for low blood sugar 2 each 0     ibuprofen (ADVIL/MOTRIN) 600 MG tablet Take 1 tablet (600 mg) by mouth every 8 hours as needed for moderate pain 20 tablet 0     insulin glargine (LANTUS SOLOSTAR) 100 UNIT/ML pen Inject 12 Units Subcutaneous At Bedtime Add 2 units to prime. 15 mL 1     insulin pen needle (BD TARA U/F) 32G X 4 MM miscellaneous Use 3 daily or as directed. 270 each 3     Naproxen Sodium (ALEVE PO) Take by mouth as needed for moderate pain       NOVOLOG FLEXPEN 100 UNIT/ML soln Inject 1 unit for every 15 grams of carbohydrates plus correction dose; uses up to 30 units daily. 30 mL 1     SUMAtriptan (IMITREX) 50 MG tablet Take 1 tablet (50 mg) by mouth at onset of headache for migraine May repeat in 2 hours. Max 4 tablets/24 hours. 18 tablet 4     Glucagon 3 MG/DOSE POWD Spray 3 mg in nostril as needed (Hypoglycemia)  (Patient not taking: Reported on 2021) 1 each 3     ROS: 10 point ROS negative other than as noted in the HPI    EXAM:  Vitals:    21 1044   BP: 110/60   BP Location: Right arm   Patient Position: Chair   Cuff Size: Adult Regular   Pulse: 70   Temp: 98  F (36.7  C)   TempSrc: Temporal   SpO2: 98%   Weight: 67.9 kg (149 lb 12.8 oz)    Body mass index is 25.71 kg/m .    Gen: Alert, oriented, appropriately interactive, NAD  CV: RRR, 2+ peripheral pulses  Resp: CTAB, good effort without distress   Abdomen: soft, non tender, non distended, no masses, no hernias. No inguinal lymphadenopathy.   Perineum: no lesions; normal appearing external genitalia, bartholins glands, urethra, skenes glands  Vagina: no masses or lesions or discharge, normally rugated.  Cervix: no masses or lesions or discharge   Anus: appears normal  Bimanual exam:   Nontender pelvic floor muscles  Uterus: midline, anteverted, small, mobile  no masses, non-tender  Adnexa: no masses or tenderness appreciated   No cervical motion tenderness  MSK: normal gait, symmetric movements UE & LE  Lower extremities: non-tender, no edema    Labs & Imaging:  Results for orders placed or performed in visit on 21 (from the past 24 hour(s))   CBC with platelets   Result Value Ref Range    WBC 9.2 4.0 - 11.0 10e9/L    RBC Count 4.65 3.8 - 5.2 10e12/L    Hemoglobin 12.4 11.7 - 15.7 g/dL    Hematocrit 38.6 35.0 - 47.0 %    MCV 83 78 - 100 fl    MCH 26.7 26.5 - 33.0 pg    MCHC 32.1 31.5 - 36.5 g/dL    RDW 13.3 10.0 - 15.0 %    Platelet Count 197 150 - 450 10e9/L   TSH with free T4 reflex   Result Value Ref Range    TSH 3.96 0.40 - 4.00 mU/L     Lab Results   Component Value Date    PAP NIL 2017    PAP NIL 2012    PAP NIL 10/01/2009     ASSESSMENT/PLAN: Vickie Duque is a 35 year old  woman with history of IDDM and ulcerative colitis who presents for evaluation of heavy periods, she states that she is a brittle diabetic, and that her heavy  cycles cause her BG control to be poor.     1. Abnormal uterine bleeding (AUB)  - Likely ovulatory cause, less likely polyp given bleeding patter. Discussed hormonal suppression vs surgical suppression with endometrial ablation or hysterectomy. Discussed each option in detail. Recommending trial of hormonal suppression given safest option, also current A1c level would make surgery less safe for her. Reviewed options for hormones, recommending IUD.   - Will collect EMB to screen for dysplasia vs malignancy, Ultrasound to look for possible polyp.   - Will revisit surgery option of IUD fails to adequately control her symptoms   - CBC with platelets  - TSH with free T4 reflex  - US Pelvic Complete with Transvaginal; Future  - ENDOMETRIAL BIOPSY W/O CERVICAL DILATION  - Surgical pathology exam  - levonorgestrel (MIRENA) 20 MCG/24HR IUD; 1 each (20 mcg) by Intrauterine route once  Dispense:    - levonorgestrel (MIRENA) 20 MCG/24HR IUD 20 mcg  - INSERTION INTRAUTERINE DEVICE    2. Screen for STD (sexually transmitted disease)  - Screening given IUD placement   - Chlamydia trachomatis PCR  - Neisseria gonorrhoeae PCR    3. Encounter for insertion of intrauterine contraceptive device    Constantine Elmore MD   2021 10:50 AM      IUD Insertion:  CONSULT:    Was a consent obtained?  Yes    Subjective: Vickie Duque is a 35 year old  presents for AUB and desires Mirena type IUD.    Vickie Duque understands she may have the IUD removed at any time. IUD should be removed by a health care provider.    The entire insertion procedure was reviewed with the patient, including care after placement.    Patient's last menstrual period was 2020.  No allergy to betadine or shellfish.  She has surgical sterilization.   HCG Qual Urine   Date Value Ref Range Status   2019 Negative NEG^Negative Final     Comment:     This test is for screening purposes.  Results should be interpreted along with   the clinical  picture.  Confirmation testing is available if warranted by   ordering [id number], HCG Quantitative Pregnancy.         /60 (BP Location: Right arm, Patient Position: Chair, Cuff Size: Adult Regular)   Pulse 70   Temp 98  F (36.7  C) (Temporal)   Wt 67.9 kg (149 lb 12.8 oz)   LMP 12/14/2020   SpO2 98%   BMI 25.71 kg/m      Pelvic Exam:   EG/BUS: normal genital architecture without lesions, erythema or abnormal secretions.   Vagina: moist, pink, rugae with physiologic discharge and secretions  Cervix: multiparous no lesions and pink, moist, closed, without lesion or CMT  Uterus: anteverted position, mobile, no pain  Adnexa: within normal limits and no masses, nodularity, tenderness    PROCEDURE NOTE: -- IUD Insertion    Reason for Insertion: abnormal uterine bleeding    Under sterile technique, cervix was visualized with speculum and prepped with Betadine solution swab x 3. Tenaculum was placed for stability. The uterus was gently straightened and sounded to 6.5 cm. IUD prepared for placement, and IUD inserted according to 's instructions without difficulty or significant resitance, and deployed at the fundus. The strings were visualized and trimmed to 2.5 cm from the external os. Tenaculum was removed and hemostasis noted. Speculum removed.  Patient tolerated procedure well.    Lot # [id number]  Exp: Nov 2022    EBL: minimal    Complications: none    ASSESSMENT:     ICD-10-CM    1. Abnormal uterine bleeding (AUB)  N93.9 CBC with platelets     TSH with free T4 reflex     US Pelvic Complete with Transvaginal     ENDOMETRIAL BIOPSY W/O CERVICAL DILATION     Surgical pathology exam   2. Screen for STD (sexually transmitted disease)  Z11.3 Chlamydia trachomatis PCR     Neisseria gonorrhoeae PCR        PLAN:    Given 's handouts, including when to have IUD removed, list of danger s/sx, side effects and follow up recommended. Encouraged condom use for prevention of STD. Back up contraception  "advised for 7 days if progestin method. Advised to call for any fever, for prolonged or severe pain or bleeding, abnormal vaginal discharge, or unable to palpate strings. She was advised to use pain medications (ibuprofen) as needed for mild to moderate pain. Advised to follow-up in clinic in 4-6 weeks for IUD string check if unable to find strings or as directed by provider.     Constantine Elmore MD  January 4, 2021 5:05 PM       Endometrial Biopsy                                                                       Time Out - \"Pause for the Cause\"  Just before the procedure begins, through verbal and active participation of team members, verify:    Initials   Patient Name    JCB   Patient Date of Birth JCB   Procedure to be performed  JCB   Implants, special equipment or special requirements        JCB     Consent:  Written consent signed and scanned into medical record.    Indication: menorrhagia    Using a Kelley speculum, the cervix was visualized. The cervix was prepped with Betadine.  A single tooth tenaculum was applied to the anterior lip of the cervix. The endometrial pipelle was advanced through the cervix without difficulty and a sample collected. The tenaculum was removed from the cervix and the tenaculum site hemostatic.    EBL: minimal   Complications: none  Pathology: EMB sample was sent to pathology.  Tolerance of Procedure:  Patient tolerated the procedure well.     Patient was instructed to call if she experiences any heavy bleeding, severe cramping, or abnormal vaginal discharge.  May take ibuprofen 400-800 mg PO TID PRN for cramping.    Will notify patient of results.    Constantine Elmore MD  OB/GYN  January 4, 2021, 5:05 PM         "

## 2021-01-05 LAB
C TRACH DNA SPEC QL NAA+PROBE: NEGATIVE
N GONORRHOEA DNA SPEC QL NAA+PROBE: NEGATIVE
SPECIMEN SOURCE: NORMAL
SPECIMEN SOURCE: NORMAL

## 2021-01-06 LAB — COPATH REPORT: NORMAL

## 2021-01-07 ENCOUNTER — HOSPITAL ENCOUNTER (OUTPATIENT)
Dept: ULTRASOUND IMAGING | Facility: CLINIC | Age: 36
Discharge: HOME | End: 2021-01-07
Attending: OBSTETRICS & GYNECOLOGY | Admitting: OBSTETRICS & GYNECOLOGY
Payer: COMMERCIAL

## 2021-01-07 DIAGNOSIS — N93.9 ABNORMAL UTERINE BLEEDING (AUB): ICD-10-CM

## 2021-01-07 PROCEDURE — 76830 TRANSVAGINAL US NON-OB: CPT

## 2021-01-08 DIAGNOSIS — N83.202 LEFT OVARIAN CYST: Primary | ICD-10-CM

## 2021-01-09 ENCOUNTER — HEALTH MAINTENANCE LETTER (OUTPATIENT)
Age: 36
End: 2021-01-09

## 2021-01-15 ENCOUNTER — OFFICE VISIT (OUTPATIENT)
Dept: FAMILY MEDICINE | Facility: CLINIC | Age: 36
End: 2021-01-15
Payer: COMMERCIAL

## 2021-01-15 VITALS
WEIGHT: 156 LBS | HEIGHT: 64 IN | HEART RATE: 88 BPM | TEMPERATURE: 98.2 F | OXYGEN SATURATION: 99 % | BODY MASS INDEX: 26.63 KG/M2 | RESPIRATION RATE: 18 BRPM

## 2021-01-15 DIAGNOSIS — Z01.818 PREOP GENERAL PHYSICAL EXAM: Primary | ICD-10-CM

## 2021-01-15 DIAGNOSIS — E10.65 UNCONTROLLED TYPE 1 DIABETES MELLITUS WITH HYPERGLYCEMIA (H): ICD-10-CM

## 2021-01-15 DIAGNOSIS — E10.319 DIABETIC RETINOPATHY OF LEFT EYE ASSOCIATED WITH TYPE 1 DIABETES MELLITUS, MACULAR EDEMA PRESENCE UNSPECIFIED, UNSPECIFIED RETINOPATHY SEVERITY (H): ICD-10-CM

## 2021-01-15 DIAGNOSIS — Z98.51 HX OF TUBAL LIGATION: ICD-10-CM

## 2021-01-15 LAB
ANION GAP SERPL CALCULATED.3IONS-SCNC: 4 MMOL/L (ref 3–14)
BUN SERPL-MCNC: 11 MG/DL (ref 7–30)
CALCIUM SERPL-MCNC: 8.8 MG/DL (ref 8.5–10.1)
CHLORIDE SERPL-SCNC: 108 MMOL/L (ref 94–109)
CO2 SERPL-SCNC: 28 MMOL/L (ref 20–32)
CREAT SERPL-MCNC: 0.84 MG/DL (ref 0.52–1.04)
GFR SERPL CREATININE-BSD FRML MDRD: 88 ML/MIN/{1.73_M2}
GLUCOSE SERPL-MCNC: 87 MG/DL (ref 70–99)
POTASSIUM SERPL-SCNC: 3.3 MMOL/L (ref 3.4–5.3)
SODIUM SERPL-SCNC: 140 MMOL/L (ref 133–144)

## 2021-01-15 PROCEDURE — 99215 OFFICE O/P EST HI 40 MIN: CPT | Performed by: FAMILY MEDICINE

## 2021-01-15 PROCEDURE — 36415 COLL VENOUS BLD VENIPUNCTURE: CPT | Performed by: FAMILY MEDICINE

## 2021-01-15 PROCEDURE — 93000 ELECTROCARDIOGRAM COMPLETE: CPT | Performed by: FAMILY MEDICINE

## 2021-01-15 PROCEDURE — 80048 BASIC METABOLIC PNL TOTAL CA: CPT | Performed by: FAMILY MEDICINE

## 2021-01-15 ASSESSMENT — MIFFLIN-ST. JEOR: SCORE: 1377.61

## 2021-01-15 ASSESSMENT — PAIN SCALES - GENERAL: PAINLEVEL: NO PAIN (0)

## 2021-01-15 NOTE — PROGRESS NOTES
[address] [address]  Phone: [phone]  Fax: [phone]  Primary Provider: Alvaro Bella  Pre-op Performing Provider: ALVARO BELLA    PREOPERATIVE EVALUATION:  Today's date: 1/15/2021    Vickie Duque is a 35 year old female who presents for a preoperative evaluation.    Surgical Information:  Surgery/Procedure: Scar tissue in left eye   Surgery Location: [facility]  Surgeon: Yoel   Surgery Date: 1/26/2021  Time of Surgery: unknown  Where patient plans to recover: At home with family  Fax number for surgical facility: [phone]    Type of Anesthesia Anticipated: General    Subjective     HPI related to upcoming procedure: needs eye surgery for scar tissue on retina from diabetic retinopathy.      Preop Questions 1/15/2021   1. Have you ever had a heart attack or stroke? No   2. Have you ever had surgery on your heart or blood vessels, such as a stent placement, a coronary artery bypass, or surgery on an artery in your head, neck, heart, or legs? No   3. Do you have chest pain with activity? No   4. Do you have a history of  heart failure? No   5. Do you currently have a cold, bronchitis or symptoms of other infection? No   6. Do you have a cough, shortness of breath, or wheezing? No   7. Do you or anyone in your family have previous history of blood clots? No   8. Do you or does anyone in your family have a serious bleeding problem such as prolonged bleeding following surgeries or cuts? No   9. Have you ever had problems with anemia or been told to take iron pills? YES - younger    10. Have you had any abnormal blood loss such as black, tarry or bloody stools, or abnormal vaginal bleeding? No   11. Have you ever had a blood transfusion? No   12. Are you willing to have a blood transfusion if it is medically needed before, during, or after your surgery? Yes   13. Have you or any of your relatives ever had problems with  anesthesia? No   14. Do you have sleep apnea, excessive snoring or daytime drowsiness? No   15. Do you have any artifical heart valves or other implanted medical devices like a pacemaker, defibrillator, or continuous glucose monitor? No   16. Do you have artificial joints? No   17. Are you allergic to latex? YES: rash, burning feeling,    18. Is there any chance that you may be pregnant? No       Health Care Directive:  Patient does not have a Health Care Directive or Living Will: Discussed advance care planning with patient; however, patient declined at this time.    Preoperative Review of :   reviewed - controlled substances reflected in medication list.    Status of Chronic Conditions:  DIABETES - Patient has a longstanding history of DiabetesType Type I . Patient is being treated with insulin injections and denies significant side effects. Control has been poor. Complicating factors include but are not limited to: diabetic retinopathy, which is why she requires procedure.     Lab Results   Component Value Date    A1C 9.5 01/13/2020    A1C 8.3 10/14/2019    A1C 8.9 07/22/2019    A1C 10.6 03/19/2019    A1C 11.6 12/20/2018      she doses her self twice daily with Lantus.  Takes 10 units at bedtime and then takes AM dose of Lantus as she feels this better controls her blood sugars during the day.  She has been meeting with endocrinology and is still trying to adjust her insulin.      Review of Systems  Constitutional, neuro, ENT, endocrine, pulmonary, cardiac, gastrointestinal, genitourinary, musculoskeletal, integument and psychiatric systems are negative, except as otherwise noted.    Patient Active Problem List    Diagnosis Date Noted     Hx of tubal ligation 01/15/2021     Priority: Medium     Diabetic retinopathy of left eye associated with type 1 diabetes mellitus, macular edema presence unspecified, unspecified retinopathy severity (H) 11/23/2020     Priority: Medium     Hyperglycemia 10/08/2017      Priority: Medium     Major depressive disorder, recurrent episode, moderate (H) 10/04/2016     Priority: Medium     Ulcerative colitis without complications (H) 01/29/2016     Priority: Medium     Anxiety 11/27/2015     Priority: Medium     Noncompliance with medication regimen 11/03/2015     Priority: Medium     Uncontrolled type 1 diabetes mellitus (H) 05/30/2014     Priority: Medium     Problem list name updated by automated process. Provider to review       Viral URI with cough 05/30/2014     Priority: Medium     Advanced directives, counseling/discussion 05/29/2014     Priority: Medium     Tobacco abuse 05/29/2014     Priority: Medium     Facial paralysis/[place] palsy 04/27/2012     Priority: Medium     Type 1 diabetes mellitus with hyperglycemia (H) 10/31/2010     Priority: Medium     HYPERLIPIDEMIA LDL GOAL <100 10/31/2010     Priority: Medium     ASCUS of cervix with negative high risk HPV 06/19/2008     Priority: Medium     6/19/08 ASCUS pap/neg HPV  2009 and 2012 both NIL paps  6/28/17 NIL pap/neg HR HPV. EMB normal. Plan: cotest in 3 years.       Sprain of back 03/10/2008     Priority: Medium     Problem list name updated by automated process. Provider to review        Past Medical History:   Diagnosis Date     Adjustment disorder with anxious mood 11/23/2015     Anxiety 11/27/2015     ASCUS of cervix with negative high risk HPV 06/19/2008     Depressive disorder, not elsewhere classified      Diabetic eye exam (H) 12/19/14     Mixed hyperlipidemia 11/30/2006     Regional enteritis of unspecified site     Ulcerative Colitis     Type I (juvenile type) diabetes mellitus with ketoacidosis, not stated as uncontrolled(250.11) 01/01/2007    Moderately severe intensity.     Type I (juvenile type) diabetes mellitus with ketoacidosis, uncontrolled 02/14/08     Type I (juvenile type) diabetes mellitus without mention of complication, not stated as uncontrolled     diagnosed in 2003     Ulcerative colitis,  "unspecified     remission. Last flare 6 yrs ago.      Past Surgical History:   Procedure Laterality Date     DILATION AND CURETTAGE SUCTION  4/2010    miscarriage     HC COLONOSCOPY W BIOPSY  08/16/06     HC COLONOSCOPY W/WO BRUSH/WASH       TUBAL LIGATION       Current Outpatient Medications   Medication Sig Dispense Refill     glucagon 1 MG SOLR injection Inject 1 mg Subcutaneous every 15 minutes as needed for low blood sugar 2 each 0     ibuprofen (ADVIL/MOTRIN) 600 MG tablet Take 1 tablet (600 mg) by mouth every 8 hours as needed for moderate pain 20 tablet 0     insulin glargine (LANTUS SOLOSTAR) 100 UNIT/ML pen Inject 12 Units Subcutaneous At Bedtime Add 2 units to prime. 15 mL 1     insulin pen needle (BD TARA U/F) 32G X 4 MM miscellaneous Use 3 daily or as directed. 270 each 3     levonorgestrel (MIRENA) 20 MCG/24HR IUD 1 each (20 mcg) by Intrauterine route once       Naproxen Sodium (ALEVE PO) Take by mouth as needed for moderate pain       NOVOLOG FLEXPEN 100 UNIT/ML soln Inject 1 unit for every 15 grams of carbohydrates plus correction dose; uses up to 30 units daily. 30 mL 1     SUMAtriptan (IMITREX) 50 MG tablet Take 1 tablet (50 mg) by mouth at onset of headache for migraine May repeat in 2 hours. Max 4 tablets/24 hours. 18 tablet 4       No Known Allergies     Social History     Tobacco Use     Smoking status: Current Every Day Smoker     Packs/day: 0.50     Years: 16.00     Pack years: 8.00     Types: Cigarettes     Smokeless tobacco: Never Used     Tobacco comment: working on quiting   Substance Use Topics     Alcohol use: Yes     Alcohol/week: 0.0 standard drinks     Comment: occassionally       History   Drug Use No         Objective     Pulse 88   Temp 98.2  F (36.8  C) (Temporal)   Resp 18   Ht 1.626 m (5' 4\")   Wt 70.8 kg (156 lb)   LMP 01/11/2021   SpO2 99%   Breastfeeding No   BMI 26.78 kg/m      Physical Exam    GENERAL APPEARANCE: healthy, alert and no distress     EYES: EOMI, " PERRL     HENT: ear canals and TM's normal and nose and mouth without ulcers or lesions     NECK: no adenopathy, no asymmetry, masses, or scars and thyroid normal to palpation     RESP: lungs clear to auscultation - no rales, rhonchi or wheezes     CV: regular rates and rhythm, normal S1 S2, no S3 or S4 and no murmur, click or rub     ABDOMEN:  soft, nontender, no HSM or masses and bowel sounds normal     MS: extremities normal- no gross deformities noted, no evidence of inflammation in joints, FROM in all extremities.     SKIN: no suspicious lesions or rashes     NEURO: Normal strength and tone, sensory exam grossly normal, mentation intact and speech normal     PSYCH: mentation appears normal. and affect normal/bright     LYMPHATICS: No cervical adenopathy    Recent Labs   Lab Test 01/04/21  1137 05/12/20  1200 01/13/20 10/14/19 06/27/19  1430 06/27/19  1430   HGB 12.4 12.4  --   --   --  13.0    190  --   --   --  221   NA  --  137  --   --   --  139   POTASSIUM  --  4.0  --   --   --  3.6   CR  --  0.93  --   --   --  0.94   A1C  --   --  9.5* 8.3*   < >  --     < > = values in this interval not displayed.        Diagnostics:  Labs pending at this time.  Results will be reviewed when available.   EKG: appears normal, NSR, normal axis, normal intervals, no acute ST/T changes c/w ischemia, no LVH by voltage criteria    Revised Cardiac Risk Index (RCRI):  The patient has the following serious cardiovascular risks for perioperative complications:   - Diabetes Mellitus (on Insulin) = 1 point     RCRI Interpretation: 1 point: Class II (low risk - 0.9% complication rate)           Assessment & Plan   The proposed surgical procedure is considered LOW risk.    ASSESSMENT/ORDERS:    ICD-10-CM    1. Preop general physical exam  Z01.818 EKG 12-lead complete w/read - Clinics     Basic metabolic panel   2. Diabetic retinopathy of left eye associated with type 1 diabetes mellitus, macular edema presence unspecified,  unspecified retinopathy severity (H)  E10.319 Basic metabolic panel   3. Uncontrolled type 1 diabetes mellitus with hyperglycemia (H)  E10.65 Basic metabolic panel   4. Hx of tubal ligation  Z98.51      PLAN:  1.  Discussed her diabetic control.  Sugars are high, but she needs surgery given that the reason she is having surgery is secondary to her retinopthy from poorly controlled diabetes.  She does not get low AM sugars, and therefore to avoid too high of blood sugars when she arrives for her surgery, we will have her take her usual nighttime insulin dose without change.        Risks and Recommendations:  The patient has the following additional risks and recommendations for perioperative complications:  Diabetes:  - Patient is on insulin therapy; diabetic NPO guidelines provided and discussed.    Medication Instructions:   - Long acting insulin (e.g. glargine, detemir): Take 100% of the usual evening or morning dose (10 units at bedtime) before surgery as she has normal fasting AM blood sugars and no recent low blood sugars.  She will hold her usual AM dosing of Lantus.    RECOMMENDATION:  APPROVAL GIVEN to proceed with proposed procedure, without further diagnostic evaluation.    Signed Electronically by: Alvaro Guerrero MD    Copy of this evaluation report is provided to requesting physician.    Preop [facility] Preop Guidelines    Revised Cardiac Risk Index

## 2021-01-15 NOTE — PATIENT INSTRUCTIONS

## 2021-01-18 NOTE — RESULT ENCOUNTER NOTE
Necoe,  Your results show mildly low potassium that should not be an issue for your procedure.  Please let me know if you have any questions.    Sincerely,  Dr. Guerrero

## 2021-02-22 ENCOUNTER — OFFICE VISIT (OUTPATIENT)
Dept: ENDOCRINOLOGY | Facility: CLINIC | Age: 36
End: 2021-02-22
Payer: COMMERCIAL

## 2021-02-22 VITALS
WEIGHT: 154.4 LBS | BODY MASS INDEX: 26.5 KG/M2 | SYSTOLIC BLOOD PRESSURE: 97 MMHG | OXYGEN SATURATION: 98 % | DIASTOLIC BLOOD PRESSURE: 62 MMHG | HEART RATE: 80 BPM

## 2021-02-22 DIAGNOSIS — E10.39 TYPE 1 DIABETES MELLITUS WITH OTHER OPHTHALMIC COMPLICATION (H): ICD-10-CM

## 2021-02-22 LAB — HBA1C MFR BLD: 8.7 % (ref 0–5.6)

## 2021-02-22 PROCEDURE — 99214 OFFICE O/P EST MOD 30 MIN: CPT | Performed by: INTERNAL MEDICINE

## 2021-02-22 PROCEDURE — 83036 HEMOGLOBIN GLYCOSYLATED A1C: CPT | Performed by: INTERNAL MEDICINE

## 2021-02-22 NOTE — LETTER
2/22/2021         RE: Vickie Duque  [address] [facility] [address]        Dear Colleague,    Thank you for referring your patient, Vickie Duque, to the [facility]. Please see a copy of my visit note below.                                              Endocrinology Clinic Visit    Chief Complaint: Diabetes     Information obtained from:Patient    Subjective:         HPI: Vickie Duque is a 35 year old female with history of type 1 diabetes who is here for routine follow up.     Type 1 diabetes since the age of 18.    Current diabetic medications are: Insulin glargine 10 units bedtime and 5 units in am, mealtime insulin 1 units for every 15, 15, 15 g of carbohydrates and a correction dose of insulin 0.5 unit for every 30 above 150.       She has diabetic retinopathy.                               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    No Known Allergies    Current Outpatient Medications   Medication Sig Dispense Refill     ibuprofen (ADVIL/MOTRIN) 600 MG tablet Take 1 tablet (600 mg) by mouth every 8 hours as needed for moderate pain 20 tablet 0     insulin glargine (LANTUS SOLOSTAR) 100 UNIT/ML pen Inject 12 Units Subcutaneous At Bedtime Add 2 units to prime. 15 mL 1     insulin pen needle (BD TARA U/F) 32G X 4 MM miscellaneous Use 3 daily or as directed. 270 each 3     levonorgestrel (MIRENA) 20 MCG/24HR IUD 1 each (20 mcg) by Intrauterine route once       Naproxen Sodium (ALEVE PO) Take by mouth as needed for moderate pain       NOVOLOG FLEXPEN 100 UNIT/ML soln Inject 1 unit for every 15 grams of carbohydrates plus correction dose; uses up to 30 units daily. 30 mL 1     SUMAtriptan (IMITREX) 50 MG tablet Take 1 tablet (50 mg) by mouth at onset of headache for migraine May repeat in 2 hours. Max 4 tablets/24 hours. 18 tablet 4     glucagon 1 MG SOLR injection Inject 1 mg Subcutaneous every 15 minutes as needed for low blood sugar 2 each 0       Review of Systems     per HPI  above      Past Medical History:   Diagnosis Date     Adjustment disorder with anxious mood 11/23/2015     Anxiety 11/27/2015     ASCUS of cervix with negative high risk HPV 06/19/2008     Depressive disorder, not elsewhere classified      Diabetic eye exam (H) 12/19/14     Mixed hyperlipidemia 11/30/2006     Regional enteritis of unspecified site     Ulcerative Colitis     Type I (juvenile type) diabetes mellitus with ketoacidosis, not stated as uncontrolled(250.11) 01/01/2007    Moderately severe intensity.     Type I (juvenile type) diabetes mellitus with ketoacidosis, uncontrolled 02/14/08     Type I (juvenile type) diabetes mellitus without mention of complication, not stated as uncontrolled     diagnosed in 2003     Ulcerative colitis, unspecified     remission. Last flare 6 yrs ago.        Past Surgical History:   Procedure Laterality Date     DILATION AND CURETTAGE SUCTION  4/2010    miscarriage     HC COLONOSCOPY W BIOPSY  08/16/06     HC COLONOSCOPY W/WO BRUSH/WASH       TUBAL LIGATION         Family History   Problem Relation Age of Onset     Hypertension Father      Diabetes Maternal Grandmother      Cancer Maternal Grandmother      Diabetes Paternal Grandfather      Diabetes Maternal Uncle      Diabetes Maternal Aunt        Social History     Socioeconomic History     Marital status: Single     Spouse name: None     Number of children: 1     Years of education: 12     Highest education level: None   Occupational History     Occupation: Nursing assistant       Objective:   BP 97/62 (BP Location: Left arm, Patient Position: Sitting, Cuff Size: Adult Regular)   Pulse 80   Wt 70 kg (154 lb 6.4 oz)   SpO2 98%   BMI 26.50 kg/m    Constitutional: Pleasant no acute cardiopulmonary distress.   Neurological: Alert and oriented.    Psychological: appropriate mood and affect   In House Labs:     TSH   Date Value Ref Range Status   01/04/2021 3.96 0.40 - 4.00 mU/L Final   03/19/2019 2.95 0.40 - 4.00 mU/L Final    10/07/2017 2.57 0.40 - 4.00 mU/L Final   06/26/2017 6.05 (H) 0.40 - 4.00 mU/L Final   03/05/2014 3.97 0.4 - 5.0 mU/L Final     T4 Free   Date Value Ref Range Status   06/26/2017 1.02 0.76 - 1.46 ng/dL Final   03/28/2006 1.10 0.70 - 1.85 ng/dL Final       Creatinine   Date Value Ref Range Status   01/15/2021 0.84 0.52 - 1.04 mg/dL Final     Hemoglobin A1C   Date Value Ref Range Status   02/22/2021 8.7 (A) 0 - 5.6 % Final   01/13/2020 9.5 (A) 0 - 5.6 % Final   10/14/2019 8.3 (A) 0 - 5.6 % Final         Assessment/Treatment Plan:      Type 1 diabetes uncontrolled with pre-proliferative diabetic retinopathy: Diagnosed at the age of 18 and has been on insulin since then.  A1c is higher than the glycemic control indicated on her Dexcom.  Glycemic control on her Dexcom is 7.1 however A1c checked today is 8.7.  Likely glycemic control might of improved over the last 1 month.  She has been having lows overnight therefore we will slightly reduce the basal and change to equal twice daily doses.  I think mealtime needs to be changed to 1 for every 12 g/this has been previously addressed so multiple times  After correction she goes low therefore we are reducing the correction factor today.    Reduce Lantus to 7 units twice daily    Mealtime correction 1 unit for every 12 g of carbohydrates    Correction scale changes to 1 unit for every 65 above 150    She is interested to start using insulin pump therefore referral to CDE-in order to help choosing the right pump and the initial pump teaching instructions.    Lipid panel, microalbuminuria ordered today.  Noted TSH was borderline and we will check it again in a few months.  She is at risk for hypothyroidism given her autoimmune diabetes.  Metabolic panel checked recently and was within the normal limits.      Chronic complications monitoring    Blood pressure well controlled    Check lipid panel    Check microalbuminuria    Following with ophthalmology closely regarding  retinopathy      Patient Instructions   Nghia Johnston 7 units twice daily.      1 unit for every 12 grams of carbohydrates with meals three times per day and snacks.      Please follow the following correction scale three times per day and at bedtime:  -215 = 1 unit  -280 = 2 units.  -345 = 3 units.  -410 = 4 units.  -475 = 5 units.  BG >475 = 6 units.    [facility]  Haydee Rojas RN, Diabetes Educator: [phone]  Maggy Gaona LPN Diabetes Clinic Coordinator: [phone]  Clinic Line:[phone]  Clinic Fax: [phone]  On-Call Endocrine Provider at [facility] (after hours/weekends): [phone] option 4  Scheduling Line: [phone]    Appointment Reminders:  * Please bring meter and/or CGM device with for staff to download  * If you are due ONLY for an A1C, it is scheduled with the nurse and will be done in clinic. You do not need to schedule a lab appointment. Fasting is not required for an A1C.  * Refill request should be submitted to your pharmacy. They will contact clinic for approval.          I will contact the patient with the test results.  Return to clinic in 4 months      Siri Rhoades MD  Staff Endocrinologist    Division of Endocrinology and Diabetes      Again, thank you for allowing me to participate in the care of your patient.        Sincerely,        Siri Rhoades MD

## 2021-02-22 NOTE — PATIENT INSTRUCTIONS
Nghia Johnston 7 units twice daily.      1 unit for every 12 grams of carbohydrates with meals three times per day and snacks.      Please follow the following correction scale three times per day and at bedtime:  -215 = 1 unit  -280 = 2 units.  -345 = 3 units.  -410 = 4 units.  -475 = 5 units.  BG >475 = 6 units.    [facility]  Haydee Rojas RN, Diabetes Educator: [phone]  Maggy Gaona LPN Diabetes Clinic Coordinator: [phone]  Clinic Line:[phone]  Clinic Fax: [phone]  On-Call Endocrine Provider at the [place] (after hours/weekends): [phone] option 4  Scheduling Line: [phone]    Appointment Reminders:  * Please bring meter and/or CGM device with for staff to download  * If you are due ONLY for an A1C, it is scheduled with the nurse and will be done in clinic. You do not need to schedule a lab appointment. Fasting is not required for an A1C.  * Refill request should be submitted to your pharmacy. They will contact clinic for approval.

## 2021-02-22 NOTE — PROGRESS NOTES
Endocrinology Clinic Visit    Chief Complaint: Diabetes     Information obtained from:Patient    Subjective:         HPI: Vickie Duque is a 35 year old female with history of type 1 diabetes who is here for routine follow up.     Type 1 diabetes since the age of 18.    Current diabetic medications are: Insulin glargine 10 units bedtime and 5 units in am, mealtime insulin 1 units for every 15, 15, 15 g of carbohydrates and a correction dose of insulin 0.5 unit for every 30 above 150.       She has diabetic retinopathy.                               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    No Known Allergies    Current Outpatient Medications   Medication Sig Dispense Refill     ibuprofen (ADVIL/MOTRIN) 600 MG tablet Take 1 tablet (600 mg) by mouth every 8 hours as needed for moderate pain 20 tablet 0     insulin glargine (LANTUS SOLOSTAR) 100 UNIT/ML pen Inject 12 Units Subcutaneous At Bedtime Add 2 units to prime. 15 mL 1     insulin pen needle (BD TARA U/F) 32G X 4 MM miscellaneous Use 3 daily or as directed. 270 each 3     levonorgestrel (MIRENA) 20 MCG/24HR IUD 1 each (20 mcg) by Intrauterine route once       Naproxen Sodium (ALEVE PO) Take by mouth as needed for moderate pain       NOVOLOG FLEXPEN 100 UNIT/ML soln Inject 1 unit for every 15 grams of carbohydrates plus correction dose; uses up to 30 units daily. 30 mL 1     SUMAtriptan (IMITREX) 50 MG tablet Take 1 tablet (50 mg) by mouth at onset of headache for migraine May repeat in 2 hours. Max 4 tablets/24 hours. 18 tablet 4     glucagon 1 MG SOLR injection Inject 1 mg Subcutaneous every 15 minutes as needed for low blood sugar 2 each 0       Review of Systems     per HPI above      Past Medical History:   Diagnosis Date     Adjustment disorder with anxious mood 11/23/2015     Anxiety 11/27/2015     ASCUS of cervix with negative high risk HPV 06/19/2008     Depressive disorder, not elsewhere classified      Diabetic  eye exam (H) 12/19/14     Mixed hyperlipidemia 11/30/2006     Regional enteritis of unspecified site     Ulcerative Colitis     Type I (juvenile type) diabetes mellitus with ketoacidosis, not stated as uncontrolled(250.11) 01/01/2007    Moderately severe intensity.     Type I (juvenile type) diabetes mellitus with ketoacidosis, uncontrolled 02/14/08     Type I (juvenile type) diabetes mellitus without mention of complication, not stated as uncontrolled     diagnosed in 2003     Ulcerative colitis, unspecified     remission. Last flare 6 yrs ago.        Past Surgical History:   Procedure Laterality Date     DILATION AND CURETTAGE SUCTION  4/2010    miscarriage     HC COLONOSCOPY W BIOPSY  08/16/06     HC COLONOSCOPY W/WO BRUSH/WASH       TUBAL LIGATION         Family History   Problem Relation Age of Onset     Hypertension Father      Diabetes Maternal Grandmother      Cancer Maternal Grandmother      Diabetes Paternal Grandfather      Diabetes Maternal Uncle      Diabetes Maternal Aunt        Social History     Socioeconomic History     Marital status: Single     Spouse name: None     Number of children: 1     Years of education: 12     Highest education level: None   Occupational History     Occupation: Nursing assistant       Objective:   BP 97/62 (BP Location: Left arm, Patient Position: Sitting, Cuff Size: Adult Regular)   Pulse 80   Wt 70 kg (154 lb 6.4 oz)   SpO2 98%   BMI 26.50 kg/m    Constitutional: Pleasant no acute cardiopulmonary distress.   Neurological: Alert and oriented.    Psychological: appropriate mood and affect   In House Labs:     TSH   Date Value Ref Range Status   01/04/2021 3.96 0.40 - 4.00 mU/L Final   03/19/2019 2.95 0.40 - 4.00 mU/L Final   10/07/2017 2.57 0.40 - 4.00 mU/L Final   06/26/2017 6.05 (H) 0.40 - 4.00 mU/L Final   03/05/2014 3.97 0.4 - 5.0 mU/L Final     T4 Free   Date Value Ref Range Status   06/26/2017 1.02 0.76 - 1.46 ng/dL Final   03/28/2006 1.10 0.70 - 1.85 ng/dL Final        Creatinine   Date Value Ref Range Status   01/15/2021 0.84 0.52 - 1.04 mg/dL Final     Hemoglobin A1C   Date Value Ref Range Status   02/22/2021 8.7 (A) 0 - 5.6 % Final   01/13/2020 9.5 (A) 0 - 5.6 % Final   10/14/2019 8.3 (A) 0 - 5.6 % Final         Assessment/Treatment Plan:      Type 1 diabetes uncontrolled with pre-proliferative diabetic retinopathy: Diagnosed at the age of 18 and has been on insulin since then.  A1c is higher than the glycemic control indicated on her Dexcom.  Glycemic control on her Dexcom is 7.1 however A1c checked today is 8.7.  Likely glycemic control might of improved over the last 1 month.  She has been having lows overnight therefore we will slightly reduce the basal and change to equal twice daily doses.  I think mealtime needs to be changed to 1 for every 12 g/this has been previously addressed so multiple times  After correction she goes low therefore we are reducing the correction factor today.    Reduce Lantus to 7 units twice daily    Mealtime correction 1 unit for every 12 g of carbohydrates    Correction scale changes to 1 unit for every 65 above 150    She is interested to start using insulin pump therefore referral to CDE-in order to help choosing the right pump and the initial pump teaching instructions.    Lipid panel, microalbuminuria ordered today.  Noted TSH was borderline and we will check it again in a few months.  She is at risk for hypothyroidism given her autoimmune diabetes.  Metabolic panel checked recently and was within the normal limits.      Chronic complications monitoring    Blood pressure well controlled    Check lipid panel    Check microalbuminuria    Following with ophthalmology closely regarding retinopathy      Patient Instructions   Necoe,     Lantus 7 units twice daily.      1 unit for every 12 grams of carbohydrates with meals three times per day and snacks.      Please follow the following correction scale three times per day and at  bedtime:  -215 = 1 unit  -280 = 2 units.  -345 = 3 units.  -410 = 4 units.  -475 = 5 units.  BG >475 = 6 units.    [facility]  Haydee Rojas RN, Diabetes Educator: [phone]  Maggy Gaona LPN Diabetes Clinic Coordinator: [phone]  Clinic Line:[phone]  Clinic Fax: [phone]  On-Call Endocrine Provider at [facility] (after hours/weekends): [phone] option 4  Scheduling Line: [phone]    Appointment Reminders:  * Please bring meter and/or CGM device with for staff to download  * If you are due ONLY for an A1C, it is scheduled with the nurse and will be done in clinic. You do not need to schedule a lab appointment. Fasting is not required for an A1C.  * Refill request should be submitted to your pharmacy. They will contact clinic for approval.          I will contact the patient with the test results.  Return to clinic in 4 months      Siri Rhoades MD  Staff Endocrinologist    Division of Endocrinology and Diabetes

## 2021-02-22 NOTE — NURSING NOTE
Vickie Duque's goals for this visit include:   Chief Complaint   Patient presents with     Diabetes     She requests these members of her care team be copied on today's visit information: Yes    PCP: Alvaro Guerrero    Referring Provider:  Alvaro Guerrero MD  [address] [place] [address]    BP 97/62 (BP Location: Left arm, Patient Position: Sitting, Cuff Size: Adult Regular)   Pulse 80   Wt 70 kg (154 lb 6.4 oz)   SpO2 98%   BMI 26.50 kg/m      Do you need any medication refills at today's visit? No

## 2021-02-23 ENCOUNTER — ALLIED HEALTH/NURSE VISIT (OUTPATIENT)
Dept: EDUCATION SERVICES | Facility: CLINIC | Age: 36
End: 2021-02-23
Payer: COMMERCIAL

## 2021-02-23 ENCOUNTER — MEDICAL CORRESPONDENCE (OUTPATIENT)
Dept: HEALTH INFORMATION MANAGEMENT | Facility: CLINIC | Age: 36
End: 2021-02-23

## 2021-02-23 DIAGNOSIS — E11.9 DIABETES MELLITUS WITHOUT COMPLICATION (H): Primary | ICD-10-CM

## 2021-02-23 PROCEDURE — G0108 DIAB MANAGE TRN  PER INDIV: HCPCS

## 2021-02-23 NOTE — PROGRESS NOTES
Diabetes Self Management Training: Individual Review Visit    Vickie Duque presents today for education on insulin pump options, related to Type 1 diabetes.    She is accompanied by self    Patient's diabetes management related comments/concerns: None at this time.     Patient's emotional response to diabetes: expresses readiness to learn    Patient would like this visit to be focused around the following diabetes-related behaviors and goals: Taking Medication    ASSESSMENT:  Patient presents to clinic today to discuss insulin pump options. Diagnosed with Type 1 Diabetes at age 18. Pt's insulin therapy since diagnosis has been MDI. Has used Dexcom Cgms for the past two years as adjunct therapy.     Current Diabetes Management per Patient:  Taking diabetes medications? yes: see below.     Diabetes Medication(s)     Diabetic Other       glucagon 1 MG SOLR injection    Inject 1 mg Subcutaneous every 15 minutes as needed for low blood sugar    Insulin       insulin glargine (LANTUS SOLOSTAR) 100 UNIT/ML pen    Inject 12 Units Subcutaneous At Bedtime Add 2 units to prime.     NOVOLOG FLEXPEN 100 UNIT/ML soln    Inject 1 unit for every 15 grams of carbohydrates plus correction dose; uses up to 30 units daily.        Do you have any difficulty affording your medications or glucose monitoring supplies?  No.    Patient glucose self monitoring as follows: Uses Dexcom Cgms.    BG RESULTS: Please refer to MD visit note from 02/22/21. Dexcom report scanned into chart.      BG values are: Not in goal  Patient's most recent   Lab Results   Component Value Date    A1C 8.7 02/22/2021    is not meeting goal of <7.0    Nutrition:  Not discussed today.     Physical Activity:    Not discussed today.    Relevant co-morbidities and related health problems:  Significant for:  Retinopathy    Diabetes Complications:  Not discussed today.    Recent health service and resource utilization related to diabetes (hyperglycemia, hypoglycemia,  "etc):   None    Vitals:  There were no vitals taken for this visit.  Estimated body mass index is 26.5 kg/m  as calculated from the following:    Height as of 1/15/21: 1.626 m (5' 4\").    Weight as of 2/22/21: 70 kg (154 lb 6.4 oz).   Last 3 BP:   BP Readings from Last 3 Encounters:   02/22/21 97/62   01/04/21 110/60   11/29/20 123/84       History   Smoking Status     Current Every Day Smoker     Packs/day: 0.50     Years: 16.00     Types: Cigarettes   Smokeless Tobacco     Never Used     Comment: working on quiting       Labs:  Lab Results   Component Value Date    A1C 8.7 02/22/2021     Lab Results   Component Value Date    GLC 87 01/15/2021     Lab Results   Component Value Date     08/09/2018     01/29/2016     HDL Cholesterol   Date Value Ref Range Status   01/29/2016 55 >49 mg/dL Final   ]  GFR Estimate   Date Value Ref Range Status   01/15/2021 88 >60 mL/min/[1.73_m2] Final     Comment:     Non  GFR Calc  Starting 12/18/2018, serum creatinine based estimated GFR (eGFR) will be   calculated using the Chronic Kidney Disease Epidemiology Collaboration   (CKD-EPI) equation.       GFR Estimate If Black   Date Value Ref Range Status   01/15/2021 >90 >60 mL/min/[1.73_m2] Final     Comment:      GFR Calc  Starting 12/18/2018, serum creatinine based estimated GFR (eGFR) will be   calculated using the Chronic Kidney Disease Epidemiology Collaboration   (CKD-EPI) equation.       Lab Results   Component Value Date    CR 0.84 01/15/2021       Socio/Economic/Cultural Considerations:    Support system: family    Cultural Influences/Ethnic Background:  American      Health Literacy/Numeracy:  \"With diabetes, it's helpful to use forms and log books to write down blood sugars and what you're eating at times to help understand how foods affect your blood sugars. With this in mind how confident are you at filling out medical forms, such as these, by yourself?  Not Assessed    Health " Beliefs and Attitudes:   Patient Activation Measure Survey Score:  BYRON Score (Last Two) 3/16/2012   BYRON Raw Score 42   Activation Score 66   BYRON Level 3       Barriers to Learning: None.       INTERVENTION:   Education provided today on:  Discussed similariites and differences between the Medtronic, OmniPod and Tandem insulin pumps.    Opportunities for ongoing education and support in diabetes-self management were discussed.    Pt verbalized understanding of concepts discussed and recommendations provided today.       Education Materials Provided:  Pump Brochures    PLAN:  Pt interested in the Tandem TSlim X2 pump with Control IQ. Cde filled out SMN and it was faxed to [employer] today. Pt advised to be expecting a call from [employer] regarding benefits coverage within the next two weeks. Advised Cde will do pump training.       FOLLOW-UP:  Has return appt with Endo in June.     Ongoing plan for CDE education and support: Appt will be made for pump training once pump is shipped to pt's home.     Time Spent:35 minutes  Encounter Type: Individual    Any diabetes medication dose changes were made via the CDE Protocol and Collaborative Practice Agreement with the patient's endocrinology provider. A copy of this encounter was shared with the provider.    Vickie Duque comes into clinic today at the request of Dr Rhoades, Ordering Provider for Pt Teaching .    This service provided today was under the supervising provider of the day Dr Aguilar, who was available if needed.    Haydee Rojas, RN, BSN, CDE   [facility],

## 2021-03-05 ENCOUNTER — TELEPHONE (OUTPATIENT)
Dept: ENDOCRINOLOGY | Facility: CLINIC | Age: 36
End: 2021-03-05

## 2021-03-05 NOTE — TELEPHONE ENCOUNTER
Per request, Charts Notes and Labs faxed to [employer] @ [phone].    Haydee Rojas, RN, BSN, CDE   [facility]

## 2021-03-06 DIAGNOSIS — E10.65 UNCONTROLLED TYPE 1 DIABETES MELLITUS WITH HYPERGLYCEMIA (H): ICD-10-CM

## 2021-03-07 RX ORDER — PEN NEEDLE, DIABETIC 32GX 5/32"
NEEDLE, DISPOSABLE MISCELLANEOUS
Qty: 270 EACH | Refills: 3 | Status: SHIPPED | OUTPATIENT
Start: 2021-03-07

## 2021-03-08 ENCOUNTER — MEDICAL CORRESPONDENCE (OUTPATIENT)
Dept: HEALTH INFORMATION MANAGEMENT | Facility: CLINIC | Age: 36
End: 2021-03-08

## 2021-03-08 NOTE — TELEPHONE ENCOUNTER
DIOMEDESN completed and faxed with chart notes to [place] [phone] for Tandem/Dexcom supplies.    Maggy Gaona LPN  Adult Endocrinology   [facility]

## 2021-03-10 ENCOUNTER — HOSPITAL ENCOUNTER (OUTPATIENT)
Dept: ULTRASOUND IMAGING | Facility: CLINIC | Age: 36
Discharge: HOME | End: 2021-03-10
Attending: OBSTETRICS & GYNECOLOGY | Admitting: OBSTETRICS & GYNECOLOGY
Payer: COMMERCIAL

## 2021-03-10 DIAGNOSIS — N83.202 LEFT OVARIAN CYST: ICD-10-CM

## 2021-03-10 PROCEDURE — 76830 TRANSVAGINAL US NON-OB: CPT

## 2021-03-11 DIAGNOSIS — N83.202 LEFT OVARIAN CYST: Primary | ICD-10-CM

## 2021-03-25 ENCOUNTER — TELEPHONE (OUTPATIENT)
Dept: EDUCATION SERVICES | Facility: CLINIC | Age: 36
End: 2021-03-25

## 2021-03-25 NOTE — TELEPHONE ENCOUNTER
Health Call Center    Phone Message    May a detailed message be left on voicemail: yes     Reason for Call: Patient would like to reschedule her appointment on 3/26. Patient stated that Haydee likes to schedule these herself, thank you!

## 2021-03-26 NOTE — TELEPHONE ENCOUNTER
Cde left  for pt asking her to call [phone] to r/s pump start appt. Advised will cancel appt for today.     Haydee Rojas RN, BSN, CDE   [facility]

## 2021-04-09 ENCOUNTER — ALLIED HEALTH/NURSE VISIT (OUTPATIENT)
Dept: EDUCATION SERVICES | Facility: CLINIC | Age: 36
End: 2021-04-09
Payer: COMMERCIAL

## 2021-04-09 DIAGNOSIS — E11.9 DIABETES MELLITUS WITHOUT COMPLICATION (H): Primary | ICD-10-CM

## 2021-04-09 PROCEDURE — G0108 DIAB MANAGE TRN  PER INDIV: HCPCS

## 2021-04-16 NOTE — PROGRESS NOTES
Diabetes Self Management Training: Individual Review Visit    Vickie Duque presents today for initiation of the Tandem T-Slim X2 insulin pump, related to Type 1 diabetes. Pt diagnosed at age 18.     She is accompanied by self    Patient's diabetes management related comments/concerns: None at this time.     Patient's emotional response to diabetes: expresses readiness to learn    Patient would like this visit to be focused around the following diabetes-related behaviors and goals: Taking Medication    ASSESSMENT:  Patient presents to clinic today for insulin pump start. Pt has been using the Dexcom G6 sensor which will be synced with pump today. "[employer] albertina will be downloaded on phone so pt will be able to share data with clinic.     Current Diabetes Management per Patient:  Taking diabetes medications? yes: see below   Diabetes Medication(s)     Diabetic Other       glucagon 1 MG SOLR injection    Inject 1 mg Subcutaneous every 15 minutes as needed for low blood sugar    Insulin       insulin glargine (LANTUS SOLOSTAR) 100 UNIT/ML pen    Inject 12 Units Subcutaneous At Bedtime Add 2 units to prime.     NOVOLOG FLEXPEN 100 UNIT/ML soln    Inject 1 unit for every 15 grams of carbohydrates plus correction dose; uses up to 30 units daily.        Do you have any difficulty affording your medications or glucose monitoring supplies? No.    Patient glucose self monitoring as follows: Uses Dexcom. Bg readings not reviewed today.     BG values are: Not in goal  Patient's most recent   Lab Results   Component Value Date    A1C 8.7 02/22/2021    is not meeting goal of <7.0    Nutrition:  Not discussed today.    Physical Activity:    Not discussed today.     Relevant co-morbidities and related health problems:  Significant for: None.    Diabetes Complications:  Not discussed today.    Recent health service and resource utilization related to diabetes (hyperglycemia, hypoglycemia, etc):   None    Vitals:  There were no vitals  "taken for this visit.  Estimated body mass index is 26.5 kg/m  as calculated from the following:    Height as of 1/15/21: 1.626 m (5' 4\").    Weight as of 2/22/21: 70 kg (154 lb 6.4 oz).   Last 3 BP:   BP Readings from Last 3 Encounters:   02/22/21 97/62   01/04/21 110/60   11/29/20 123/84       History   Smoking Status     Current Every Day Smoker     Packs/day: 0.50     Years: 16.00     Types: Cigarettes   Smokeless Tobacco     Never Used     Comment: working on quiting       Labs:  Lab Results   Component Value Date    A1C 8.7 02/22/2021     Lab Results   Component Value Date    GLC 87 01/15/2021     Lab Results   Component Value Date     08/09/2018     01/29/2016     HDL Cholesterol   Date Value Ref Range Status   01/29/2016 55 >49 mg/dL Final   ]  GFR Estimate   Date Value Ref Range Status   01/15/2021 88 >60 mL/min/[1.73_m2] Final     Comment:     Non  GFR Calc  Starting 12/18/2018, serum creatinine based estimated GFR (eGFR) will be   calculated using the Chronic Kidney Disease Epidemiology Collaboration   (CKD-EPI) equation.       GFR Estimate If Black   Date Value Ref Range Status   01/15/2021 >90 >60 mL/min/[1.73_m2] Final     Comment:      GFR Calc  Starting 12/18/2018, serum creatinine based estimated GFR (eGFR) will be   calculated using the Chronic Kidney Disease Epidemiology Collaboration   (CKD-EPI) equation.       Lab Results   Component Value Date    CR 0.84 01/15/2021       Socio/Economic/Cultural Considerations:    Support system: not assessed    Cultural Influences/Ethnic Background:  American      Health Literacy/Numeracy:  \"With diabetes, it's helpful to use forms and log books to write down blood sugars and what you're eating at times to help understand how foods affect your blood sugars. With this in mind how confident are you at filling out medical forms, such as these, by yourself?  Not Assessed    Health Beliefs and Attitudes:   Patient " "Activation Measure Survey Score:  BYRON Score (Last Two) 3/16/2012   BYRON Raw Score 42   Activation Score 66   BYRON Level 3     Barriers to Learning: None.     INTERVENTION:   Education provided today on:  Insulin Pump Use - Tandem T-Slim X2    Infusion Set: AutoSoft 90,  23\" tubing, 9mm needle.     Last basal insulin injection: This morning took 14 units Lantus  Last bolus insulin injection: 2:30pm, took 5 units for lunch.   Patient instructed on basic insulin pump topics and insulin pump programming.     With guidance from Cde, pt programmed pump, filled cartridge with insulin, loaded cartridge on to pump, filled tubing, inserted infusion set into abdomen without difficulty, filled cannula and started insulin infusion without difficulty. Dexcom G6 sensor was synced with pump.     PUMP SETTINGS:    BASAL: 12am-12am: 0.45u/hr  BOLUS: 12am-12am: 1u:12g carbs  CF: 12am-12am: 60  TARGET: 12am-12am: 110. Control IQ - On    CGM:  Hi Alert: 300  Lo Alert: 70    Problem Solving: High blood sugars and Dka prevention     FOLLOW-UP:  Post-pump start follow-up appointment scheduled on: 05/07/21. Pt advised to call Cde if issues of concern arise.     Time Spent: 105 minutes  Visit Type: Individual    Any diabetes medication dose changes were made via the CDE Protocol and Collaborative Practice Agreement with the patient's endocrinology provider. A copy of this encounter was shared with the provider.    Vickie Duque comes into clinic today at the request of Dr Rhoades, Ordering Provider for Pt Teaching and insulin pump inititation.     This service provided today was under the supervising provider of the day Dr Wiley, who was available if needed.    Haydee Rojas, RN, BSN, CDE   [facility]  "

## 2021-05-05 ENCOUNTER — TELEPHONE (OUTPATIENT)
Dept: ENDOCRINOLOGY | Facility: CLINIC | Age: 36
End: 2021-05-05

## 2021-05-05 DIAGNOSIS — E10.9 TYPE 1 DIABETES MELLITUS WITHOUT COMPLICATION (H): Primary | ICD-10-CM

## 2021-05-05 NOTE — TELEPHONE ENCOUNTER
Need refill of Dexcom G6 sensors, didn't see on med list  Thank you!  Mayra Aguilar Derrell  [facility]

## 2021-05-06 RX ORDER — PROCHLORPERAZINE 25 MG/1
SUPPOSITORY RECTAL
Qty: 9 EACH | Refills: 3 | Status: SHIPPED | OUTPATIENT
Start: 2021-05-06 | End: 2021-05-10

## 2021-05-06 RX ORDER — PROCHLORPERAZINE 25 MG/1
SUPPOSITORY RECTAL
Qty: 1 EACH | Refills: 3 | Status: SHIPPED | OUTPATIENT
Start: 2021-05-06 | End: 2021-05-10

## 2021-05-10 DIAGNOSIS — E10.9 TYPE 1 DIABETES MELLITUS WITHOUT COMPLICATION (H): ICD-10-CM

## 2021-05-10 RX ORDER — PROCHLORPERAZINE 25 MG/1
SUPPOSITORY RECTAL
Qty: 9 EACH | Refills: 3 | Status: SHIPPED | OUTPATIENT
Start: 2021-05-10 | End: 2022-06-13

## 2021-05-10 RX ORDER — PROCHLORPERAZINE 25 MG/1
SUPPOSITORY RECTAL
Qty: 1 EACH | Refills: 3 | Status: SHIPPED | OUTPATIENT
Start: 2021-05-10 | End: 2022-06-13

## 2021-05-10 NOTE — TELEPHONE ENCOUNTER
[facility] Call Center    Phone Message    May a detailed message be left on voicemail: yes     Reason for Call: Medication Question or concern regarding medication   Prescription Clarification  Name of Medication: Dexcom G6 sensors  Continuous Blood Gluc Transmit (DEXCOM G6 TRANSMITTER) MISC  Prescribing Provider: [place]   Pharmacy:    [facility] - [place], [place] - [address]   What on the order needs clarification? The Dexcom G6 sensors and transmitter were sent to the wrong pharmacy. They were supposed to be sent to [facility]. Please send ASAP. Thank you.    Action Taken: Message routed to:  Adult Clinics: Endocrinology p [id number]    Travel Screening: Not Applicable

## 2021-05-11 DIAGNOSIS — E10.39 TYPE 1 DIABETES MELLITUS WITH OTHER OPHTHALMIC COMPLICATION (H): ICD-10-CM

## 2021-05-11 RX ORDER — INSULIN ASPART 100 [IU]/ML
INJECTION, SOLUTION INTRAVENOUS; SUBCUTANEOUS
Qty: 30 ML | Refills: 1 | Status: SHIPPED | OUTPATIENT
Start: 2021-05-11 | End: 2021-07-01

## 2021-05-11 NOTE — TELEPHONE ENCOUNTER
NOVOLOG FLEXPEN 100 UNIT/ML soln  Last Written Prescription Date:  11/23/20  Last Fill Quantity: 30ml,   # refills: 1  Last Office Visit : 2/22/21   MG  Future Office visit:  6/28/21      Routing refill request to provider for review/approval because: endo triage to fill

## 2021-05-24 ENCOUNTER — VIRTUAL VISIT (OUTPATIENT)
Dept: FAMILY MEDICINE | Facility: CLINIC | Age: 36
End: 2021-05-24
Payer: COMMERCIAL

## 2021-05-24 DIAGNOSIS — F33.1 MAJOR DEPRESSIVE DISORDER, RECURRENT EPISODE, MODERATE (H): ICD-10-CM

## 2021-05-24 DIAGNOSIS — F43.22 ADJUSTMENT DISORDER WITH ANXIOUS MOOD: Primary | ICD-10-CM

## 2021-05-24 PROCEDURE — 99213 OFFICE O/P EST LOW 20 MIN: CPT | Mod: 95 | Performed by: FAMILY MEDICINE

## 2021-05-24 ASSESSMENT — PATIENT HEALTH QUESTIONNAIRE - PHQ9: SUM OF ALL RESPONSES TO PHQ QUESTIONS 1-9: 15

## 2021-05-24 NOTE — PROGRESS NOTES
"Vickie is a 35 year old who is being evaluated via a billable telephone visit.      What phone number would you like to be contacted at? [phone]  How would you like to obtain your AVS? Ho    Assessment & Plan     ASSESSMENT/ORDERS:    ICD-10-CM    1. Adjustment disorder with anxious mood  F43.22    2. Major depressive disorder, recurrent episode, moderate (H)  F33.1      PLAN:  1.  Patient having relationship stressors with an untrustworthy partner.  She feels this is driving her depression/anxiety symptoms.  We will have her speak with Ирина Ledesma, behavioral health clinician for further cognitive behavioral therapy related treatment.  Could consider medication if therapy can be improved with it.                Tobacco Cessation:   reports that she has been smoking cigarettes. She has a 8.00 pack-year smoking history. She has never used smokeless tobacco.      BMI:   Estimated body mass index is 26.5 kg/m  as calculated from the following:    Height as of 1/15/21: 1.626 m (5' 4\").    Weight as of 2/22/21: 70 kg (154 lb 6.4 oz).           No follow-ups on file.    Alvaro Guerrero MD  [facility]    Subjective   Vickie is a 35 year old who presents for the following health issues     HPI     Abnormal Mood Symptoms  Onset/Duration: sept of 2020  Description: deprssion  Depression (if yes, do PHQ-9): YES  Anxiety (if yes, do ALYX-7): no  Accompanying Signs & Symptoms:  Still participating in activities that you used to enjoy: YES  Fatigue: YES  Irritability: YES  Difficulty concentrating: YES  Changes in appetite: YES  Problems with sleep: YES  Heart racing/beating fast: no  Abnormally elevated, expansive, or irritable mood: no  Persistently increased activity or energy: no  Thoughts of hurting yourself or others: no  History:  Recent stress or major life event: YES last sept  Prior depression or anxiety: yes   Family history of depression or anxiety: YES  Alcohol/drug use: YES;  " Is not using marijuana since going back to work 2 months ago.  Less alcohol use over past few weeks.  Max consistent use was 2-3/day for 1.5 months but did have some high use days here and there.    Difficulty sleeping: YES  Precipitating or alleviating factors: current relationship issues with a partner that isn't be honest with the patient.    Therapies tried and outcome: none  PHQ 10/2/2019 11/23/2020 5/24/2021   PHQ-9 Total Score 0 1 15   Q9: Thoughts of better off dead/self-harm past 2 weeks Not at all Not at all Not at all     ALYX-7 SCORE 9/26/2016 12/20/2018   Total Score 16 9       Patient is having increased anxiety/depression since finding out that her boyfriend is having texting relationship with another female and she found inappropriate pictures of her on his phone.  She is frustrated as he is not being truthful with her about their origin.      She lost her job earlier in the year.  Was using marijuana as a result to help her with the anxiety.  Stopped with new job 2 months ago due to random UAs and that the marijuana wasn't helpful anyway.  Was drinking alcohol regularly when she started working but also has stopped this now.      She has been on depression medication in the past but does not feel this is the correct route at this time for management as she attributes her relationship stress as the problem.    Review of Systems         Objective           Vitals:  No vitals were obtained today due to virtual visit.    Physical Exam   healthy, alert and no distress  PSYCH: Alert and oriented times 3; coherent speech, normal   rate and volume, able to articulate logical thoughts, able   to abstract reason, no tangential thoughts, no hallucinations   or delusions  Her affect is anxious and sad  RESP: No cough, no audible wheezing, able to talk in full sentences  Remainder of exam unable to be completed due to telephone visits                Phone call duration: 22 minutes

## 2021-05-26 ENCOUNTER — TELEPHONE (OUTPATIENT)
Dept: BEHAVIORAL HEALTH | Facility: CLINIC | Age: 36
End: 2021-05-26

## 2021-05-26 NOTE — TELEPHONE ENCOUNTER
Phone Encounter   [facility] made attempt to reach patient, by PCP request. LM requesting a returned call and provided call back number. Informed that this writer will also send a [employer] message.    JENNIFER Kingston, Behavioral Health Clinician

## 2021-06-04 ENCOUNTER — OFFICE VISIT (OUTPATIENT)
Dept: FAMILY MEDICINE | Facility: CLINIC | Age: 36
End: 2021-06-04
Payer: COMMERCIAL

## 2021-06-04 VITALS
BODY MASS INDEX: 25.95 KG/M2 | TEMPERATURE: 97.4 F | OXYGEN SATURATION: 100 % | WEIGHT: 152 LBS | HEIGHT: 64 IN | HEART RATE: 94 BPM | SYSTOLIC BLOOD PRESSURE: 98 MMHG | DIASTOLIC BLOOD PRESSURE: 70 MMHG

## 2021-06-04 DIAGNOSIS — K51.90 ULCERATIVE COLITIS WITHOUT COMPLICATIONS, UNSPECIFIED LOCATION (H): ICD-10-CM

## 2021-06-04 DIAGNOSIS — Z72.0 TOBACCO ABUSE: ICD-10-CM

## 2021-06-04 DIAGNOSIS — E10.65 TYPE 1 DIABETES MELLITUS WITH HYPERGLYCEMIA (H): ICD-10-CM

## 2021-06-04 DIAGNOSIS — E10.319 DIABETIC RETINOPATHY OF LEFT EYE ASSOCIATED WITH TYPE 1 DIABETES MELLITUS, MACULAR EDEMA PRESENCE UNSPECIFIED, UNSPECIFIED RETINOPATHY SEVERITY (H): ICD-10-CM

## 2021-06-04 DIAGNOSIS — Z01.818 PREOP GENERAL PHYSICAL EXAM: Primary | ICD-10-CM

## 2021-06-04 LAB
ALBUMIN SERPL-MCNC: 3.8 G/DL (ref 3.4–5)
ALP SERPL-CCNC: 89 U/L (ref 40–150)
ALT SERPL W P-5'-P-CCNC: 18 U/L (ref 0–50)
ANION GAP SERPL CALCULATED.3IONS-SCNC: 2 MMOL/L (ref 3–14)
AST SERPL W P-5'-P-CCNC: 10 U/L (ref 0–45)
BILIRUB SERPL-MCNC: 0.4 MG/DL (ref 0.2–1.3)
BUN SERPL-MCNC: 12 MG/DL (ref 7–30)
CALCIUM SERPL-MCNC: 8.9 MG/DL (ref 8.5–10.1)
CHLORIDE SERPL-SCNC: 105 MMOL/L (ref 94–109)
CHOLEST SERPL-MCNC: 180 MG/DL
CO2 SERPL-SCNC: 32 MMOL/L (ref 20–32)
CREAT SERPL-MCNC: 0.94 MG/DL (ref 0.52–1.04)
CREAT UR-MCNC: 336 MG/DL
ERYTHROCYTE [DISTWIDTH] IN BLOOD BY AUTOMATED COUNT: 14.5 % (ref 10–15)
GFR SERPL CREATININE-BSD FRML MDRD: 78 ML/MIN/{1.73_M2}
GLUCOSE SERPL-MCNC: 131 MG/DL (ref 70–99)
HBA1C MFR BLD: 9.1 % (ref 0–5.6)
HCT VFR BLD AUTO: 39.8 % (ref 35–47)
HDLC SERPL-MCNC: 41 MG/DL
HGB BLD-MCNC: 12.9 G/DL (ref 11.7–15.7)
LABORATORY COMMENT REPORT: NORMAL
LDLC SERPL CALC-MCNC: 112 MG/DL
MCH RBC QN AUTO: 26.8 PG (ref 26.5–33)
MCHC RBC AUTO-ENTMCNC: 32.4 G/DL (ref 31.5–36.5)
MCV RBC AUTO: 83 FL (ref 78–100)
MICROALBUMIN UR-MCNC: 27 MG/L
MICROALBUMIN/CREAT UR: 7.92 MG/G CR (ref 0–25)
NONHDLC SERPL-MCNC: 139 MG/DL
PLATELET # BLD AUTO: 216 10E9/L (ref 150–450)
POTASSIUM SERPL-SCNC: 3.7 MMOL/L (ref 3.4–5.3)
PROT SERPL-MCNC: 7.2 G/DL (ref 6.8–8.8)
RBC # BLD AUTO: 4.82 10E12/L (ref 3.8–5.2)
SARS-COV-2 RNA RESP QL NAA+PROBE: NEGATIVE
SARS-COV-2 RNA RESP QL NAA+PROBE: NORMAL
SODIUM SERPL-SCNC: 139 MMOL/L (ref 133–144)
SPECIMEN SOURCE: NORMAL
SPECIMEN SOURCE: NORMAL
TRIGL SERPL-MCNC: 134 MG/DL
WBC # BLD AUTO: 10.9 10E9/L (ref 4–11)

## 2021-06-04 PROCEDURE — U0005 INFEC AGEN DETEC AMPLI PROBE: HCPCS | Mod: 59 | Performed by: PHYSICIAN ASSISTANT

## 2021-06-04 PROCEDURE — U0003 INFECTIOUS AGENT DETECTION BY NUCLEIC ACID (DNA OR RNA); SEVERE ACUTE RESPIRATORY SYNDROME CORONAVIRUS 2 (SARS-COV-2) (CORONAVIRUS DISEASE [COVID-19]), AMPLIFIED PROBE TECHNIQUE, MAKING USE OF HIGH THROUGHPUT TECHNOLOGIES AS DESCRIBED BY CMS-2020-01-R: HCPCS | Performed by: PHYSICIAN ASSISTANT

## 2021-06-04 PROCEDURE — 99214 OFFICE O/P EST MOD 30 MIN: CPT | Performed by: PHYSICIAN ASSISTANT

## 2021-06-04 PROCEDURE — 80061 LIPID PANEL: CPT | Performed by: PHYSICIAN ASSISTANT

## 2021-06-04 PROCEDURE — 85027 COMPLETE CBC AUTOMATED: CPT | Performed by: PHYSICIAN ASSISTANT

## 2021-06-04 PROCEDURE — 82043 UR ALBUMIN QUANTITATIVE: CPT | Performed by: PHYSICIAN ASSISTANT

## 2021-06-04 PROCEDURE — 36415 COLL VENOUS BLD VENIPUNCTURE: CPT | Performed by: PHYSICIAN ASSISTANT

## 2021-06-04 PROCEDURE — 80053 COMPREHEN METABOLIC PANEL: CPT | Performed by: PHYSICIAN ASSISTANT

## 2021-06-04 PROCEDURE — 83036 HEMOGLOBIN GLYCOSYLATED A1C: CPT | Performed by: PHYSICIAN ASSISTANT

## 2021-06-04 ASSESSMENT — MIFFLIN-ST. JEOR: SCORE: 1359.47

## 2021-06-04 NOTE — PROGRESS NOTES
[address] [address]  Phone: [phone]  Fax: [phone]  Primary Provider: Alvaro Guerrero  Pre-op Performing Provider: BERNABE CLAROS    PREOPERATIVE EVALUATION:  Today's date: 6/4/2021    Vickie Duque is a 35 year old female who presents for a preoperative evaluation.    Surgical Information:  Surgery/Procedure: left eye  Surgery Location: [facility] [place]  Surgeon: dr santamaria  Surgery Date: 6/8/21  Time of Surgery: 8:15  Where patient plans to recover: At home with family  Fax number for surgical facility: [phone]    Type of Anesthesia Anticipated: to be determined    Subjective     HPI related to upcoming procedure: complications affecting left eye related to diabetic retinopathy      Preop Questions 6/4/2021   1. Have you ever had a heart attack or stroke? No   2. Have you ever had surgery on your heart or blood vessels, such as a stent placement, a coronary artery bypass, or surgery on an artery in your head, neck, heart, or legs? No   3. Do you have chest pain with activity? No   4. Do you have a history of  heart failure? No   5. Do you currently have a cold, bronchitis or symptoms of other infection? No   6. Do you have a cough, shortness of breath, or wheezing? No   7. Do you or anyone in your family have previous history of blood clots? No   8. Do you or does anyone in your family have a serious bleeding problem such as prolonged bleeding following surgeries or cuts? No   9. Have you ever had problems with anemia or been told to take iron pills? YES - with UC when she was young   10. Have you had any abnormal blood loss such as black, tarry or bloody stools, or abnormal vaginal bleeding? No   11. Have you ever had a blood transfusion? No   12. Are you willing to have a blood transfusion if it is medically needed before, during, or after your surgery? Yes   13. Have you or any of your relatives ever had problems  with anesthesia? No   14. Do you have sleep apnea, excessive snoring or daytime drowsiness? YES - tired during the day   14a. Do you have a CPAP machine? No   15. Do you have any artifical heart valves or other implanted medical devices like a pacemaker, defibrillator, or continuous glucose monitor? YES - see below   15a. What type of device do you have? dexcom g6 tandem t slim 2   15b. Name of the clinic that manages your device:  [facility]   16. Do you have artificial joints? No   17. Are you allergic to latex? YES: sensitive   18. Is there any chance that you may be pregnant? No     Health Care Directive:  Patient does not have a Health Care Directive or Living Will: Discussed advance care planning with patient; however, patient declined at this time.    Preoperative Review of :   reviewed - no record of controlled substances prescribed.    Status of Chronic Conditions:  See problem list for active medical problems.  Problems all longstanding and stable, except as noted/documented.  See ROS for pertinent symptoms related to these conditions.    DIABETES - Patient has a longstanding history of DiabetesType Type I . Patient is being treated with insulin injections and insulin pump and denies significant side effects. Control has been fair. Complicating factors include but are not limited to: retinopathy, neuropathy and tobacco use.       Review of Systems  CONSTITUTIONAL: NEGATIVE for fever, chills, change in weight  INTEGUMENTARY/SKIN: NEGATIVE for worrisome rashes, moles or lesions  EYES: NEGATIVE for vision changes or irritation  ENT/MOUTH: NEGATIVE for ear, mouth and throat problems  RESP: NEGATIVE for significant cough or SOB  BREAST: NEGATIVE for masses, tenderness or discharge  CV: NEGATIVE for chest pain, palpitations or peripheral edema  GI: NEGATIVE for nausea, abdominal pain, heartburn, or change in bowel habits  : NEGATIVE for frequency, dysuria, or hematuria  MUSCULOSKELETAL: NEGATIVE  for significant arthralgias or myalgia  NEURO: NEGATIVE for weakness, dizziness or paresthesias  ENDOCRINE: NEGATIVE for temperature intolerance, skin/hair changes  HEME: NEGATIVE for bleeding problems  PSYCHIATRIC: NEGATIVE for changes in mood or affect    Patient Active Problem List    Diagnosis Date Noted     Hx of tubal ligation 01/15/2021     Priority: Medium     Diabetic retinopathy of left eye associated with type 1 diabetes mellitus, macular edema presence unspecified, unspecified retinopathy severity (H) 11/23/2020     Priority: Medium     Hyperglycemia 10/08/2017     Priority: Medium     Major depressive disorder, recurrent episode, moderate (H) 10/04/2016     Priority: Medium     Ulcerative colitis without complications (H) 01/29/2016     Priority: Medium     Anxiety 11/27/2015     Priority: Medium     Noncompliance with medication regimen 11/03/2015     Priority: Medium     Uncontrolled type 1 diabetes mellitus (H) 05/30/2014     Priority: Medium     Problem list name updated by automated process. 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 EMB normal. Plan: cotest in 3 years.       Sprain of back 03/10/2008     Priority: Medium     Problem list name updated by automated process. Provider to review        Past Medical History:   Diagnosis Date     Adjustment disorder with anxious mood 11/23/2015     Anxiety 11/27/2015     ASCUS of cervix with negative high risk HPV  06/19/2008     Depressive disorder, not elsewhere classified      Diabetic eye exam (H) 12/19/14     Mixed hyperlipidemia 11/30/2006     Regional enteritis of unspecified site     Ulcerative Colitis     Type I (juvenile type) diabetes mellitus with ketoacidosis, not stated as uncontrolled(250.11) 01/01/2007    Moderately severe intensity.     Type I (juvenile type) diabetes mellitus with ketoacidosis, uncontrolled 02/14/08     Type I (juvenile type) diabetes mellitus without mention of complication, not stated as uncontrolled     diagnosed in 2003     Ulcerative colitis, unspecified     remission. Last flare 6 yrs ago.      Past Surgical History:   Procedure Laterality Date     DILATION AND CURETTAGE SUCTION  4/2010    miscarriage     HC COLONOSCOPY W BIOPSY  08/16/06     HC COLONOSCOPY W/WO BRUSH/WASH       TUBAL LIGATION       Current Outpatient Medications   Medication Sig Dispense Refill     Continuous Blood Gluc Sensor (DEXCOM G6 SENSOR) MISC Change every 10 days. 9 each 3     Continuous Blood Gluc Transmit (DEXCOM G6 TRANSMITTER) MISC Change every 90 days. 1 each 3     levonorgestrel (MIRENA) 20 MCG/24HR IUD 1 each (20 mcg) by Intrauterine route once       Naproxen Sodium (ALEVE PO) Take by mouth as needed for moderate pain       NOVOLOG FLEXPEN 100 UNIT/ML soln Inject 1 unit for every 15 grams of carbohydrates plus correction dose; uses up to 45 units daily. 30 mL 1     SUMAtriptan (IMITREX) 50 MG tablet Take 1 tablet (50 mg) by mouth at onset of headache for migraine May repeat in 2 hours. Max 4 tablets/24 hours. 18 tablet 4     glucagon 1 MG SOLR injection Inject 1 mg Subcutaneous every 15 minutes as needed for low blood sugar (Patient not taking: Reported on 6/4/2021) 2 each 0     ibuprofen (ADVIL/MOTRIN) 600 MG tablet Take 1 tablet (600 mg) by mouth every 8 hours as needed for moderate pain (Patient not taking: Reported on 5/24/2021) 20 tablet 0     insulin glargine (LANTUS SOLOSTAR) 100 UNIT/ML pen  "Inject 12 Units Subcutaneous At Bedtime Add 2 units to prime. (Patient not taking: Reported on 6/4/2021) 15 mL 1     insulin pen needle (BD TARA U/F) 32G X 4 MM miscellaneous USE 3 DAILY OR AS DIRECTED. 270 each 3       No Known Allergies     Social History     Tobacco Use     Smoking status: Current Every Day Smoker     Packs/day: 0.50     Years: 16.00     Pack years: 8.00     Types: Cigarettes     Smokeless tobacco: Never Used     Tobacco comment: working on quiting   Substance Use Topics     Alcohol use: Yes     Alcohol/week: 0.0 standard drinks     Comment: occassionally     Family History   Problem Relation Age of Onset     Hypertension Father      Diabetes Maternal Grandmother      Cancer Maternal Grandmother      Diabetes Paternal Grandfather      Diabetes Maternal Uncle      Diabetes Maternal Aunt      History   Drug Use No         Objective     Pulse 94   Temp 97.4  F (36.3  C) (Temporal)   Ht 1.626 m (5' 4\")   Wt 68.9 kg (152 lb)   LMP  (LMP Unknown)   BMI 26.09 kg/m      Physical Exam    GENERAL APPEARANCE: healthy, alert and no distress     EYES: EOMI, PERRL     HENT: ear canals and TM's normal and nose and mouth without ulcers or lesions     NECK: no adenopathy, no asymmetry, masses, or scars and thyroid normal to palpation     RESP: lungs clear to auscultation - no rales, rhonchi or wheezes     CV: regular rates and rhythm, normal S1 S2, no S3 or S4 and no murmur, click or rub     ABDOMEN:  soft, nontender, no HSM or masses and bowel sounds normal     MS: extremities normal- no gross deformities noted, no evidence of inflammation in joints, FROM in all extremities.     SKIN: no suspicious lesions or rashes     NEURO: Normal strength and tone, sensory exam grossly normal, mentation intact and speech normal     PSYCH: mentation appears normal. and affect normal/bright     LYMPHATICS: No cervical adenopathy    Recent Labs   Lab Test 02/22/21 01/15/21  1213 01/04/21  1137 05/12/20  1200 01/13/20   HGB "  --   --  12.4 12.4  --    PLT  --   --  197 190  --    NA  --  140  --  137  --    POTASSIUM  --  3.3*  --  4.0  --    CR  --  0.84  --  0.93  --    A1C 8.7*  --   --   --  9.5*        Diagnostics:  Recent Results (from the past 24 hour(s))   CBC with platelets    Collection Time: 06/04/21  8:37 AM   Result Value Ref Range    WBC 10.9 4.0 - 11.0 10e9/L    RBC Count 4.82 3.8 - 5.2 10e12/L    Hemoglobin 12.9 11.7 - 15.7 g/dL    Hematocrit 39.8 35.0 - 47.0 %    MCV 83 78 - 100 fl    MCH 26.8 26.5 - 33.0 pg    MCHC 32.4 31.5 - 36.5 g/dL    RDW 14.5 10.0 - 15.0 %    Platelet Count 216 150 - 450 10e9/L   Hemoglobin A1c    Collection Time: 06/04/21  8:37 AM   Result Value Ref Range    Hemoglobin A1C 9.1 (H) 0 - 5.6 %   Comprehensive metabolic panel (BMP + Alb, Alk Phos, ALT, AST, Total. Bili, TP)    Collection Time: 06/04/21  8:37 AM   Result Value Ref Range    Sodium 139 133 - 144 mmol/L    Potassium 3.7 3.4 - 5.3 mmol/L    Chloride 105 94 - 109 mmol/L    Carbon Dioxide 32 20 - 32 mmol/L    Anion Gap 2 (L) 3 - 14 mmol/L    Glucose 131 (H) 70 - 99 mg/dL    Urea Nitrogen 12 7 - 30 mg/dL    Creatinine 0.94 0.52 - 1.04 mg/dL    GFR Estimate 78 >60 mL/min/[1.73_m2]    GFR Estimate If Black 90 >60 mL/min/[1.73_m2]    Calcium 8.9 8.5 - 10.1 mg/dL    Bilirubin Total 0.4 0.2 - 1.3 mg/dL    Albumin 3.8 3.4 - 5.0 g/dL    Protein Total 7.2 6.8 - 8.8 g/dL    Alkaline Phosphatase 89 40 - 150 U/L    ALT 18 0 - 50 U/L    AST 10 0 - 45 U/L   Lipid panel reflex to direct LDL Fasting    Collection Time: 06/04/21  8:37 AM   Result Value Ref Range    Cholesterol 180 <200 mg/dL    Triglycerides 134 <150 mg/dL    HDL Cholesterol 41 (L) >49 mg/dL    LDL Cholesterol Calculated 112 (H) <100 mg/dL    Non HDL Cholesterol 139 (H) <130 mg/dL   Albumin Random Urine Quantitative with Creat Ratio    Collection Time: 06/04/21  8:41 AM   Result Value Ref Range    Creatinine Urine PENDING mg/dL    Albumin Urine mg/L 27 mg/L    Albumin Urine mg/g Cr PENDING  0 - 25 mg/g Cr      No EKG required for low risk surgery (cataract, skin procedure, breast biopsy, etc).     COVID test collected and results pending    Revised Cardiac Risk Index (RCRI):  The patient has the following serious cardiovascular risks for perioperative complications:   - Diabetes Mellitus (on Insulin) = 1 point     RCRI Interpretation: 1 point: Class II (low risk - 0.9% complication rate)        Assessment & Plan     The proposed surgical procedure is considered LOW risk.    Preop general physical exam  - CBC with platelets  - Hemoglobin A1c  - Comprehensive metabolic panel (BMP + Alb, Alk Phos, ALT, AST, Total. Bili, TP)  - Asymptomatic COVID-19 Virus (Coronavirus) by PCR; Future    Diabetic retinopathy of left eye associated with type 1 diabetes mellitus, macular edema presence unspecified, unspecified retinopathy severity (H)  Diabetes poorly controlled. Given low risk surgery OK to proceed. Recommended ongoing follow-up with endocrinology.     Type 1 diabetes mellitus with hyperglycemia (H)  - CBC with platelets  - Hemoglobin A1c  - Comprehensive metabolic panel (BMP + Alb, Alk Phos, ALT, AST, Total. Bili, TP)  - Lipid panel reflex to direct LDL Fasting  - Albumin Random Urine Quantitative with Creat Ratio    Ulcerative colitis without complications, unspecified location (H)  Stable - no issues as an adult.     Tobacco abuse  Smoking cessation strongly encouraged.      Implanted Device:   - Type of device: Dexcom Patient advised to bring device information on day of surgery.      Risks and Recommendations:  The patient has the following additional risks and recommendations for perioperative complications:  Diabetes:  - Patient is on insulin therapy; diabetic NPO guidelines provided and discussed.  Social and Substance:    - Active nicotine user, advised smoking cessation    Medication Instructions:  Patient is to take all scheduled medications on the day of surgery EXCEPT for modifications listed  below:   - short acting insulin (e.g. regular, lispro, aspart): HOLD on the morning of surgery.    - Insulin pump: Continue basal rate of insulin up until the time of surgery.    RECOMMENDATION:  APPROVAL GIVEN to proceed with proposed procedure, without further diagnostic evaluation.    Signed Electronically by: Ping Mayers PA-C  Copy of this evaluation report is provided to requesting physician.

## 2021-06-04 NOTE — PATIENT INSTRUCTIONS

## 2021-06-16 ENCOUNTER — VIRTUAL VISIT (OUTPATIENT)
Dept: BEHAVIORAL HEALTH | Facility: CLINIC | Age: 36
End: 2021-06-16
Payer: COMMERCIAL

## 2021-06-16 DIAGNOSIS — F32.A DEPRESSION, UNSPECIFIED DEPRESSION TYPE: Primary | ICD-10-CM

## 2021-06-16 PROCEDURE — 90785 PSYTX COMPLEX INTERACTIVE: CPT | Mod: 95 | Performed by: MARRIAGE & FAMILY THERAPIST

## 2021-06-16 PROCEDURE — 90837 PSYTX W PT 60 MINUTES: CPT | Mod: 95 | Performed by: MARRIAGE & FAMILY THERAPIST

## 2021-06-16 ASSESSMENT — COLUMBIA-SUICIDE SEVERITY RATING SCALE - C-SSRS
ATTEMPT LIFETIME: YES
2. HAVE YOU ACTUALLY HAD ANY THOUGHTS OF KILLING YOURSELF LIFETIME?: YES
5. HAVE YOU STARTED TO WORK OUT OR WORKED OUT THE DETAILS OF HOW TO KILL YOURSELF? DO YOU INTEND TO CARRY OUT THIS PLAN?: NO
REASONS FOR IDEATION PAST MONTH: MOSTLY TO END OR STOP THE PAIN (YOU COULDN'T GO ON LIVING WITH THE PAIN OR HOW YOU WERE FEELING)
TOTAL  NUMBER OF INTERRUPTED ATTEMPTS PAST 3 MONTHS: NO
4. HAVE YOU HAD THESE THOUGHTS AND HAD SOME INTENTION OF ACTING ON THEM?: NO
ATTEMPT PAST THREE MONTHS: NO
TOTAL  NUMBER OF INTERRUPTED ATTEMPTS LIFETIME: NO
LETHALITY/MEDICAL DAMAGE CODE FIRST ACTUAL ATTEMPT: MODERATELY SEVERE PHYSICAL DAMAGE, MEDICAL HOSPITALIZATION AND LIKELY INTENSIVE CARE REQUIRED
5. HAVE YOU STARTED TO WORK OUT OR WORKED OUT THE DETAILS OF HOW TO KILL YOURSELF? DO YOU INTEND TO CARRY OUT THIS PLAN?: YES
1. IN THE PAST MONTH, HAVE YOU WISHED YOU WERE DEAD OR WISHED YOU COULD GO TO SLEEP AND NOT WAKE UP?: YES
TOTAL  NUMBER OF ACTUAL ATTEMPTS LIFETIME: 1
2. HAVE YOU ACTUALLY HAD ANY THOUGHTS OF KILLING YOURSELF?: NO
3. HAVE YOU BEEN THINKING ABOUT HOW YOU MIGHT KILL YOURSELF?: YES
6. HAVE YOU EVER DONE ANYTHING, STARTED TO DO ANYTHING, OR PREPARED TO DO ANYTHING TO END YOUR LIFE?: NO
4. HAVE YOU HAD THESE THOUGHTS AND HAD SOME INTENTION OF ACTING ON THEM?: NO
6. HAVE YOU EVER DONE ANYTHING, STARTED TO DO ANYTHING, OR PREPARED TO DO ANYTHING TO END YOUR LIFE?: NO
1. IN THE PAST MONTH, HAVE YOU WISHED YOU WERE DEAD OR WISHED YOU COULD GO TO SLEEP AND NOT WAKE UP?: YES
TOTAL  NUMBER OF ABORTED OR SELF INTERRUPTED ATTEMPTS PAST 3 MONTHS: NO
TOTAL  NUMBER OF ABORTED OR SELF INTERRUPTED ATTEMPTS PAST LIFETIME: NO
LETHALITY/MEDICAL DAMAGE CODE MOST LETHAL ACTUAL ATTEMPT: MODERATELY SEVERE PHYSICAL DAMAGE, MEDICAL HOSPITALIZATION AND LIKELY INTENSIVE CARE REQUIRED
LETHALITY/MEDICAL DAMAGE CODE MOST RECENT ACTUAL ATTEMPT: MODERATELY SEVERE PHYSICAL DAMAGE, MEDICAL HOSPITALIZATION AND LIKELY INTENSIVE CARE REQUIRED

## 2021-06-16 NOTE — PROGRESS NOTES
[facility]: Integrated Behavioral Health  June 16, 2021    Telemedicine Visit: The patient's condition can be safely assessed and treated via synchronous audio and visual telemedicine encounter.      Reason for Telemedicine Visit: Services only offered telehealth    Originating Site (Patient Location): Patient's car-parked    Distant Site (Provider Location): [facility]: [place]    Consent:  The patient/guardian has verbally consented to: the potential risks and benefits of telemedicine (video visit) versus in person care; bill my insurance or make self-payment for services provided; and responsibility for payment of non-covered services.     Mode of Communication:  Video Conference via QFPay    As the provider I attest to compliance with applicable laws and regulations related to telemedicine.      Behavioral Health Clinician Progress Note    Patient Name: Vickie Duque           Service Type:  Individual      Service Location:   Telemedicine     Session Start Time: 1:16pm  Session End Time: 2:20pm      Session Length: 53 - 60      Attendees: Patient    Visit Activities (Refresh list every visit): NEW and [facility] Only    Diagnostic Assessment Date: not completed due to patient's clinical needs  Treatment Plan Review Date: due after DA is complete  See Flowsheets for today's PHQ-9 and ALYX-7 results  Previous PHQ-9:   PHQ-9 SCORE 10/2/2019 11/23/2020 5/24/2021   PHQ-9 Total Score 0 1 15     Previous ALYX-7:   ALYX-7 SCORE 9/26/2016 12/20/2018   Total Score 16 9       BYRON LEVEL:  BYRON Score (Last Two) 3/16/2012   BYRON Raw Score 42   Activation Score 66   BYRON Level 3       DATA  Extended Session (60+ minutes): No  Interactive Complexity: Yes, visit entailed Interactive Complexity evidenced by:  Crisis: Yes, visit entailed Crisis Management / Stabilization requiring urgent assessment and history of the crisis state, mental status exam and disposition  [facility] Patient:  "No    Treatment Objective(s) Addressed in This Session:  Target Behavior(s): disease management/lifestyle changes depression and relationship issues    Depressed Mood: Identify negative self-talk and behaviors: challenge core beliefs, myths, and actions  Improve concentration, focus, and mindfulness in daily activities   Anxiety: will develop healthy cognitive patterns and beliefs  Relationship Problems: will address relationship difficulties in a more adaptive manner  Risk / Safety: will follow safety plan (in EMR) for more effective management of risk issues    Current Stressors / Issues:  Patient was referred to [facility] by her PCP. Patient reports that she has previously met with a therapist, however did not feel this went well. She states that this was over 5 years ago, somewhere in [place], and she felt they were \"pushing medication\".     Patient states she gets really focused on her work and it can take her away from other things. She reflected that it will also not allow her to \"deal with things\". Reinforced patient's insight.    Patient reflected that she's had \"shitty relationships\" Patient reports she is in a relationship and they were having issues around her birthday (September 2022), and they discussed things and she felt things were getting worked out. In November, she found things in his phone and she reportedly became \"unhinged\". She tried to ask him about him being unfaithful and he did not give her any answers. She states that she has had difficulty trusting him and she is currently working out of town. She states that she \"cannot walk away from him and I don't know why\". They currently live together and they have been together for almost 4 years. They  in 2016 for 4 months and then  October 2016. They got back together July 2017. They have known each other for over 9 years.     She reports that she has \"vented\" to her mom and sister about this. She has also talked with a mutual " "friend.     Patient reports that she has a previous diagnosis of \"Manic-Depressive\", given at age 15. She reports that five years ago her therapist believed she had PTSD.    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 She gets random UA's for work so she has stopped using.  In March, she was drinking 3-4 drinks a day after work. This has also decreased.     Patient reports that she recently tried reiki. She reports that the professional commented on how tense she is. Patient stated that she felt better upon leaving the session.     Patient has three kids, ages: 19,12 and 8. She reports that they \"keep me going\".    Patient reports that she experiences thoughts of wishing that she is dead. She states that she will also experience intrusive thoughts of wanting to kill her ex as they have to co-parent. She denies any specific plan or intent. Patient agreed to co-develop a safety plan to help her manage and distract from these thoughts. Patient stated that she was also open to a referral for specialty counseling.    Progress on Treatment Objective(s) / Homework:  In development-first visit    Motivational Interviewing    MI Intervention: Expressed Empathy/Understanding, Supported Autonomy, Collaboration, Evocation, Open-ended questions, Reflections: simple and complex, Change talk (evoked) and Reframe     Change Talk Expressed by the Patient: Desire to change Reasons to change Taking steps    Provider Response to Change Talk: E - Evoked more info from patient about behavior change, A - Affirmed patient's thoughts, decisions, or attempts at behavior change, R - Reflected patient's change talk and S - Summarized patient's change talk statements    Also provided psychoeducation about behavioral health condition, symptoms, and treatment options    Care Plan review completed: Yes    Medication Review:  No current psychiatric medications prescribed   Patient has previously " "been on Prozac, Ativan (hasn't taken either of these for at least 5 years). 10 years ago she was prescribed Celexa \"it didn't do anything for me\".    Medication Compliance:  NA    Changes in Health Issues:   Yes: Diabetes, No Psychological Distress- eye issue currently, related to her diabetes    Chemical Use Review:   Substance Use: decrease in alcohol  and cannabis .  Patient reports frequency of use none for either.  Provided encouragement towards sobriety  Provided support and affirmation for steps taken towards sobriety    , Patient denies use of alcohol in the last 2 weeks, she reports that it was daily use before, she is trying to stay away from this. She denies current use of cannabis.      Tobacco Use: Yes, increase.  Patient reports frequency of use 1.5ppd. Pre-contemplation  Patient declined discussion at this time     Assessment: Current Emotional / Mental Status (status of significant symptoms):  Risk status (Self / Other harm or suicidal ideation)  Patient has had a history of suicidal ideation: thoughts started in adolescence around age 14 and suicide attempts: she attempted suicide by taking \"a bunch\" of her medication for her ulcerative colitis at age 14, she was treated medically and held for 72 hours and denies a history of self-injurious behavior, homicidal ideation, homicidal behavior and and other safety concerns   Patient denies current fears or concerns for personal safety.  Patient reports the following current or recent suicidal ideation or behaviors: She reports thoughts of \"I'd like to disappear\" or \"I wish I were dead\". She states that she wishes she would get into a car accident and die, she denies wanting to cause this and more that this would happen on it's own. She states that she has control of the thoughts and they occur about once every two weeks. . She reflected that the thoughts were worse when she was younger versus now.  Patient reports current or recent homicidal ideation or " "behaviors including patient reports that she will get thoughts of wanting to kill her ex or SO, She denies any current homicidal thoughts, plan or behaviors. She states that thinking of her kids keeps her grounded. She states that she is able to control these thoughts and often \"bite my tongue\"  Patient denies current or recent self injurious behavior or ideation.  Patient denies other safety concerns.  A safety and risk management plan has been developed including: Patient consented to co-developed safety plan.  A safety and risk management plan was completed.  Patient agreed to use safety plan should any safety concerns arise.  A copy was given to the patient.       Appearance:   Appropriate   Eye Contact:   Poor  Psychomotor Behavior: Restless   Attitude:   Cooperative  Pleasant  Orientation:   All  Speech   Rate / Production: Monotone    Volume:  Normal   Mood:    Depressed  Irritable   Affect:    Flat   Thought Content:  Homicidal  Perservative  Rumination  Suicidal  Thought Form:  Coherent  Circumstantial  Insight:    Good     Diagnoses:  1. Depression, unspecified depression type        Collateral Reports Completed:  Not Applicable    Plan: (Homework, other):  Patient was given information about behavioral services and encouraged to schedule a follow up appointment with the clinic [facility] in 1-2 weeks.  She was also given information about mental health symptoms and treatment options  and Cognitive Behavioral Therapy skills to practice when experiencing depression and anger.  CD Recommendations: Maintain Sobriety. Patient agrees to follow safety plan and present to the ER if she develops any suicidal or homicidal plan. Referral was placed for specialty therapist.    JENNIFER Kingston, [facility]      "

## 2021-06-16 NOTE — PATIENT INSTRUCTIONS
"  Integrated Behavioral Health Services                                      Patient's Name: Vickie Duque  June 16, 2021    SAFETY PLAN:  Step 1: Warning signs / cues (Thoughts, images, mood, situation, behavior) that a crisis may be developing:    Thoughts: \"I just want this to end\" and \"I'd like to disappear\"    Images: obsessive thoughts of death or dying: car accident    Thinking Processes: ruminations (can't stop thinking about my problems): related to my relationship    Mood: worsening depression and intense anger    Behaviors: isolating/withdrawing , using alcohol, can't stop crying and not sleeping enough    Situations: relationship problems   Step 2: Coping strategies - Things I can do to take my mind off of my problems without contacting another person (relaxation technique, physical activity):    Distress Tolerance Strategies:  listen to positive and upbeat music: something happy or upbeat, listen to it loud    Physical Activities: go for a walk and exercise: kickboxing, Reiki    Focus on helpful thoughts:  self-compassion statements: this is hard right now and I'm doing what I can to get through this  Step 3: People and social settings that provide distraction:   Name: Lina Phone: in phone   Name:  My kids  Phone: in phone or in person      work   Step 4: Remind myself of people and things that are important to me and worth living for:  My kids, my parents      Step 5: When I am in crisis, I can ask these people to help me use my safety plan:   Name: Lina Phone: in phone  Step 6: Making the environment safe:     remove alcohol and remove access to firearms: lock in safe and have someone change the code, get rid of old medications, someone else to help distribute, Be around others  Step 7: Professionals or agencies I can contact during a crisis:    Suicide Prevention Lifeline: 3-299-593-TALK (3430)    Crisis Text Line Service: Text   [place]  to 088272.    [facility] Crisis Services: 1-698.275.4519    Call 911 " or go to my nearest emergency department.   I helped develop this safety plan and agree to use it when needed.  I have been given a copy of this plan.      Patient signature: _______________________________________________________________  Today s date:  June 16, 2021  Adapted from Safety Plan Template 2008 Rula Lux and Paulo Harrington is reprinted with the express permission of the authors.  No portion of the Safety Plan Template may be reproduced without the express, written permission.  You can contact the authors at [email or web address] or [email or web address].

## 2021-06-25 ENCOUNTER — HOSPITAL ENCOUNTER (OUTPATIENT)
Dept: ULTRASOUND IMAGING | Facility: CLINIC | Age: 36
Discharge: HOME | End: 2021-06-25
Attending: OBSTETRICS & GYNECOLOGY | Admitting: OBSTETRICS & GYNECOLOGY
Payer: COMMERCIAL

## 2021-06-25 DIAGNOSIS — N83.202 LEFT OVARIAN CYST: ICD-10-CM

## 2021-06-25 PROCEDURE — 76830 TRANSVAGINAL US NON-OB: CPT

## 2021-06-28 ENCOUNTER — OFFICE VISIT (OUTPATIENT)
Dept: ENDOCRINOLOGY | Facility: CLINIC | Age: 36
End: 2021-06-28
Payer: COMMERCIAL

## 2021-06-28 VITALS
DIASTOLIC BLOOD PRESSURE: 76 MMHG | SYSTOLIC BLOOD PRESSURE: 116 MMHG | OXYGEN SATURATION: 100 % | BODY MASS INDEX: 26.62 KG/M2 | WEIGHT: 155.1 LBS | HEART RATE: 67 BPM

## 2021-06-28 DIAGNOSIS — E10.39 TYPE 1 DIABETES MELLITUS WITH OTHER OPHTHALMIC COMPLICATION (H): Primary | ICD-10-CM

## 2021-06-28 PROCEDURE — 99214 OFFICE O/P EST MOD 30 MIN: CPT | Performed by: INTERNAL MEDICINE

## 2021-06-28 RX ORDER — IBUPROFEN 600 MG/1
1 TABLET ORAL PRN
Qty: 1 KIT | Refills: 3 | Status: SHIPPED | OUTPATIENT
Start: 2021-06-28 | End: 2024-04-30

## 2021-06-28 NOTE — PROGRESS NOTES
Endocrinology virtual Visit    Chief Complaint: Diabetes     Information obtained from:Patient      Assessment/Treatment Plan:      Type 1 diabetes currently uncontrolled     Previously on multiple daily injections of insulin.  Started insulin pump tandem recently with CGM which has been working great for her.  Since she started the insulin pump- had an episode whereby the line was kinked and she has not been getting insulin that led to hyperglycemia other than that she is adjusting well to the insulin pump.  Current settings seems to be appropriate overall however she has had hyperglycemia in the 300s following mealtime boluses due to delay in insulin mealtime bolus.  Encouraged to bolus every meal at least 10-15 minutes.  Continue to use control IQ as you are currently doing.  She has a backup Lantus and NovoLog pens.  Glucagon prescription provided.  Blood pressure well controlled  No indication for statin at this time  Up-to-date eye exam-she had eye surgery    Patient Instructions   Continue with current pump settings:     Basal = 0.45 units per hour  CHO 1: 12 units   Correction factor 1:60 mg/dl  Target 110.        Please administer mealtime bolus consistently at least 10-15 minutes before eating.         Return to clinic in 3months.      Siri Rhoades MD  Staff Endocrinologist    Division of Endocrinology and Diabetes      Subjective:         HPI: Vickie Duque is a 35 year old female with history of type 1 diabetes.    Currently on tandem insulin pump-newly started since I saw her last.  Settings as below.  She likes her insulin pump a lot.  She had one episode whereby the transmitter was kinked and she has not been getting insulin for more than 20 hours which resulted in very high blood sugar and she was symptomatic.  Did not develop DKA.  As a backup plan now she carries her Lantus and NovoLog pens.    Current pump settings:     Basal = 0.45 units per hour  CHO 1: 12 units   Correction factor 1:60  mg/dl  Target 110.   No new concerns today.   No Known Allergies    Current Outpatient Medications   Medication Sig Dispense Refill     Continuous Blood Gluc Sensor (DEXCOM G6 SENSOR) MISC Change every 10 days. 9 each 3     Continuous Blood Gluc Transmit (DEXCOM G6 TRANSMITTER) MISC Change every 90 days. 1 each 3     Glucagon, rDNA, (GLUCAGON EMERGENCY) 1 MG KIT Inject 1 mg into the muscle as needed (Hypoglycemia) 1 kit 3     insulin pen needle (BD TARA U/F) 32G X 4 MM miscellaneous USE 3 DAILY OR AS DIRECTED. 270 each 3     levonorgestrel (MIRENA) 20 MCG/24HR IUD 1 each (20 mcg) by Intrauterine route once       Naproxen Sodium (ALEVE PO) Take by mouth as needed for moderate pain       NOVOLOG FLEXPEN 100 UNIT/ML soln Inject 1 unit for every 15 grams of carbohydrates plus correction dose; uses up to 45 units daily. 30 mL 1     SUMAtriptan (IMITREX) 50 MG tablet Take 1 tablet (50 mg) by mouth at onset of headache for migraine May repeat in 2 hours. Max 4 tablets/24 hours. (Patient not taking: Reported on 6/28/2021) 18 tablet 4       Review of Systems      as per HPI above.  Mild numbness and tingling involving bilateral toes.    Objective:   Constitutional: Pleasant no acute cardiopulmonary distress.   EYES: anicteric, normal extra-ocular movements, no lid lag or retraction, is equal and reactive to light bilaterally.  Cardiovascular: RRR, S1, S2 normal.   Pulmonary/Chest: CTAB. No wheezing or rales.   Abdominal: +BS. Non tender to palpation.  Stretch marks: none  Neurological: Alert and oriented.  No tremor and reflexes are symmetrical bilaterally and within the normal limits. Muscle strength 5/5.   Psychological: appropriate mood and affect   In House Labs:   Lab Results   Component Value Date    A1C 9.1 06/04/2021    A1C 8.7 02/22/2021    A1C 9.5 01/13/2020    A1C 8.3 10/14/2019    A1C 8.9 07/22/2019       TSH   Date Value Ref Range Status   01/04/2021 3.96 0.40 - 4.00 mU/L Final   03/19/2019 2.95 0.40 - 4.00 mU/L  Final   10/07/2017 2.57 0.40 - 4.00 mU/L Final   06/26/2017 6.05 (H) 0.40 - 4.00 mU/L Final   03/05/2014 3.97 0.4 - 5.0 mU/L Final     T4 Free   Date Value Ref Range Status   06/26/2017 1.02 0.76 - 1.46 ng/dL Final   03/28/2006 1.10 0.70 - 1.85 ng/dL Final       Creatinine   Date Value Ref Range Status   06/04/2021 0.94 0.52 - 1.04 mg/dL Final   ]    Recent Labs   Lab Test 06/04/21  0837 08/09/18  0935 01/29/16  0933 04/01/15  1143 07/24/14  0851   CHOL 180  --  207* 197 193   HDL 41*  --  55 53 51   * 113* 129* 121 126   TRIG 134  --  116 113 84   CHOLHDLRATIO  --   --   --  3.7 4.0     This note has been dictated using voice recognition software.  As a result, there may be errors in the documentation that have gone undetected.  Please consider this when interpreting information in this documentation.

## 2021-06-28 NOTE — PATIENT INSTRUCTIONS
Continue with current pump settings:     Basal = 0.45 units per hour  CHO 1: 12 units   Correction factor 1:60 mg/dl  Target 110.        Please administer mealtime bolus consistently at least 10-15 minutes before eating.

## 2021-06-28 NOTE — NURSING NOTE
Vickie Duque's goals for this visit include:   Chief Complaint   Patient presents with     Diabetes     She requests these members of her care team be copied on today's visit information: Yes    PCP: Alvaro Guerrero    Referring Provider:  Alvaro Guerrero MD  [address] [place] [address]    /76 (BP Location: Left arm, Patient Position: Sitting, Cuff Size: Adult Regular)   Pulse 67   Wt 70.4 kg (155 lb 1.6 oz)   LMP  (LMP Unknown)   SpO2 100%   BMI 26.62 kg/m      Do you need any medication refills at today's visit? No

## 2021-06-28 NOTE — LETTER
6/28/2021         RE: Vickie Duque  [address] [facility] [address]        Dear Colleague,    Thank you for referring your patient, Vickie Duque, to the [facility]. Please see a copy of my visit note below.    Endocrinology virtual Visit    Chief Complaint: Diabetes     Information obtained from:Patient      Assessment/Treatment Plan:      Type 1 diabetes currently uncontrolled     Previously on multiple daily injections of insulin.  Started insulin pump tandem recently with CGM which has been working great for her.  Since she started the insulin pump- had an episode whereby the line was kinked and she has not been getting insulin that led to hyperglycemia other than that she is adjusting well to the insulin pump.  Current settings seems to be appropriate overall however she has had hyperglycemia in the 300s following mealtime boluses due to delay in insulin mealtime bolus.  Encouraged to bolus every meal at least 10-15 minutes.  Continue to use control IQ as you are currently doing.  She has a backup Lantus and NovoLog pens.  Glucagon prescription provided.  Blood pressure well controlled  No indication for statin at this time  Up-to-date eye exam-she had eye surgery    Patient Instructions   Continue with current pump settings:     Basal = 0.45 units per hour  CHO 1: 12 units   Correction factor 1:60 mg/dl  Target 110.        Please administer mealtime bolus consistently at least 10-15 minutes before eating.         Return to clinic in 3months.      Siri Rhoades MD  Staff Endocrinologist    Division of Endocrinology and Diabetes      Subjective:         HPI: Vickie Duque is a 35 year old female with history of type 1 diabetes.    Currently on tandem insulin pump-newly started since I saw her last.  Settings as below.  She likes her insulin pump a lot.  She had one episode whereby the transmitter was kinked and she has not been getting insulin for more than 20 hours which  resulted in very high blood sugar and she was symptomatic.  Did not develop DKA.  As a backup plan now she carries her Lantus and NovoLog pens.    Current pump settings:     Basal = 0.45 units per hour  CHO 1: 12 units   Correction factor 1:60 mg/dl  Target 110.   No new concerns today.   No Known Allergies    Current Outpatient Medications   Medication Sig Dispense Refill     Continuous Blood Gluc Sensor (DEXCOM G6 SENSOR) MISC Change every 10 days. 9 each 3     Continuous Blood Gluc Transmit (DEXCOM G6 TRANSMITTER) MISC Change every 90 days. 1 each 3     Glucagon, rDNA, (GLUCAGON EMERGENCY) 1 MG KIT Inject 1 mg into the muscle as needed (Hypoglycemia) 1 kit 3     insulin pen needle (BD TARA U/F) 32G X 4 MM miscellaneous USE 3 DAILY OR AS DIRECTED. 270 each 3     levonorgestrel (MIRENA) 20 MCG/24HR IUD 1 each (20 mcg) by Intrauterine route once       Naproxen Sodium (ALEVE PO) Take by mouth as needed for moderate pain       NOVOLOG FLEXPEN 100 UNIT/ML soln Inject 1 unit for every 15 grams of carbohydrates plus correction dose; uses up to 45 units daily. 30 mL 1     SUMAtriptan (IMITREX) 50 MG tablet Take 1 tablet (50 mg) by mouth at onset of headache for migraine May repeat in 2 hours. Max 4 tablets/24 hours. (Patient not taking: Reported on 6/28/2021) 18 tablet 4       Review of Systems      as per HPI above.  Mild numbness and tingling involving bilateral toes.    Objective:   Constitutional: Pleasant no acute cardiopulmonary distress.   EYES: anicteric, normal extra-ocular movements, no lid lag or retraction, is equal and reactive to light bilaterally.  Cardiovascular: RRR, S1, S2 normal.   Pulmonary/Chest: CTAB. No wheezing or rales.   Abdominal: +BS. Non tender to palpation.  Stretch marks: none  Neurological: Alert and oriented.  No tremor and reflexes are symmetrical bilaterally and within the normal limits. Muscle strength 5/5.   Psychological: appropriate mood and affect   In House Labs:   Lab Results    Component Value Date    A1C 9.1 06/04/2021    A1C 8.7 02/22/2021    A1C 9.5 01/13/2020    A1C 8.3 10/14/2019    A1C 8.9 07/22/2019       TSH   Date Value Ref Range Status   01/04/2021 3.96 0.40 - 4.00 mU/L Final   03/19/2019 2.95 0.40 - 4.00 mU/L Final   10/07/2017 2.57 0.40 - 4.00 mU/L Final   06/26/2017 6.05 (H) 0.40 - 4.00 mU/L Final   03/05/2014 3.97 0.4 - 5.0 mU/L Final     T4 Free   Date Value Ref Range Status   06/26/2017 1.02 0.76 - 1.46 ng/dL Final   03/28/2006 1.10 0.70 - 1.85 ng/dL Final       Creatinine   Date Value Ref Range Status   06/04/2021 0.94 0.52 - 1.04 mg/dL Final   ]    Recent Labs   Lab Test 06/04/21  0837 08/09/18  0935 01/29/16  0933 04/01/15  1143 07/24/14  0851   CHOL 180  --  207* 197 193   HDL 41*  --  55 53 51   * 113* 129* 121 126   TRIG 134  --  116 113 84   CHOLHDLRATIO  --   --   --  3.7 4.0     This note has been dictated using voice recognition software.  As a result, there may be errors in the documentation that have gone undetected.  Please consider this when interpreting information in this documentation.        Again, thank you for allowing me to participate in the care of your patient.        Sincerely,        Siri Rhoades MD

## 2021-07-01 DIAGNOSIS — E10.39 TYPE 1 DIABETES MELLITUS WITH OTHER OPHTHALMIC COMPLICATION (H): ICD-10-CM

## 2021-07-01 NOTE — TELEPHONE ENCOUNTER
Received notice from pharmacy that patient is running out of insulin before her insurance will pay for a refill. Patient reported to pharmacy that she sometimes uses more than 45 units daily and that is why she is running out. Pharmacy asking for updated prescription on patient's Novolog flexpens.      As per review of Dr. Rhoades's virtual progress note from this week on 6/28/21, patient is on an insulin pump.    Writer contacted patient to determine if she is wanting the prescription changed to Novolog vials verses the current Rx for flexpens. Patient states that she prefers to have the pens to have in case of pump failure.       Writer will send to Dr. Rhoades to review and send updated Rx.      Jennifer Mcconnell RN  Endocrine Care Coordinator  [facility]

## 2021-07-02 RX ORDER — INSULIN ASPART 100 [IU]/ML
INJECTION, SOLUTION INTRAVENOUS; SUBCUTANEOUS
Qty: 45 ML | Refills: 1 | Status: SHIPPED | OUTPATIENT
Start: 2021-07-02 | End: 2021-10-12

## 2021-07-09 ENCOUNTER — VIRTUAL VISIT (OUTPATIENT)
Dept: BEHAVIORAL HEALTH | Facility: CLINIC | Age: 36
End: 2021-07-09
Payer: COMMERCIAL

## 2021-07-09 DIAGNOSIS — F32.A DEPRESSION, UNSPECIFIED DEPRESSION TYPE: Primary | ICD-10-CM

## 2021-07-09 PROCEDURE — 90834 PSYTX W PT 45 MINUTES: CPT | Mod: 95 | Performed by: MARRIAGE & FAMILY THERAPIST

## 2021-07-09 NOTE — PROGRESS NOTES
8:05am-[facility] initiated video visit by sending a link via text.    [facility]: Integrated Behavioral Health  July 9, 2021    Telemedicine Visit: The patient's condition can be safely assessed and treated via synchronous audio and visual telemedicine encounter.      Reason for Telemedicine Visit: Services only offered telehealth    Originating Site (Patient Location): Patient's car-parked while at work    Distant Site (Provider Location): Provider Remote Setting- Home Office    Consent:  The patient/guardian has verbally consented to: the potential risks and benefits of telemedicine (video visit) versus in person care; bill my insurance or make self-payment for services provided; and responsibility for payment of non-covered services.     Mode of Communication:  Video Conference via [employer]    As the provider I attest to compliance with applicable laws and regulations related to telemedicine.      Behavioral Health Clinician Progress Note    Patient Name: Vickie Duque           Service Type:  Individual      Service Location:   Telemedicine     Session Start Time: 8:07am  Session End Time: 8:59am      Session Length: 38 - 52      Attendees: Patient    Visit Activities (Refresh list every visit): [facility] Only    Diagnostic Assessment Date: not completed due to patient's clinical needs  Treatment Plan Review Date: due after DA is complete  See Flowsheets for today's PHQ-9 and ALYX-7 results  Previous PHQ-9:   PHQ-9 SCORE 10/2/2019 11/23/2020 5/24/2021   PHQ-9 Total Score 0 1 15     Previous ALYX-7:   ALYX-7 SCORE 9/26/2016 12/20/2018   Total Score 16 9       BYRON LEVEL:  BYRON Score (Last Two) 3/16/2012   BYRON Raw Score 42   Activation Score 66   BYRON Level 3       DATA  Extended Session (60+ minutes): No  Interactive Complexity: No  Crisis: No  [facility] Patient: No    Treatment Objective(s) Addressed in This Session:  Target Behavior(s): relationship issues and depression    Depressed Mood:  "Decrease frequency and intensity of feeling down, depressed, hopeless  Identify negative self-talk and behaviors: challenge core beliefs, myths, and actions  Anxiety: will develop healthy cognitive patterns and beliefs  Relationship Problems: will address relationship difficulties in a more adaptive manner  Risk / Safety: will follow safety plan (in EMR) for more effective management of risk issues    Current Stressors / Issues:  Patient reports that she is \"back to where I can be home every night\". Patient has had to be out of town for work.     Patient states that she has had difficulty with sleep, historically. She has tried melatonin and unisom and feels groggy the next day. She states that sleep has been better than it was.     Patient reports that the communication has improved with her SO, as they continue to reside together. She states that she is feeling better at home and feels a little less anxious. She states that she and her SO are trying to work through things together.    Explored patient's view on trust and regaining trust. Patient identified that she has had issues with being \"an over-thinker\". Patient states that she is \"constantly looking for things that are out of place\". \"It sucks to be an over-thinker\". Patient states that if she trusted her SO she wouldn't be asking him who he talks to when he is on his phone. Provided psycho-education on building trust as behavior over time and taking the risk to trust. Patient states that they have also talked about trying to go out and have a \"date day\". She states that they also have a lot of kids between the two of them and this also plays into time they have alone. They make an effort to done something as a couple, once a week. Patient talked about her relationship with her SO's kids. She states that she doesn't get along with his daughter due to her seemingly wanting to be the \"center of attention\".    Patient is the youngest child in her family. She " reportedly does not have a relationship with either of her sisters. She stopped talking with one of her sisters more recently. Patient states that she is wanting to get help and be better for herself. She reflected that she believes her sister expects people to do things for her. She states that she only talks with her mom and dad.    Patient has not yet set up an appointment for specialty therapy. Informed her that she has been contacted and a letter had been sent out. Patient stated that she hasn't picked up her mail in a few days. This writer sent out a [employer] message providing the phone number for scheduling.    Progress on Treatment Objective(s) / Homework:  Minimal progress - PREPARATION (Decided to change - considering how); Intervened by negotiating a change plan and determining options / strategies for behavior change, identifying triggers, exploring social supports, and working towards setting a date to begin behavior change    Motivational Interviewing    MI Intervention: Expressed Empathy/Understanding, Supported Autonomy, Collaboration, Evocation, Open-ended questions, Reflections: simple and complex, Change talk (evoked) and Reframe     Change Talk Expressed by the Patient: Desire to change Reasons to change Taking steps    Provider Response to Change Talk: E - Evoked more info from patient about behavior change, A - Affirmed patient's thoughts, decisions, or attempts at behavior change, R - Reflected patient's change talk and S - Summarized patient's change talk statements    Also provided psychoeducation about behavioral health condition, symptoms, and treatment options    Care Plan review completed: Yes    Medication Review:  No current psychiatric medications prescribed       Medication Compliance:  NA    Changes in Health Issues:   None reported    Chemical Use Review:   Substance Use: decrease in alcohol  and cannabis .  Patient reports frequency of use none for either.  Provided encouragement  "towards sobriety  Provided support and affirmation for steps taken towards sobriety    , Patient denies use of alcohol in the last 2 months, she reports that it was daily use before, she is trying to stay away from this. She denies current use of cannabis.      Tobacco Use: Yes, increase.  Patient reports frequency of use 1.5ppd. Pre-contemplation  Patient declined discussion at this time     Assessment: Current Emotional / Mental Status (status of significant symptoms):  Risk status (Self / Other harm or suicidal ideation)  Patient has had a history of suicidal ideation: thoughts started in adolescence around age 14 and suicide attempts: she attempted suicide by taking \"a bunch\" of her medication for her ulcerative colitis at age 14, she was treated medically and held for 72 hours and denies a history of self-injurious behavior, homicidal ideation, homicidal behavior and and other safety concerns   Patient denies current fears or concerns for personal safety.  Patient denies current or recent suicidal ideation or behaviors.   Patient reports current or recent homicidal ideation or behaviors including thoughts \"depend on the day\". She states that she has some people where the thought of them dying puts a smile on her face. She denies any specific thoughts, plan or intent to hurt anyone. She states that it's \"highly unlikely\" that she would do anything. She denies any history of homicidal behavior. She states that the individuals she has had thoughts of are those that have hurt her in the past.   Patient denies current or recent self injurious behavior or ideation.  Patient denies other safety concerns.  A safety and risk management plan has been developed including: Patient co-developed a safety plan on 6/16/21.  Patient has a copy of her safety plan in her chart and agrees to use it if she experiences suicidal or homicidal ideation.      Appearance:   Appropriate   Eye Contact:   Poor  Psychomotor " Behavior: Restless  she was moving thing around in her car during the visit   Attitude:   Cooperative  Pleasant Distracted   Orientation:   All  Speech   Rate / Production: Monotone    Volume:  Normal   Mood:    Depressed   Affect:    Flat   Thought Content:  Perservative  Rumination   Thought Form:  Coherent  Circumstantial  Insight:    Fair     Diagnoses:  1. Depression, unspecified depression type        Collateral Reports Completed:  Not Applicable    Plan: (Homework, other):  Patient was given information about behavioral services and encouraged to schedule a follow up appointment with the clinic [facility] in 1-2 weeks.  She was also given information about mental health symptoms and treatment options  and Cognitive Behavioral Therapy skills to practice when experiencing depression and anger.  CD Recommendations: Maintain Sobriety. Patient agrees to follow safety plan if she experiences suicidal or homicidal thoughts and plan. Patient will call the schedulers to make an appointment for specialty therapy.    JENNIFER Kingston, [facility]

## 2021-07-21 ENCOUNTER — VIRTUAL VISIT (OUTPATIENT)
Dept: BEHAVIORAL HEALTH | Facility: CLINIC | Age: 36
End: 2021-07-21
Payer: COMMERCIAL

## 2021-07-21 DIAGNOSIS — F41.1 GAD (GENERALIZED ANXIETY DISORDER): Primary | ICD-10-CM

## 2021-07-21 PROCEDURE — 90791 PSYCH DIAGNOSTIC EVALUATION: CPT | Mod: 95 | Performed by: MARRIAGE & FAMILY THERAPIST

## 2021-07-21 NOTE — PROGRESS NOTES
"[facility] Primary Care: Integrated Behavioral Health  Provider Name:  Ирина Ledesma     Credentials:  MA, LMFT    1:08[facility] initiated video visit by sending a link via text.    PATIENT'S NAME: Vickie Duque  PREFERRED NAME: Vickie  PRONOUNS:     she/her  MRN: [id number]  : 1985  ADDRESS: [address] [place], [place] [address]T. NUMBER:  [id number]  DATE OF SERVICE: 21  START TIME: 1:12pm  END TIME: 2:08pm  PREFERRED PHONE: [phone]  May we leave a program related message: No  SERVICE MODALITY:  Video Visit:      Provider verified identity through the following two step process.  Patient provided:  Patient  and Patient address    Telemedicine Visit: The patient's condition can be safely assessed and treated via synchronous audio and visual telemedicine encounter.      Reason for Telemedicine Visit: Services only offered telehealth    Originating Site (Patient Location): Patient's place of employment, in parked car    Distant Site (Provider Location): Provider Remote Setting- Home Office    Consent:  The patient/guardian has verbally consented to: the potential risks and benefits of telemedicine (video visit) versus in person care; bill my insurance or make self-payment for services provided; and responsibility for payment of non-covered services.     Patient would like the video invitation sent by:  Text to cell phone: [phone]    Mode of Communication:  Video Conference via [employer]    As the provider I attest to compliance with applicable laws and regulations related to telemedicine.    UNIVERSAL ADULT Mental Health DIAGNOSTIC ASSESSMENT    Identifying Information:  Patient is a 35 year old,  .  The pronoun use throughout this assessment reflects the patient's chosen pronoun.  Patient was referred for an assessment by primary care provider .  Patient attended the session alone.     Chief Complaint:   The reason for seeking services at this time is: \" life in " "general\". She states that she is also wanting to address relationship issues.  The problem(s) began September 2020 when she and her SO starting having issues in their relationship.In November, she found things in her SO's phone that led her to believe that he was not being faithful. Patient feels that prior to that she was able to not let things bother her. Patient has attempted to resolve these concerns in the past through smoking cannabis while she was laid off from work, she then turned to alcohol. She has now discontinued both and is working to say what she is needing to help her work through issues..    Social/Family History:  Patient reported they grew up in [place], [place].  They were raised by biological parents.  Parents stayed . Patient has three siblings; two sisters and a brother.   Patient reported that their childhood was \"normal\". She states that she learned \"what not to do\" by watching her sister. Patient described their current relationships with family of origin as \"I don't talk to any of my siblings\". She states that she talks with her parents and has a good relationship with them.      The patient describes their cultural background as not Yazidi.  Cultural influences and impact on patient's life structure, values, norms, and healthcare: growing up didn't really do things as a family and mostly stayed at home.  Contextual influences on patient's health include: Family Factors family didn't discuss mental health. She just learned about 6 months ago she has an aunt that has mental health issues that she was completely unaware of.    These factors will be addressed in the Preliminary Treatment plan.  Patient identified their preferred language to be English. Patient reported they does not need the assistance of an  or other support involved in therapy.     Patient reported had no significant delays in developmental tasks.   Patient's highest education level was 10th grade, and then " "earned GED. Patient identified the following learning problems: none reported.  Modifications will not be used to assist communication in therapy.  Patient reports they are able to understand written materials.    Patient reported the following relationship history is \"shitty\".  Patient's current relationship status is partnered / significant other for 4 years. They  in 2016 for 4 months and then  October 2016. They got back together July 2017. They have known each other for over 9 years.   Patient identified their sexual orientation as heterosexual.  Patient reported having three child(floyd). Patient identified mother as part of their support system.  Patient identified the quality of these relationships as good.      Patient's current living/housing situation involves staying in own home/apartment.  They live with her SO, SO's son, her son and then she shares custody of her other two children and they report that housing is stable.     Patient is currently employed full time and reports they are able to function appropriately at work. 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  Patient reports their finances are obtained through employment.  Patient does identify finances as a current stressor.      Patient reported that they have not been involved with the legal system.  Patient denies being on probation / parole / under the jurisdiction of the court.    Patient's Strengths and Limitations:  Patient identified the following strengths or resources that will help them succeed in treatment: motivation and work ethic. Things that may interfere with the patient's success in treatment include: none identified.     Personal and Family Medical History:   Patient does not report a family history of mental health concerns. She states that this wasn't something her family talked about. She has an aunt that may have some mental health symptoms. Patient reports family history includes Cancer in her maternal " "grandmother; Diabetes in her maternal aunt, maternal grandmother, maternal uncle, and paternal grandfather; Hypertension in her father..     Patient does report Mental Health Diagnosis and/or Treatment.  Patient Patient reported the following previous diagnoses which include(s): manic depressive.  Patient reported symptoms began at age 14.   Patient has received mental health services in the past: at age 14 when she was diagnosed with \"manic depression\".  Psychiatric Hospitalizations: None.  Patient denies a history of civil commitment.  Currently, patient is not receiving other mental health services.  These include primary care provider at [facility].  For follow-up on not scheduled.           Patient has had a physical exam to rule out medical causes for current symptoms.  Date of last physical exam was within the past year. Client was encouraged to follow up with PCP if symptoms were to develop. The patient has a [facility] Primary Care Provider, who is named Alvaro Guerrero..  Patient reports no current medical concerns.  Patient denies any issues with pain..   There are not significant appetite / nutritional concerns / weight changes.   Patient does not report a history of head injury / trauma / cognitive impairment.      Patient reports current meds as:   Outpatient Medications Marked as Taking for the 7/21/21 encounter (Appointment) with Ирина Ledesma LMFT   Medication Sig     Continuous Blood Gluc Sensor (DEXCOM G6 SENSOR) MISC Change every 10 days.     Continuous Blood Gluc Transmit (DEXCOM G6 TRANSMITTER) MISC Change every 90 days.     Glucagon, rDNA, (GLUCAGON EMERGENCY) 1 MG KIT Inject 1 mg into the muscle as needed (Hypoglycemia)     insulin pen needle (BD TARA U/F) 32G X 4 MM miscellaneous USE 3 DAILY OR AS DIRECTED.     levonorgestrel (MIRENA) 20 MCG/24HR IUD 1 each (20 mcg) by Intrauterine route once     Naproxen Sodium (ALEVE PO) Take by mouth as needed for moderate pain     NOVOLOG " FLEXPEN 100 UNIT/ML soln Inject 1 unit for every 12 grams of carbohydrates plus correction dose; uses up to 60 units daily.       Medication Adherence:  Patient reports taking prescribed medications as prescribed.    Patient Allergies:  No Known Allergies    Medical History:    Past Medical History:   Diagnosis Date     Adjustment disorder with anxious mood 11/23/2015     Anxiety 11/27/2015     ASCUS of cervix with negative high risk HPV 06/19/2008     Depressive disorder, not elsewhere classified      Diabetic eye exam (H) 12/19/14     Mixed hyperlipidemia 11/30/2006     Regional enteritis of unspecified site     Ulcerative Colitis     Type I (juvenile type) diabetes mellitus with ketoacidosis, not stated as uncontrolled(250.11) 01/01/2007    Moderately severe intensity.     Type I (juvenile type) diabetes mellitus with ketoacidosis, uncontrolled 02/14/08     Type I (juvenile type) diabetes mellitus without mention of complication, not stated as uncontrolled     diagnosed in 2003     Ulcerative colitis, unspecified     remission. Last flare 6 yrs ago.          Current Mental Status Exam:   Appearance:  Appropriate    Eye Contact:  Fair   Psychomotor:  Normal       Gait / station:  Unable to determine as patient was sitting for entire visit  Attitude / Demeanor: Cooperative  Pleasant Kvng  Speech      Rate / Production: Normal/ Responsive      Volume:  Normal  volume      Language:  intact  Mood:   Anxious   Affect:   Subdued    Thought Content: Rumination   Thought Process: Coherent  Logical       Associations: No loosening of associations  Insight:   Fair   Judgment:  Intact   Orientation:  All  Attention/concentration: Good    Rating Scales:    PHQ9:    PHQ-9 SCORE 10/2/2019 11/23/2020 5/24/2021   PHQ-9 Total Score 0 1 15   ;    GAD7:    ALYX-7 SCORE 9/26/2016 12/20/2018   Total Score 16 9     CGI:     First:No data recorded;    Most recentNo data recorded    Substance Use:  Patient did report a family history of  substance use concerns; see medical history section for details. Patient reports that she has an aunt that reportedly uses alcohol. Patient also has a sister that uses cannabis. Patient has not received chemical dependency treatment in the past.  Patient has not ever been to detox.      Patient is not currently receiving any chemical dependency treatment. Patient reported the following problems as a result of their substance use:  none.    Patient denies using alcohol.  Patient reports using tobacco 1.5-2 packs per day, so multiple times per day. Client started using tobacco at age 13..  Patient denies using cannabis.  Patient reports using caffeine 2 times per day and drinks 1 at a time. Patient started using caffeine at age 11. Patient reports that she will have about 3 starbucks frappuccinos throughout the day, these are the ones you buy in the store/gas station  Patient reports using/abusing the following substance(s). Patient reported no other substance use.     CAGE- AID:    CAGE-AID Total Score 7/21/2021   Total Score 1       Substance Use: hangovers on occasion    Based on the negative CAGE score and clinical interview there  are not indications of drug or alcohol abuse.      Significant Losses / Trauma / Abuse / Neglect Issues:   Patient   did not serve in the .  There are indications or report of significant loss, trauma, abuse or neglect issues related to: death of grandparents in 2006. She states that she was pretty close with her grandma.  Concerns for possible neglect are not present.     Safety Assessment:   Current Safety Concerns:  Blount Suicide Severity Rating Scale (Lifetime/Recent)  Blount Suicide Severity Rating (Lifetime/Recent) 6/16/2021   1. Wish to be Dead (Lifetime) Yes   Wish to be Dead Description (Lifetime) thoughts started around age 14   1. Wish to be Dead (Recent) Yes   Wish to be Dead Description (Recent) Patient reports having occasional thoughts of wishing that she  "would \"disappear\" or that she wishes she were dead   2. Non-Specific Active Suicidal Thoughts (Lifetime) Yes   2. Non-Specific Active Suicidal Thoughts (Recent) No   3. Active Suicidal Ideation with any Methods (Not Plan) Without Intent to Act (Lifetime) Yes   Active Suicidal Ideation with any Methods (Not Plan) Description (Lifetime) patient in late adolescence experienced thoughts of methods, with use of pills   3. Active Suicidal Ideation with any Methods (Not Plan) Without Intent to Act (Recent) No   4. Active Suicidal Ideation with Some Intent to Act, Without Specific Plan (Lifetime) No   4. Active Suicidal Ideation with Some Intent to Act, Without Specific Plan (Recent) No   5. Active Suicidal Ideation with Specific Plan and Intent (Lifetime) Yes   Active Suicidal Ideation with Specific Plan and Intent Description (Lifetime) patient had an attempted overdose at age 14   5. Active Suicidal Ideation with Specific Plan and Intent (Recent) No   Most Severe Ideation Rating (Lifetime) 5   Frequency (Lifetime) NA   Duration (Lifetime) NA   Controllability (Lifetime) NA   Protective Factors  (Lifetime) 5   Reasons for Ideation (Lifetime) NA   Most Severe Ideation Rating (Past Month) 4   Frequency (Past Month) 2   Duration (Past Month) 5   Controllability (Past Month) 2   Protective Factors (Past Month) 1   Reasons for Ideation (Past Month) 4   Actual Attempt (Lifetime) Yes   Actual Attempt Description (Lifetime) patient attempted at age 14 by overdosing on a medication she was taking for ulcerative colitis   Total Number of Actual Attempts (Lifetime) 1   Actual Attempt (Past 3 Months) No   Has subject engaged in non-suicidal self-injurious behavior? (Lifetime) No   Has subject engaged in non-suicidal self-injurious behavior? (Past 3 Months) No   Interrupted Attempts (Lifetime) No   Interrupted Attempts (Past 3 Months) No   Aborted or Self-Interrupted Attempt (Lifetime) No   Aborted or Self-Interrupted Attempt (Past 3 " "Months) No   Preparatory Acts or Behavior (Lifetime) No   Preparatory Acts or Behavior (Past 3 Months) No   Most Recent Attempt Date (No Data)   Comments age 14   Most Recent Attempt Actual Lethality Code 3   Most Lethal Attempt Actual Lethality Code 3   Initial/First Attempt Date (No Data)   Comments age 14   Initial/First Attempt Actual Lethality Code 3     Patient denies current homicidal ideation and behaviors.  Patient denies current self-injurious ideation and behaviors.    Patient denied risk behaviors associated with substance use.  Patient denies any high risk behaviors associated with mental health symptoms.  Patient reports the following current concerns for their personal safety: None.  Patient reports there   firearms in the house.       The firearms are secured in a locked space.    History of Safety Concerns:  Patient denied a history of homicidal ideation.     Patient denied a history of personal safety concerns.    Patient denied a history of assaultive behaviors.    Patient denied a history of sexual assault behaviors.     Patient denied a history of risk behaviors associated with substance use.  Patient denies any history of high risk behaviors associated with mental health symptoms.  Patient reports the following protective factors:      Risk Plan:  See Recommendations for Safety and Risk Management Plan    Patient denies any current suicidal thoughts. Patient denies any homicidal thoughts. She reports that she will get thoughts of \"hurting people\". She states that she feels she has control over these thoughts so she does not act on anything.    Review of Symptoms per patient report:  Depression: Change in sleep, Difficulties concentrating, Change in appetite, Ruminations, Irritability and Withdrawn  Gladys:  Irritability and Racing thoughts  Psychosis: She states she sees silhouettes of people happens around 3:30am when getting out of bed; occurs almost daily; she will look directly at it and her " "heart starts racing and she turns on a light because it freaks her out. The silhouette will go away. She states they have been there her \"entire life\". She denies them talking to her. Every once in a while if she is going to bed she may notice this too, but not all the time.  Anxiety: Excessive worry, Nervousness, Physical complaints, such as headaches, stomachaches, muscle tension, Sleep disturbance, Ruminations and Irritability will sometimes get annoyed at the sound of someone breathing.  Panic:  No symptoms  Post Traumatic Stress Disorder:  No Symptoms   Eating Disorder: No Symptoms  ADD / ADHD:  Difficulties listening, Distractibility and Restlessness/fidgety  Conduct Disorder: No symptoms  Autism Spectrum Disorder: No symptoms  Obsessive Compulsive Disorder: will clean when she becomes upset, she wakes, showers and leaves the house at the same time everyday. If this routine goes off she feels her whole day is \"off\".    Patient reports the following compulsive behaviors and treatment history: none identified.      Diagnostic Criteria:   A. Excessive anxiety and worry about a number of events or activities (such as work or school performance).   B. The person finds it difficult to control the worry.  C. Select 3 or more symptoms (required for diagnosis). Only one item is required in children.   - Restlessness or feeling keyed up or on edge.    - Being easily fatigued.    - Difficulty concentrating or mind going blank.    - Irritability.    - Muscle tension.    - Sleep disturbance (difficulty falling or staying asleep, or restless unsatisfying sleep).   D. The focus of the anxiety and worry is not confined to features of an Axis I disorder.  E. The anxiety, worry, or physical symptoms cause clinically significant distress or impairment in social, occupational, or other important areas of functioning.   F. The disturbance is not due to the direct physiological effects of a substance (e.g., a drug of abuse, a " "medication) or a general medical condition (e.g., hyperthyroidism) and does not occur exclusively during a Mood Disorder, a Psychotic Disorder, or a Pervasive Developmental Disorder.    - The aformentioned symptoms began 10 month(s) ago and occurs 5 days per week and is experienced as moderate.    Functional Status:  Patient reports the following functional impairments: home life with SO, management of the household and or completion of tasks, self-care and work / vocational responsibilities.     WHODAS: No flowsheet data found.  Nonprogrammatic care:  Patient is requesting basic services to address current mental health concerns.    Clinical Summary:  1. Reason for assessment: irritability and \"life in general\".  2. Psychosocial, Cultural and Contextual Factors: relationship issues.  3. Principal DSM5 Diagnoses  (Sustained by DSM5 Criteria Listed Above):   300.02 (F41.1) Generalized Anxiety Disorder.  4. Other Diagnoses that is relevant to services:  Patient reports a historical diagnosis of \"manic depressive\" from age 14.  5. Provisional Diagnosis: Rule out major depression  6. Prognosis: Expect Improvement and Relieve Acute Symptoms.  7. Likely consequences of symptoms if not treated: worsening symptoms.  8. Client strengths include:  open to learning and open to suggestions / feedback .     Recommendations:     1. Plan for Safety and Risk Management:   A safety and risk management plan has been developed including: patient co-created a safety plan on 6/16/21 and agrees to use this as needed if she experiences SI.          Report to child / adult protection services was NA.     2. Patient's identified no Mosque or spiritual needs for counseling.    3. Initial Treatment will focus on:    Anxiety - irritability, trouble relaxing, concentration difficulty, worry about different things, interpersonal relationship issues.     4. Resources/Service Plan:    services are not indicated.   Modifications to " assist communication are not indicated.   Additional disability accommodations are not indicated.      5. Collaboration:   Collaboration / coordination of treatment will be initiated with the following  support professionals: primary care physician.      6.  Referrals:   The following referral(s) will be initiated: [facility]. Next Scheduled Appointment: 8/2/21.     A Release of Information has been obtained for the following: NA.    7. ASHLEY:    ASHLEY:  Discussed the general effects of drugs and alcohol on health and well-being. Provider gave patient printed information about the effects of chemical use on their health and well being. Recommendations:  Will monitor use as she is currently sober and previously used alcohol and cannabis as a form of coping.     8. Records:   These were reviewed at time of assessment.   Information in this assessment was obtained from the medical record and  provided by patient who is a good historian.    Patient will have open access to their mental health medical record.        Provider Name/ Credentials:  JENNIFER Kingston, Behavioral Health Clinician    July 21, 2021

## 2021-07-22 PROBLEM — F41.1 GAD (GENERALIZED ANXIETY DISORDER): Status: ACTIVE | Noted: 2021-07-21

## 2021-07-22 PROBLEM — F41.1 GAD (GENERALIZED ANXIETY DISORDER): Status: ACTIVE | Noted: 2021-07-22

## 2021-08-02 ENCOUNTER — VIRTUAL VISIT (OUTPATIENT)
Dept: BEHAVIORAL HEALTH | Facility: CLINIC | Age: 36
End: 2021-08-02
Payer: COMMERCIAL

## 2021-08-02 DIAGNOSIS — F41.1 GAD (GENERALIZED ANXIETY DISORDER): Primary | ICD-10-CM

## 2021-08-02 PROCEDURE — 90832 PSYTX W PT 30 MINUTES: CPT | Mod: 95 | Performed by: MARRIAGE & FAMILY THERAPIST

## 2021-08-02 NOTE — PROGRESS NOTES
3:35pm-[facility] [place] initiated video visit by sending a link via text.    [facility]: Integrated Behavioral Health  August 2, 2021    Telemedicine Visit: The patient's condition can be safely assessed and treated via synchronous audio and visual telemedicine encounter.      Reason for Telemedicine Visit: Services only offered telehealth    Originating Site (Patient Location): Patient's car-parked while at work    Distant Site (Provider Location): Provider Remote Setting- Home Office    Consent:  The patient/guardian has verbally consented to: the potential risks and benefits of telemedicine (video visit) versus in person care; bill my insurance or make self-payment for services provided; and responsibility for payment of non-covered services.     Mode of Communication:  Video Conference via Drimki    As the provider I attest to compliance with applicable laws and regulations related to telemedicine.      Behavioral Health Clinician Progress Note    Patient Name: Vickie Duque           Service Type:  Individual      Service Location:   Telemedicine     Session Start Time: 3:40pm  Session End Time: 4:05pm      Session Length: 16 - 37      Attendees: Patient    Visit Activities (Refresh list every visit): [facility] [place] Only    Diagnostic Assessment Date: 7/21/21  Treatment Plan Review Date: due next visit  See Flowsheets for today's PHQ-9 and ALYX-7 results  Previous PHQ-9:   PHQ-9 SCORE 10/2/2019 11/23/2020 5/24/2021   PHQ-9 Total Score 0 1 15     Previous ALYX-7:   ALYX-7 SCORE 9/26/2016 12/20/2018   Total Score 16 9       BYRON LEVEL:  BYRON Score (Last Two) 3/16/2012   BYRON Raw Score 42   Activation Score 66   BYRON Level 3       DATA  Extended Session (60+ minutes): No  Interactive Complexity: No  Crisis: No  [facility] Patient: No    Treatment Objective(s) Addressed in This Session:  Target Behavior(s): relationship issues and depression    Depressed Mood: Decrease frequency and intensity of feeling  "down, depressed, hopeless  Identify negative self-talk and behaviors: challenge core beliefs, myths, and actions  Anxiety: will develop healthy cognitive patterns and beliefs  Relationship Problems: will address relationship difficulties in a more adaptive manner  Risk / Safety: will follow safety plan (in EMR) for more effective management of risk issues    Current Stressors / Issues:  \"The last couple weeks have been rough again\". She states that she notices feeling more anxious and having doubt. She identified that this is mostly regarding her relationship with her SO.     Patient identified that she is more quiet as a result and \"in my own little world\". \"I'm trying to figure out how to be okay with things\". She provided an example of hearing her SO's tone of voice change when a female said hi to him at the store. She reportedly talked to him about this and he seemed to become defensive.     Reinforced patient being open with her feelings to her SO. Explored dynamics and her automatic thoughts and reactions to their interactions. Discussed validation and patient identifying ways to rebuild trust in her relationship.    Progress on Treatment Objective(s) / Homework:  No improvement - PREPARATION (Decided to change - considering how); Intervened by negotiating a change plan and determining options / strategies for behavior change, identifying triggers, exploring social supports, and working towards setting a date to begin behavior change    Motivational Interviewing    MI Intervention: Supported Autonomy, Collaboration, Evocation, Open-ended questions, Reflections: simple and complex, Change talk (evoked) and Reframe     Change Talk Expressed by the Patient: Desire to change Reasons to change Taking steps    Provider Response to Change Talk: E - Evoked more info from patient about behavior change, A - Affirmed patient's thoughts, decisions, or attempts at behavior change, R - Reflected patient's change talk and S " "- Summarized patient's change talk statements    Also provided psychoeducation about behavioral health condition, symptoms, and treatment options    Care Plan review completed: Yes    Medication Review:  No current psychiatric medications prescribed       Medication Compliance:  NA    Changes in Health Issues:   None reported    Chemical Use Review:   Substance Use: Chemical use reviewed, no active concerns identified      Tobacco Use: No change in amount of tobacco use since last session.  Patient declined discussion at this time     Assessment: Current Emotional / Mental Status (status of significant symptoms):  Risk status (Self / Other harm or suicidal ideation)  Patient has had a history of suicidal ideation: thoughts started in adolescence around age 14 and suicide attempts: she attempted suicide by taking \"a bunch\" of her medication for her ulcerative colitis at age 14, she was treated medically and held for 72 hours and denies a history of self-injurious behavior, homicidal ideation, homicidal behavior and and other safety concerns   Patient denies current fears or concerns for personal safety.  Patient denies current or recent suicidal ideation or behaviors.   Patient denies current or recent homicidal ideation or behaviors..   Patient denies current or recent self injurious behavior or ideation.  Patient denies other safety concerns.  A safety and risk management plan has been developed including: Patient co-developed a safety plan on 6/16/21.  Patient has a copy of her safety plan in her chart and agrees to use it if she experiences suicidal or homicidal ideation.      Appearance:   Appropriate   Eye Contact:   Fair   Psychomotor Behavior: Restless   Attitude:   Cooperative  Pleasant  Orientation:   All  Speech   Rate / Production: Normal/ Responsive   Volume:  Normal   Mood:    Depressed   Affect:    Flat   Thought Content:  Perservative  Rumination   Thought Form:  Coherent  " Circumstantial  Insight:    Fair     Diagnoses:  1. ALYX (generalized anxiety disorder)        Collateral Reports Completed:  Not Applicable    Plan: (Homework, other):  Patient was given information about behavioral services and encouraged to schedule a follow up appointment with the clinic [facility] in 1-2 weeks.  She was also given information about mental health symptoms and treatment options  and Cognitive Behavioral Therapy skills to practice when experiencing depression and anger.  CD Recommendations: Maintain Sobriety. Patient agrees to follow safety plan if she experiences suicidal or homicidal thoughts and plan. Patient will think of what she would like to gain out of her visits and treatment plan will be created next visit.    JENNFIER Kingston, [facility]

## 2021-08-19 ENCOUNTER — LAB REQUISITION (OUTPATIENT)
Dept: LAB | Facility: CLINIC | Age: 36
End: 2021-08-19

## 2021-08-19 ENCOUNTER — TELEPHONE (OUTPATIENT)
Dept: FAMILY MEDICINE | Facility: CLINIC | Age: 36
End: 2021-08-19

## 2021-08-19 PROCEDURE — U0003 INFECTIOUS AGENT DETECTION BY NUCLEIC ACID (DNA OR RNA); SEVERE ACUTE RESPIRATORY SYNDROME CORONAVIRUS 2 (SARS-COV-2) (CORONAVIRUS DISEASE [COVID-19]), AMPLIFIED PROBE TECHNIQUE, MAKING USE OF HIGH THROUGHPUT TECHNOLOGIES AS DESCRIBED BY CMS-2020-01-R: HCPCS | Performed by: FAMILY MEDICINE

## 2021-08-19 NOTE — TELEPHONE ENCOUNTER
Patient is calling to report she chun snot feel good and it has nothing to do with COVID since she still has her taste and smell.  She has a headache, fever, cough and just in general does not feel well.    When asked if she has been exposed to COVID or had her vaccine, she states this is a HIPA violation for RN to be asking.  She is informed it is not a HIPA violation.  She is asked what she would like from an appointment as it does not sound like she would be willing to be tested for COVID.  She states she will get tested if it is suggested.  A virtual visit is offered as this is our only opening and she has no wheezing or SOB.    She states her BF got her an appointment in [place].  She is going to this appointment.  Closing this encounter.  Elena Keller, ROHITN, RN

## 2021-08-21 LAB — SARS-COV-2 RNA RESP QL NAA+PROBE: NEGATIVE

## 2021-08-27 ENCOUNTER — VIRTUAL VISIT (OUTPATIENT)
Dept: BEHAVIORAL HEALTH | Facility: CLINIC | Age: 36
End: 2021-08-27
Payer: COMMERCIAL

## 2021-08-27 DIAGNOSIS — F41.1 GAD (GENERALIZED ANXIETY DISORDER): Primary | ICD-10-CM

## 2021-08-27 PROCEDURE — 90832 PSYTX W PT 30 MINUTES: CPT | Mod: 95 | Performed by: MARRIAGE & FAMILY THERAPIST

## 2021-08-27 NOTE — PROGRESS NOTES
10:40am-[facility] initiated video visit by sending a link via text.      [facility] Primary Care: Integrated Behavioral Health  August 27, 2021    Telemedicine Visit: The patient's condition can be safely assessed and treated via synchronous audio and visual telemedicine encounter.      Reason for Telemedicine Visit: Services only offered telehealth    Originating Site (Patient Location): Patient's home     Distant Site (Provider Location): Provider Remote Setting- Home Office    Consent:  The patient/guardian has verbally consented to: the potential risks and benefits of telemedicine (video visit) versus in person care; bill my insurance or make self-payment for services provided; and responsibility for payment of non-covered services.     Mode of Communication:  Video Conference via [employer] Here    As the provider I attest to compliance with applicable laws and regulations related to telemedicine.      Behavioral Health Clinician Progress Note    Patient Name: Vickie Duque           Service Type:  Individual      Service Location:   Telemedicine     Session Start Time: 10:43am  Session End Time: 11:04am      Session Length: 16 - 37      Attendees: Patient    Visit Activities (Refresh list every visit): [facility] Only    Diagnostic Assessment Date: 7/21/21  Treatment Plan Review Date: 8/27/2021  See Flowsheets for today's PHQ-9 and ALYX-7 results  Previous PHQ-9:   PHQ-9 SCORE 10/2/2019 11/23/2020 5/24/2021   PHQ-9 Total Score 0 1 15     Previous ALYX-7:   ALYX-7 SCORE 9/26/2016 12/20/2018   Total Score 16 9       BYRON LEVEL:  BYRON Score (Last Two) 3/16/2012   BYRON Raw Score 42   Activation Score 66   BYRON Level 3       Clinical Global Impressions  First:  Considering your total clinical experience with this particular patient population, how severe are the patient's symptoms at this time?: 4 (7/22/2021 12:59 PM)      Most recent:  No data recorded        DATA  Extended Session (60+ minutes):  "No  Interactive Complexity: No  Crisis: No  [facility] Patient: No    Treatment Objective(s) Addressed in This Session:  Target Behavior(s): relationship issues and depression    Depressed Mood: Decrease frequency and intensity of feeling down, depressed, hopeless  Identify negative self-talk and behaviors: challenge core beliefs, myths, and actions  Anxiety: will develop healthy cognitive patterns and beliefs  Relationship Problems: will address relationship difficulties in a more adaptive manner  Risk / Safety: will follow safety plan (in EMR) for more effective management of risk issues    Current Stressors / Issues:  Patient reports that she had a \"rain day\" and is home today.  \"I have my days\" which she states is typical.     She states that some days she wonders if medication can help, though doesn't want to be dependant. Patient had been on medication before over 5 years ago; she was taking Prozac and Lorazepam, and she found it helpful. She ended up discontinuing it just short of a year later. She notices that she has days where her anxiety is increased and she doesn't want to be around others because she is more irritable. She states that on those days she has a lot of things going through her head and doesn't know what to do with them all. [facility] and patient weighed pros and cons of going on medication. Suggested patient schedule an appointment with her PCP to discuss restarting a medication and patient agreed with this plan.    Co-developed treatment plan and goals.    Progress on Treatment Objective(s) / Homework:  No improvement - PREPARATION (Decided to change - considering how); Intervened by negotiating a change plan and determining options / strategies for behavior change, identifying triggers, exploring social supports, and working towards setting a date to begin behavior change    Motivational Interviewing    MI Intervention: Supported Autonomy, Collaboration, Evocation, Open-ended questions, Reflections: " "simple and complex, Change talk (evoked) and Reframe     Change Talk Expressed by the Patient: Desire to change Reasons to change Taking steps    Provider Response to Change Talk: E - Evoked more info from patient about behavior change, A - Affirmed patient's thoughts, decisions, or attempts at behavior change, R - Reflected patient's change talk and S - Summarized patient's change talk statements    Also provided psychoeducation about behavioral health condition, symptoms, and treatment options    Care Plan review completed: Yes    Medication Review:  No current psychiatric medications prescribed       Medication Compliance:  NA    Changes in Health Issues:   None reported    Chemical Use Review:   Substance Use: Chemical use reviewed, no active concerns identified      Tobacco Use: No change in amount of tobacco use since last session.  Patient declined discussion at this time     Assessment: Current Emotional / Mental Status (status of significant symptoms):  Risk status (Self / Other harm or suicidal ideation)  Patient has had a history of suicidal ideation: thoughts started in adolescence around age 14 and suicide attempts: she attempted suicide by taking \"a bunch\" of her medication for her ulcerative colitis at age 14, she was treated medically and held for 72 hours and denies a history of self-injurious behavior, homicidal ideation, homicidal behavior and and other safety concerns   Patient denies current fears or concerns for personal safety.  Patient denies current or recent suicidal ideation or behaviors.   Patient denies current or recent homicidal ideation or behaviors..   Patient denies current or recent self injurious behavior or ideation.  Patient denies other safety concerns.  A safety and risk management plan has been developed including: Patient co-developed a safety plan on 6/16/21.  Patient has a copy of her safety plan in her chart and agrees to use it if she experiences suicidal or homicidal " "ideation.       Appearance:   Appropriate   Eye Contact:   Fair   Psychomotor Behavior: Normal   Attitude:   Cooperative  Pleasant  Orientation:   All  Speech   Rate / Production: Normal/ Responsive   Volume:  Normal   Mood:    Anxious   Affect:    Appropriate   Thought Content:  Rumination   Thought Form:  Coherent  Logical   Insight:    Fair     Diagnoses:  1. ALYX (generalized anxiety disorder)        Collateral Reports Completed:  Not Applicable    Plan: (Homework, other):  Patient was given information about behavioral services and encouraged to schedule a follow up appointment with the clinic [facility] in 1-2 weeks.  She was also given information about mental health symptoms and treatment options  and Cognitive Behavioral Therapy skills to practice when experiencing depression and anger.  CD Recommendations: Maintain Sobriety. Patient agrees to follow safety plan if she experiences suicidal or homicidal thoughts and plan. Patient will schedule an appointment with her PCP to discuss starting a medication.    JENNIFER Kingston, [facility]       ______________________________________________________________________    Integrated Primary Care Behavioral Health Treatment Plan    Patient's Name: Vickie Duque  YOB: 1985    Date: August 27, 2021    DSM-V Diagnoses: 300.02 (F41.1) Generalized Anxiety Disorder   Rule out Major Depression  Patient reports a historical dx of \"manic depressive\" at age 14  Psychosocial / Contextual Factors: relationship issues  WHODAS: not completed    Referral / Collaboration:  Referral to another professional/service is not indicated at this time..    Anticipated number of session or this episode of care: 6-8      MeasurableTreatment Goal(s) related to diagnosis / functional impairment(s)  Goal 1: Patient will demonstrate ability to manage anxiety symptoms by better managing anger and improving communication skills.    I will know I've met my goal when I don't get mad as often or " feel like biting my tongue.      Objective #A (Patient Action)    Patient will identify patterns of escalation  (i.e. tightness in chest, flushed face, increased heart rate, clenched hands, etc.)  Patient will identify situations that trigger anger/irritability  Status: New - Date: 8/27/21     Intervention(s)  [facility] will teach patient about anger as a secondary emotion. Help patient identify and recognize patterns of escalation and discuss situations where she experienced anger to identify triggers and patterns of anger..    Objective #B  Patient will learn & utilize at least 1 assertive communication skills weekly  Status: New - Date: 8/27/21     Intervention(s)  [facility] will discuss interpersonal effectiveness skills and assertive language and communication.    Patient has reviewed and agreed to the above plan.    Written by  JENNIFER Kingston, [facility]

## 2021-09-03 ENCOUNTER — MYC MEDICAL ADVICE (OUTPATIENT)
Dept: FAMILY MEDICINE | Facility: CLINIC | Age: 36
End: 2021-09-03

## 2021-09-03 NOTE — TELEPHONE ENCOUNTER
Per Dr. Guerrero, pt needs a virtual visit. I have scheduled pt for a video visit on 9/09/2021. Gabrielle Galo, CMA

## 2021-09-09 ENCOUNTER — VIRTUAL VISIT (OUTPATIENT)
Dept: FAMILY MEDICINE | Facility: CLINIC | Age: 36
End: 2021-09-09
Payer: COMMERCIAL

## 2021-09-09 DIAGNOSIS — F43.22 ADJUSTMENT DISORDER WITH ANXIOUS MOOD: ICD-10-CM

## 2021-09-09 DIAGNOSIS — F33.1 MAJOR DEPRESSIVE DISORDER, RECURRENT EPISODE, MODERATE (H): Primary | ICD-10-CM

## 2021-09-09 PROCEDURE — 96127 BRIEF EMOTIONAL/BEHAV ASSMT: CPT | Performed by: FAMILY MEDICINE

## 2021-09-09 PROCEDURE — 99214 OFFICE O/P EST MOD 30 MIN: CPT | Mod: 95 | Performed by: FAMILY MEDICINE

## 2021-09-09 ASSESSMENT — ANXIETY QUESTIONNAIRES
7. FEELING AFRAID AS IF SOMETHING AWFUL MIGHT HAPPEN: SEVERAL DAYS
IF YOU CHECKED OFF ANY PROBLEMS ON THIS QUESTIONNAIRE, HOW DIFFICULT HAVE THESE PROBLEMS MADE IT FOR YOU TO DO YOUR WORK, TAKE CARE OF THINGS AT HOME, OR GET ALONG WITH OTHER PEOPLE: SOMEWHAT DIFFICULT
6. BECOMING EASILY ANNOYED OR IRRITABLE: NEARLY EVERY DAY
2. NOT BEING ABLE TO STOP OR CONTROL WORRYING: MORE THAN HALF THE DAYS
GAD7 TOTAL SCORE: 17
5. BEING SO RESTLESS THAT IT IS HARD TO SIT STILL: NEARLY EVERY DAY
3. WORRYING TOO MUCH ABOUT DIFFERENT THINGS: MORE THAN HALF THE DAYS
1. FEELING NERVOUS, ANXIOUS, OR ON EDGE: NEARLY EVERY DAY

## 2021-09-09 ASSESSMENT — PATIENT HEALTH QUESTIONNAIRE - PHQ9
SUM OF ALL RESPONSES TO PHQ QUESTIONS 1-9: 14
5. POOR APPETITE OR OVEREATING: NEARLY EVERY DAY

## 2021-09-09 NOTE — PROGRESS NOTES
"Vickie is a 35 year old who is being evaluated via a billable video visit.      How would you like to obtain your AVS? MyChart  If the video visit is dropped, the invitation should be resent by: Text to cell phone: [phone]  Will anyone else be joining your video visit? No      Video Start Time: 10:41 AM    Assessment & Plan     ASSESSMENT/ORDERS:    ICD-10-CM    1. Major depressive disorder, recurrent episode, moderate (H)  F33.1 FLUoxetine (PROZAC) 20 MG capsule   2. Adjustment disorder with anxious mood  F43.22 FLUoxetine (PROZAC) 20 MG capsule     PLAN:  1.  Start 20 mg fluoxetine dose today.  Follow-up in 1 month for recheck and discussion on 40 mg dosing.   2.  Continue therapy.             Tobacco Cessation:   reports that she has been smoking cigarettes. She has a 16.00 pack-year smoking history. She has never used smokeless tobacco.  Tobacco Cessation Action Plan: not addressed today    BMI:   Estimated body mass index is 26.62 kg/m  as calculated from the following:    Height as of 6/4/21: 1.626 m (5' 4\").    Weight as of 6/28/21: 70.4 kg (155 lb 1.6 oz).       Depression Screening Follow Up    PHQ 9/9/2021   PHQ-9 Total Score 14   Q9: Thoughts of better off dead/self-harm past 2 weeks Several days                 Follow Up    Follow Up Actions Taken  discussed follow-up actions for suicidaly ideation    Discussed the following ways the patient can remain in a safe environment:        Return in about 4 weeks (around 10/7/2021) for depression/anxiety recheck.    Alvaro Guerrero MD  [facility]    Subjective   Vickie is a 35 year old who presents for the following health issues     HPI     Depression and Anxiety Follow-Up    How are you doing with your depression since your last visit? No change    How are you doing with your anxiety since your last visit?  No change    Are you having other symptoms that might be associated with depression or anxiety? Yes:  trouble " "sleeping    Have you had a significant life event? OTHER: \"plenty of them\"     Do you have any concerns with your use of alcohol or other drugs? No    Social History     Tobacco Use     Smoking status: Current Every Day Smoker     Packs/day: 1.00     Years: 16.00     Pack years: 16.00     Types: Cigarettes     Smokeless tobacco: Never Used     Tobacco comment: working on quiting   Vaping Use     Vaping Use: Never used   Substance Use Topics     Alcohol use: Yes     Alcohol/week: 0.0 standard drinks     Comment: occassionally     Drug use: No     PHQ 11/23/2020 5/24/2021 9/9/2021   PHQ-9 Total Score 1 15 14   Q9: Thoughts of better off dead/self-harm past 2 weeks Not at all Not at all Several days     ALYX-7 SCORE 9/26/2016 12/20/2018 9/9/2021   Total Score 16 9 17     Last PHQ-9 9/9/2021   1.  Little interest or pleasure in doing things 0   2.  Feeling down, depressed, or hopeless 1   3.  Trouble falling or staying asleep, or sleeping too much 2   4.  Feeling tired or having little energy 3   5.  Poor appetite or overeating 2   6.  Feeling bad about yourself 2   7.  Trouble concentrating 2   8.  Moving slowly or restless 1   Q9: Thoughts of better off dead/self-harm past 2 weeks 1   PHQ-9 Total Score 14   Difficulty at work, home, or with people Somewhat difficult     ALYX-7  9/9/2021   1. Feeling nervous, anxious, or on edge 3   2. Not being able to stop or control worrying 2   3. Worrying too much about different things 2   4. Trouble relaxing 3   5. Being so restless that it is hard to sit still 3   6. Becoming easily annoyed or irritable 3   7. Feeling afraid, as if something awful might happen 1   ALYX-7 Total Score 17   If you checked any problems, how difficult have they made it for you to do your work, take care of things at home, or get along with other people? Somewhat difficult             Follow Up Actions Taken  patient has no thoughts of self harm or suicide.  she answered question #9 as she is feeling " down.     Discussed the following ways the patient can remain in a safe environment:  has no immediate symptoms of self harm  Suicide Assessment Five-step Evaluation and Treatment (SAFE-T)      How many servings of fruits and vegetables do you eat daily?  2-3    On average, how many sweetened beverages do you drink each day (Examples: soda, juice, sweet tea, etc.  Do NOT count diet or artificially sweetened beverages)?   1    How many days per week do you exercise enough to make your heart beat faster? none    How many minutes a day do you exercise enough to make your heart beat faster? none    How many days per week do you miss taking your medication? 0      Has been on fluoxetine in the past and this has worked well for her.  Is interested in restarting this.  Was up to 40 mg dosing in the past.  Has been on lorazepam in the past but does not wish to start this at this time.     Feels work is adding to her stress.  They are not supportive to her missing time for medical issues.  Does not qualify for FMLA per them despite working for them for 3 years.  Is at work today and taking break to attend this visit.    Review of Systems         Objective           Vitals:  No vitals were obtained today due to virtual visit.    Physical Exam   GENERAL: Healthy, alert and no distress  EYES: Eyes grossly normal to inspection.  No discharge or erythema, or obvious scleral/conjunctival abnormalities.  RESP: No audible wheeze, cough, or visible cyanosis.  No visible retractions or increased work of breathing.    SKIN: Visible skin clear. No significant rash, abnormal pigmentation or lesions.  NEURO: Cranial nerves grossly intact.  Mentation and speech appropriate for age.  PSYCH: Mentation appears normal, affect flat, judgement and insight intact, normal speech and appearance well-groomed.                Video-Visit Details    Type of service:  Video Visit    Video End Time:11:02 AM    Originating Location (pt. Location): Other  work (in [place])    Distant Location (provider location):  [facility]     Platform used for Video Visit: Shan

## 2021-09-10 ASSESSMENT — ANXIETY QUESTIONNAIRES: GAD7 TOTAL SCORE: 17

## 2021-09-20 ENCOUNTER — VIRTUAL VISIT (OUTPATIENT)
Dept: BEHAVIORAL HEALTH | Facility: CLINIC | Age: 36
End: 2021-09-20
Payer: COMMERCIAL

## 2021-09-20 DIAGNOSIS — F41.1 GAD (GENERALIZED ANXIETY DISORDER): Primary | ICD-10-CM

## 2021-09-20 PROCEDURE — 90832 PSYTX W PT 30 MINUTES: CPT | Mod: 95 | Performed by: MARRIAGE & FAMILY THERAPIST

## 2021-09-20 NOTE — PROGRESS NOTES
1:02pm-[facility] initiated video visit by sending a link via text.      [facility]: Integrated Behavioral Health  September 20, 2021    Telemedicine Visit: The patient's condition can be safely assessed and treated via synchronous audio and visual telemedicine encounter.      Reason for Telemedicine Visit: Services only offered telehealth    Originating Site (Patient Location): Patient's parked car, at work     Distant Site (Provider Location): Provider Remote Setting- Home Office    Consent:  The patient/guardian has verbally consented to: the potential risks and benefits of telemedicine (video visit) versus in person care; bill my insurance or make self-payment for services provided; and responsibility for payment of non-covered services.     Mode of Communication:  Video Conference via [employer]    As the provider I attest to compliance with applicable laws and regulations related to telemedicine.      Behavioral Health Clinician Progress Note    Patient Name: Vickie Duque           Service Type:  Individual      Service Location:   Telemedicine     Session Start Time: 1:08pm  Session End Time: 1:32pm      Session Length: 16 - 37      Attendees: Patient    Visit Activities (Refresh list every visit): [facility] Only    Diagnostic Assessment Date: 7/21/21  Treatment Plan Review Date: 8/27/2021  See Flowsheets for today's PHQ-9 and ALYX-7 results  Previous PHQ-9:   PHQ-9 SCORE 11/23/2020 5/24/2021 9/9/2021   PHQ-9 Total Score 1 15 14     Previous ALYX-7:   ALYX-7 SCORE 9/26/2016 12/20/2018 9/9/2021   Total Score 16 9 17       BYRON LEVEL:  BYRON Score (Last Two) 3/16/2012   BYRON Raw Score 42   Activation Score 66   BYRON Level 3       Clinical Global Impressions  First:  Considering your total clinical experience with this particular patient population, how severe are the patient's symptoms at this time?: 4 (7/22/2021 12:59 PM)      Most recent:  No data recorded        DATA  Extended Session  "(60+ minutes): No  Interactive Complexity: No  Crisis: No  [facility] Patient: No    Treatment Objective(s) Addressed in This Session:  Target Behavior(s): stress and anxiety    Depressed Mood: Identify negative self-talk and behaviors: challenge core beliefs, myths, and actions  Anxiety: will develop more effective coping skills to manage anxiety symptoms and will develop healthy cognitive patterns and beliefs  Adjustment Difficulties: will develop coping/problem-solving skills to facilitate more adaptive adjustment    Current Stressors / Issues:  Patient just had her birthday and she stated that it was a good day because she didn't have to work. She reportedly spent the day not doing \"a whole lot of nothing\" and spent time with her kids. Patient reports that she has \"come to hate my job more and more everyday\". She reports that there have been dynamics which have been stressful.     She states that her kids are home sick and she has to take work off tomorrow. They are with their father today. She reflected that co-parenting with her ex is \"one good thing\" in their relationship and that this took work.     Patient met with her PCP and was just started on fluoxetine. She feels this is going well so far, however believes her dose will need to be increased when she has her follow up in October. She states that this is because of increased stressors at work.     Patient states that she wanted to get FMLA so she could attend her appointments, however hasn't worked enough to qualify. Patient talked about feeling unhappy with work and how she wants to find another job. She mentioned that she would be okay if she were fired and has tried to get fired so she can collect unemployment, though \"this isn't working well\". She states that she is being asked to work 58 hours a week. She states that this upsets her because she wants to see her kids. She is currently working on location in [place].    Patient reports that " she has been feeling more anxious. She denies feeling down or depressed.     Patient talked about her stressors at work. [facility] validated patient's feelings. Discussed control and looking at where patient has control versus where she does not have control. Explored options to create change such as seeking new employment.    Progress on Treatment Objective(s) / Homework:  Minimal progress - PREPARATION (Decided to change - considering how); Intervened by negotiating a change plan and determining options / strategies for behavior change, identifying triggers, exploring social supports, and working towards setting a date to begin behavior change    Motivational Interviewing    MI Intervention: Expressed Empathy/Understanding, Supported Autonomy, Collaboration, Evocation, Open-ended questions, Reflections: simple and complex, Change talk (evoked) and Reframe     Change Talk Expressed by the Patient: Desire to change Reasons to change Taking steps    Provider Response to Change Talk: E - Evoked more info from patient about behavior change, A - Affirmed patient's thoughts, decisions, or attempts at behavior change, R - Reflected patient's change talk and S - Summarized patient's change talk statements    Also provided psychoeducation about behavioral health condition, symptoms, and treatment options    Solution-Focused Therapy    Explored patterns in patient's relationships and discussed options for new behaviors    Explored patterns in patient's actions and choices and discussed options for new behaviors    Cognitive-behavioral Therapy    Discussed common cognitive distortions, identified them in patient's life    Explored ways to challenge, replace, and act against these cognitions      Care Plan review completed: Yes    Medication Review:  Changes to psychiatric medications, see updated Medication List in EPIC.  Patient was started on 20 mg of Fluoxetine.      Medication Compliance:  Yes and patient believes she will  "likely need an increased in a month when she has her follow up.    Changes in Health Issues:   None reported    Chemical Use Review:   Substance Use: Chemical use reviewed, no active concerns identified      Tobacco Use: No change in amount of tobacco use since last session.  Patient declined discussion at this time     Assessment: Current Emotional / Mental Status (status of significant symptoms):  Risk status (Self / Other harm or suicidal ideation)  Patient has had a history of suicidal ideation: thoughts started in adolescence around age 14 and suicide attempts: she attempted suicide by taking \"a bunch\" of her medication for her ulcerative colitis at age 14, she was treated medically and held for 72 hours and denies a history of self-injurious behavior, homicidal ideation, homicidal behavior and and other safety concerns   Patient denies current fears or concerns for personal safety.  Patient denies current or recent suicidal ideation or behaviors.   Patient denies current or recent homicidal ideation or behaviors..   Patient denies current or recent self injurious behavior or ideation.  Patient denies other safety concerns.  A safety and risk management plan has been developed including: Patient co-developed a safety plan on 6/16/21.  Patient has a copy of her safety plan in her chart and agrees to use it if she experiences suicidal or homicidal ideation.       Appearance:   Appropriate   Eye Contact:   Poor  Psychomotor Behavior: Restless   Attitude:   Cooperative  and distracted   Orientation:   All  Speech   Rate / Production: Normal/ Responsive   Volume:  Normal   Mood:    Anxious  Irritable   Affect:    Appropriate   Thought Content:  Perservative  Rumination   Thought Form:  Coherent  Circumstantial  Insight:    Fair     Diagnoses:  1. ALYX (generalized anxiety disorder)        Collateral Reports Completed:  Not Applicable    Plan: (Homework, other):  Patient was given information about behavioral services " "and encouraged to schedule a follow up appointment with the clinic [facility] in 1-2 weeks.  She was also given information about mental health symptoms and treatment options  and Cognitive Behavioral Therapy skills to practice when experiencing depression and anger.  CD Recommendations: Maintain Sobriety. Patient agrees to follow safety plan if she experiences suicidal or homicidal thoughts and plan. Patient will continue to take medication as prescribed per recommendation of her PCP. Patient will look at where she has control in situations and where she does not and try and shift her negative thoughts.    JENNIFER Kingston, [facility]       ______________________________________________________________________    Integrated Primary Care Behavioral Health Treatment Plan    Patient's Name: Vickie Duque  YOB: 1985    Date: August 27, 2021    DSM-V Diagnoses: 300.02 (F41.1) Generalized Anxiety Disorder   Rule out Major Depression  Patient reports a historical dx of \"manic depressive\" at age 14  Psychosocial / Contextual Factors: relationship issues  WHODAS: not completed    Referral / Collaboration:  Referral to another professional/service is not indicated at this time..    Anticipated number of session or this episode of care: 6-8      MeasurableTreatment Goal(s) related to diagnosis / functional impairment(s)  Goal 1: Patient will demonstrate ability to manage anxiety symptoms by better managing anger and improving communication skills.    I will know I've met my goal when I don't get mad as often or feel like biting my tongue.      Objective #A (Patient Action)    Patient will identify patterns of escalation  (i.e. tightness in chest, flushed face, increased heart rate, clenched hands, etc.)  Patient will identify situations that trigger anger/irritability  Status: New - Date: 8/27/21     Intervention(s)  [facility] will teach patient about anger as a secondary emotion. Help patient identify and recognize patterns of " escalation and discuss situations where she experienced anger to identify triggers and patterns of anger..    Objective #B  Patient will learn & utilize at least 1 assertive communication skills weekly  Status: New - Date: 8/27/21     Intervention(s)  [facility] will discuss interpersonal effectiveness skills and assertive language and communication.    Patient has reviewed and agreed to the above plan.    Written by  JENNIFER Kingston, [facility]

## 2021-10-04 ENCOUNTER — VIRTUAL VISIT (OUTPATIENT)
Dept: BEHAVIORAL HEALTH | Facility: CLINIC | Age: 36
End: 2021-10-04
Payer: COMMERCIAL

## 2021-10-04 DIAGNOSIS — F41.1 GAD (GENERALIZED ANXIETY DISORDER): Primary | ICD-10-CM

## 2021-10-04 PROCEDURE — 90832 PSYTX W PT 30 MINUTES: CPT | Mod: 95 | Performed by: MARRIAGE & FAMILY THERAPIST

## 2021-10-04 NOTE — PROGRESS NOTES
1:13pm-[facility] initiated video visit by sending a link via text.    [facility] Primary Care: Integrated Behavioral Health  October 4, 2021    Telemedicine Visit: The patient's condition can be safely assessed and treated via synchronous audio and visual telemedicine encounter.      Reason for Telemedicine Visit: Services only offered telehealth    Originating Site (Patient Location): Patient's home     Distant Site (Provider Location): Provider Remote Setting- Home Office    Consent:  The patient/guardian has verbally consented to: the potential risks and benefits of telemedicine (video visit) versus in person care; bill my insurance or make self-payment for services provided; and responsibility for payment of non-covered services.     Mode of Communication:  Video Conference via [employer]    As the provider I attest to compliance with applicable laws and regulations related to telemedicine.      Behavioral Health Clinician Progress Note    Patient Name: Vickie Duque           Service Type:  Individual      Service Location:   Telemedicine     Session Start Time: 1:14pm  Session End Time: 1:35pm      Session Length: 16 - 37      Attendees: Patient    Visit Activities (Refresh list every visit): [facility] Only    Diagnostic Assessment Date: 7/21/21  Treatment Plan Review Date: 8/27/2021  See Flowsheets for today's PHQ-9 and ALYX-7 results  Previous PHQ-9:   PHQ-9 SCORE 11/23/2020 5/24/2021 9/9/2021   PHQ-9 Total Score 1 15 14     Previous ALYX-7:   ALYX-7 SCORE 9/26/2016 12/20/2018 9/9/2021   Total Score 16 9 17       BYRON LEVEL:  BYRON Score (Last Two) 3/16/2012   BYRON Raw Score 42   Activation Score 66   BYRON Level 3       Clinical Global Impressions  First:  Considering your total clinical experience with this particular patient population, how severe are the patient's symptoms at this time?: 4 (7/22/2021 12:59 PM)      Most recent:  No data recorded        DATA  Extended Session (60+ minutes):  No  Interactive Complexity: No  Crisis: No  [facility] Patient: No    Treatment Objective(s) Addressed in This Session:  Target Behavior(s): stress and irritability    Depressed Mood: Identify negative self-talk and behaviors: challenge core beliefs, myths, and actions  Anxiety: will develop healthy cognitive patterns and beliefs  Adjustment Difficulties: will develop coping/problem-solving skills to facilitate more adaptive adjustment    Current Stressors / Issues:  Patient reports that she has been home because her daughter tested positive with COVID. Patient has taken the time off of work to be home with her daughter. She states that she will be returning to work on 10/11. She reflected that it has been nice not having to be at work dealing with stressors there.     Patient identified financial stress due to taking this time off of work. However she received information to apply for unemployment during this time.     Patient reports that her mood has been better and she has been less stressed being at home. She reports that her anger has decreased some. She and her SO have also spent more time together with her not working.     Provided psycho-education on displaced or projected anger and patient recognized that she likely has been experiencing this. Discussed self care and encouraged patient to think about what this might look like for her.    Progress on Treatment Objective(s) / Homework:  Minimal progress - PREPARATION (Decided to change - considering how); Intervened by negotiating a change plan and determining options / strategies for behavior change, identifying triggers, exploring social supports, and working towards setting a date to begin behavior change    Motivational Interviewing    MI Intervention: Expressed Empathy/Understanding, Supported Autonomy, Collaboration, Evocation, Open-ended questions, Reflections: simple and complex, Change talk (evoked) and Reframe     Change Talk Expressed by the Patient:  "Desire to change Reasons to change Taking steps    Provider Response to Change Talk: E - Evoked more info from patient about behavior change, A - Affirmed patient's thoughts, decisions, or attempts at behavior change, R - Reflected patient's change talk and S - Summarized patient's change talk statements    Also provided psychoeducation about behavioral health condition, symptoms, and treatment options    Solution-Focused Therapy    Explored patterns in patient's relationships and discussed options for new behaviors    Explored patterns in patient's actions and choices and discussed options for new behaviors    Cognitive-behavioral Therapy    Discussed common cognitive distortions, identified them in patient's life    Explored ways to challenge, replace, and act against these cognitions      Care Plan review completed: Yes    Medication Review:  No changes to current psychiatric medication(s)     Medication Compliance:  Yes patient reports that the medication has been going well and she also believes that an increase would likely be more beneficial. She meets with her PCP on 10/11.    Changes in Health Issues:   None reported    Chemical Use Review:   Substance Use: Chemical use reviewed, no active concerns identified      Tobacco Use: No change in amount of tobacco use since last session.  Patient declined discussion at this time     Assessment: Current Emotional / Mental Status (status of significant symptoms):  Risk status (Self / Other harm or suicidal ideation)  Patient has had a history of suicidal ideation: thoughts started in adolescence around age 14 and suicide attempts: she attempted suicide by taking \"a bunch\" of her medication for her ulcerative colitis at age 14, she was treated medically and held for 72 hours and denies a history of self-injurious behavior, homicidal ideation, homicidal behavior and and other safety concerns   Patient denies current fears or concerns for personal safety.  Patient " "denies current or recent suicidal ideation or behaviors.   Patient denies current or recent homicidal ideation or behaviors..   Patient denies current or recent self injurious behavior or ideation.  Patient denies other safety concerns.  A safety and risk management plan has been developed including: Patient co-developed a safety plan on 6/16/21.  Patient has a copy of her safety plan in her chart and agrees to use it if she experiences suicidal or homicidal ideation.       Appearance:   Appropriate   Eye Contact:   Fair   Psychomotor Behavior: Restless   Attitude:   Cooperative  Pleasant  Orientation:   All  Speech   Rate / Production: Normal/ Responsive   Volume:  Normal   Mood:    Anxious   Affect:    Appropriate   Thought Content:  Clear  Rumination   Thought Form:  Coherent  Logical   Insight:    Fair     Diagnoses:  1. ALYX (generalized anxiety disorder)        Collateral Reports Completed:  Not Applicable    Plan: (Homework, other):  Patient was given information about behavioral services and encouraged to schedule a follow up appointment with the clinic [facility] in 1-2 weeks.  She was also given information about mental health symptoms and treatment options  and Cognitive Behavioral Therapy skills to practice when experiencing depression and anger.  CD Recommendations: Maintain Sobriety. Patient agrees to follow safety plan if she experiences suicidal or homicidal thoughts and plan. Patient will continue to take medication as prescribed per recommendation of her PCP. Patient will think about what self care looks like for her.    JENNIFER Kingston, [facility]       ______________________________________________________________________    Integrated Primary Care Behavioral Health Treatment Plan    Patient's Name: Vickie Duque  YOB: 1985    Date: August 27, 2021    DSM-V Diagnoses: 300.02 (F41.1) Generalized Anxiety Disorder   Rule out Major Depression  Patient reports a historical dx of \"manic depressive\" " at age 14  Psychosocial / Contextual Factors: relationship issues  WHODAS: not completed    Referral / Collaboration:  Referral to another professional/service is not indicated at this time..    Anticipated number of session or this episode of care: 6-8      MeasurableTreatment Goal(s) related to diagnosis / functional impairment(s)  Goal 1: Patient will demonstrate ability to manage anxiety symptoms by better managing anger and improving communication skills.    I will know I've met my goal when I don't get mad as often or feel like biting my tongue.      Objective #A (Patient Action)    Patient will identify patterns of escalation  (i.e. tightness in chest, flushed face, increased heart rate, clenched hands, etc.)  Patient will identify situations that trigger anger/irritability   Status: New - Date: 8/27/21     Intervention(s)  [facility] will teach patient about anger as a secondary emotion. Help patient identify and recognize patterns of escalation and discuss situations where she experienced anger to identify triggers and patterns of anger..    Objective #B  Patient will learn & utilize at least 1 assertive communication skills weekly  Status: New - Date: 8/27/21     Intervention(s)  [facility] will discuss interpersonal effectiveness skills and assertive language and communication.    Patient has reviewed and agreed to the above plan.    Written by  JENNIFER Kingston, [facility]

## 2021-10-11 ENCOUNTER — VIRTUAL VISIT (OUTPATIENT)
Dept: FAMILY MEDICINE | Facility: CLINIC | Age: 36
End: 2021-10-11
Payer: COMMERCIAL

## 2021-10-11 DIAGNOSIS — F43.22 ADJUSTMENT DISORDER WITH ANXIOUS MOOD: ICD-10-CM

## 2021-10-11 DIAGNOSIS — F33.1 MAJOR DEPRESSIVE DISORDER, RECURRENT EPISODE, MODERATE (H): ICD-10-CM

## 2021-10-11 PROCEDURE — 99214 OFFICE O/P EST MOD 30 MIN: CPT | Mod: 95 | Performed by: FAMILY MEDICINE

## 2021-10-11 RX ORDER — FLUOXETINE 40 MG/1
40 CAPSULE ORAL DAILY
Qty: 30 CAPSULE | Refills: 1 | Status: SHIPPED | OUTPATIENT
Start: 2021-10-11 | End: 2021-11-15

## 2021-10-11 ASSESSMENT — ANXIETY QUESTIONNAIRES
IF YOU CHECKED OFF ANY PROBLEMS ON THIS QUESTIONNAIRE, HOW DIFFICULT HAVE THESE PROBLEMS MADE IT FOR YOU TO DO YOUR WORK, TAKE CARE OF THINGS AT HOME, OR GET ALONG WITH OTHER PEOPLE: SOMEWHAT DIFFICULT
2. NOT BEING ABLE TO STOP OR CONTROL WORRYING: NOT AT ALL
1. FEELING NERVOUS, ANXIOUS, OR ON EDGE: NOT AT ALL
3. WORRYING TOO MUCH ABOUT DIFFERENT THINGS: SEVERAL DAYS
5. BEING SO RESTLESS THAT IT IS HARD TO SIT STILL: NOT AT ALL
7. FEELING AFRAID AS IF SOMETHING AWFUL MIGHT HAPPEN: NOT AT ALL
6. BECOMING EASILY ANNOYED OR IRRITABLE: SEVERAL DAYS
GAD7 TOTAL SCORE: 2

## 2021-10-11 ASSESSMENT — PATIENT HEALTH QUESTIONNAIRE - PHQ9
SUM OF ALL RESPONSES TO PHQ QUESTIONS 1-9: 3
5. POOR APPETITE OR OVEREATING: NOT AT ALL

## 2021-10-11 NOTE — PROGRESS NOTES
Vickie is a 36 year old who is being evaluated via a billable telephone visit.      What phone number would you like to be contacted at? [phone]  How would you like to obtain your AVS? MyChart    Assessment & Plan     ASSESSMENT/ORDERS:    ICD-10-CM    1. Major depressive disorder, recurrent episode, moderate (H)  F33.1 FLUoxetine (PROZAC) 40 MG capsule   2. Adjustment disorder with anxious mood  F43.22 FLUoxetine (PROZAC) 40 MG capsule     PLAN:  1.  Fluoxetine increased to 40 mg/day.  Follow-up in a month to see where she is at with symptoms.  This dose is the one that she has been on in the past but may need to go higher depending on work stressors.                  Return in about 1 month (around 11/11/2021) for depression/anxiety recheck.    Alvaro Guerrero MD  [facility]   Vickie is a 36 year old who presents for the following health issues     HPI     Depression Followup    How are you doing with your depression since your last visit? Improved     Are you having other symptoms that might be associated with depression? No    Have you had a significant life event?  No     Are you feeling anxious or having panic attacks?   No    Do you have any concerns with your use of alcohol or other drugs? No    Social History     Tobacco Use     Smoking status: Current Every Day Smoker     Packs/day: 1.00     Years: 16.00     Pack years: 16.00     Types: Cigarettes     Smokeless tobacco: Never Used     Tobacco comment: working on quiting   Vaping Use     Vaping Use: Never used   Substance Use Topics     Alcohol use: Yes     Alcohol/week: 0.0 standard drinks     Comment: occassionally     Drug use: No     PHQ 5/24/2021 9/9/2021 10/11/2021   PHQ-9 Total Score 15 14 3   Q9: Thoughts of better off dead/self-harm past 2 weeks Not at all Several days Not at all     ALYX-7 SCORE 12/20/2018 9/9/2021 10/11/2021   Total Score 9 17 2     Last PHQ-9 10/11/2021   1.  Little interest or  pleasure in doing things 0   2.  Feeling down, depressed, or hopeless 0   3.  Trouble falling or staying asleep, or sleeping too much 0   4.  Feeling tired or having little energy 3   5.  Poor appetite or overeating 0   6.  Feeling bad about yourself 0   7.  Trouble concentrating 0   8.  Moving slowly or restless 0   Q9: Thoughts of better off dead/self-harm past 2 weeks 0   PHQ-9 Total Score 3   Difficulty at work, home, or with people Somewhat difficult     ALYX-7  10/11/2021   1. Feeling nervous, anxious, or on edge 0   2. Not being able to stop or control worrying 0   3. Worrying too much about different things 1   4. Trouble relaxing 0   5. Being so restless that it is hard to sit still 0   6. Becoming easily annoyed or irritable 1   7. Feeling afraid, as if something awful might happen 0   ALYX-7 Total Score 2   If you checked any problems, how difficult have they made it for you to do your work, take care of things at home, or get along with other people? Somewhat difficult       Suicide Assessment Five-step Evaluation and Treatment (SAFE-T)      How many servings of fruits and vegetables do you eat daily?  2-3    On average, how many sweetened beverages do you drink each day (Examples: soda, juice, sweet tea, etc.  Do NOT count diet or artificially sweetened beverages)?   2    How many days per week do you exercise enough to make your heart beat faster? 5    How many minutes a day do you exercise enough to make your heart beat faster? 60 or more    How many days per week do you miss taking your medication? 0      Doing well, but also notes that she has been home with her daughter on COVID-19 quarantine due to daughter testing positive.  Patient is back to work, first day today.  She is not sure how that is going to go as work has been stressful.    Tolerating fluoxetine with no issues.      Seeing Ирина Ledesma, behavioral health clinician and finding these visits helpful.    Review of Systems          Objective           Vitals:  No vitals were obtained today due to virtual visit.    Physical Exam   healthy, alert and no distress  PSYCH: Alert and oriented times 3; coherent speech, normal   rate and volume, able to articulate logical thoughts, able   to abstract reason, no tangential thoughts, no hallucinations   or delusions  Her affect is normal  RESP: No cough, no audible wheezing, able to talk in full sentences  Remainder of exam unable to be completed due to telephone visits                Phone call duration: 15 minutes

## 2021-10-11 NOTE — NURSING NOTE
Health Maintenance Due   Topic Date Due     COVID-19 Vaccine (1) Never done     HEPATITIS B IMMUNIZATION (3 of 3 - Risk 3-dose series) 08/04/2018     PREVENTIVE CARE VISIT  03/26/2019     EYE EXAM  09/27/2020     DIABETIC FOOT EXAM  01/13/2021     INFLUENZA VACCINE (1) 09/01/2021     A1C  09/04/2021     Aysha SHANNON LPN

## 2021-10-12 ENCOUNTER — OFFICE VISIT (OUTPATIENT)
Dept: ENDOCRINOLOGY | Facility: CLINIC | Age: 36
End: 2021-10-12
Payer: COMMERCIAL

## 2021-10-12 VITALS
WEIGHT: 154.3 LBS | DIASTOLIC BLOOD PRESSURE: 76 MMHG | SYSTOLIC BLOOD PRESSURE: 112 MMHG | HEART RATE: 64 BPM | BODY MASS INDEX: 26.49 KG/M2 | OXYGEN SATURATION: 98 %

## 2021-10-12 DIAGNOSIS — E10.39 TYPE 1 DIABETES MELLITUS WITH OTHER OPHTHALMIC COMPLICATION (H): ICD-10-CM

## 2021-10-12 LAB — HBA1C MFR BLD: 8.7 % (ref 0–5.7)

## 2021-10-12 PROCEDURE — 83036 HEMOGLOBIN GLYCOSYLATED A1C: CPT | Performed by: INTERNAL MEDICINE

## 2021-10-12 PROCEDURE — 99214 OFFICE O/P EST MOD 30 MIN: CPT | Performed by: INTERNAL MEDICINE

## 2021-10-12 RX ORDER — INSULIN ASPART 100 [IU]/ML
INJECTION, SOLUTION INTRAVENOUS; SUBCUTANEOUS
Qty: 45 ML | Refills: 1 | Status: SHIPPED | OUTPATIENT
Start: 2021-10-12

## 2021-10-12 ASSESSMENT — ANXIETY QUESTIONNAIRES: GAD7 TOTAL SCORE: 2

## 2021-10-12 NOTE — LETTER
10/12/2021         RE: Vickie Duque  [address] [facility] [address]        Dear Colleague,    Thank you for referring your patient, Vickie Duque, to the [facility]. Please see a copy of my visit note below.    Endocrinology Clinic Visit    Chief Complaint: Diabetes     Information obtained from:Patient      Assessment/Treatment Plan:      Type 1 diabetes     Previously on multiple daily injections of insulin.  Started insulin pump tandem this year tth CGM which has been working great for her.  Insulin pump and CGM was downloaded and average BG has been 164 with post meal hyperglycemia. No lows. She has been using control IQ.  We have adjusted mealtime insulin to address hyperglycemia (1:12 grams of CHO to 1:11 grams of CHO).   Has Glucagon  Blood pressure well controlled  No indication for statin at this time  Up-to-date eye exam    Patient Instructions     pump setting changes made today:     Basal = 0.45 units per hour  CHO 1: 12 units changed to 1:11 between 7:00 am -5:00 PM  Correction factor 1:60 mg/dl  Target 110.        Please administer mealtime bolus consistently at least 10-15 minutes before eating.           Return to clinic in 3 months.      Siri Rhoades MD  Staff Endocrinologist    Division of Endocrinology and Diabetes      Subjective:         HPI: Vickie Duque is a 36 year old female with history of type 1 diabetes.    Currently on tandem insulin pump    Current pump settings:     Basal = 0.45 units per hour  CHO 1: 12 units   Correction factor 1:60 mg/dl  Target 110.   No new concerns today.    She would like the prescription to be in insulin pens instead of vial since what she pays for for the two is the same.     CGM and pump settings downloaded today - last four weeks. She has been within the target range mostly except for post meal hyperglycemia. No lows. Bolus:basal :55:45   Has been using insulin IQ.       No Known Allergies    Current Outpatient  Medications   Medication Sig Dispense Refill     Continuous Blood Gluc Sensor (DEXCOM G6 SENSOR) MISC Change every 10 days. 9 each 3     Continuous Blood Gluc Transmit (DEXCOM G6 TRANSMITTER) MISC Change every 90 days. 1 each 3     FLUoxetine (PROZAC) 40 MG capsule Take 1 capsule (40 mg) by mouth daily 30 capsule 1     insulin pen needle (BD TARA U/F) 32G X 4 MM miscellaneous USE 3 DAILY OR AS DIRECTED. 270 each 3     levonorgestrel (MIRENA) 20 MCG/24HR IUD 1 each (20 mcg) by Intrauterine route once       NOVOLOG FLEXPEN 100 UNIT/ML soln 1 unit for every 12 grams of carbohydrates with meals three times per day. Plus correction scale; uses up to 60 units daily. 45 mL 1     Glucagon, rDNA, (GLUCAGON EMERGENCY) 1 MG KIT Inject 1 mg into the muscle as needed (Hypoglycemia) (Patient not taking: Reported on 10/11/2021) 1 kit 3     Naproxen Sodium (ALEVE PO) Take by mouth as needed for moderate pain (Patient not taking: Reported on 10/11/2021)       SUMAtriptan (IMITREX) 50 MG tablet Take 1 tablet (50 mg) by mouth at onset of headache for migraine May repeat in 2 hours. Max 4 tablets/24 hours. (Patient not taking: Reported on 10/11/2021) 18 tablet 4       Review of Systems      as per HPI above.  Mild numbness and tingling involving bilateral toes.    Objective:   /76 (BP Location: Left arm, Patient Position: Chair, Cuff Size: Adult Regular)   Pulse 64   Wt 70 kg (154 lb 4.8 oz)   SpO2 98%   BMI 26.49 kg/m      Constitutional: Pleasant no acute cardiopulmonary distress.   EYES: anicteric, normal extra-ocular movements, no lid lag or retraction, is equal and reactive to light bilaterally.  Cardiovascular: RRR, S1, S2 normal.   Pulmonary/Chest: CTAB. No wheezing or rales.   Abdominal: +BS. Non tender to palpation.  Stretch marks: none  Neurological: Alert and oriented.  No tremor and reflexes are symmetrical bilaterally and within the normal limits. Muscle strength 5/5.   Psychological: appropriate mood and affect    In House Labs:   Lab Results   Component Value Date    A1C 9.1 06/04/2021    A1C 8.7 02/22/2021    A1C 9.5 01/13/2020    A1C 8.3 10/14/2019    A1C 8.9 07/22/2019       TSH   Date Value Ref Range Status   01/04/2021 3.96 0.40 - 4.00 mU/L Final   03/19/2019 2.95 0.40 - 4.00 mU/L Final   10/07/2017 2.57 0.40 - 4.00 mU/L Final   06/26/2017 6.05 (H) 0.40 - 4.00 mU/L Final   03/05/2014 3.97 0.4 - 5.0 mU/L Final     T4 Free   Date Value Ref Range Status   06/26/2017 1.02 0.76 - 1.46 ng/dL Final   03/28/2006 1.10 0.70 - 1.85 ng/dL Final       Creatinine   Date Value Ref Range Status   06/04/2021 0.94 0.52 - 1.04 mg/dL Final   ]    Recent Labs   Lab Test 06/04/21  0837 08/09/18  0935 01/29/16  0933 04/01/15  1143 07/24/14  0851   CHOL 180  --  207* 197 193   HDL 41*  --  55 53 51   * 113* 129* 121 126   TRIG 134  --  116 113 84   CHOLHDLRATIO  --   --   --  3.7 4.0     This note has been dictated using voice recognition software.  As a result, there may be errors in the documentation that have gone undetected.  Please consider this when interpreting information in this documentation.    33 minutes spent on the date of the encounter doing chart review, history and exam, documentation and further activities per the note.      Again, thank you for allowing me to participate in the care of your patient.        Sincerely,        Siri Rhoades MD

## 2021-10-12 NOTE — PATIENT INSTRUCTIONS
pump setting changes made today:     Basal = 0.45 units per hour  CHO 1: 12 units changed to 1:11 between 7:00 am -5:00 PM  Correction factor 1:60 mg/dl  Target 110.        Please administer mealtime bolus consistently at least 10-15 minutes before eating.       [facility]-Department of Endocrinology  Haydee Rojas RN, Diabetes Educator: [phone]  Clinic Nurses JOB Rodriguez; CMA's: Roseann Hartmann Yang Mee   Clinic Fax: [phone]  On-Call Endocrine at the [facility] (after hours/weekends): [phone] option 4  Scheduling Line: [phone]

## 2021-10-12 NOTE — NURSING NOTE
Vickie Duque's goals for this visit include:   Chief Complaint   Patient presents with     Diabetes       She requests these members of her care team be copied on today's visit information: yes    PCP: Alvaro Guerrero    Referring Provider:  Alvaro Guerrero MD  [address] [place] [address]    /76 (BP Location: Left arm, Patient Position: Chair, Cuff Size: Adult Regular)   Pulse 64   Wt 70 kg (154 lb 4.8 oz)   SpO2 98%   BMI 26.49 kg/m      Do you need any medication refills at today's visit? Yes    Roseann DELONG MA   Adult Endocrine   [facility]

## 2021-10-12 NOTE — PROGRESS NOTES
Endocrinology Clinic Visit    Chief Complaint: Diabetes     Information obtained from:Patient      Assessment/Treatment Plan:      Type 1 diabetes     Previously on multiple daily injections of insulin.  Started insulin pump tandem this year tth CGM which has been working great for her.  Insulin pump and CGM was downloaded and average BG has been 164 with post meal hyperglycemia. No lows. She has been using control IQ.  We have adjusted mealtime insulin to address hyperglycemia (1:12 grams of CHO to 1:11 grams of CHO).   Has Glucagon  Blood pressure well controlled  No indication for statin at this time  Up-to-date eye exam    Patient Instructions     pump setting changes made today:     Basal = 0.45 units per hour  CHO 1: 12 units changed to 1:11 between 7:00 am -5:00 PM  Correction factor 1:60 mg/dl  Target 110.        Please administer mealtime bolus consistently at least 10-15 minutes before eating.           Return to clinic in 3 months.      Siri Rhoades MD  Staff Endocrinologist    Division of Endocrinology and Diabetes      Subjective:         HPI: Vickie Duque is a 36 year old female with history of type 1 diabetes.    Currently on tandem insulin pump    Current pump settings:     Basal = 0.45 units per hour  CHO 1: 12 units   Correction factor 1:60 mg/dl  Target 110.   No new concerns today.    She would like the prescription to be in insulin pens instead of vial since what she pays for for the two is the same.     CGM and pump settings downloaded today - last four weeks. She has been within the target range mostly except for post meal hyperglycemia. No lows. Bolus:basal :55:45   Has been using insulin IQ.       No Known Allergies    Current Outpatient Medications   Medication Sig Dispense Refill     Continuous Blood Gluc Sensor (DEXCOM G6 SENSOR) MISC Change every 10 days. 9 each 3     Continuous Blood Gluc Transmit (DEXCOM G6 TRANSMITTER) MISC Change every 90 days. 1 each 3     FLUoxetine  (PROZAC) 40 MG capsule Take 1 capsule (40 mg) by mouth daily 30 capsule 1     insulin pen needle (BD TARA U/F) 32G X 4 MM miscellaneous USE 3 DAILY OR AS DIRECTED. 270 each 3     levonorgestrel (MIRENA) 20 MCG/24HR IUD 1 each (20 mcg) by Intrauterine route once       NOVOLOG FLEXPEN 100 UNIT/ML soln 1 unit for every 12 grams of carbohydrates with meals three times per day. Plus correction scale; uses up to 60 units daily. 45 mL 1     Glucagon, rDNA, (GLUCAGON EMERGENCY) 1 MG KIT Inject 1 mg into the muscle as needed (Hypoglycemia) (Patient not taking: Reported on 10/11/2021) 1 kit 3     Naproxen Sodium (ALEVE PO) Take by mouth as needed for moderate pain (Patient not taking: Reported on 10/11/2021)       SUMAtriptan (IMITREX) 50 MG tablet Take 1 tablet (50 mg) by mouth at onset of headache for migraine May repeat in 2 hours. Max 4 tablets/24 hours. (Patient not taking: Reported on 10/11/2021) 18 tablet 4       Review of Systems      as per HPI above.  Mild numbness and tingling involving bilateral toes.    Objective:   /76 (BP Location: Left arm, Patient Position: Chair, Cuff Size: Adult Regular)   Pulse 64   Wt 70 kg (154 lb 4.8 oz)   SpO2 98%   BMI 26.49 kg/m      Constitutional: Pleasant no acute cardiopulmonary distress.   EYES: anicteric, normal extra-ocular movements, no lid lag or retraction, is equal and reactive to light bilaterally.  Cardiovascular: RRR, S1, S2 normal.   Pulmonary/Chest: CTAB. No wheezing or rales.   Abdominal: +BS. Non tender to palpation.  Stretch marks: none  Neurological: Alert and oriented.  No tremor and reflexes are symmetrical bilaterally and within the normal limits. Muscle strength 5/5.   Psychological: appropriate mood and affect   In House Labs:   Lab Results   Component Value Date    A1C 9.1 06/04/2021    A1C 8.7 02/22/2021    A1C 9.5 01/13/2020    A1C 8.3 10/14/2019    A1C 8.9 07/22/2019       TSH   Date Value Ref Range Status   01/04/2021 3.96 0.40 - 4.00 mU/L  Final   03/19/2019 2.95 0.40 - 4.00 mU/L Final   10/07/2017 2.57 0.40 - 4.00 mU/L Final   06/26/2017 6.05 (H) 0.40 - 4.00 mU/L Final   03/05/2014 3.97 0.4 - 5.0 mU/L Final     T4 Free   Date Value Ref Range Status   06/26/2017 1.02 0.76 - 1.46 ng/dL Final   03/28/2006 1.10 0.70 - 1.85 ng/dL Final       Creatinine   Date Value Ref Range Status   06/04/2021 0.94 0.52 - 1.04 mg/dL Final   ]    Recent Labs   Lab Test 06/04/21  0837 08/09/18  0935 01/29/16  0933 04/01/15  1143 07/24/14  0851   CHOL 180  --  207* 197 193   HDL 41*  --  55 53 51   * 113* 129* 121 126   TRIG 134  --  116 113 84   CHOLHDLRATIO  --   --   --  3.7 4.0     This note has been dictated using voice recognition software.  As a result, there may be errors in the documentation that have gone undetected.  Please consider this when interpreting information in this documentation.    33 minutes spent on the date of the encounter doing chart review, history and exam, documentation and further activities per the note.

## 2021-10-15 ENCOUNTER — TRANSFERRED RECORDS (OUTPATIENT)
Dept: HEALTH INFORMATION MANAGEMENT | Facility: CLINIC | Age: 36
End: 2021-10-15

## 2021-10-15 LAB — RETINOPATHY: POSITIVE

## 2021-10-20 ENCOUNTER — VIRTUAL VISIT (OUTPATIENT)
Dept: BEHAVIORAL HEALTH | Facility: CLINIC | Age: 36
End: 2021-10-20
Payer: COMMERCIAL

## 2021-10-20 DIAGNOSIS — F41.1 GAD (GENERALIZED ANXIETY DISORDER): Primary | ICD-10-CM

## 2021-10-20 PROCEDURE — 90832 PSYTX W PT 30 MINUTES: CPT | Mod: 95 | Performed by: MARRIAGE & FAMILY THERAPIST

## 2021-10-20 NOTE — Clinical Note
Judd Charlton,   This patient will be transferring her counseling to you on 11/4. She has made progress since I started seeing her this summer. Her DA was completed on 7/21/21 with a diagnosis of ALYX. She initially started seeing me to address frustration and hurt related to her SO cheating. They are working on their relationship and things have been improving. She has started medication and this has also helped her better manage her mood. She experiences a lot of irritability along with her anxiety. She is changing jobs this week. She wants to continue to do counseling as she has found it helpful in helping her better manage her symptoms. Please reach out with any further questions. Thanks, JENNIFER Kingston, Behavioral Health Clinician

## 2021-10-20 NOTE — PROGRESS NOTES
3:00pm-[facility] initiated video visit by sending a link via text.    [facility]: Integrated Behavioral Health  October 20, 2021    Telemedicine Visit: The patient's condition can be safely assessed and treated via synchronous audio and visual telemedicine encounter.      Reason for Telemedicine Visit: Services only offered telehealth    Originating Site (Patient Location): Patient's home     Distant Site (Provider Location): [facility]: [place]    Consent:  The patient/guardian has verbally consented to: the potential risks and benefits of telemedicine (video visit) versus in person care; bill my insurance or make self-payment for services provided; and responsibility for payment of non-covered services.     Mode of Communication:  Video Conference via CHEQ[employer]    As the provider I attest to compliance with applicable laws and regulations related to telemedicine.      Behavioral Health Clinician Progress Note    Patient Name: Vickie Duque           Service Type:  Individual      Service Location:   Telemedicine     Session Start Time: 3:10pm  Session End Time: 3:31pm      Session Length: 16 - 37      Attendees: Patient    Visit Activities (Refresh list every visit): [facility] Only    Diagnostic Assessment Date: 7/21/21  Treatment Plan Review Date: 8/27/2021  See Flowsheets for today's PHQ-9 and ALYX-7 results  Previous PHQ-9:   PHQ-9 SCORE 5/24/2021 9/9/2021 10/11/2021   PHQ-9 Total Score 15 14 3     Previous ALYX-7:   ALYX-7 SCORE 12/20/2018 9/9/2021 10/11/2021   Total Score 9 17 2       BYRON LEVEL:  BYRON Score (Last Two) 3/16/2012   BYRON Raw Score 42   Activation Score 66   BYRON Level 3       Clinical Global Impressions  First:  Considering your total clinical experience with this particular patient population, how severe are the patient's symptoms at this time?: 4 (7/22/2021 12:59 PM)      Most recent:  No data recorded        DATA  Extended Session (60+ minutes):  "No  Interactive Complexity: No  Crisis: No  [facility] Patient: No    Treatment Objective(s) Addressed in This Session:  Target Behavior(s): stress and irritability    Depressed Mood: Identify negative self-talk and behaviors: challenge core beliefs, myths, and actions  Anxiety: will develop healthy cognitive patterns and beliefs  Adjustment Difficulties: will develop coping/problem-solving skills to facilitate more adaptive adjustment    Current Stressors / Issues:  Patient reports that she resigned from her previous job, last week. She states that she had originally thought she would work both jobs, however her previous employer was not willing to let her take time to do orientation. This led to her deciding to resign from the position as she has not been happy at work. She will be starting her new job tomorrow. She states that she will be paid more in this new position. 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 She is looking forward to having better work/life balance as she will be able to \"leave work at work\".     Patient states that they plan to move in January. She states that they will be moving into a house.     Patient reports that her relationship is going better. She states that they continue to work on communication. She is also working on trusting him when he is on his phone. They are taking time for her and her SO to spend quality time together. Reinforced working on rebuilding trust and gaining improvements in communication skills.    Patient reflected that she is not feeling as anxious as she was before. Reinforced patient's progress and discussed patient's transition to a new therapist in a couple weeks.       Progress on Treatment Objective(s) / Homework:  Satisfactory progress - ACTION (Actively working towards change); Intervened by reinforcing change plan / affirming steps taken    Motivational Interviewing    MI Intervention: Expressed Empathy/Understanding, Supported Autonomy, Collaboration, " "Evocation, Open-ended questions, Reflections: simple and complex and Change talk (evoked)     Change Talk Expressed by the Patient: Desire to change Reasons to change Taking steps    Provider Response to Change Talk: E - Evoked more info from patient about behavior change, A - Affirmed patient's thoughts, decisions, or attempts at behavior change, R - Reflected patient's change talk and S - Summarized patient's change talk statements    Also provided psychoeducation about behavioral health condition, symptoms, and treatment options    Solution-Focused Therapy    Explored patterns in patient's relationships and discussed options for new behaviors    Explored patterns in patient's actions and choices and discussed options for new behaviors    Cognitive-behavioral Therapy    Discussed common cognitive distortions, identified them in patient's life    Explored ways to challenge, replace, and act against these cognitions      Care Plan review completed: Yes    Medication Review:  Changes to psychiatric medications, see updated Medication List in EPIC.  Patient's medication was increased to 40mg on 10/11. Patient believes that this has helped her.     Medication Compliance:  Yes     Changes in Health Issues:   None reported    Chemical Use Review:   Substance Use: Chemical use reviewed, no active concerns identified      Tobacco Use: No change in amount of tobacco use since last session.  Patient declined discussion at this time     Assessment: Current Emotional / Mental Status (status of significant symptoms):  Risk status (Self / Other harm or suicidal ideation)  Patient has had a history of suicidal ideation: thoughts started in adolescence around age 14 and suicide attempts: she attempted suicide by taking \"a bunch\" of her medication for her ulcerative colitis at age 14, she was treated medically and held for 72 hours and denies a history of self-injurious behavior, homicidal ideation, homicidal behavior and and other " safety concerns   Patient denies current fears or concerns for personal safety.  Patient denies current or recent suicidal ideation or behaviors.   Patient denies current or recent homicidal ideation or behaviors..   Patient denies current or recent self injurious behavior or ideation.  Patient denies other safety concerns.  A safety and risk management plan has been developed including: Patient co-developed a safety plan on 6/16/21.  Patient has a copy of her safety plan in her chart and agrees to use it if she experiences suicidal or homicidal ideation.       Appearance:   Appropriate   Eye Contact:   Fair   Psychomotor Behavior: Normal   Attitude:   Cooperative  Pleasant  Orientation:   All  Speech   Rate / Production: Normal/ Responsive   Volume:  Normal   Mood:    Anxious   Affect:    Appropriate   Thought Content:  Clear  Rumination   Thought Form:  Coherent  Logical   Insight:    Fair     Diagnoses:  1. ALYX (generalized anxiety disorder)        Collateral Reports Completed:  routed to therapist, JENNIFER Payton as patient will be transferring care to him    Plan: (Homework, other):  Patient was given information about behavioral services and encouraged to schedule a follow up appointment with the clinic [facility] as needed.  She was also given information about mental health symptoms and treatment options  and Cognitive Behavioral Therapy skills to practice when experiencing depression and anger.  CD Recommendations: Maintain Sobriety. Patient agrees to follow safety plan if she experiences suicidal or homicidal thoughts and plan. Patient will continue to take medication as prescribed per recommendation of her PCP. Patient will think about what self care looks like for her.    JENNIFER Kingston, [facility]       ______________________________________________________________________    Integrated Primary Care Behavioral Health Treatment Plan    Patient's Name: Vickie Duque  YOB: 1985    Date:  "August 27, 2021    DSM-V Diagnoses: 300.02 (F41.1) Generalized Anxiety Disorder   Rule out Major Depression  Patient reports a historical dx of \"manic depressive\" at age 14  Psychosocial / Contextual Factors: relationship issues  WHODAS: not completed    Referral / Collaboration:  Referral to another professional/service is not indicated at this time..    Anticipated number of session or this episode of care: 6-8      MeasurableTreatment Goal(s) related to diagnosis / functional impairment(s)  Goal 1: Patient will demonstrate ability to manage anxiety symptoms by better managing anger and improving communication skills.    I will know I've met my goal when I don't get mad as often or feel like biting my tongue.      Objective #A (Patient Action)    Patient will identify patterns of escalation  (i.e. tightness in chest, flushed face, increased heart rate, clenched hands, etc.)  Patient will identify situations that trigger anger/irritability   Status: New - Date: 8/27/21     Intervention(s)  [facility] will teach patient about anger as a secondary emotion. Help patient identify and recognize patterns of escalation and discuss situations where she experienced anger to identify triggers and patterns of anger..    Objective #B  Patient will learn & utilize at least 1 assertive communication skills weekly  Status: New - Date: 8/27/21     Intervention(s)  [facility] will discuss interpersonal effectiveness skills and assertive language and communication.    Patient has reviewed and agreed to the above plan.    Written by  JENNIFER Kingston, [facility]     "

## 2021-10-21 NOTE — TELEPHONE ENCOUNTER
Problem: Adult Mental Health  Goal: Reports or exhibits reduction in symptoms associated with elevated or labile mood/moose  Outcome: Outcome Not Met, Continue to Monitor  Goal: Reports or exhibits an improvement in mood signs/symptoms associated with anxiety  Outcome: Outcome Not Met, Continue to Monitor  Goal: Demonstrates ability to proactively perform self cares  Outcome: Outcome Not Met, Continue to Monitor  Goal: Demonstrates the ability to engage in reality-based communication and refrains from acting on delusional thoughts  Outcome: Outcome Not Met, Continue to Monitor  Goal: Demonstrates improved ability to track conversation, process information  Outcome: Outcome Not Met, Continue to Monitor  Goal: Verbalizes understanding of positive individual coping skills  Outcome: Outcome Not Met, Continue to Monitor  Goal: Demonstrates control of behavior, refraining from hostility, aggression or threats of violence  Outcome: Outcome Not Met, Continue to Monitor  Goal: Verbalizes understanding of diagnosis, reason for treament and treatment program  Outcome: Outcome Not Met, Continue to Monitor  Goal: Verbalizes understanding of medication management/monitoring/assessment of effectiveness  Outcome: Outcome Not Met, Continue to Monitor  Goal: Demonstrates or reports decrease in symptoms of paranoia or delusional thoughts  Outcome: Outcome Not Met, Continue to Monitor     Problem: Pain  Goal: #Acceptable pain level achieved/maintained at rest using NRS/Faces  Description: This goal is used for patients who can self-report.  Acceptable means the level is at or below the identified comfort/function goal.  Outcome: Outcome Not Met, Continue to Monitor  Goal: # Acceptable pain level achieved/maintained with activity using NRS/Faces  Description: This goal is used for patients who can self-report and are not achieving acceptable pain control during activity.  Outcome: Outcome Not Met, Continue to Monitor  Goal: #  [facility]    Phone Message    May a detailed message be left on voicemail: yes     Reason for Call: Appointment Intake    Referring Provider Name: none- self referred  Diagnosis and/or Symptoms: Cellulitis    Pt been to urgent care, had shots, antibiotics over the last 5 days. Face still swollen and oozing.Mychart message with pictures. Please review.     Action Taken: Message routed to:  [facility]  Dermatology    Travel Screening: Not Applicable                                                                         Verbalizes understanding of pain management  Description: Documented in Patient Education Activity  Outcome: Outcome Not Met, Continue to Monitor      Patient noted to be out in milieu this morning, sitting with sitter within arms length,  in dayroom. Patient had breakfast this morning with peers, and remains social with staff  and peers. Patient is making his wants and needs known appropriately. Will continue to monitor pt.

## 2021-10-23 ENCOUNTER — HEALTH MAINTENANCE LETTER (OUTPATIENT)
Age: 36
End: 2021-10-23

## 2021-10-28 ENCOUNTER — OFFICE VISIT (OUTPATIENT)
Dept: FAMILY MEDICINE | Facility: OTHER | Age: 36
End: 2021-10-28
Payer: COMMERCIAL

## 2021-10-28 VITALS
WEIGHT: 160.2 LBS | TEMPERATURE: 96.7 F | OXYGEN SATURATION: 98 % | SYSTOLIC BLOOD PRESSURE: 106 MMHG | DIASTOLIC BLOOD PRESSURE: 58 MMHG | BODY MASS INDEX: 27.5 KG/M2 | HEART RATE: 72 BPM | RESPIRATION RATE: 18 BRPM

## 2021-10-28 DIAGNOSIS — E10.65 TYPE 1 DIABETES MELLITUS WITH HYPERGLYCEMIA (H): ICD-10-CM

## 2021-10-28 DIAGNOSIS — L08.9 LOCAL INFECTION OF SKIN AND SUBCUTANEOUS TISSUE: Primary | ICD-10-CM

## 2021-10-28 PROCEDURE — 99213 OFFICE O/P EST LOW 20 MIN: CPT | Performed by: NURSE PRACTITIONER

## 2021-10-28 RX ORDER — CEPHALEXIN 500 MG/1
500 CAPSULE ORAL 3 TIMES DAILY
Qty: 30 CAPSULE | Refills: 0 | Status: SHIPPED | OUTPATIENT
Start: 2021-10-28 | End: 2021-11-07

## 2021-10-28 ASSESSMENT — PAIN SCALES - GENERAL: PAINLEVEL: MODERATE PAIN (5)

## 2021-10-28 NOTE — PATIENT INSTRUCTIONS
- start Keflex three times daily  - Warm compress to the area  - monitor for any worsening pain, swelling, redness or fevers, if so please go in to be evaluated.       RAJESH Pinedo CNP  Questions or concerns please feel free to send me a [email or web address] message or call me  Phone : [phone]

## 2021-10-28 NOTE — PROGRESS NOTES
Assessment & Plan     Local infection of skin and subcutaneous tissue  -Patient here today with concerns for lump along the left earlobe region. Given the amount of pain and erythema present, as well as her Type 1 DM, recommend that she starting taking cephALEXin (KEFLEX) 500 MG capsule TID for 10 days.  -Plan to recheck her symptoms on Monday 11/1/21 or sooner if symptoms worsen.  -Instructed patient to watch for worsening signs of infection- worsening redness, chills, fever and if this occurs she is to be seen in urgent care.  -Patient was in agreement with this plan.    Type 1 diabetes mellitus with hyperglycemia (H)  -She reports that her sugars have been well controlled.  -She uses an insulin pump  -Discussed with patient that she may see a spike in her sugars due to the infection and to compensate for this as needed.  -Patient expressed understanding of controlling her sugars so that the infection does not worsen        Return in about 4 days (around 11/1/2021).    Dario Rios, DNP Student  [employer]     RAJESH Pinedo CNP  [facility] is a 36 year old who presents for the following health issues     HPI     Concern - Ear Lobe Mass  Onset: 1 week   Description: mass on lob, left. Was able to squeeze it and get clear fluid out of it. Then it came back now it hurts and it didn't before. Continues to try and squeeze it.   Intensity: moderate  Progression of Symptoms:  worsening  Accompanying Signs & Symptoms: red, painful to touch  Previous history of similar problem: ongoing   Precipitating factors:        Worsened by: popping it  Alleviating factors:        Improved by: none  Therapies tried and outcome: None    Denies fever or chills  No further drainage since first popping it  Denies inner ear pain or changes in hearing  Has not wore earrings in years.   No trauma noted.       Review of Systems  10 point ROS of systems including:  Constitutional, Eyes, Respiratory, Cardiovascular, Gastroenterology, Genitourinary, Integumentary, Muscularskeletal, Psychiatric were all negative except for pertinent positives noted in my HPI.         Objective    There were no vitals taken for this visit.  There is no height or weight on file to calculate BMI.     Physical Exam  HENT:      Left Ear: Hearing, tympanic membrane and ear canal normal. Swelling and tenderness present. No drainage. No mastoid tenderness.      Ears:      Comments: Severe tenderness on palpation of the left ear lobe  Skin:     Findings: Erythema present.      Comments: Localized to the left ear lobe      Surrounding skin along the left ear normal without any redness or swelling. Infection is localized.

## 2021-11-01 ENCOUNTER — OFFICE VISIT (OUTPATIENT)
Dept: FAMILY MEDICINE | Facility: OTHER | Age: 36
End: 2021-11-01
Payer: COMMERCIAL

## 2021-11-01 VITALS
HEART RATE: 68 BPM | HEIGHT: 65 IN | WEIGHT: 159 LBS | SYSTOLIC BLOOD PRESSURE: 115 MMHG | RESPIRATION RATE: 14 BRPM | BODY MASS INDEX: 26.49 KG/M2 | TEMPERATURE: 98.4 F | OXYGEN SATURATION: 97 % | DIASTOLIC BLOOD PRESSURE: 76 MMHG

## 2021-11-01 DIAGNOSIS — L08.9 LOCAL INFECTION OF SKIN AND SUBCUTANEOUS TISSUE: Primary | ICD-10-CM

## 2021-11-01 DIAGNOSIS — E10.65 TYPE 1 DIABETES MELLITUS WITH HYPERGLYCEMIA (H): ICD-10-CM

## 2021-11-01 PROCEDURE — 99213 OFFICE O/P EST LOW 20 MIN: CPT | Performed by: NURSE PRACTITIONER

## 2021-11-01 ASSESSMENT — ENCOUNTER SYMPTOMS
CONSTITUTIONAL NEGATIVE: 1
RESPIRATORY NEGATIVE: 1
NEUROLOGICAL NEGATIVE: 1
GASTROINTESTINAL NEGATIVE: 1
CARDIOVASCULAR NEGATIVE: 1
COLOR CHANGE: 1
PSYCHIATRIC NEGATIVE: 1

## 2021-11-01 ASSESSMENT — MIFFLIN-ST. JEOR: SCORE: 1404.59

## 2021-11-01 ASSESSMENT — PAIN SCALES - GENERAL: PAINLEVEL: NO PAIN (0)

## 2021-11-01 NOTE — PROGRESS NOTES
Assessment & Plan     Local infection of skin and subcutaneous tissue  - Patient was seen in the clinic last Thursday (10/28) for left ear lobe lump, erythemia, and pain.  - Left ear lobe lesion healing and is less painful.  - Will have her continue taking the Keflex 500mg for the full 10 days  - Continue to closely monitor both ear lobe lesions and contact the clinic if  worsening or do not appear to be healing and could extend her Keflex to 14 days  - Recommend seeing ENT if lesions worsen to rule out other etiologies  - Could also consider a topical antibiotic in addition.   - Recommend monitoring new papule located near previous lesion.       Type 1 diabetes mellitus with hyperglycemia (H)  - Reports blood sugars have been well controlled  - Understands the importance of having her blood sugars controlled for healing      Patient is in agreement with this plan and will contact the clinic if symptoms are not improving.     No follow-ups on file.    Dario Rios, YA Student  [employer]    Rashida Johnson, APRN [facility] SUSAN Johnston is a 36 year old who presents for the following health issues     History of Present Illness       She eats 2-3 servings of fruits and vegetables daily.She consumes 3 sweetened beverage(s) daily.She exercises with enough effort to increase her heart rate 9 or less minutes per day.  She exercises with enough effort to increase her heart rate 6 days per week.   She is taking medications regularly.       Concern - Ear follow up   Onset: a couple weeks   Description: second lesion has appeared, Previous lesion has now gotten some more fluid out- but is still in pain and red.   Intensity: 4/10- only when being touched.   Progression of Symptoms:  intermittent  Accompanying Signs & Symptoms: none  Previous history of similar problem: ongoing   Precipitating factors:        Worsened by: touching   Alleviating factors:         "Improved by: antibiotics   Therapies tried and outcome:  Keflex.     Redness and soreness of left ear lobe improving. Is able to touch the ear and lay on that side without pain.   Was able to get some pus out of it over the weekend, but no other drainage  Concerned for a second lesion on the same ear lobe  Denies fever or chills    Review of Systems   Constitutional: Negative.    HENT: Positive for ear pain.         External mild ear pain   Respiratory: Negative.    Cardiovascular: Negative.    Gastrointestinal: Negative.    Skin: Positive for color change.        Tiny red spot ear lobe next other spot   Neurological: Negative.    Psychiatric/Behavioral: Negative.             Objective    /76   Pulse 68   Temp 98.4  F (36.9  C) (Oral)   Resp 14   Ht 1.655 m (5' 5.16\")   Wt 72.1 kg (159 lb)   SpO2 97%   BMI 26.33 kg/m    Body mass index is 26.33 kg/m .     Physical Exam  Constitutional:       Appearance: Normal appearance.   HENT:      Left Ear: No drainage. No mastoid tenderness.      Ears:      Comments: Swelling and mild tenderness on palpation of left ear lobe. New pen tip sized red lesion on inner ear lobe next to other lesion.  Cardiovascular:      Rate and Rhythm: Normal rate.   Pulmonary:      Effort: Pulmonary effort is normal.   Neurological:      Mental Status: She is alert.   Psychiatric:         Mood and Affect: Mood normal.         Behavior: Behavior normal.         Thought Content: Thought content normal.         Judgment: Judgment normal.                      "

## 2021-11-02 ENCOUNTER — TELEPHONE (OUTPATIENT)
Dept: BEHAVIORAL HEALTH | Facility: CLINIC | Age: 36
End: 2021-11-02

## 2021-11-04 ENCOUNTER — MYC MEDICAL ADVICE (OUTPATIENT)
Dept: FAMILY MEDICINE | Facility: OTHER | Age: 36
End: 2021-11-04

## 2021-11-04 ENCOUNTER — VIRTUAL VISIT (OUTPATIENT)
Dept: PSYCHOLOGY | Facility: CLINIC | Age: 36
End: 2021-11-04
Payer: COMMERCIAL

## 2021-11-04 DIAGNOSIS — F41.1 GAD (GENERALIZED ANXIETY DISORDER): Primary | ICD-10-CM

## 2021-11-04 DIAGNOSIS — L08.9 LOCAL INFECTION OF SKIN AND SUBCUTANEOUS TISSUE: Primary | ICD-10-CM

## 2021-11-04 PROCEDURE — 90834 PSYTX W PT 45 MINUTES: CPT | Mod: 95 | Performed by: MARRIAGE & FAMILY THERAPIST

## 2021-11-04 RX ORDER — CEPHALEXIN 500 MG/1
500 CAPSULE ORAL 3 TIMES DAILY
Qty: 12 CAPSULE | Refills: 0 | Status: SHIPPED | OUTPATIENT
Start: 2021-11-04 | End: 2021-11-08

## 2021-11-04 NOTE — PROGRESS NOTES
Progress Note     Patient Name: Vickie Duque                                                                 Service Type:             Individual                            Service Location:       Telemedicine                 Session Start Time:  10:35am                        Session End Time:    11:20am                            Session Length:        45                  Attendees:     Patient    Telemedicine Visit: The patient's condition can be safely assessed and treated via synchronous audio and visual telemedicine encounter.       Reason for Telemedicine Visit: Services only offered telehealth     Originating Site (Patient Location): Patient's home      Distant Site (Provider Location): [facility]: [place]     Consent:  The patient/guardian has verbally consented to: the potential risks and benefits of telemedicine (video visit) versus in person care; bill my insurance or make self-payment for services provided; and responsibility for payment of non-covered services.      Mode of Communication:  Video Conference via [employer]     As the provider I attest to compliance with applicable laws and regulations related to telemedicine.    Diagnostic Assessment Date: 7/21/21  Treatment Plan Review Date: 11/4/2021, next due 2/4/22.  See Flowsheets for today's PHQ-9 and ALYX-7 results  Previous PHQ-9:   PHQ-9 SCORE 5/24/2021 9/9/2021 10/11/2021   PHQ-9 Total Score 15 14 3      Previous ALYX-7:   ALYX-7 SCORE 12/20/2018 9/9/2021 10/11/2021   Total Score 9 17 2      Clinical Global Impressions  First:  Considering your total clinical experience with this particular patient population, how severe are the patient's symptoms at this time?: 4 (7/22/2021 12:59 PM)    Most recent:  No data recorded    DATA  Extended Session (60+ minutes): No  Interactive Complexity: No  Crisis: No     Treatment Objective(s) Addressed in This Session:  Patient will learn & utilize at least 1 assertive communication skills  "weekly     Current Stressors / Issues:  Patient reported regarding starting new job, about which she feels positive.  Patient reported regarding her upcoming move in January.  Patient reported wanting to work on increasing calm mindset and improved interpersonal functioning/communication as her desired therapy goal.     Progress on Treatment Objective(s) / Homework:  Satisfactory progress - ACTION (Actively working towards change); Intervened by reinforcing change plan / affirming steps taken    DBT:  Reviewed with patient being assertive/direct/effective in her communication and responses to stressors.  Motivational Interviewing:  Reviewed with patient her psychosocial history, presenting issues and desired therapy goal.    Care Plan review completed: Yes     Medication Review:  No changes to psychiatric medication.      Medication Compliance:  Yes      Changes in Health Issues:              None reported     Chemical Use Review:              Substance Use: Chemical use reviewed, no active concerns identified                  Tobacco Use: No change in amount of tobacco use since last session.  Patient declined discussion at this time      Assessment:  Current Emotional / Mental Status (status of significant symptoms):  Risk status (Self / Other harm or suicidal ideation)  Patient has had a history of suicidal ideation: thoughts started in adolescence around age 14 and suicide attempts: she attempted suicide by taking \"a bunch\" of her medication for her ulcerative colitis at age 14, she was treated medically and held for 72 hours and denies a history of self-injurious behavior, homicidal ideation, homicidal behavior and and other safety concerns   Patient denies current fears or concerns for personal safety.  Patient denies current or recent suicidal ideation or behaviors.   Patient denies current or recent homicidal ideation or behaviors..   Patient denies current or recent self injurious behavior or " "ideation.  Patient denies other safety concerns.  A safety and risk management plan has been developed including: Patient co-developed a safety plan on 6/16/21.  Patient has a copy of her safety plan in her chart and agrees to use it if she experiences suicidal or homicidal ideation.        Appearance:                            Appropriate   Eye Contact:                           Good   Psychomotor Behavior:          Normal   Attitude:                                   Cooperative Interested  Friendly Pleasant Attentive Kvng  Orientation:                             All  Speech              Rate / Production:       Normal/ Responsive              Volume:                       Normal   Mood:                                      Anxious   Affect:                                      Appropriate   Thought Content:                    Clear  Rumination   Thought Form:                        Coherent  Logical   Insight:                                     Good Intellectual Insight      Diagnoses:  1. ALYX (generalized anxiety disorder)          Collateral Reports Completed:  None     Plan: (Homework, other):  Patient agreed to consider desired therapy goals and continue to work on assertively/directly/effectively communicating and responding to stressors.     Angel Tamez M.A., [facility] 11/4/21        ______________________________________________________________________     Treatment Plan     Patient's Name: Vickie Duque                    YOB: 1985     Date: November 4, 2021     DSM-V Diagnoses: 300.02 (F41.1) Generalized Anxiety Disorder   Rule out Major Depression  Patient reports a historical dx of \"manic depressive\" at age 14  Psychosocial / Contextual Factors: relationship issues  WHODAS: not completed     Referral / Collaboration:  Referral to another professional/service is not indicated at this time..     Anticipated number of session or this episode of care: 11-20        MeasurableTreatment " Goal(s) related to diagnosis / functional impairment(s)  Goal 1: Patient will demonstrate ability to manage anxiety symptoms by better managing anger and improving communication skills.    I will know I've met my goal when I don't get mad as often or feel like biting my tongue.       Objective #A (Patient Action)                          Patient will identify patterns of escalation  (i.e. tightness in chest, flushed face, increased heart rate, clenched hands, etc.)  Patient will identify situations that trigger anger/irritability   Status: Continued - Date: 11/4/21      Intervention(s)  Therapist will teach patient about anger as a secondary emotion. Help patient identify and recognize patterns of escalation and discuss situations where she experienced anger to identify triggers and patterns of anger.     Objective #B  Patient will learn & utilize at least 1 assertive communication skills weekly  Status: Continued - Date: 11/4/21      Intervention(s)  Therapist will discuss interpersonal effectiveness skills and assertive language and communication.     Patient has reviewed and agreed to the above plan.     Angel Tamez M.A.,LMFT 11/4/21

## 2021-11-04 NOTE — TELEPHONE ENCOUNTER
Spoke with patient the original lesion has improved, but now a new one has started. Will extend the Keflex another 4 days and she will message me next week.         RAJESH Pinedo CNP  Questions or concerns please feel free to send me a Fanzila message or call me  Phone : [phone]

## 2021-11-11 ENCOUNTER — TELEPHONE (OUTPATIENT)
Dept: PSYCHOLOGY | Facility: CLINIC | Age: 36
End: 2021-11-11
Payer: COMMERCIAL

## 2021-11-15 ENCOUNTER — VIRTUAL VISIT (OUTPATIENT)
Dept: FAMILY MEDICINE | Facility: CLINIC | Age: 36
End: 2021-11-15
Payer: COMMERCIAL

## 2021-11-15 DIAGNOSIS — F33.1 MAJOR DEPRESSIVE DISORDER, RECURRENT EPISODE, MODERATE (H): ICD-10-CM

## 2021-11-15 DIAGNOSIS — F43.22 ADJUSTMENT DISORDER WITH ANXIOUS MOOD: ICD-10-CM

## 2021-11-15 PROCEDURE — 96127 BRIEF EMOTIONAL/BEHAV ASSMT: CPT | Performed by: FAMILY MEDICINE

## 2021-11-15 PROCEDURE — 99214 OFFICE O/P EST MOD 30 MIN: CPT | Mod: 95 | Performed by: FAMILY MEDICINE

## 2021-11-15 RX ORDER — FLUOXETINE 40 MG/1
40 CAPSULE ORAL DAILY
Qty: 90 CAPSULE | Refills: 1 | Status: SHIPPED | OUTPATIENT
Start: 2021-11-15 | End: 2022-05-10

## 2021-11-15 ASSESSMENT — ANXIETY QUESTIONNAIRES
1. FEELING NERVOUS, ANXIOUS, OR ON EDGE: MORE THAN HALF THE DAYS
5. BEING SO RESTLESS THAT IT IS HARD TO SIT STILL: MORE THAN HALF THE DAYS
IF YOU CHECKED OFF ANY PROBLEMS ON THIS QUESTIONNAIRE, HOW DIFFICULT HAVE THESE PROBLEMS MADE IT FOR YOU TO DO YOUR WORK, TAKE CARE OF THINGS AT HOME, OR GET ALONG WITH OTHER PEOPLE: SOMEWHAT DIFFICULT
2. NOT BEING ABLE TO STOP OR CONTROL WORRYING: NEARLY EVERY DAY
GAD7 TOTAL SCORE: 16
7. FEELING AFRAID AS IF SOMETHING AWFUL MIGHT HAPPEN: MORE THAN HALF THE DAYS
3. WORRYING TOO MUCH ABOUT DIFFERENT THINGS: MORE THAN HALF THE DAYS
6. BECOMING EASILY ANNOYED OR IRRITABLE: NEARLY EVERY DAY

## 2021-11-15 ASSESSMENT — PATIENT HEALTH QUESTIONNAIRE - PHQ9: 5. POOR APPETITE OR OVEREATING: MORE THAN HALF THE DAYS

## 2021-11-15 NOTE — PROGRESS NOTES
Vickie is a 36 year old who is being evaluated via a billable telephone visit.      What phone number would you like to be contacted at? [phone]  How would you like to obtain your AVS? MyChart    Assessment & Plan     ASSESSMENT/ORDERS:    ICD-10-CM    1. Major depressive disorder, recurrent episode, moderate (H)  F33.1 FLUoxetine (PROZAC) 40 MG capsule   2. Adjustment disorder with anxious mood  F43.22 FLUoxetine (PROZAC) 40 MG capsule     PLAN:  1.  Continue fluoxetine at current dose.  Follow-up in 6 months to discuss continuation or wean.  Depends on how patient feels and if she is ready for that.                  Return in about 6 months (around 5/15/2022) for depression/anxiety recheck.    Alvaro Guerrero MD  [facility]   Vickie is a 36 year old who presents for the following health issues     HPI     Depression and Anxiety Follow-Up    How are you doing with your depression since your last visit? Improved, but she thinks it is from the stress in the job she had.    How are you doing with your anxiety since your last visit?  Improved     Are you having other symptoms that might be associated with depression or anxiety? No    Have you had a significant life event? Job Concerns     Do you have any concerns with your use of alcohol or other drugs? No    Social History     Tobacco Use     Smoking status: Current Every Day Smoker     Packs/day: 1.00     Years: 16.00     Pack years: 16.00     Types: Cigarettes     Smokeless tobacco: Never Used     Tobacco comment: working on quiting   Vaping Use     Vaping Use: Never used   Substance Use Topics     Alcohol use: Yes     Alcohol/week: 0.0 standard drinks     Comment: occassionally     Drug use: No     PHQ 9/9/2021 10/11/2021 11/15/2021   PHQ-9 Total Score 14 3 11   Q9: Thoughts of better off dead/self-harm past 2 weeks Several days Not at all Not at all     ALYX-7 SCORE 9/9/2021 10/11/2021 11/15/2021   Total Score 17 2 16      Last PHQ-9 11/15/2021   1.  Little interest or pleasure in doing things 1   2.  Feeling down, depressed, or hopeless 2   3.  Trouble falling or staying asleep, or sleeping too much 1   4.  Feeling tired or having little energy 1   5.  Poor appetite or overeating 2   6.  Feeling bad about yourself 2   7.  Trouble concentrating 2   8.  Moving slowly or restless 0   Q9: Thoughts of better off dead/self-harm past 2 weeks 0   PHQ-9 Total Score 11   Difficulty at work, home, or with people Somewhat difficult     ALYX-7  11/15/2021   1. Feeling nervous, anxious, or on edge 2   2. Not being able to stop or control worrying 3   3. Worrying too much about different things 2   4. Trouble relaxing 2   5. Being so restless that it is hard to sit still 2   6. Becoming easily annoyed or irritable 3   7. Feeling afraid, as if something awful might happen 2   ALYX-7 Total Score 16   If you checked any problems, how difficult have they made it for you to do your work, take care of things at home, or get along with other people? Somewhat difficult       Suicide Assessment Five-step Evaluation and Treatment (SAFE-T)      How many servings of fruits and vegetables do you eat daily?  2-3    On average, how many sweetened beverages do you drink each day (Examples: soda, juice, sweet tea, etc.  Do NOT count diet or artificially sweetened beverages)?   3    How many days per week do you exercise enough to make your heart beat faster? none    How many minutes a day do you exercise enough to make your heart beat faster? none    How many days per week do you miss taking your medication? 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      Previous job was quite stressful.  Boss not good to work for.  She feels maybe this will be the change she needs.  Has been not working since a few weeks ago.  Enjoying the break before starting her new job.    Is moving soon to a new rental house.  1/2 the price,  but she notes it is stressful, so knows she needs to continue medication.    Review of Systems         Objective           Vitals:  No vitals were obtained today due to virtual visit.    Physical Exam   healthy, alert and no distress  PSYCH: Alert and oriented times 3; coherent speech, normal   rate and volume, able to articulate logical thoughts, able   to abstract reason, no tangential thoughts, no hallucinations   or delusions  Her affect is normal  RESP: No cough, no audible wheezing, able to talk in full sentences  Remainder of exam unable to be completed due to telephone visits                Phone call duration: 14 minutes

## 2021-11-16 ASSESSMENT — ANXIETY QUESTIONNAIRES: GAD7 TOTAL SCORE: 16

## 2021-11-16 ASSESSMENT — PATIENT HEALTH QUESTIONNAIRE - PHQ9: SUM OF ALL RESPONSES TO PHQ QUESTIONS 1-9: 11

## 2021-12-07 ENCOUNTER — VIRTUAL VISIT (OUTPATIENT)
Dept: PSYCHOLOGY | Facility: CLINIC | Age: 36
End: 2021-12-07
Payer: COMMERCIAL

## 2021-12-07 DIAGNOSIS — F41.1 GAD (GENERALIZED ANXIETY DISORDER): Primary | ICD-10-CM

## 2021-12-07 NOTE — PROGRESS NOTES
Met briefly with patient.  Patient was en route to work, so opted to meet for next scheduled session and schedule follow-ups then.    Angel Tamez M.A., LMFT 12/7/21

## 2021-12-18 ENCOUNTER — HEALTH MAINTENANCE LETTER (OUTPATIENT)
Age: 36
End: 2021-12-18

## 2021-12-20 ENCOUNTER — MEDICAL CORRESPONDENCE (OUTPATIENT)
Dept: HEALTH INFORMATION MANAGEMENT | Facility: CLINIC | Age: 36
End: 2021-12-20
Payer: COMMERCIAL

## 2021-12-21 ENCOUNTER — VIRTUAL VISIT (OUTPATIENT)
Dept: PSYCHOLOGY | Facility: CLINIC | Age: 36
End: 2021-12-21
Payer: COMMERCIAL

## 2021-12-21 DIAGNOSIS — F41.1 GAD (GENERALIZED ANXIETY DISORDER): Primary | ICD-10-CM

## 2021-12-21 PROCEDURE — 90834 PSYTX W PT 45 MINUTES: CPT | Mod: 95 | Performed by: MARRIAGE & FAMILY THERAPIST

## 2021-12-21 NOTE — PROGRESS NOTES
Progress Note     Patient Name: Vickie Duque                                                                 Service Type:             Individual                            Service Location:       Telemedicine                 Session Start Time:  10:03am                        Session End Time:    10:51am                            Session Length:        48                  Attendees:     Patient    Telemedicine Visit: The patient's condition can be safely assessed and treated via synchronous audio and visual telemedicine encounter.       Reason for Telemedicine Visit: Services only offered telehealth     Originating Site (Patient Location): Patient's home      Distant Site (Provider Location): [facility]: [place]     Consent:  The patient/guardian has verbally consented to: the potential risks and benefits of telemedicine (video visit) versus in person care; bill my insurance or make self-payment for services provided; and responsibility for payment of non-covered services.      Mode of Communication:  Video Conference via [email or web address].     As the provider I attest to compliance with applicable laws and regulations related to telemedicine.    Diagnostic Assessment Date: 7/21/21  Treatment Plan Review Date: 11/4/2021, next due 2/4/22.  See Flowsheets for today's PHQ-9 and ALYX-7 results  Previous PHQ-9:   PHQ-9 SCORE 5/24/2021 9/9/2021 10/11/2021   PHQ-9 Total Score 15 14 3      Previous ALYX-7:   ALYX-7 SCORE 12/20/2018 9/9/2021 10/11/2021   Total Score 9 17 2      Clinical Global Impressions  First:  Considering your total clinical experience with this particular patient population, how severe are the patient's symptoms at this time?: 4 (7/22/2021 12:59 PM)    Most recent:  No data recorded    DATA  Extended Session (60+ minutes): No  Interactive Complexity: No  Crisis: No     Treatment Objective(s) Addressed in This Session:  Patient will identify situations that trigger  "anger/irritability     Current Stressors / Issues:  Patient reported regarding feeling low energy lately.  Patient reported regarding holiday/time-of-year stress and her upcoming move in January.  Patient reported regarding work/life balance challenges.     Progress on Treatment Objective(s) / Homework:  Minimal progress - ACTION (Actively working towards change); Intervened by reinforcing change plan / affirming steps taken    DBT:  Reviewed with patient practicing self-care and being effective in her responses to stressors.    Care Plan review completed: Yes     Medication Review:  No changes to psychiatric medication.      Medication Compliance:  Yes      Changes in Health Issues:              None reported     Chemical Use Review:              Substance Use: Chemical use reviewed, no active concerns identified                  Tobacco Use: No change in amount of tobacco use since last session.  Patient declined discussion at this time      Assessment:  Current Emotional / Mental Status (status of significant symptoms):  Risk status (Self / Other harm or suicidal ideation)  Patient has had a history of suicidal ideation: thoughts started in adolescence around age 14 and suicide attempts: she attempted suicide by taking \"a bunch\" of her medication for her ulcerative colitis at age 14, she was treated medically and held for 72 hours and denies a history of self-injurious behavior, homicidal ideation, homicidal behavior and and other safety concerns   Patient denies current fears or concerns for personal safety.  Patient denies current or recent suicidal ideation or behaviors.   Patient denies current or recent homicidal ideation or behaviors..   Patient denies current or recent self injurious behavior or ideation.  Patient denies other safety concerns.  A safety and risk management plan has been developed including: Patient co-developed a safety plan on 6/16/21.  Patient has a copy of her safety plan in her chart " "and agrees to use it if she experiences suicidal or homicidal ideation.        Appearance:                            Appropriate   Eye Contact:                           Good   Psychomotor Behavior:          Normal   Attitude:                                   Cooperative Interested  Friendly Pleasant Attentive Kvng  Orientation:                             All  Speech              Rate / Production:       Normal/ Responsive              Volume:                       Normal   Mood:                                      Anxious   Affect:                                      Appropriate   Thought Content:                    Clear  Rumination   Thought Form:                        Coherent  Logical   Insight:                                     Good Intellectual Insight      Diagnoses:  1. ALYX (generalized anxiety disorder)          Collateral Reports Completed:  None     Plan: (Homework, other):  Patient agreed to work on practicing self-care and being effective in her responses to stressors.     Angel Tamez M.A., [facility] 12/21/21        ______________________________________________________________________     Treatment Plan     Patient's Name: Vickie Duque                    YOB: 1985     Date: November 4, 2021     DSM-V Diagnoses: 300.02 (F41.1) Generalized Anxiety Disorder   Rule out Major Depression  Patient reports a historical dx of \"manic depressive\" at age 14  Psychosocial / Contextual Factors: relationship issues  WHODAS: not completed     Referral / Collaboration:  Referral to another professional/service is not indicated at this time..     Anticipated number of session or this episode of care: 11-20        MeasurableTreatment Goal(s) related to diagnosis / functional impairment(s)  Goal 1: Patient will demonstrate ability to manage anxiety symptoms by better managing anger and improving communication skills.    I will know I've met my goal when I don't get mad as often or feel like biting " my tongue.       Objective #A (Patient Action)                          Patient will identify patterns of escalation  (i.e. tightness in chest, flushed face, increased heart rate, clenched hands, etc.)  Patient will identify situations that trigger anger/irritability   Status: Continued - Date: 11/4/21      Intervention(s)  Therapist will teach patient about anger as a secondary emotion. Help patient identify and recognize patterns of escalation and discuss situations where she experienced anger to identify triggers and patterns of anger.     Objective #B  Patient will learn & utilize at least 1 assertive communication skills weekly  Status: Continued - Date: 11/4/21      Intervention(s)  Therapist will discuss interpersonal effectiveness skills and assertive language and communication.     Patient has reviewed and agreed to the above plan.     Angel Tamez M.A.,LMFT 11/4/21

## 2021-12-22 ENCOUNTER — DOCUMENTATION ONLY (OUTPATIENT)
Dept: ENDOCRINOLOGY | Facility: CLINIC | Age: 36
End: 2021-12-22

## 2022-01-21 ENCOUNTER — TELEPHONE (OUTPATIENT)
Dept: PSYCHOLOGY | Facility: CLINIC | Age: 37
End: 2022-01-21
Payer: COMMERCIAL

## 2022-01-27 ENCOUNTER — MYC MEDICAL ADVICE (OUTPATIENT)
Dept: FAMILY MEDICINE | Facility: CLINIC | Age: 37
End: 2022-01-27
Payer: COMMERCIAL

## 2022-02-02 ENCOUNTER — VIRTUAL VISIT (OUTPATIENT)
Dept: PSYCHOLOGY | Facility: CLINIC | Age: 37
End: 2022-02-02
Payer: COMMERCIAL

## 2022-02-02 DIAGNOSIS — F41.1 GAD (GENERALIZED ANXIETY DISORDER): Primary | ICD-10-CM

## 2022-02-02 PROCEDURE — 90834 PSYTX W PT 45 MINUTES: CPT | Mod: 95 | Performed by: MARRIAGE & FAMILY THERAPIST

## 2022-02-03 NOTE — PROGRESS NOTES
Progress Note     Patient Name: Vickie Duque                                                                 Service Type:             Individual                            Service Location:       Telemedicine                 Session Start Time:  1:04pm                        Session End Time:    1:45pm                            Session Length:        41                  Attendees:     Patient    Telemedicine Visit: The patient's condition can be safely assessed and treated via synchronous audio and visual telemedicine encounter.       Reason for Telemedicine Visit: Services only offered telehealth     Originating Site (Patient Location): Patient's home      Distant Site (Provider Location): Provider home office     Consent:  The patient/guardian has verbally consented to: the potential risks and benefits of telemedicine (video visit) versus in person care; bill my insurance or make self-payment for services provided; and responsibility for payment of non-covered services.      Mode of Communication:  Video Conference via "[employer]     As the provider I attest to compliance with applicable laws and regulations related to telemedicine.    Diagnostic Assessment Date: 7/21/21  Treatment Plan Review Date: 11/4/2021, next due 2/4/22.  See Flowsheets for today's PHQ-9 and ALYX-7 results  Previous PHQ-9:   PHQ-9 SCORE 5/24/2021 9/9/2021 10/11/2021   PHQ-9 Total Score 15 14 3      Previous ALYX-7:   ALYX-7 SCORE 12/20/2018 9/9/2021 10/11/2021   Total Score 9 17 2      Clinical Global Impressions  First:  Considering your total clinical experience with this particular patient population, how severe are the patient's symptoms at this time?: 4 (7/22/2021 12:59 PM)    Most recent:  No data recorded    DATA  Extended Session (60+ minutes): No  Interactive Complexity: No  Crisis: No     Treatment Objective(s) Addressed in This Session:  Patient will identify situations that trigger anger/irritability     Current Stressors /  "Issues:  Patient reported regarding busy schedule/interpersonal stressor lately.  Patient reported regarding starting new job about which she's excited and recently completing her move.  Patient reported regarding stressful situation with her son and his significant other, and handling it effectively.     Progress on Treatment Objective(s) / Homework:  Satisfactory progress - ACTION (Actively working towards change); Intervened by reinforcing change plan / affirming steps taken    DBT:  Re-reviewed with patient practicing self-care and being effective in her responses to stressors.    Care Plan review completed: Yes     Medication Review:  No changes to psychiatric medication.      Medication Compliance:  Yes      Changes in Health Issues:              None reported     Chemical Use Review:              Substance Use: Chemical use reviewed, no active concerns identified                  Tobacco Use: No change in amount of tobacco use since last session.  Patient declined discussion at this time      Assessment:  Current Emotional / Mental Status (status of significant symptoms):  Risk status (Self / Other harm or suicidal ideation)  Patient has had a history of suicidal ideation: thoughts started in adolescence around age 14 and suicide attempts: she attempted suicide by taking \"a bunch\" of her medication for her ulcerative colitis at age 14, she was treated medically and held for 72 hours and denies a history of self-injurious behavior, homicidal ideation, homicidal behavior and and other safety concerns   Patient denies current fears or concerns for personal safety.  Patient denies current or recent suicidal ideation or behaviors.   Patient denies current or recent homicidal ideation or behaviors..   Patient denies current or recent self injurious behavior or ideation.  Patient denies other safety concerns.  A safety and risk management plan has been developed including: Patient co-developed a safety plan on " "6/16/21.  Patient has a copy of her safety plan in her chart and agrees to use it if she experiences suicidal or homicidal ideation.        Appearance:                            Appropriate   Eye Contact:                           Good   Psychomotor Behavior:          Normal   Attitude:                                   Cooperative Interested  Friendly Pleasant Attentive Kvng  Orientation:                             All  Speech              Rate / Production:       Normal/ Responsive              Volume:                       Normal   Mood:                                      Anxious   Affect:                                      Appropriate   Thought Content:                    Clear  Rumination   Thought Form:                        Coherent  Logical   Insight:                                     Good Intellectual Insight      Diagnoses:  1. ALYX (generalized anxiety disorder)          Collateral Reports Completed:  None     Plan: (Homework, other):  Patient agreed to continue to work on practicing self-care and being effective in her responses to stressors.     Angel Tamez M.A., [facility] 2/2/22        ______________________________________________________________________     Treatment Plan     Patient's Name: Vickie Duque                    YOB: 1985     Date: November 4, 2021     DSM-V Diagnoses: 300.02 (F41.1) Generalized Anxiety Disorder   Rule out Major Depression  Patient reports a historical dx of \"manic depressive\" at age 14  Psychosocial / Contextual Factors: relationship issues  WHODAS: not completed     Referral / Collaboration:  Referral to another professional/service is not indicated at this time..     Anticipated number of session or this episode of care: 11-20        MeasurableTreatment Goal(s) related to diagnosis / functional impairment(s)  Goal 1: Patient will demonstrate ability to manage anxiety symptoms by better managing anger and improving communication skills.    I will " know I've met my goal when I don't get mad as often or feel like biting my tongue.       Objective #A (Patient Action)                          Patient will identify patterns of escalation  (i.e. tightness in chest, flushed face, increased heart rate, clenched hands, etc.)  Patient will identify situations that trigger anger/irritability   Status: Continued - Date: 11/4/21      Intervention(s)  Therapist will teach patient about anger as a secondary emotion. Help patient identify and recognize patterns of escalation and discuss situations where she experienced anger to identify triggers and patterns of anger.     Objective #B  Patient will learn & utilize at least 1 assertive communication skills weekly  Status: Continued - Date: 11/4/21      Intervention(s)  Therapist will discuss interpersonal effectiveness skills and assertive language and communication.     Patient has reviewed and agreed to the above plan.     Angel Tamez M.A.,LMFT 11/4/21

## 2022-02-12 ENCOUNTER — HEALTH MAINTENANCE LETTER (OUTPATIENT)
Age: 37
End: 2022-02-12

## 2022-02-25 ENCOUNTER — OFFICE VISIT (OUTPATIENT)
Dept: FAMILY MEDICINE | Facility: OTHER | Age: 37
End: 2022-02-25
Payer: COMMERCIAL

## 2022-02-25 VITALS
WEIGHT: 176.8 LBS | DIASTOLIC BLOOD PRESSURE: 70 MMHG | SYSTOLIC BLOOD PRESSURE: 118 MMHG | BODY MASS INDEX: 29.46 KG/M2 | OXYGEN SATURATION: 99 % | HEART RATE: 66 BPM | RESPIRATION RATE: 16 BRPM | HEIGHT: 65 IN | TEMPERATURE: 98 F

## 2022-02-25 DIAGNOSIS — K51.90 ULCERATIVE COLITIS WITHOUT COMPLICATIONS, UNSPECIFIED LOCATION (H): ICD-10-CM

## 2022-02-25 DIAGNOSIS — H69.93 EUSTACHIAN TUBE DYSFUNCTION, BILATERAL: Primary | ICD-10-CM

## 2022-02-25 DIAGNOSIS — E10.65 TYPE 1 DIABETES MELLITUS WITH HYPERGLYCEMIA (H): ICD-10-CM

## 2022-02-25 DIAGNOSIS — F33.1 MAJOR DEPRESSIVE DISORDER, RECURRENT EPISODE, MODERATE (H): ICD-10-CM

## 2022-02-25 PROCEDURE — 99213 OFFICE O/P EST LOW 20 MIN: CPT | Mod: GW | Performed by: PHYSICIAN ASSISTANT

## 2022-02-25 RX ORDER — AMOXICILLIN 500 MG/1
CAPSULE ORAL
COMMUNITY
Start: 2022-02-19 | End: 2022-06-20

## 2022-02-25 RX ORDER — CETIRIZINE HYDROCHLORIDE 10 MG/1
10 TABLET ORAL DAILY
Qty: 90 TABLET | Refills: 0 | Status: SHIPPED | OUTPATIENT
Start: 2022-02-25 | End: 2023-05-12

## 2022-02-25 RX ORDER — FLUTICASONE PROPIONATE 50 MCG
1 SPRAY, SUSPENSION (ML) NASAL DAILY
Qty: 16 G | Refills: 1 | Status: SHIPPED | OUTPATIENT
Start: 2022-02-25 | End: 2023-05-12

## 2022-02-25 ASSESSMENT — PAIN SCALES - GENERAL: PAINLEVEL: SEVERE PAIN (6)

## 2022-02-25 NOTE — PROGRESS NOTES
Assessment & Plan     Eustachian tube dysfunction, bilateral  Patient was seen in the urgent care in [facility] earlier this week and placed on amoxicillin.  She has not had much for change in her overall signs and symptoms since that point of time.  We discussed the fact that this could certainly be viral in nature and then antibiotics are not going to help at all.  She does have a history of some seasonal allergies and from the looks of her nasal passages I suspect that this is more likely the case.  She does admit to some postnasal drip as well.  Treat with medications as noted below and follow-up with her primary care provider in the near future.  - cetirizine (ZYRTEC) 10 MG tablet; Take 1 tablet (10 mg) by mouth daily  - fluticasone (FLONASE) 50 MCG/ACT nasal spray; Spray 1 spray into both nostrils daily    Major depressive disorder, recurrent episode, moderate (H)  Ulcerative colitis without complications, unspecified location (H)  Type 1 diabetes mellitus with hyperglycemia (H)  No new concerns with this and she has a follow-up appointment scheduled with specialist in the [facility].  Strongly recommended that she get into see her endocrinologist ASAP to continue working through her diabetes.  She is due for a full physical in the near future I do recommend that she see her primary care provider for that as well.    No follow-ups on file.    German Calov PA-C  [facility]    Rhonda Johnston is a 36 year old who presents for the following health issues       On abx amoxicillin but not improving    History of Present Illness     Reason for visit:  Ear pain/infection, migrane  Symptom onset:  1-2 weeks ago  Symptoms include:  Ear Pain, migrane  Symptom intensity:  Moderate  Symptom progression:  Staying the same  Had these symptoms before:  No  What makes it worse:  No  What makes it better:  No at the moment    She eats 2-3 servings of fruits and vegetables daily.She consumes  "3 sweetened beverage(s) daily.She exercises with enough effort to increase her heart rate 9 or less minutes per day.  She exercises with enough effort to increase her heart rate 3 or less days per week.   She is taking medications regularly.     Review of Systems   Constitutional, HEENT, cardiovascular, pulmonary, GI, , musculoskeletal, neuro, skin, endocrine and psych systems are negative, except as otherwise noted.      Objective    /70   Pulse 66   Temp 98  F (36.7  C) (Temporal)   Resp 16   Ht 1.655 m (5' 5.16\")   Wt 80.2 kg (176 lb 12.8 oz)   SpO2 99%   BMI 29.28 kg/m    Body mass index is 29.28 kg/m .  Physical Exam   GENERAL: healthy, alert and no distress  HENT: ear canals and TM's normal, nose and mouth without ulcers or lesions though pale nasal tissue is noted and clear nasal discharge is present on exam.  Mild nasal crease is present as well as mild allergic shiners.  NECK: no adenopathy, no asymmetry, masses, or scars and thyroid normal to palpation  RESP: lungs clear to auscultation - no rales, rhonchi or wheezes  CV: regular rate and rhythm, normal S1 S2, no S3 or S4, no murmur, click or rub, no peripheral edema and peripheral pulses strong  ABDOMEN: soft, nontender, no hepatosplenomegaly, no masses and bowel sounds normal  MS: no gross musculoskeletal defects noted, no edema    No results found for this or any previous visit (from the past 24 hour(s)).            "

## 2022-02-25 NOTE — TELEPHONE ENCOUNTER
2/22/2021  [facility] Siri Jones MD  Endocrinology, Diabetes, and Metabolism    Pen needle   No

## 2022-03-07 ENCOUNTER — HOSPITAL ENCOUNTER (EMERGENCY)
Facility: CLINIC | Age: 37
Discharge: HOME | End: 2022-03-07
Attending: EMERGENCY MEDICINE | Admitting: EMERGENCY MEDICINE
Payer: COMMERCIAL

## 2022-03-07 VITALS
BODY MASS INDEX: 28.82 KG/M2 | RESPIRATION RATE: 20 BRPM | OXYGEN SATURATION: 100 % | HEART RATE: 78 BPM | DIASTOLIC BLOOD PRESSURE: 68 MMHG | SYSTOLIC BLOOD PRESSURE: 122 MMHG | TEMPERATURE: 98.2 F | WEIGHT: 174 LBS

## 2022-03-07 DIAGNOSIS — H92.02 OTALGIA, LEFT: ICD-10-CM

## 2022-03-07 PROCEDURE — 99283 EMERGENCY DEPT VISIT LOW MDM: CPT | Performed by: EMERGENCY MEDICINE

## 2022-03-07 PROCEDURE — 99284 EMERGENCY DEPT VISIT MOD MDM: CPT | Performed by: EMERGENCY MEDICINE

## 2022-03-07 RX ORDER — NEOMYCIN SULFATE, POLYMYXIN B SULFATE AND HYDROCORTISONE 10; 3.5; 1 MG/ML; MG/ML; [USP'U]/ML
3 SUSPENSION/ DROPS AURICULAR (OTIC) 3 TIMES DAILY
Qty: 10 ML | Refills: 0 | Status: SHIPPED | OUTPATIENT
Start: 2022-03-07 | End: 2022-03-14

## 2022-03-07 RX ORDER — NEOMYCIN SULFATE, POLYMYXIN B SULFATE AND HYDROCORTISONE 10; 3.5; 1 MG/ML; MG/ML; [USP'U]/ML
3 SUSPENSION/ DROPS AURICULAR (OTIC) 3 TIMES DAILY
Qty: 10 ML | Refills: 0 | Status: SHIPPED | OUTPATIENT
Start: 2022-03-07 | End: 2022-03-07

## 2022-03-07 RX ORDER — ACETAMINOPHEN 500 MG
500-1000 TABLET ORAL EVERY 6 HOURS PRN
COMMUNITY

## 2022-03-07 NOTE — ED TRIAGE NOTES
Pt reports bilateral ear pain and a sore throat for the past 3 weeks. States she was on antibiotics for an ear infection but the pain returned as soon she was done taking them.

## 2022-03-07 NOTE — ED PROVIDER NOTES
History     Chief Complaint   Patient presents with     Otalgia     HPI  Vickie Duque is a 36 year old female who presents with ear pain.  This has been both ears, left greater than right.  Pain is been present over the last 3 to 4 weeks.  She was on amoxicillin which she finished she states 1 to 2 weeks ago.  This did not change anything.  She did feel like she maybe had some drainage from her left ear.  Pain is dull and not relieved with Tylenol or ibuprofen she reports.    Allergies:  No Known Allergies    Problem List:    Patient Active Problem List    Diagnosis Date Noted     ALYX (generalized anxiety disorder) 07/21/2021     Priority: Medium     Hx of tubal ligation 01/15/2021     Priority: Medium     Diabetic retinopathy of left eye associated with type 1 diabetes mellitus, macular edema presence unspecified, unspecified retinopathy severity (H) 11/23/2020     Priority: Medium     Hyperglycemia 10/08/2017     Priority: Medium     Major depressive disorder, recurrent episode, moderate (H) 10/04/2016     Priority: Medium     Ulcerative colitis without complications (H) 01/29/2016     Priority: Medium     Anxiety 11/27/2015     Priority: Medium     Noncompliance with medication regimen 11/03/2015     Priority: Medium     Uncontrolled type 1 diabetes mellitus (H) 05/30/2014     Priority: Medium     Problem list name updated by automated process. Provider to review       Advanced directives, counseling/discussion 05/29/2014     Priority: Medium     Tobacco abuse 05/29/2014     Priority: Medium     Facial paralysis/[place] palsy 04/27/2012     Priority: Medium     Type 1 diabetes mellitus with hyperglycemia (H) 10/31/2010     Priority: Medium     HYPERLIPIDEMIA LDL GOAL <100 10/31/2010     Priority: Medium     ASCUS of cervix with negative high risk HPV 06/19/2008     Priority: Medium     6/19/08 ASCUS pap/neg HPV  2009 and 2012 both NIL paps  6/28/17 NIL pap/neg HR HPV. EMB normal. Plan: cotest in 3 years.        Sprain of back 03/10/2008     Priority: Medium     Problem list name updated by automated process. Provider to review          Past Medical History:    Past Medical History:   Diagnosis Date     Adjustment disorder with anxious mood 11/23/2015     Anxiety 11/27/2015     ASCUS of cervix with negative high risk HPV 06/19/2008     Depressive disorder, not elsewhere classified      Diabetic eye exam (H) 12/19/14     Mixed hyperlipidemia 11/30/2006     Regional enteritis of unspecified site      Type I (juvenile type) diabetes mellitus with ketoacidosis, not stated as uncontrolled(250.11) 01/01/2007     Type I (juvenile type) diabetes mellitus with ketoacidosis, uncontrolled 02/14/08     Type I (juvenile type) diabetes mellitus without mention of complication, not stated as uncontrolled      Ulcerative colitis, unspecified        Past Surgical History:    Past Surgical History:   Procedure Laterality Date     DILATION AND CURETTAGE SUCTION  4/2010    miscarriage     TUBAL LIGATION       [facility] COLONOSCOPY W BIOPSY  08/16/06     [facility] COLONOSCOPY W/WO BRUSH/WASH         Family History:    Family History   Problem Relation Age of Onset     Hypertension Father      Diabetes Maternal Grandmother      Cancer Maternal Grandmother      Diabetes Paternal Grandfather      Diabetes Maternal Uncle      Diabetes Maternal Aunt        Social History:  Marital Status:  Single [1]  Social History     Tobacco Use     Smoking status: Current Every Day Smoker     Packs/day: 0.50     Years: 16.00     Pack years: 8.00     Types: Cigarettes     Smokeless tobacco: Never Used     Tobacco comment: working on quiting, somedays up to 4 cigs per day   Vaping Use     Vaping Use: Never used   Substance Use Topics     Alcohol use: Yes     Alcohol/week: 0.0 standard drinks     Comment: occassionally     Drug use: No        Medications:    acetaminophen (TYLENOL) 500 MG tablet  neomycin-polymyxin-hydrocortisone (CORTISPORIN) 3.5-82089-6 otic  suspension  amoxicillin (AMOXIL) 500 MG capsule  cetirizine (ZYRTEC) 10 MG tablet  Continuous Blood Gluc Sensor (DEXCOM G6 SENSOR) MISC  Continuous Blood Gluc Transmit (DEXCOM G6 TRANSMITTER) MISC  FLUoxetine (PROZAC) 40 MG capsule  fluticasone (FLONASE) 50 MCG/ACT nasal spray  Glucagon, rDNA, (GLUCAGON EMERGENCY) 1 MG KIT  insulin pen needle (BD TARA U/F) 32G X 4 MM miscellaneous  levonorgestrel (MIRENA) 20 MCG/24HR IUD  Naproxen Sodium (ALEVE PO)  NOVOLOG FLEXPEN 100 UNIT/ML soln  SUMAtriptan (IMITREX) 50 MG tablet          Review of Systems  All other systems are reviewed and are negative    Physical Exam   BP: 122/68  Pulse: 78  Temp: 98.2  F (36.8  C)  Resp: 20  Weight: 78.9 kg (174 lb)  SpO2: 100 %      Physical Exam  Vitals reviewed.   Constitutional:       General: She is not in acute distress.     Appearance: She is not diaphoretic.   HENT:      Head: Normocephalic and atraumatic.      Comments: Left external auditory canal is somewhat erythematous.  No significant discharge.  Bilateral tympanic membranes are without erythema.  Eyes:      General: No scleral icterus.        Right eye: No discharge.         Left eye: No discharge.      Conjunctiva/sclera: Conjunctivae normal.   Pulmonary:      Effort: Pulmonary effort is normal.      Breath sounds: No stridor.   Musculoskeletal:         General: Normal range of motion.      Cervical back: Normal range of motion.   Skin:     General: Skin is warm and dry.      Findings: No rash.   Neurological:      Mental Status: She is alert.      Comments: Normal speech and mentation   Psychiatric:         Judgment: Judgment normal.         ED Course                 Procedures              Critical Care time:  none               No results found for this or any previous visit (from the past 24 hour(s)).    Medications - No data to display    Assessments & Plan (with Medical Decision Making)  36-year-old female with left otalgia.  Some signs of possible otitis externa.   Will trial on drops of Cortisporin.  Referred to ENT if not improving     I have reviewed the nursing notes.    I have reviewed the findings, diagnosis, plan and need for follow up with the patient.       Current Discharge Medication List      START taking these medications    Details   neomycin-polymyxin-hydrocortisone (CORTISPORIN) 3.5-94247-2 otic suspension Place 3 drops Into the left ear 3 times daily for 7 days  Qty: 10 mL, Refills: 0             Final diagnoses:   Otalgia, left       3/7/2022   [facility] EMERGENCY DEPT     Isaiah Serrano MD  03/07/22 [id number]

## 2022-03-21 ENCOUNTER — TELEPHONE (OUTPATIENT)
Dept: PSYCHOLOGY | Facility: CLINIC | Age: 37
End: 2022-03-21
Payer: COMMERCIAL

## 2022-04-11 ENCOUNTER — MYC MEDICAL ADVICE (OUTPATIENT)
Dept: ENDOCRINOLOGY | Facility: CLINIC | Age: 37
End: 2022-04-11
Payer: COMMERCIAL

## 2022-04-12 ENCOUNTER — MEDICAL CORRESPONDENCE (OUTPATIENT)
Dept: HEALTH INFORMATION MANAGEMENT | Facility: CLINIC | Age: 37
End: 2022-04-12
Payer: COMMERCIAL

## 2022-04-12 ENCOUNTER — DOCUMENTATION ONLY (OUTPATIENT)
Dept: ENDOCRINOLOGY | Facility: CLINIC | Age: 37
End: 2022-04-12
Payer: COMMERCIAL

## 2022-04-12 NOTE — PROGRESS NOTES
Order supply form received from [employer], completed to the best of writers ability, and placed in Dr. Rhoades's file to review and sign when in clinic  on 4/12/2022.    Mary Kate Sanchez CMA  Adult Endocrinology  [facility], [place]

## 2022-04-12 NOTE — PROGRESS NOTES
Detail Written Order completed and faxed to Bk at [phone].    Mary Kate Sanchez CMA  Adult Endocrinology  [facility], [place]

## 2022-04-18 ENCOUNTER — TELEPHONE (OUTPATIENT)
Dept: PSYCHOLOGY | Facility: CLINIC | Age: 37
End: 2022-04-18
Payer: COMMERCIAL

## 2022-05-07 DIAGNOSIS — F33.1 MAJOR DEPRESSIVE DISORDER, RECURRENT EPISODE, MODERATE (H): ICD-10-CM

## 2022-05-07 DIAGNOSIS — F43.22 ADJUSTMENT DISORDER WITH ANXIOUS MOOD: ICD-10-CM

## 2022-05-09 NOTE — TELEPHONE ENCOUNTER
Routing refill request to provider for review/approval because:  Labs out of range:    PHQ 9/9/2021 10/11/2021 11/15/2021   PHQ-9 Total Score 14 3 11   Q9: Thoughts of better off dead/self-harm past 2 weeks Several days Not at all Not at all     Will forward to team to schedule patient for depression follow up.  Elena Keller, RN

## 2022-05-10 ENCOUNTER — MYC MEDICAL ADVICE (OUTPATIENT)
Dept: FAMILY MEDICINE | Facility: CLINIC | Age: 37
End: 2022-05-10
Payer: COMMERCIAL

## 2022-05-10 RX ORDER — FLUOXETINE 40 MG/1
40 CAPSULE ORAL DAILY
Qty: 30 CAPSULE | Refills: 0 | Status: SHIPPED | OUTPATIENT
Start: 2022-05-10 | End: 2022-06-14

## 2022-05-10 NOTE — TELEPHONE ENCOUNTER
Patient informed via mychart to schedule, 5/13 reminder if not read to send letter.   Nadine Edwards MA

## 2022-05-10 NOTE — LETTER
[address] [address]  Phone: [phone]  Fax: [phone]    May 13, 2022      Violagabriel LO Dunia                                                                                                                                [address] [place] [id number]         Dear Ms. Duque,    We are concerned about your health care.  We recently provided you with a medication refill.  Many medications require routine follow-up with your Doctor.       At this time we ask that: You schedule a routine office visit with your physician to follow your medications or depression Call the clinic at [phone] Option 1 to schedule.         Thank you,     Alvaro Guerrero MD  Care

## 2022-05-18 ENCOUNTER — MYC MEDICAL ADVICE (OUTPATIENT)
Dept: ENDOCRINOLOGY | Facility: CLINIC | Age: 37
End: 2022-05-18
Payer: COMMERCIAL

## 2022-05-18 ENCOUNTER — MYC MEDICAL ADVICE (OUTPATIENT)
Dept: FAMILY MEDICINE | Facility: CLINIC | Age: 37
End: 2022-05-18
Payer: COMMERCIAL

## 2022-05-18 DIAGNOSIS — E10.65 TYPE 1 DIABETES MELLITUS WITH HYPERGLYCEMIA (H): Primary | ICD-10-CM

## 2022-05-18 NOTE — TELEPHONE ENCOUNTER
Patient has video appointment for diabetes on 6/13. Advised patient to schedule virtual with PCP if he wishes through [employer] or by calling.     ROHIT TurnerN, RN

## 2022-06-04 ENCOUNTER — HEALTH MAINTENANCE LETTER (OUTPATIENT)
Age: 37
End: 2022-06-04

## 2022-06-06 NOTE — PROGRESS NOTES
Vickie is a 36 year old who is being evaluated via a billable video visit.      How would you like to obtain your AVS? [employer]  If the video visit is dropped, the invitation should be resent by: Send to e-mail at: [email or web address]  Will anyone else be joining your video visit? No      Outcome for 06/06/22 2:59 PM: [employer] message sent  MACKENZIE Tesfaye  Outcome for 06/06/22 3:47 PM: Replied to [employer] message  MACKENZIE Tesfaye  Outcome for 06/09/22 3:12 PM: Will send [employer] information via [employer]  MACKENZIE Tesfaye  Outcome for 06/13/22 1:57 PM: Pt unable to link device. Needs to make a nurse appt to download in clinic.   Kymberly Ortiz, VF

## 2022-06-10 DIAGNOSIS — E10.9 TYPE 1 DIABETES MELLITUS WITHOUT COMPLICATION (H): ICD-10-CM

## 2022-06-10 RX ORDER — PROCHLORPERAZINE 25 MG/1
SUPPOSITORY RECTAL
Qty: 9 EACH | Refills: 3 | Status: CANCELLED | OUTPATIENT
Start: 2022-06-10

## 2022-06-10 NOTE — TELEPHONE ENCOUNTER
Pt requesting Dexcom G6 Sensors as well as the Transmitter.  Please send both orders over. Thank you.    [facility]/ mail order Services  [phone]

## 2022-06-13 ENCOUNTER — VIRTUAL VISIT (OUTPATIENT)
Dept: ENDOCRINOLOGY | Facility: CLINIC | Age: 37
End: 2022-06-13
Payer: COMMERCIAL

## 2022-06-13 DIAGNOSIS — E10.319: ICD-10-CM

## 2022-06-13 PROCEDURE — 99214 OFFICE O/P EST MOD 30 MIN: CPT | Mod: 95 | Performed by: INTERNAL MEDICINE

## 2022-06-13 RX ORDER — PROCHLORPERAZINE 25 MG/1
SUPPOSITORY RECTAL
Qty: 1 EACH | Refills: 3 | OUTPATIENT
Start: 2022-06-13

## 2022-06-13 RX ORDER — PROCHLORPERAZINE 25 MG/1
SUPPOSITORY RECTAL
Qty: 9 EACH | Refills: 3 | Status: SHIPPED | OUTPATIENT
Start: 2022-06-13 | End: 2022-11-14

## 2022-06-13 RX ORDER — INSULIN ASPART 100 [IU]/ML
INJECTION, SOLUTION INTRAVENOUS; SUBCUTANEOUS
Qty: 40 ML | Refills: 3 | Status: SHIPPED | OUTPATIENT
Start: 2022-06-13 | End: 2022-11-29

## 2022-06-13 RX ORDER — PROCHLORPERAZINE 25 MG/1
SUPPOSITORY RECTAL
Qty: 1 EACH | Refills: 3 | Status: SHIPPED | OUTPATIENT
Start: 2022-06-13 | End: 2022-11-14

## 2022-06-13 NOTE — PATIENT INSTRUCTIONS
Necoe,      Basal = 0.45 units per hour  CHO 1: 11 units   Correction factor 1:60 mg/dl  Target 110.       Please administer mealtime bolus consistently at least 10-15 minutes before eating.       [facility]-Department of Endocrinology  Haydee Rojas RN, Diabetes Educator: [phone]  Clinic Nurses JOB Rodriguez; CMA's: Roseann Hartmann Yang Mee   Clinic Fax: [phone]  On-Call Endocrine at the [place] (after hours/weekends): [phone] option 4  Scheduling Line: [phone]

## 2022-06-13 NOTE — PROGRESS NOTES
Endocrinology Clinic Visit    Chief Complaint: Follow Up and Video Visit     Information obtained from:Patient      Assessment/Treatment Plan:      Type 1 diabetes complicated with retinopathy     Currently being managed with insulin pump tandem & CGM. Pump and CGM information not available for this visit. Overall, per report no concerns regarding blood sugars. No lows. Continue current insulin pump settings. Annual labs ordered today. At the clinic; download insulin pump and CGM and we will conatct you with next steps. In the mean time; please continue the following:   Plan   Basal = 0.45 units per hour  CHO 1: 11 units   Correction factor 1:60 mg/dl  Target 110.     Has Glucagon  Blood pressure well controlled  No indication for statin at this time  Up-to-date eye exam     Orders Placed This Encounter   Procedures     Hemoglobin A1c     Lipid panel reflex to direct LDL Fasting     Albumin Random Urine Quantitative with Creat Ratio     Basic metabolic panel     TSH with free T4 reflex       Return to clinic in 3-4 months.       Siri Rhoades MD  Staff Endocrinologist    Division of Endocrinology and Diabetes      Subjective:         HPI: Vickie Duque is a 36 year old female with history of type 1 diabetes.    Currently on tandem insulin pump    Current pump settings:     Basal = 0.45 units per hour  CHO 1: 11 units   Correction factor 1:60 mg/dl  Target 110.   No new concerns today.    All prescriptions refilled today.     CGM and pump settings not downloaded today. She was unable to download.  Has been using insulin IQ.       No Known Allergies    Current Outpatient Medications   Medication Sig Dispense Refill     acetaminophen (TYLENOL) 500 MG tablet Take 500-1,000 mg by mouth every 6 hours as needed for mild pain       Continuous Blood Gluc Sensor (DEXCOM G6 SENSOR) MISC Change every 10 days. 9 each 3     Continuous Blood Gluc Transmit (DEXCOM G6 TRANSMITTER) MISC Change every 90 days. 1 each 3      FLUoxetine (PROZAC) 40 MG capsule TAKE 1 CAPSULE (40 MG) BY MOUTH DAILY 30 capsule 0     fluticasone (FLONASE) 50 MCG/ACT nasal spray Spray 1 spray into both nostrils daily 16 g 1     Glucagon, rDNA, (GLUCAGON EMERGENCY) 1 MG KIT Inject 1 mg into the muscle as needed (Hypoglycemia) 1 kit 3     insulin aspart (NOVOLOG VIAL) 100 UNITS/ML vial 1 unit for every 12 grams of carbohydrates with meals three times per day. Plus correction scale; uses up to 60 units daily. 60 mL 1     Insulin Infusion Pump Supplies (T:SLIM T:LOCK INSULIN CART 3ML) MISC 1 each See Admin Instructions Change insulin pump cartridge every 2-3 days. 40 each 3     insulin pen needle (BD TARA U/F) 32G X 4 MM miscellaneous USE 3 DAILY OR AS DIRECTED. 270 each 3     levonorgestrel (MIRENA) 20 MCG/24HR IUD 1 each (20 mcg) by Intrauterine route once       Naproxen Sodium (ALEVE PO) Take by mouth as needed for moderate pain        SUMAtriptan (IMITREX) 50 MG tablet Take 1 tablet (50 mg) by mouth at onset of headache for migraine May repeat in 2 hours. Max 4 tablets/24 hours. 18 tablet 4     amoxicillin (AMOXIL) 500 MG capsule TAKE 1 CAPSULE BY MOUTH 3 TIMES A DAY FOR 7 DAYS (Patient not taking: Reported on 6/13/2022)       cetirizine (ZYRTEC) 10 MG tablet Take 1 tablet (10 mg) by mouth daily (Patient not taking: Reported on 6/13/2022) 90 tablet 0     NOVOLOG FLEXPEN 100 UNIT/ML soln 1 unit for every 12 grams of carbohydrates with meals three times per day. Plus correction scale; uses up to 60 units daily. (Patient not taking: Reported on 6/13/2022) 45 mL 1       Review of Systems      as per HPI above.  Mild numbness and tingling involving bilateral toes.    Objective:   There were no vitals taken for this visit.    Constitutional: Pleasant no acute cardiopulmonary distress.   Psychological: appropriate mood and affect   In House Labs:   Lab Results   Component Value Date    A1C 9.1 06/04/2021    A1C 8.7 02/22/2021    A1C 9.5 01/13/2020    A1C 8.3  10/14/2019    A1C 8.9 07/22/2019       TSH   Date Value Ref Range Status   01/04/2021 3.96 0.40 - 4.00 mU/L Final   03/19/2019 2.95 0.40 - 4.00 mU/L Final   10/07/2017 2.57 0.40 - 4.00 mU/L Final   06/26/2017 6.05 (H) 0.40 - 4.00 mU/L Final   03/05/2014 3.97 0.4 - 5.0 mU/L Final     T4 Free   Date Value Ref Range Status   06/26/2017 1.02 0.76 - 1.46 ng/dL Final   03/28/2006 1.10 0.70 - 1.85 ng/dL Final       Creatinine   Date Value Ref Range Status   06/04/2021 0.94 0.52 - 1.04 mg/dL Final   ]    Recent Labs   Lab Test 06/04/21  0837 08/09/18  0935 01/29/16  0933 04/01/15  1143 07/24/14  0851   CHOL 180  --  207* 197 193   HDL 41*  --  55 53 51   * 113* 129* 121 126   TRIG 134  --  116 113 84   CHOLHDLRATIO  --   --   --  3.7 4.0     This note has been dictated using voice recognition software.  As a result, there may be errors in the documentation that have gone undetected.  Please consider this when interpreting information in this documentation.    Video=15 minutes. Nathen.

## 2022-06-13 NOTE — LETTER
6/13/2022         RE: Vickie Duque  [address] [facility], [place] [address]        Dear Colleague,    Thank you for referring your patient, Vickie Duque, to the [facility]. Please see a copy of my visit note below.    Vickie is a 36 year old who is being evaluated via a billable video visit.      How would you like to obtain your AVS? [employer]  If the video visit is dropped, the invitation should be resent by: Send to e-mail at: [email or web address]  Will anyone else be joining your video visit? No      Outcome for 06/06/22 2:59 PM: [employer] message sent  MACKENZIE Tesfaye  Outcome for 06/06/22 3:47 PM: Replied to [employer] message  MACKENZIE Tesfaye  Outcome for 06/09/22 3:12 PM: Will send Tandem information via [employer]  MACKENZIE Tesfaye  Outcome for 06/13/22 1:57 PM: Pt unable to link device. Needs to make a nurse appt to download in clinic.   MACKENZIE De Leon              Endocrinology Clinic Visit    Chief Complaint: Follow Up and Video Visit     Information obtained from:Patient      Assessment/Treatment Plan:      Type 1 diabetes complicated with retinopathy     Currently being managed with insulin pump tandem & CGM. Pump and CGM information not available for this visit. Overall, per report no concerns regarding blood sugars. No lows. Continue current insulin pump settings. Annual labs ordered today. At the clinic; download insulin pump and CGM and we will conatct you with next steps. In the mean time; please continue the following:   Plan   Basal = 0.45 units per hour  CHO 1: 11 units   Correction factor 1:60 mg/dl  Target 110.     Has Glucagon  Blood pressure well controlled  No indication for statin at this time  Up-to-date eye exam     Orders Placed This Encounter   Procedures     Hemoglobin A1c     Lipid panel reflex to direct LDL Fasting     Albumin Random Urine Quantitative with Creat Ratio     Basic metabolic panel     TSH with free T4 reflex       Return to clinic in 3-4  months.       Siri Rhoades MD  Staff Endocrinologist    Division of Endocrinology and Diabetes      Subjective:         HPI: Vickie Duque is a 36 year old female with history of type 1 diabetes.    Currently on tandem insulin pump    Current pump settings:     Basal = 0.45 units per hour  CHO 1: 11 units   Correction factor 1:60 mg/dl  Target 110.   No new concerns today.    All prescriptions refilled today.     CGM and pump settings not downloaded today. She was unable to download.  Has been using insulin IQ.       No Known Allergies    Current Outpatient Medications   Medication Sig Dispense Refill     acetaminophen (TYLENOL) 500 MG tablet Take 500-1,000 mg by mouth every 6 hours as needed for mild pain       Continuous Blood Gluc Sensor (DEXCOM G6 SENSOR) MISC Change every 10 days. 9 each 3     Continuous Blood Gluc Transmit (DEXCOM G6 TRANSMITTER) MISC Change every 90 days. 1 each 3     FLUoxetine (PROZAC) 40 MG capsule TAKE 1 CAPSULE (40 MG) BY MOUTH DAILY 30 capsule 0     fluticasone (FLONASE) 50 MCG/ACT nasal spray Spray 1 spray into both nostrils daily 16 g 1     Glucagon, rDNA, (GLUCAGON EMERGENCY) 1 MG KIT Inject 1 mg into the muscle as needed (Hypoglycemia) 1 kit 3     insulin aspart (NOVOLOG VIAL) 100 UNITS/ML vial 1 unit for every 12 grams of carbohydrates with meals three times per day. Plus correction scale; uses up to 60 units daily. 60 mL 1     Insulin Infusion Pump Supplies (T:SLIM T:LOCK INSULIN CART 3ML) MISC 1 each See Admin Instructions Change insulin pump cartridge every 2-3 days. 40 each 3     insulin pen needle (BD TARA U/F) 32G X 4 MM miscellaneous USE 3 DAILY OR AS DIRECTED. 270 each 3     levonorgestrel (MIRENA) 20 MCG/24HR IUD 1 each (20 mcg) by Intrauterine route once       Naproxen Sodium (ALEVE PO) Take by mouth as needed for moderate pain        SUMAtriptan (IMITREX) 50 MG tablet Take 1 tablet (50 mg) by mouth at onset of headache for migraine May repeat in 2 hours. Max 4  tablets/24 hours. 18 tablet 4     amoxicillin (AMOXIL) 500 MG capsule TAKE 1 CAPSULE BY MOUTH 3 TIMES A DAY FOR 7 DAYS (Patient not taking: Reported on 6/13/2022)       cetirizine (ZYRTEC) 10 MG tablet Take 1 tablet (10 mg) by mouth daily (Patient not taking: Reported on 6/13/2022) 90 tablet 0     NOVOLOG FLEXPEN 100 UNIT/ML soln 1 unit for every 12 grams of carbohydrates with meals three times per day. Plus correction scale; uses up to 60 units daily. (Patient not taking: Reported on 6/13/2022) 45 mL 1       Review of Systems      as per HPI above.  Mild numbness and tingling involving bilateral toes.    Objective:   There were no vitals taken for this visit.    Constitutional: Pleasant no acute cardiopulmonary distress.   Psychological: appropriate mood and affect   In House Labs:   Lab Results   Component Value Date    A1C 9.1 06/04/2021    A1C 8.7 02/22/2021    A1C 9.5 01/13/2020    A1C 8.3 10/14/2019    A1C 8.9 07/22/2019       TSH   Date Value Ref Range Status   01/04/2021 3.96 0.40 - 4.00 mU/L Final   03/19/2019 2.95 0.40 - 4.00 mU/L Final   10/07/2017 2.57 0.40 - 4.00 mU/L Final   06/26/2017 6.05 (H) 0.40 - 4.00 mU/L Final   03/05/2014 3.97 0.4 - 5.0 mU/L Final     T4 Free   Date Value Ref Range Status   06/26/2017 1.02 0.76 - 1.46 ng/dL Final   03/28/2006 1.10 0.70 - 1.85 ng/dL Final       Creatinine   Date Value Ref Range Status   06/04/2021 0.94 0.52 - 1.04 mg/dL Final   ]    Recent Labs   Lab Test 06/04/21  0837 08/09/18  0935 01/29/16  0933 04/01/15  1143 07/24/14  0851   CHOL 180  --  207* 197 193   HDL 41*  --  55 53 51   * 113* 129* 121 126   TRIG 134  --  116 113 84   CHOLHDLRATIO  --   --   --  3.7 4.0     This note has been dictated using voice recognition software.  As a result, there may be errors in the documentation that have gone undetected.  Please consider this when interpreting information in this documentation.    Video=15 minutes. Nathen.       Again, thank you for allowing me to  participate in the care of your patient.        Sincerely,        Siri Rhoades MD

## 2022-06-14 ENCOUNTER — MYC MEDICAL ADVICE (OUTPATIENT)
Dept: FAMILY MEDICINE | Facility: CLINIC | Age: 37
End: 2022-06-14
Payer: COMMERCIAL

## 2022-06-14 ENCOUNTER — MYC REFILL (OUTPATIENT)
Dept: FAMILY MEDICINE | Facility: CLINIC | Age: 37
End: 2022-06-14
Payer: COMMERCIAL

## 2022-06-14 DIAGNOSIS — F43.22 ADJUSTMENT DISORDER WITH ANXIOUS MOOD: ICD-10-CM

## 2022-06-14 DIAGNOSIS — F33.1 MAJOR DEPRESSIVE DISORDER, RECURRENT EPISODE, MODERATE (H): ICD-10-CM

## 2022-06-14 NOTE — TELEPHONE ENCOUNTER
Patient is informed to schedule a virtual visit.    Also asked about suicidal thoughts.  ROHIT BenitezN, RN

## 2022-06-14 NOTE — TELEPHONE ENCOUNTER
Patient is scheduled on 6/20/22.  Informed to go to ED or call a crisis line if she has thoughts of harming others between now and appointment.    Closing this encounter.  Elena Keller, ROHITN, RN

## 2022-06-15 RX ORDER — FLUOXETINE 40 MG/1
40 CAPSULE ORAL DAILY
Qty: 30 CAPSULE | Refills: 0 | Status: SHIPPED | OUTPATIENT
Start: 2022-06-15 | End: 2022-06-20

## 2022-06-15 NOTE — TELEPHONE ENCOUNTER
Pending Prescriptions:                       Disp   Refills    FLUoxetine (PROZAC) 40 MG capsule          30 cap*0        Sig: Take 1 capsule (40 mg) by mouth daily        Routing refill request to provider for review/approval because:  Mary given x1 and patient did not follow up, please advise    Spencer Cheatham, ROHITN, RN, PHN  [facility] RN for [place] [place]  Marisol 15, 2022

## 2022-06-15 NOTE — TELEPHONE ENCOUNTER
Medication refilled x1.  Needs appointment for further refills.  Will have staff notify patient.    Alvaro Guerrero MD

## 2022-06-15 NOTE — TELEPHONE ENCOUNTER
Patient has appointment scheduled for 06/20/2022 with Dr. Guerrero.  Marli Xiong, [facility]

## 2022-06-23 ENCOUNTER — LAB (OUTPATIENT)
Dept: LAB | Facility: CLINIC | Age: 37
End: 2022-06-23
Payer: COMMERCIAL

## 2022-06-23 DIAGNOSIS — E10.319: ICD-10-CM

## 2022-06-23 LAB
ANION GAP SERPL CALCULATED.3IONS-SCNC: 4 MMOL/L (ref 3–14)
BUN SERPL-MCNC: 16 MG/DL (ref 7–30)
CALCIUM SERPL-MCNC: 8.5 MG/DL (ref 8.5–10.1)
CHLORIDE BLD-SCNC: 106 MMOL/L (ref 94–109)
CHOLEST SERPL-MCNC: 174 MG/DL
CO2 SERPL-SCNC: 29 MMOL/L (ref 20–32)
CREAT SERPL-MCNC: 0.94 MG/DL (ref 0.52–1.04)
CREAT UR-MCNC: 304 MG/DL
FASTING STATUS PATIENT QL REPORTED: NO
GFR SERPL CREATININE-BSD FRML MDRD: 79 ML/MIN/1.73M2
GLUCOSE BLD-MCNC: 150 MG/DL (ref 70–99)
HBA1C MFR BLD: 10 % (ref 0–5.6)
HDLC SERPL-MCNC: 41 MG/DL
LDLC SERPL CALC-MCNC: 107 MG/DL
MICROALBUMIN UR-MCNC: 12 MG/L
MICROALBUMIN/CREAT UR: 3.95 MG/G CR (ref 0–25)
NONHDLC SERPL-MCNC: 133 MG/DL
POTASSIUM BLD-SCNC: 3.8 MMOL/L (ref 3.4–5.3)
SODIUM SERPL-SCNC: 139 MMOL/L (ref 133–144)
TRIGL SERPL-MCNC: 128 MG/DL
TSH SERPL DL<=0.005 MIU/L-ACNC: 2.26 MU/L (ref 0.4–4)

## 2022-06-23 PROCEDURE — 80061 LIPID PANEL: CPT

## 2022-06-23 PROCEDURE — 84443 ASSAY THYROID STIM HORMONE: CPT

## 2022-06-23 PROCEDURE — 36415 COLL VENOUS BLD VENIPUNCTURE: CPT

## 2022-06-23 PROCEDURE — 82043 UR ALBUMIN QUANTITATIVE: CPT

## 2022-06-23 PROCEDURE — 80048 BASIC METABOLIC PNL TOTAL CA: CPT

## 2022-06-23 PROCEDURE — 83036 HEMOGLOBIN GLYCOSYLATED A1C: CPT

## 2022-06-24 ENCOUNTER — TELEPHONE (OUTPATIENT)
Dept: ENDOCRINOLOGY | Facility: CLINIC | Age: 37
End: 2022-06-24

## 2022-06-24 NOTE — TELEPHONE ENCOUNTER
Patient stopped in on 6/23/22 to have her T-Slim insulin pump downloaded. Patient had recent virtual visit with Dr. Rhoades on 6/13/22.    Writer will update Dr. Rhoades patient data available on T-Slim. Please note that this past weekend 6/18-6/19 patient noted that she had to do a pump site change early due to a kink in the tubing. This caused her blood sugars to go high.     Patient aware that Dr. Rhoades not back until Monday, 6/27/22.     Jennifer Mcconnell, RN  Endocrine Care Coordinator  [facility]

## 2022-06-27 NOTE — RESULT ENCOUNTER NOTE
Necoe,    The A1c has worsened compared to previous results.  Please schedule an appointment with Haydee [diabetes educator] at your earliest convenience so that we can review your continuous glucose monitor and determine next steps.  Other lab results are stable compared to previous results.  Please let me know if any questions the meantime.    Siri Rhoades MD

## 2022-06-29 ENCOUNTER — TELEPHONE (OUTPATIENT)
Dept: EDUCATION SERVICES | Facility: CLINIC | Age: 37
End: 2022-06-29

## 2022-06-29 NOTE — TELEPHONE ENCOUNTER
Contacted Necoe to assist in pump changes per Dr.Kidmealem Rhoades.  Necoe was driving so we plan to talk at noon today.  Yudith Shirley RN, [facility]

## 2022-06-29 NOTE — TELEPHONE ENCOUNTER
Plan: CGM and insulin pump info reviewed.   Plan - CDE team please assist pt with the following.   Change CHO coverage to 1:11 at 5:00 PM.   Use control IQ  refresher on CHO counting   Siri Rhoades MD

## 2022-06-29 NOTE — TELEPHONE ENCOUNTER
See 6/24/22 telephone encounter:  Dr.Kidmealem Rhoades reviewed Vickie's Tandem report and recommended:  Change CHO coverage to 1:11 at 5:00 PM (from current 1:12).   Use control IQ   refresher on CHO counting   Siri Rhoades MD     I contacted Vickie and she was able to make the pump change over the phone.  She confirmed she was in control IQ and we reviewed dosing 10-15 min before meals as much as possible. We discussed carb counting and to use the [employer] albertina on her phone to look up foods paying close attention to serving size and total carb in grams and entering exact grams into the pump.  We discussed if you miss a food bolus and it has been within an hour, only bolus for the carbs.  If it has been over an hour, only take the correction.  She is encouraged to call or MyChart message 2 or more low blood sugars less than 70 mg/dl in one week for further adjustments.    Yudith Shirley RN, [facility]

## 2022-08-25 ENCOUNTER — VIRTUAL VISIT (OUTPATIENT)
Dept: FAMILY MEDICINE | Facility: CLINIC | Age: 37
End: 2022-08-25
Payer: COMMERCIAL

## 2022-08-25 DIAGNOSIS — F33.1 MAJOR DEPRESSIVE DISORDER, RECURRENT EPISODE, MODERATE (H): ICD-10-CM

## 2022-08-25 DIAGNOSIS — F43.22 ADJUSTMENT DISORDER WITH ANXIOUS MOOD: ICD-10-CM

## 2022-08-25 PROCEDURE — 99214 OFFICE O/P EST MOD 30 MIN: CPT | Mod: 95 | Performed by: FAMILY MEDICINE

## 2022-08-25 ASSESSMENT — ANXIETY QUESTIONNAIRES
GAD7 TOTAL SCORE: 15
8. IF YOU CHECKED OFF ANY PROBLEMS, HOW DIFFICULT HAVE THESE MADE IT FOR YOU TO DO YOUR WORK, TAKE CARE OF THINGS AT HOME, OR GET ALONG WITH OTHER PEOPLE?: SOMEWHAT DIFFICULT
6. BECOMING EASILY ANNOYED OR IRRITABLE: NEARLY EVERY DAY
GAD7 TOTAL SCORE: 15
7. FEELING AFRAID AS IF SOMETHING AWFUL MIGHT HAPPEN: SEVERAL DAYS
IF YOU CHECKED OFF ANY PROBLEMS ON THIS QUESTIONNAIRE, HOW DIFFICULT HAVE THESE PROBLEMS MADE IT FOR YOU TO DO YOUR WORK, TAKE CARE OF THINGS AT HOME, OR GET ALONG WITH OTHER PEOPLE: SOMEWHAT DIFFICULT
5. BEING SO RESTLESS THAT IT IS HARD TO SIT STILL: NEARLY EVERY DAY
GAD7 TOTAL SCORE: 15
1. FEELING NERVOUS, ANXIOUS, OR ON EDGE: SEVERAL DAYS
GAD7 TOTAL SCORE: 15
4. TROUBLE RELAXING: NEARLY EVERY DAY
7. FEELING AFRAID AS IF SOMETHING AWFUL MIGHT HAPPEN: SEVERAL DAYS
7. FEELING AFRAID AS IF SOMETHING AWFUL MIGHT HAPPEN: SEVERAL DAYS
3. WORRYING TOO MUCH ABOUT DIFFERENT THINGS: NEARLY EVERY DAY
5. BEING SO RESTLESS THAT IT IS HARD TO SIT STILL: NEARLY EVERY DAY
1. FEELING NERVOUS, ANXIOUS, OR ON EDGE: SEVERAL DAYS
2. NOT BEING ABLE TO STOP OR CONTROL WORRYING: SEVERAL DAYS
6. BECOMING EASILY ANNOYED OR IRRITABLE: NEARLY EVERY DAY
2. NOT BEING ABLE TO STOP OR CONTROL WORRYING: SEVERAL DAYS
IF YOU CHECKED OFF ANY PROBLEMS ON THIS QUESTIONNAIRE, HOW DIFFICULT HAVE THESE PROBLEMS MADE IT FOR YOU TO DO YOUR WORK, TAKE CARE OF THINGS AT HOME, OR GET ALONG WITH OTHER PEOPLE: SOMEWHAT DIFFICULT
3. WORRYING TOO MUCH ABOUT DIFFERENT THINGS: NEARLY EVERY DAY

## 2022-08-25 ASSESSMENT — PATIENT HEALTH QUESTIONNAIRE - PHQ9
SUM OF ALL RESPONSES TO PHQ QUESTIONS 1-9: 15
10. IF YOU CHECKED OFF ANY PROBLEMS, HOW DIFFICULT HAVE THESE PROBLEMS MADE IT FOR YOU TO DO YOUR WORK, TAKE CARE OF THINGS AT HOME, OR GET ALONG WITH OTHER PEOPLE: SOMEWHAT DIFFICULT
SUM OF ALL RESPONSES TO PHQ QUESTIONS 1-9: 15
5. POOR APPETITE OR OVEREATING: NEARLY EVERY DAY

## 2022-08-25 ASSESSMENT — ENCOUNTER SYMPTOMS: NERVOUS/ANXIOUS: 1

## 2022-08-25 NOTE — PROGRESS NOTES
"Vickie is a 36 year old who is being evaluated via a billable video visit.      How would you like to obtain your AVS? MyChart  If the video visit is dropped, the invitation should be resent by: Text to cell phone: [phone]  Will anyone else be joining your video visit? No          Assessment & Plan     ASSESSMENT/ORDERS:    ICD-10-CM    1. Major depressive disorder, recurrent episode, moderate (H)  F33.1 FLUoxetine (PROZAC) 20 MG capsule   2. Adjustment disorder with anxious mood  F43.22 FLUoxetine (PROZAC) 20 MG capsule     PLAN:  1.  Debated a lot over what to do going forward with medication changes versus other options.  She is going to assess her situation going forward with no medication change at this time.  Plan to follow-up in 3 months with prevent visit and discuss if medication is still right type and dose for her.       33 minutes spent on the date of the encounter doing chart review, patient visit and documentation        Nicotine/Tobacco Cessation:  She reports that she has been smoking cigarettes. She has a 8.00 pack-year smoking history. She has never used smokeless tobacco.  Nicotine/Tobacco Cessation Plan:   Self help information given to patient      BMI:   Estimated body mass index is 28.82 kg/m  as calculated from the following:    Height as of 2/25/22: 1.655 m (5' 5.16\").    Weight as of 3/7/22: 78.9 kg (174 lb).           Return in about 3 months (around 11/25/2022) for next preventative visit (Physical), depression recheck.    Alvaro Guerrero MD  [facility]    Subjective   Vickie is a 36 year old, presenting for the following health issues:  Anxiety and Depression      Anxiety    History of Present Illness       Mental Health Follow-up:  Patient presents to follow-up on Depression & Anxiety.Patient's depression since last visit has been:  No change  The patient is not having other symptoms associated with depression.  Patient's anxiety since last visit has " been:  No change  The patient is not having other symptoms associated with anxiety.  Any significant life events: other  Patient is not feeling anxious or having panic attacks.  Patient has no concerns about alcohol or drug use.    She eats 2-3 servings of fruits and vegetables daily.She consumes 3 sweetened beverage(s) daily.She exercises with enough effort to increase her heart rate 20 to 29 minutes per day.  She exercises with enough effort to increase her heart rate 5 days per week.   She is taking medications regularly.    Today's PHQ-9         PHQ-9 Total Score: 15    PHQ-9 Q9 Thoughts of better off dead/self-harm past 2 weeks :   Not at all    How difficult have these problems made it for you to do your work, take care of things at home, or get along with other people: Somewhat difficult  Today's ALYX-7 Score: 15       Depression and Anxiety Follow-Up    How are you doing with your depression since your last visit? No change    How are you doing with your anxiety since your last visit?  No change    Are you having other symptoms that might be associated with depression or anxiety? No    Have you had a significant life event? 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 No    Social History     Tobacco Use     Smoking status: Current Every Day Smoker     Packs/day: 0.50     Years: 16.00     Pack years: 8.00     Types: Cigarettes     Smokeless tobacco: Never Used     Tobacco comment: working on quiting, somedays up to 4 cigs per day   Vaping Use     Vaping Use: Never used   Substance Use Topics     Alcohol use: Yes     Alcohol/week: 0.0 standard drinks     Comment: occassionally     Drug use: No     PHQ 11/15/2021 8/25/2022 8/25/2022   PHQ-9 Total Score 11 15 15   Q9: Thoughts of better off dead/self-harm past 2 weeks Not at all Not at all Not at all     ALYX-7 SCORE 11/15/2021 8/25/2022 8/25/2022    Total Score - - 15 (severe anxiety)   Total Score 16 15 15         Suicide Assessment Five-step Evaluation and Treatment (SAFE-T)        Is about the same as she was 2 months ago when we increased fluoxetine dose.  Feels tired all the time.  She has noticed ups and downs.  Has no tolerance for people.      Took new full time position.  Likes her job.  Sees some stressors ahead with kids going back to school.  Has daughter with transplant and she skips her medications occasionally.  Youngest son does not like going to dad's house when it is dad's turn with the kids.      Review of Systems   Psychiatric/Behavioral: The patient is nervous/anxious.             Objective           Vitals:  No vitals were obtained today due to virtual visit.    Physical Exam   GENERAL: Healthy, alert and no distress  EYES: Eyes grossly normal to inspection.  No discharge or erythema, or obvious scleral/conjunctival abnormalities.  RESP: No audible wheeze, cough, or visible cyanosis.  No visible retractions or increased work of breathing.    SKIN: Visible skin clear. No significant rash, abnormal pigmentation or lesions.  NEURO: Cranial nerves grossly intact.  Mentation and speech appropriate for age.  PSYCH: Mentation appears normal, affect normal/bright, judgement and insight intact, normal speech and appearance well-groomed.                Video-Visit Details    Video Start Time: 9:08 AM    Type of service:  Video Visit    Video End Time:9:39 AM    Originating Location (pt. Location): Home    Distant Location (provider location):  [facility]     Platform used for Video Visit: [employer]    .  Harish.

## 2022-09-13 ENCOUNTER — DOCUMENTATION ONLY (OUTPATIENT)
Dept: ENDOCRINOLOGY | Facility: CLINIC | Age: 37
End: 2022-09-13

## 2022-09-13 NOTE — PROGRESS NOTES
Rx form from [employer] for insulin pump supplies. Form placed on provider desk for signature.    Carlos Macdonald [facility]  Adult Endocrinology  [facility]

## 2022-09-15 ENCOUNTER — MEDICAL CORRESPONDENCE (OUTPATIENT)
Dept: HEALTH INFORMATION MANAGEMENT | Facility: CLINIC | Age: 37
End: 2022-09-15

## 2022-10-09 ENCOUNTER — HEALTH MAINTENANCE LETTER (OUTPATIENT)
Age: 37
End: 2022-10-09

## 2022-11-07 NOTE — PROGRESS NOTES
Vickie Duque  is being evaluated via a billable video visit.      How would you like to obtain your AVS? [employer]  For the video visit, send the invitation by: Text to cell phone: [phone]  Will anyone else be joining your video visit? No          Outcome for 11/07/22 9:28 AM: [employer] message sent  MACKENZIE Griffin  Outcome for 11/10/22 3:15 PM: Patient states she is unable to upload device at home. Patient scheduled for nurse only visit 11/11/22 at 1pm for clinic to assist.   MACKENZIE Griffin   Outcome for 11/11/22 1:31 PM: Data uploaded on Tandem and emailed to provider.  Carlos Macdonald [facility]  Adult Endocrinology  [facility]

## 2022-11-10 ENCOUNTER — TELEPHONE (OUTPATIENT)
Dept: ENDOCRINOLOGY | Facility: CLINIC | Age: 37
End: 2022-11-10

## 2022-11-10 NOTE — TELEPHONE ENCOUNTER
Spoke with patient regarding tandem data for 11/14 appt. Pt states she is unable to upload from home. Pt scheduled for nurse only visit on 11/11 at 1pm. Dora Fenton on 11/10/2022 at 3:18 PM

## 2022-11-11 ENCOUNTER — OFFICE VISIT (OUTPATIENT)
Dept: ENDOCRINOLOGY | Facility: CLINIC | Age: 37
End: 2022-11-11
Payer: COMMERCIAL

## 2022-11-11 DIAGNOSIS — E10.319: Primary | ICD-10-CM

## 2022-11-11 NOTE — PROGRESS NOTES
Patient here for blood sugar upload. Report emailed to provider.    Carlos Macdonald [facility] [address]  Adult Endocrinology  [facility]

## 2022-11-14 ENCOUNTER — VIRTUAL VISIT (OUTPATIENT)
Dept: ENDOCRINOLOGY | Facility: CLINIC | Age: 37
End: 2022-11-14
Payer: COMMERCIAL

## 2022-11-14 DIAGNOSIS — E10.39 TYPE 1 DIABETES MELLITUS WITH OTHER OPHTHALMIC COMPLICATION (H): Primary | ICD-10-CM

## 2022-11-14 DIAGNOSIS — Z46.81 INSULIN PUMP TITRATION: ICD-10-CM

## 2022-11-14 PROCEDURE — 99215 OFFICE O/P EST HI 40 MIN: CPT | Mod: 95 | Performed by: INTERNAL MEDICINE

## 2022-11-14 RX ORDER — PROCHLORPERAZINE 25 MG/1
SUPPOSITORY RECTAL
Qty: 9 EACH | Refills: 3 | Status: SHIPPED | OUTPATIENT
Start: 2022-11-14 | End: 2023-07-15

## 2022-11-14 RX ORDER — PROCHLORPERAZINE 25 MG/1
SUPPOSITORY RECTAL
Qty: 1 EACH | Refills: 3 | Status: SHIPPED | OUTPATIENT
Start: 2022-11-14 | End: 2023-07-15

## 2022-11-14 NOTE — PATIENT INSTRUCTIONS
Necoe,      Basal = 0.45 units per hour  CHO 1: 10 units   Correction factor 1:60 mg/dl  Target 110.       Please administer mealtime bolus consistently at least 10-15 minutes before eating.    Don't give multiple correction boluses which is causing low blood sugar. Wait at least 3-4 hours between correction boluses.        [facility]  Haydee Rojas RN, Diabetes Educator: [phone]  Clinic Nurses JOB Rodriguez; CMA's: Roseann Hartmann Yang Mee   Clinic Fax: [phone]  On-Call Endocrine at [facility] (after hours/weekends): [phone] option 4  Scheduling Line: [phone]

## 2022-11-14 NOTE — PROGRESS NOTES
Endocrinology Clinic Visit    Chief Complaint: Follow Up and Video Visit     Information obtained from:Patient      Assessment/Treatment Plan:      Type 1 diabetes complicated with retinopathy     Currently being managed with insulin pump tandem & CGM. Pump and CGM reviewed.  Postprandial hyperglycemia and multiple lows due to overriding the pump with multiple correction boluses within a short period of time.  Time between correction doses has to be at least 3-4 hours.      Plan   Basal = 0.45 units per hour  Change CHO 1: 10 units   Correction factor 1:60 mg/dl  Target 110.   Insulin pump settings change it for carbohydrate counting as above.  Confirmed settings through back reading.  Has Glucagon  Blood pressure well controlled  LDL checked and slightly higher than desired.  Follow-up again.  Up-to-date eye exam     Orders Placed This Encounter   Procedures     Hemoglobin A1c     TSH with free T4 reflex       Return to clinic in 3-4 months.       Siri Rhoades MD  Staff Endocrinologist    Division of Endocrinology and Diabetes      Subjective:         HPI: Vickie Duque is a 37 year old female with history of type 1 diabetes.    Currently on tandem insulin pump    Current pump settings:     Basal = 0.45 units per hour  CHO 1: 11 units   Correction factor 1:60 mg/dl  Target 110.   No new concerns today.        CGM and pump settings downloaded today.                                    No Known Allergies    Current Outpatient Medications   Medication Sig Dispense Refill     acetaminophen (TYLENOL) 500 MG tablet Take 500-1,000 mg by mouth every 6 hours as needed for mild pain       Continuous Blood Gluc Sensor (DEXCOM G6 SENSOR) MISC Change every 10 days. 9 each 3     Continuous Blood Gluc Transmit (DEXCOM G6 TRANSMITTER) MISC Change every 90 days. 1 each 3     FLUoxetine (PROZAC) 20 MG capsule Take 3 capsules (60 mg) by mouth daily 270 capsule 0     fluticasone (FLONASE) 50 MCG/ACT nasal spray Spray 1 spray  into both nostrils daily 16 g 1     Glucagon, rDNA, (GLUCAGON EMERGENCY) 1 MG KIT Inject 1 mg into the muscle as needed (Hypoglycemia) 1 kit 3     insulin aspart (NOVOLOG VIAL) 100 UNITS/ML vial Use as directed via insulin pump. Uses up to 40 units per day. 40 mL 3     Insulin Infusion Pump Supplies (T:SLIM T:LOCK INSULIN CART 3ML) MISC 1 each See Admin Instructions Change insulin pump cartridge every 2-3 days. 40 each 3     insulin pen needle (BD TARA U/F) 32G X 4 MM miscellaneous USE 3 DAILY OR AS DIRECTED. 270 each 3     levonorgestrel (MIRENA) 20 MCG/24HR IUD 1 each (20 mcg) by Intrauterine route once       Naproxen Sodium (ALEVE PO) Take by mouth as needed for moderate pain        cetirizine (ZYRTEC) 10 MG tablet Take 1 tablet (10 mg) by mouth daily (Patient not taking: Reported on 11/14/2022) 90 tablet 0     NOVOLOG FLEXPEN 100 UNIT/ML soln 1 unit for every 12 grams of carbohydrates with meals three times per day. Plus correction scale; uses up to 60 units daily. (Patient not taking: Reported on 8/25/2022) 45 mL 1     SUMAtriptan (IMITREX) 50 MG tablet Take 1 tablet (50 mg) by mouth at onset of headache for migraine May repeat in 2 hours. Max 4 tablets/24 hours. (Patient not taking: Reported on 11/14/2022) 18 tablet 4       Review of Systems      as per HPI above.  Mild numbness and tingling involving bilateral toes.    Objective:   There were no vitals taken for this visit.    Constitutional: Pleasant no acute cardiopulmonary distress.   Psychological: appropriate mood and affect   In House Labs:   Lab Results   Component Value Date    A1C 9.1 06/04/2021    A1C 8.7 02/22/2021    A1C 9.5 01/13/2020    A1C 8.3 10/14/2019    A1C 8.9 07/22/2019       TSH   Date Value Ref Range Status   06/23/2022 2.26 0.40 - 4.00 mU/L Final   01/04/2021 3.96 0.40 - 4.00 mU/L Final   03/19/2019 2.95 0.40 - 4.00 mU/L Final   10/07/2017 2.57 0.40 - 4.00 mU/L Final   06/26/2017 6.05 (H) 0.40 - 4.00 mU/L Final   03/05/2014 3.97 0.4 -  5.0 mU/L Final     T4 Free   Date Value Ref Range Status   06/26/2017 1.02 0.76 - 1.46 ng/dL Final   03/28/2006 1.10 0.70 - 1.85 ng/dL Final       Creatinine   Date Value Ref Range Status   06/23/2022 0.94 0.52 - 1.04 mg/dL Final   06/04/2021 0.94 0.52 - 1.04 mg/dL Final   ]    Recent Labs   Lab Test 06/04/21  0837 08/09/18  0935 01/29/16  0933 04/01/15  1143 07/24/14  0851   CHOL 180  --  207* 197 193   HDL 41*  --  55 53 51   * 113* 129* 121 126   TRIG 134  --  116 113 84   CHOLHDLRATIO  --   --   --  3.7 4.0     This note has been dictated using voice recognition software.  As a result, there may be errors in the documentation that have gone undetected.  Please consider this when interpreting information in this documentation.      Video-Visit Details    Type of service:  Video Visit  Video Start Time: 11:35 AM  Video End Time:11:55 AM  Originating Location (pt. Location): Home  Distant Location (provider location):  Off-site.   Platform used for Video Visit: AmWell  40 minutes spent on the date of the encounter doing chart review, history, insulin pump and CGM review and insulin pump titration, documentation and further activities per the note.

## 2022-11-14 NOTE — LETTER
11/14/2022         RE: Vickie Duque  [address]  [facility] [address]        Dear Colleague,    Thank you for referring your patient, Vickie Duque, to the [facility]. Please see a copy of my visit note below.    Vickie Duque  is being evaluated via a billable video visit.      How would you like to obtain your AVS? Stretch  For the video visit, send the invitation by: Text to cell phone: [phone]  Will anyone else be joining your video visit? No          Outcome for 11/07/22 9:28 AM: [employer] message sent  MACKENZIE Griffin  Outcome for 11/10/22 3:15 PM: Patient states she is unable to upload device at home. Patient scheduled for nurse only visit 11/11/22 at 1pm for clinic to assist.   MACKENZIE Griffin   Outcome for 11/11/22 1:31 PM: Data uploaded on Tandem and emailed to provider.  Carlos Macdonald [facility]  Adult Endocrinology  [facility]            Endocrinology Clinic Visit    Chief Complaint: Follow Up and Video Visit     Information obtained from:Patient      Assessment/Treatment Plan:      Type 1 diabetes complicated with retinopathy     Currently being managed with insulin pump tandem & CGM. Pump and CGM reviewed.  Postprandial hyperglycemia and multiple lows due to overriding the pump with multiple correction boluses within a short period of time.  Time between correction doses has to be at least 3-4 hours.      Plan   Basal = 0.45 units per hour  Change CHO 1: 10 units   Correction factor 1:60 mg/dl  Target 110.   Insulin pump settings change it for carbohydrate counting as above.  Confirmed settings through back reading.  Has Glucagon  Blood pressure well controlled  LDL checked and slightly higher than desired.  Follow-up again.  Up-to-date eye exam     Orders Placed This Encounter   Procedures     Hemoglobin A1c     TSH with free T4 reflex       Return to clinic in 3-4 months.       Siri Rhoades MD  Staff Endocrinologist    Division of  Endocrinology and Diabetes      Subjective:         HPI: Vickie Duque is a 37 year old female with history of type 1 diabetes.    Currently on tandem insulin pump    Current pump settings:     Basal = 0.45 units per hour  CHO 1: 11 units   Correction factor 1:60 mg/dl  Target 110.   No new concerns today.        CGM and pump settings downloaded today.                                    No Known Allergies    Current Outpatient Medications   Medication Sig Dispense Refill     acetaminophen (TYLENOL) 500 MG tablet Take 500-1,000 mg by mouth every 6 hours as needed for mild pain       Continuous Blood Gluc Sensor (DEXCOM G6 SENSOR) MISC Change every 10 days. 9 each 3     Continuous Blood Gluc Transmit (DEXCOM G6 TRANSMITTER) MISC Change every 90 days. 1 each 3     FLUoxetine (PROZAC) 20 MG capsule Take 3 capsules (60 mg) by mouth daily 270 capsule 0     fluticasone (FLONASE) 50 MCG/ACT nasal spray Spray 1 spray into both nostrils daily 16 g 1     Glucagon, rDNA, (GLUCAGON EMERGENCY) 1 MG KIT Inject 1 mg into the muscle as needed (Hypoglycemia) 1 kit 3     insulin aspart (NOVOLOG VIAL) 100 UNITS/ML vial Use as directed via insulin pump. Uses up to 40 units per day. 40 mL 3     Insulin Infusion Pump Supplies (T:SLIM T:LOCK INSULIN CART 3ML) MISC 1 each See Admin Instructions Change insulin pump cartridge every 2-3 days. 40 each 3     insulin pen needle (BD TARA U/F) 32G X 4 MM miscellaneous USE 3 DAILY OR AS DIRECTED. 270 each 3     levonorgestrel (MIRENA) 20 MCG/24HR IUD 1 each (20 mcg) by Intrauterine route once       Naproxen Sodium (ALEVE PO) Take by mouth as needed for moderate pain        cetirizine (ZYRTEC) 10 MG tablet Take 1 tablet (10 mg) by mouth daily (Patient not taking: Reported on 11/14/2022) 90 tablet 0     NOVOLOG FLEXPEN 100 UNIT/ML soln 1 unit for every 12 grams of carbohydrates with meals three times per day. Plus correction scale; uses up to 60 units daily. (Patient not taking: Reported on 8/25/2022)  45 mL 1     SUMAtriptan (IMITREX) 50 MG tablet Take 1 tablet (50 mg) by mouth at onset of headache for migraine May repeat in 2 hours. Max 4 tablets/24 hours. (Patient not taking: Reported on 11/14/2022) 18 tablet 4       Review of Systems      as per HPI above.  Mild numbness and tingling involving bilateral toes.    Objective:   There were no vitals taken for this visit.    Constitutional: Pleasant no acute cardiopulmonary distress.   Psychological: appropriate mood and affect   In House Labs:   Lab Results   Component Value Date    A1C 9.1 06/04/2021    A1C 8.7 02/22/2021    A1C 9.5 01/13/2020    A1C 8.3 10/14/2019    A1C 8.9 07/22/2019       TSH   Date Value Ref Range Status   06/23/2022 2.26 0.40 - 4.00 mU/L Final   01/04/2021 3.96 0.40 - 4.00 mU/L Final   03/19/2019 2.95 0.40 - 4.00 mU/L Final   10/07/2017 2.57 0.40 - 4.00 mU/L Final   06/26/2017 6.05 (H) 0.40 - 4.00 mU/L Final   03/05/2014 3.97 0.4 - 5.0 mU/L Final     T4 Free   Date Value Ref Range Status   06/26/2017 1.02 0.76 - 1.46 ng/dL Final   03/28/2006 1.10 0.70 - 1.85 ng/dL Final       Creatinine   Date Value Ref Range Status   06/23/2022 0.94 0.52 - 1.04 mg/dL Final   06/04/2021 0.94 0.52 - 1.04 mg/dL Final   ]    Recent Labs   Lab Test 06/04/21  0837 08/09/18  0935 01/29/16  0933 04/01/15  1143 07/24/14  0851   CHOL 180  --  207* 197 193   HDL 41*  --  55 53 51   * 113* 129* 121 126   TRIG 134  --  116 113 84   CHOLHDLRATIO  --   --   --  3.7 4.0     This note has been dictated using voice recognition software.  As a result, there may be errors in the documentation that have gone undetected.  Please consider this when interpreting information in this documentation.      Video-Visit Details    Type of service:  Video Visit  Video Start Time: 11:35 AM  Video End Time:11:55 AM  Originating Location (pt. Location): Home  Distant Location (provider location):  Off-site.   Platform used for Video Visit: Kik  40 minutes spent on the date of the  encounter doing chart review, history, insulin pump and CGM review and insulin pump titration, documentation and further activities per the note.         Again, thank you for allowing me to participate in the care of your patient.        Sincerely,        Siri Rhoades MD

## 2022-11-16 ENCOUNTER — TELEPHONE (OUTPATIENT)
Dept: ENDOCRINOLOGY | Facility: CLINIC | Age: 37
End: 2022-11-16

## 2022-11-16 NOTE — TELEPHONE ENCOUNTER
Left Voicemail (1st Attempt) for the patient to call back and schedule the following:    Appointment type: return  Provider: dr. julio   Return date: 3/14/2023  Specialty phone number: [phone]   Additonal Notes: Return in about 4 months (around 3/14/2023).    Judie chun Procedure   Orthopedics, Podiatry, Sports Medicine, Ent ,Eye , Audiology, Adult Endocrine & Diabetes, Nutrition & Medication Therapy Management Specialties   [facility]

## 2022-11-23 NOTE — TELEPHONE ENCOUNTER
Left Voicemail (2nd Attempt) for the patient to call back and schedule the following:    Appointment type: return  Provider: dr. julio   Return date: 3/14/2023  Specialty phone number: [phone]   Additonal Notes: Return in about 4 months (around 3/14/2023).    Judie chun Procedure   Orthopedics, Podiatry, Sports Medicine, Ent ,Eye , Audiology, Adult Endocrine & Diabetes, Nutrition & Medication Therapy Management Specialties   [facility]

## 2022-12-06 ENCOUNTER — OFFICE VISIT (OUTPATIENT)
Dept: FAMILY MEDICINE | Facility: CLINIC | Age: 37
End: 2022-12-06
Payer: COMMERCIAL

## 2022-12-06 VITALS
HEIGHT: 65 IN | OXYGEN SATURATION: 98 % | TEMPERATURE: 97.5 F | DIASTOLIC BLOOD PRESSURE: 86 MMHG | BODY MASS INDEX: 30.41 KG/M2 | SYSTOLIC BLOOD PRESSURE: 134 MMHG | RESPIRATION RATE: 20 BRPM | WEIGHT: 182.5 LBS | HEART RATE: 82 BPM

## 2022-12-06 DIAGNOSIS — E10.42 DIABETIC POLYNEUROPATHY ASSOCIATED WITH TYPE 1 DIABETES MELLITUS (H): ICD-10-CM

## 2022-12-06 DIAGNOSIS — Z11.3 SCREENING EXAMINATION FOR VENEREAL DISEASE: ICD-10-CM

## 2022-12-06 DIAGNOSIS — F43.22 ADJUSTMENT DISORDER WITH ANXIOUS MOOD: ICD-10-CM

## 2022-12-06 DIAGNOSIS — F33.1 MAJOR DEPRESSIVE DISORDER, RECURRENT EPISODE, MODERATE (H): ICD-10-CM

## 2022-12-06 DIAGNOSIS — M72.0 DUPUYTREN'S CONTRACTURE OF BOTH HANDS: ICD-10-CM

## 2022-12-06 DIAGNOSIS — E10.319 DIABETIC RETINOPATHY OF LEFT EYE ASSOCIATED WITH TYPE 1 DIABETES MELLITUS, MACULAR EDEMA PRESENCE UNSPECIFIED, UNSPECIFIED RETINOPATHY SEVERITY (H): ICD-10-CM

## 2022-12-06 DIAGNOSIS — Z12.4 CERVICAL CANCER SCREENING: ICD-10-CM

## 2022-12-06 DIAGNOSIS — Z00.00 ROUTINE GENERAL MEDICAL EXAMINATION AT A HEALTH CARE FACILITY: Primary | ICD-10-CM

## 2022-12-06 PROCEDURE — 90677 PCV20 VACCINE IM: CPT | Performed by: FAMILY MEDICINE

## 2022-12-06 PROCEDURE — 90746 HEPB VACCINE 3 DOSE ADULT IM: CPT | Performed by: FAMILY MEDICINE

## 2022-12-06 PROCEDURE — 87491 CHLMYD TRACH DNA AMP PROBE: CPT | Performed by: FAMILY MEDICINE

## 2022-12-06 PROCEDURE — 87591 N.GONORRHOEAE DNA AMP PROB: CPT | Performed by: FAMILY MEDICINE

## 2022-12-06 PROCEDURE — 90471 IMMUNIZATION ADMIN: CPT | Performed by: FAMILY MEDICINE

## 2022-12-06 PROCEDURE — G0145 SCR C/V CYTO,THINLAYER,RESCR: HCPCS | Performed by: FAMILY MEDICINE

## 2022-12-06 PROCEDURE — 90686 IIV4 VACC NO PRSV 0.5 ML IM: CPT | Performed by: FAMILY MEDICINE

## 2022-12-06 PROCEDURE — 99395 PREV VISIT EST AGE 18-39: CPT | Mod: 25 | Performed by: FAMILY MEDICINE

## 2022-12-06 PROCEDURE — 99214 OFFICE O/P EST MOD 30 MIN: CPT | Mod: 25 | Performed by: FAMILY MEDICINE

## 2022-12-06 PROCEDURE — G0124 SCREEN C/V THIN LAYER BY MD: HCPCS | Performed by: PATHOLOGY

## 2022-12-06 PROCEDURE — 87624 HPV HI-RISK TYP POOLED RSLT: CPT | Performed by: FAMILY MEDICINE

## 2022-12-06 PROCEDURE — 90472 IMMUNIZATION ADMIN EACH ADD: CPT | Performed by: FAMILY MEDICINE

## 2022-12-06 RX ORDER — GABAPENTIN 300 MG/1
300 CAPSULE ORAL 3 TIMES DAILY
Qty: 90 CAPSULE | Refills: 1 | Status: SHIPPED | OUTPATIENT
Start: 2022-12-06 | End: 2023-05-12

## 2022-12-06 ASSESSMENT — ENCOUNTER SYMPTOMS
PALPITATIONS: 0
HEADACHES: 1
DIARRHEA: 0
NAUSEA: 0
JOINT SWELLING: 1
HEARTBURN: 0
HEMATOCHEZIA: 0
ARTHRALGIAS: 1
SHORTNESS OF BREATH: 0
SORE THROAT: 0
MYALGIAS: 0
COUGH: 0
NERVOUS/ANXIOUS: 0
CONSTIPATION: 0
DYSURIA: 0
WEAKNESS: 0
FREQUENCY: 0
ABDOMINAL PAIN: 0
BREAST MASS: 0
HEMATURIA: 0
DIZZINESS: 0
PARESTHESIAS: 0
EYE PAIN: 0
FEVER: 0
CHILLS: 0

## 2022-12-06 ASSESSMENT — PAIN SCALES - GENERAL: PAINLEVEL: MODERATE PAIN (4)

## 2022-12-06 ASSESSMENT — PATIENT HEALTH QUESTIONNAIRE - PHQ9
SUM OF ALL RESPONSES TO PHQ QUESTIONS 1-9: 9
SUM OF ALL RESPONSES TO PHQ QUESTIONS 1-9: 9
10. IF YOU CHECKED OFF ANY PROBLEMS, HOW DIFFICULT HAVE THESE PROBLEMS MADE IT FOR YOU TO DO YOUR WORK, TAKE CARE OF THINGS AT HOME, OR GET ALONG WITH OTHER PEOPLE: SOMEWHAT DIFFICULT

## 2022-12-06 NOTE — NURSING NOTE
Prior to immunization administration, verified patients identity using patient s name and date of birth. Please see Immunization Activity for additional information.     Screening Questionnaire for Adult Immunization    Are you sick today?   No   Do you have allergies to medications, food, a vaccine component or latex?   No   Have you ever had a serious reaction after receiving a vaccination?   No   Do you have a long-term health problem with heart, lung, kidney, or metabolic disease (e.g., diabetes), asthma, a blood disorder, no spleen, complement component deficiency, a cochlear implant, or a spinal fluid leak?  Are you on long-term aspirin therapy?   Yes   Do you have cancer, leukemia, HIV/AIDS, or any other immune system problem?   No   Do you have a parent, brother, or sister with an immune system problem?   No   In the past 3 months, have you taken medications that affect  your immune system, such as prednisone, other steroids, or anticancer drugs; drugs for the treatment of rheumatoid arthritis, Crohn s disease, or psoriasis; or have you had radiation treatments?   No   Have you had a seizure, or a brain or other nervous system problem?   No   During the past year, have you received a transfusion of blood or blood    products, or been given immune (gamma) globulin or antiviral drug?   No   For women: Are you pregnant or is there a chance you could become       pregnant during the next month?   No   Have you received any vaccinations in the past 4 weeks?   No     Immunization questionnaire was positive for at least one answer.  Notified Dr. Guerrero.        Per orders of Dr. Guerrero, injection of Hep B, Prevnar 20 given by Nadine Edwards CMA. Patient instructed to remain in clinic for 15 minutes afterwards, and to report any adverse reaction to me immediately.       Screening performed by Nadine Edwards CMA on 12/6/2022 at 3:35 PM.     [Negative] : Genitourinary

## 2022-12-06 NOTE — PROGRESS NOTES
SUBJECTIVE:   CC: Vickie is an 37 year old who presents for preventive health visit.   Patient has been advised of split billing requirements and indicates understanding: Yes  Healthy Habits:     Getting at least 3 servings of Calcium per day:  Yes    Bi-annual eye exam:  Yes    Dental care twice a year:  NO    Sleep apnea or symptoms of sleep apnea:  Daytime drowsiness    Diet:  Diabetic    Frequency of exercise:  2-3 days/week    Duration of exercise:  15-30 minutes    Taking medications regularly:  Yes    Medication side effects:  Not applicable    PHQ-2 Total Score: 0    Additional concerns today:  No      Having numbness and tingling in her hands and feet. Both hands with tightness of her palms and into her 3rd finger. Left hand has been worse than the right. Having some difficulty with grasping objects. Occasional swelling of her knuckles at the PIP joints. Also with numbness and tingling in her feet. Wears shoes at home to help with the pain.     Follows with endocrinology for type I diabetes. Has a history of retinal detachment and diabetic retinopathy. Has an eye appointment at the end of the month.     Mood has been stable. Would like to continue current dose of Prozac.     Today's PHQ-2 Score:   PHQ-2 ( 1999 Pfizer) 12/6/2022   Q1: Little interest or pleasure in doing things 0   Q2: Feeling down, depressed or hopeless 0   PHQ-2 Score 0   PHQ-2 Total Score (12-17 Years)- Positive if 3 or more points; Administer PHQ-A if positive -   Q1: Little interest or pleasure in doing things Not at all   Q2: Feeling down, depressed or hopeless Not at all   PHQ-2 Score 0           Social History     Tobacco Use     Smoking status: Every Day     Packs/day: 0.50     Years: 16.00     Pack years: 8.00     Types: Cigarettes     Smokeless tobacco: Never     Tobacco comments:     working on quiting, somedays up to 4 cigs per day   Substance Use Topics     Alcohol use: Yes     Alcohol/week: 0.0 standard drinks     Comment:  occassionally     If you drink alcohol do you typically have >3 drinks per day or >7 drinks per week? Not applicable    Alcohol Use 12/6/2022   Prescreen: >3 drinks/day or >7 drinks/week? Not Applicable       Reviewed orders with patient.  Reviewed health maintenance and updated orders accordingly - Yes      Breast Cancer Screening:    FHS-7:   Breast CA Risk Assessment (FHS-7) 12/6/2022   Did any of your first-degree relatives have breast or ovarian cancer? No   Did any of your relatives have bilateral breast cancer? No   Did any man in your family have breast cancer? No   Did any woman in your family have breast and ovarian cancer? No   Did any woman in your family have breast cancer before age 50 y? No   Do you have 2 or more relatives with breast and/or ovarian cancer? No   Do you have 2 or more relatives with breast and/or bowel cancer? No       Patient under 40 years of age: Routine Mammogram Screening not recommended.   Pertinent mammograms are reviewed under the imaging tab.    History of abnormal Pap smear: NO - age 30-65 PAP every 5 years with negative HPV co-testing recommended  PAP / HPV Latest Ref Rng & Units 6/28/2017 8/28/2012 10/1/2009   PAP (Historical) - NIL NIL NIL   HPV16 NEG Negative - -   HPV18 NEG Negative - -   HRHPV NEG Negative - -     Reviewed and updated as needed this visit by clinical staff   Tobacco  Allergies  Meds              Reviewed and updated as needed this visit by Provider                     Review of Systems   Constitutional: Negative for chills and fever.   HENT: Negative for congestion, ear pain, hearing loss and sore throat.    Eyes: Negative for pain and visual disturbance.   Respiratory: Negative for cough and shortness of breath.    Cardiovascular: Negative for chest pain, palpitations and peripheral edema.   Gastrointestinal: Negative for abdominal pain, constipation, diarrhea, heartburn, hematochezia and nausea.   Breasts:  Positive for tenderness. Negative for  "breast mass and discharge.   Genitourinary: Negative for dysuria, frequency, genital sores, hematuria, pelvic pain, urgency, vaginal bleeding and vaginal discharge.   Musculoskeletal: Positive for arthralgias and joint swelling. Negative for myalgias.   Skin: Negative for rash.   Neurological: Positive for headaches. Negative for dizziness, weakness and paresthesias.   Psychiatric/Behavioral: Positive for mood changes. The patient is not nervous/anxious.         OBJECTIVE:   /86   Pulse 82   Temp 97.5  F (36.4  C) (Temporal)   Resp 20   Ht 1.651 m (5' 5\")   Wt 82.8 kg (182 lb 8 oz)   SpO2 98%   BMI 30.37 kg/m    Physical Exam  GENERAL: healthy, alert and no distress  EYES: Eyes grossly normal to inspection, PERRL and conjunctivae and sclerae normal  HENT: ear canals and TM's normal, nose and mouth without ulcers or lesions  NECK: no adenopathy, no asymmetry, masses, or scars   RESP: lungs clear to auscultation - no rales, rhonchi or wheezes  BREAST:No breast exam done, we discussed self breast awareness and what to be concerned about, ie; retraction of a nipple, dimpling of the skin or any nipple discharge or aerolar rash that does not clear.  CV: regular rate and rhythm, normal S1 S2, no S3 or S4, no murmur, click or rub, no peripheral edema and peripheral pulses strong  ABDOMEN: soft, nontender, no hepatosplenomegaly, no masses and bowel sounds normal   (female): normal female external genitalia, normal urethral meatus, vaginal mucosa pink, moist, well rugated, and normal cervix without masses or discharge. PAP and G/C obtained.   MS: no gross musculoskeletal defects noted, no edema, palms of hands with skin tightness and thickening of tendon of 3rd finger.  SKIN: no suspicious lesions or rashes  NEURO: Normal strength and tone, mentation intact and speech normal  PSYCH: mentation appears normal, affect normal/bright  Diabetic foot exam: normal DP and PT pulses, no trophic changes or ulcerative " lesions, normal sensory exam and normal monofilament exam        ASSESSMENT/PLAN:     ASSESSMENT/ORDERS:    ICD-10-CM    1. Routine general medical examination at a health care facility  Z00.00       2. Cervical cancer screening  Z12.4 Pap Screen with HPV - recommended age 30 - 65 years      3. Diabetic retinopathy of left eye associated with type 1 diabetes mellitus, macular edema presence unspecified, unspecified retinopathy severity (H)  E10.319       4. Major depressive disorder, recurrent episode, moderate (H)  F33.1 FLUoxetine (PROZAC) 20 MG capsule      5. Adjustment disorder with anxious mood  F43.22 FLUoxetine (PROZAC) 20 MG capsule      6. Dupuytren's contracture of both hands  M72.0       7. Diabetic polyneuropathy associated with type 1 diabetes mellitus (H)  E10.42 gabapentin (NEURONTIN) 300 MG capsule     FOOT EXAM      8. Screening examination for venereal disease  Z11.3 CHLAMYDIA TRACHOMATIS PCR     NEISSERIA GONORRHOEA PCR     CANCELED: NEISSERIA GONORRHOEA PCR     CANCELED: CHLAMYDIA TRACHOMATIS PCR        PLAN:  1.  Pap done today. Patient also requested G/C swab.   2.  Pain and tightness in hands consistent with Dupuytren's contracture. Discussed that better control of diabetes important in managing symptoms. Patient also with numbness and tingling in hands and feet consistent with diabetic neuropathy. Started gabapentin (Neurontin) 300 mg daily that she will use up to TID for nerve pain. Encouraged close follow up with endocrinology.   3. Mood stable. Continue fluoxetine (Prozac) 60 mg daily.   4.  Medications refilled for all other above noted stable conditions.      Patient has been advised of split billing requirements and indicates understanding: Yes      COUNSELING:  Reviewed preventive health counseling, as reflected in patient instructions  Special attention given to:        Regular exercise       Healthy diet/nutrition       Immunizations    Vaccinated for: Hepatitis B, Influenza and  "Pneumococcal         One time pneumovax for smokers      BMI:   Estimated body mass index is 30.37 kg/m  as calculated from the following:    Height as of this encounter: 1.651 m (5' 5\").    Weight as of this encounter: 82.8 kg (182 lb 8 oz).   Weight management plan: Discussed healthy diet and exercise guidelines      She reports that she has been smoking cigarettes. She has a 8.00 pack-year smoking history. She has never used smokeless tobacco.  Nicotine/Tobacco Cessation Plan:   Patient has cut back on her own. Will continue to work on decreasing her smoking.          Patient was seen and examined by myself and Alvaro Guerrero MD. The note was then scribed by me.     Sonali Gloria, MS3  [employer]    December 6, 2022    This patient was seen and examined by myself as well as the medical student.  The medical student scribed the note and I have reviewed it, edited it appropriately, and agree with the final documentation.     Alvaro Guerrero MD   [facility]  Answers for HPI/ROS submitted by the patient on 12/6/2022  If you checked off any problems, how difficult have these problems made it for you to do your work, take care of things at home, or get along with other people?: Somewhat difficult  PHQ9 TOTAL SCORE: 9      "

## 2022-12-06 NOTE — PATIENT INSTRUCTIONS
Gabapentin:  1 pill daily for 2-3 days  Then 1 pill twice daily for 2-3 days.  Then as directed on bottle.     Preventive Health Recommendations  Female Ages 26 - 39  Yearly exam:   See your health care provider every year in order to  Review health changes.   Discuss preventive care.    Review your medicines if you your doctor has prescribed any.    Until age 30: Get a Pap test every three years (more often if you have had an abnormal result).    After age 30: Talk to your doctor about whether you should have a Pap test every 3 years or have a Pap test with HPV screening every 5 years.   You do not need a Pap test if your uterus was removed (hysterectomy) and you have not had cancer.  You should be tested each year for STDs (sexually transmitted diseases), if you're at risk.   Talk to your provider about how often to have your cholesterol checked.  If you are at risk for diabetes, you should have a diabetes test (fasting glucose).  Shots: Get a flu shot each year. Get a tetanus shot every 10 years.   Nutrition:   Eat at least 5 servings of fruits and vegetables each day.  Eat whole-grain bread, whole-wheat pasta and brown rice instead of white grains and rice.  Get adequate Calcium and Vitamin D.     Lifestyle  Exercise at least 150 minutes a week (30 minutes a day, 5 days of the week). This will help you control your weight and prevent disease.  Limit alcohol to one drink per day.  No smoking.   Wear sunscreen to prevent skin cancer.  See your dentist every six months for an exam and cleaning.

## 2022-12-07 LAB
C TRACH DNA SPEC QL NAA+PROBE: NEGATIVE
N GONORRHOEA DNA SPEC QL NAA+PROBE: NEGATIVE

## 2022-12-07 NOTE — RESULT ENCOUNTER NOTE
Necoe,  Your lab results that are completed up to this point are normal.  You will be notified of your pap results separately at a later time.    Please let me know if you have any questions.    Sincerely,  Dr. Guerrero

## 2022-12-12 LAB
BKR LAB AP GYN ADEQUACY: ABNORMAL
BKR LAB AP GYN INTERPRETATION: ABNORMAL
BKR LAB AP HPV REFLEX: ABNORMAL
BKR LAB AP LMP: ABNORMAL
BKR LAB AP PREVIOUS ABNORMAL: ABNORMAL
PATH REPORT.COMMENTS IMP SPEC: ABNORMAL
PATH REPORT.COMMENTS IMP SPEC: ABNORMAL
PATH REPORT.RELEVANT HX SPEC: ABNORMAL

## 2022-12-14 LAB
HUMAN PAPILLOMA VIRUS 16 DNA: NEGATIVE
HUMAN PAPILLOMA VIRUS 18 DNA: NEGATIVE
HUMAN PAPILLOMA VIRUS FINAL DIAGNOSIS: NORMAL
HUMAN PAPILLOMA VIRUS OTHER HR: NEGATIVE

## 2023-01-06 ENCOUNTER — TRANSFERRED RECORDS (OUTPATIENT)
Dept: HEALTH INFORMATION MANAGEMENT | Facility: CLINIC | Age: 38
End: 2023-01-06

## 2023-01-06 ENCOUNTER — MYC MEDICAL ADVICE (OUTPATIENT)
Dept: FAMILY MEDICINE | Facility: CLINIC | Age: 38
End: 2023-01-06

## 2023-01-06 LAB — RETINOPATHY: POSITIVE

## 2023-01-09 NOTE — TELEPHONE ENCOUNTER
See MyChart. Patient is wondering if albertina on 1/17 can be a preop as well. Would you prefer patient then reschedule the medication check if so?     ROHIT TurnerN, RN

## 2023-01-10 NOTE — TELEPHONE ENCOUNTER
Go ahead and change appointment to a preoperative visit.  Need to change visit type and make 40 min in case following appointment cancels.    Will have staff notify patient.     Alvaro Guerrero MD

## 2023-01-17 ENCOUNTER — OFFICE VISIT (OUTPATIENT)
Dept: FAMILY MEDICINE | Facility: CLINIC | Age: 38
End: 2023-01-17
Payer: COMMERCIAL

## 2023-01-17 VITALS
OXYGEN SATURATION: 100 % | SYSTOLIC BLOOD PRESSURE: 110 MMHG | HEIGHT: 67 IN | BODY MASS INDEX: 29.03 KG/M2 | WEIGHT: 185 LBS | DIASTOLIC BLOOD PRESSURE: 70 MMHG | TEMPERATURE: 97.5 F | HEART RATE: 80 BPM | RESPIRATION RATE: 20 BRPM

## 2023-01-17 DIAGNOSIS — F41.1 GAD (GENERALIZED ANXIETY DISORDER): ICD-10-CM

## 2023-01-17 DIAGNOSIS — F33.1 MAJOR DEPRESSIVE DISORDER, RECURRENT EPISODE, MODERATE (H): ICD-10-CM

## 2023-01-17 DIAGNOSIS — E10.65 TYPE 1 DIABETES MELLITUS WITH HYPERGLYCEMIA (H): ICD-10-CM

## 2023-01-17 DIAGNOSIS — K51.90 ULCERATIVE COLITIS WITHOUT COMPLICATIONS, UNSPECIFIED LOCATION (H): ICD-10-CM

## 2023-01-17 DIAGNOSIS — E10.39 TYPE 1 DIABETES MELLITUS WITH OTHER OPHTHALMIC COMPLICATION (H): ICD-10-CM

## 2023-01-17 DIAGNOSIS — E10.319: ICD-10-CM

## 2023-01-17 DIAGNOSIS — Z01.818 PREOP GENERAL PHYSICAL EXAM: Primary | ICD-10-CM

## 2023-01-17 PROBLEM — Z98.51 HX OF TUBAL LIGATION: Status: RESOLVED | Noted: 2021-01-15 | Resolved: 2023-01-17

## 2023-01-17 PROBLEM — R73.9 HYPERGLYCEMIA: Status: RESOLVED | Noted: 2017-10-08 | Resolved: 2023-01-17

## 2023-01-17 LAB
HBA1C MFR BLD: 8.4 % (ref 0–5.6)
TSH SERPL DL<=0.005 MIU/L-ACNC: 3.91 MU/L (ref 0.4–4)

## 2023-01-17 PROCEDURE — 83036 HEMOGLOBIN GLYCOSYLATED A1C: CPT | Performed by: FAMILY MEDICINE

## 2023-01-17 PROCEDURE — 36415 COLL VENOUS BLD VENIPUNCTURE: CPT | Performed by: FAMILY MEDICINE

## 2023-01-17 PROCEDURE — 99214 OFFICE O/P EST MOD 30 MIN: CPT | Performed by: FAMILY MEDICINE

## 2023-01-17 PROCEDURE — 84443 ASSAY THYROID STIM HORMONE: CPT | Performed by: FAMILY MEDICINE

## 2023-01-17 ASSESSMENT — PATIENT HEALTH QUESTIONNAIRE - PHQ9
10. IF YOU CHECKED OFF ANY PROBLEMS, HOW DIFFICULT HAVE THESE PROBLEMS MADE IT FOR YOU TO DO YOUR WORK, TAKE CARE OF THINGS AT HOME, OR GET ALONG WITH OTHER PEOPLE: SOMEWHAT DIFFICULT
SUM OF ALL RESPONSES TO PHQ QUESTIONS 1-9: 8
SUM OF ALL RESPONSES TO PHQ QUESTIONS 1-9: 8

## 2023-01-17 ASSESSMENT — PAIN SCALES - GENERAL: PAINLEVEL: MILD PAIN (3)

## 2023-01-17 NOTE — PATIENT INSTRUCTIONS
For informational purposes only. Not to replace the advice of your health care provider. Copyright   2003,  [facility]. All rights reserved. Clinically reviewed by Diann Vazquez MD. [employer] [id number] - REV .  Preparing for Your Surgery  Getting started  A nurse will call you to review your health history and instructions. They will give you an arrival time based on your scheduled surgery time. Please be ready to share:    Your doctor's clinic name and phone number    Your medical, surgical, and anesthesia history    A list of allergies and sensitivities    A list of medicines, including herbal treatments and over-the-counter drugs    Whether the patient has a legal guardian (ask how to send us the papers in advance)  Please tell us if you're pregnant--or if there's any chance you might be pregnant. Some surgeries may injure a fetus (unborn baby), so they require a pregnancy test. Surgeries that are safe for a fetus don't always need a test, and you can choose whether to have one.   If you have a child who's having surgery, please ask for a copy of Preparing for Your Child's Surgery.    Preparing for surgery    Within 10 to 30 days of surgery: Have a pre-op exam (sometimes called an H&P, or History and Physical). This can be done at a clinic or pre-operative center.  ? If you're having a , you may not need this exam. Talk to your care team.    At your pre-op exam, talk to your care team about all medicines you take. If you need to stop any medicines before surgery, ask when to start taking them again.  ? We do this for your safety. Many medicines can make you bleed too much during surgery. Some change how well surgery (anesthesia) drugs work.    Call your insurance company to let them know you're having surgery. (If you don't have insurance, call [phone].)    Call your clinic if there's any change in your health. This includes signs of a cold or flu (sore throat, runny nose,  cough, rash, fever). It also includes a scrape or scratch near the surgery site.    If you have questions on the day of surgery, call your hospital or surgery center.  Eating and drinking guidelines  For your safety: Unless your surgeon tells you otherwise, follow the guidelines below.    Eat and drink as usual until 8 hours before you arrive for surgery. After that, no food or milk.    Drink clear liquids until 2 hours before you arrive. These are liquids you can see through, like water, Gatorade, and Propel Water. They also include plain black coffee and tea (no cream or milk), candy, and breath mints. You can spit out gum when you arrive.    If you drink alcohol: Stop drinking it the night before surgery.    If your care team tells you to take medicine on the morning of surgery, it's okay to take it with a sip of water.  Preventing infection    Shower or bathe the night before and morning of your surgery. Follow the instructions your clinic gave you. (If no instructions, use regular soap.)    Don't shave or clip hair near your surgery site. We'll remove the hair if needed.    Don't smoke or vape the morning of surgery. You may chew nicotine gum up to 2 hours before surgery. A nicotine patch is okay.  ? Note: Some surgeries require you to completely quit smoking and nicotine. Check with your surgeon.    Your care team will make every effort to keep you safe from infection. We will:  ? Clean our hands often with soap and water (or an alcohol-based hand rub).  ? Clean the skin at your surgery site with a special soap that kills germs.  ? Give you a special gown to keep you warm. (Cold raises the risk of infection.)  ? Wear special hair covers, masks, gowns and gloves during surgery.  ? Give antibiotic medicine, if prescribed. Not all surgeries need antibiotics.  What to bring on the day of surgery    Photo ID and insurance card    Copy of your health care directive, if you have one    Glasses and hearing aids (bring  cases)  ? You can't wear contacts during surgery    Inhaler and eye drops, if you use them (tell us about these when you arrive)    CPAP machine or breathing device, if you use them    A few personal items, if spending the night    If you have . . .  ? A pacemaker, ICD (cardiac defibrillator) or other implant: Bring the ID card.  ? An implanted stimulator: Bring the remote control.  ? A legal guardian: Bring a copy of the certified (court-stamped) guardianship papers.  Please remove any jewelry, including body piercings. Leave jewelry and other valuables at home.  If you're going home the day of surgery    You must have a responsible adult drive you home. They should stay with you overnight as well.    If you don't have someone to stay with you, and you aren't safe to go home alone, we may keep you overnight. Insurance often won't pay for this.  After surgery  If it's hard to control your pain or you need more pain medicine, please call your surgeon's office.  Questions?   If you have any questions for your care team, list them here: _________________________________________________________________________________________________________________________________________________________________________ ____________________________________ ____________________________________ ____________________________________    How to Take Your Medication Before Surgery  - Take all of your medications before surgery except as noted below  - hold naproxen for 4 days prior to surgery   -insulin per endocrinology.

## 2023-01-17 NOTE — PROGRESS NOTES
[address] [address]  Phone: [phone]  Fax: [phone]  Primary Provider: Alvaro Bella  Pre-op Performing Provider: ALVARO BELLA      PREOPERATIVE EVALUATION:  Today's date: 1/17/2023    Vickie Duque is a 37 year old female who presents for a preoperative evaluation.    Surgical Information:  Surgery/Procedure: Right Eye Surgery  Surgery Location: [facility]  Surgeon: Dr. Diallo  Surgery Date: 01/31/2023  Time of Surgery: TBD   Where patient plans to recover: At home with family  Fax number for surgical facility: [phone]    Type of Anesthesia Anticipated: Local with MAC    Assessment & Plan     The proposed surgical procedure is considered LOW risk.    ASSESSMENT/ORDERS:    ICD-10-CM    1. Preop general physical exam  Z01.818       2. Diabetic retinopathy of right eye associated with type 1 diabetes mellitus, macular edema presence unspecified, unspecified retinopathy severity (H)  E10.319       3. Ulcerative colitis without complications, unspecified location (H)  K51.90       4. Type 1 diabetes mellitus with hyperglycemia (H)  E10.65       5. Major depressive disorder, recurrent episode, moderate (H)  F33.1       6. ALYX (generalized anxiety disorder)  F41.1         PLAN:  1.  Insulin adjustments per endocrinology and she has appointment prior to surgery.          Risks and Recommendations:  The patient has the following additional risks and recommendations for perioperative complications:  Diabetes:  - Patient is on insulin therapy; diabetic NPO guidelines provided and discussed.    Medication Instructions:  Patient is to take all scheduled medications on the day of surgery EXCEPT for modifications listed below:   - naproxen (Aleve, Naprosyn): HOLD 4 days before surgery.     RECOMMENDATION:  APPROVAL GIVEN to proceed with proposed procedure, without further diagnostic  evaluation.            Subjective     HPI related to upcoming procedure: diabetic retinopathy and recommended for surgical correction.    Preop Questions 1/17/2023   1. Have you ever had a heart attack or stroke? No   2. Have you ever had surgery on your heart or blood vessels, such as a stent placement, a coronary artery bypass, or surgery on an artery in your head, neck, heart, or legs? No   3. Do you have chest pain with activity? No   4. Do you have a history of  heart failure? No   5. Do you currently have a cold, bronchitis or symptoms of other infection? No   6. Do you have a cough, shortness of breath, or wheezing? No   7. Do you or anyone in your family have previous history of blood clots? No   8. Do you or does anyone in your family have a serious bleeding problem such as prolonged bleeding following surgeries or cuts? No   9. Have you ever had problems with anemia or been told to take iron pills? No   10. Have you had any abnormal blood loss such as black, tarry or bloody stools, or abnormal vaginal bleeding? No   11. Have you ever had a blood transfusion? No   12. Are you willing to have a blood transfusion if it is medically needed before, during, or after your surgery? Yes   13. Have you or any of your relatives ever had problems with anesthesia? No   14. Do you have sleep apnea, excessive snoring or daytime drowsiness? No   14a. Do you have a CPAP machine? -   15. Do you have any artifical heart valves or other implanted medical devices like a pacemaker, defibrillator, or continuous glucose monitor? No   15a. What type of device do you have? -   15b. Name of the clinic that manages your device:  -   16. Do you have artificial joints? No   17. Are you allergic to latex? YES:    18. Is there any chance that you may be pregnant? No       Health Care Directive:  Patient does not have a Health Care Directive or Living Will: Discussed advance care planning with patient; information given to patient to  review.    Preoperative Review of :   reviewed - no record of controlled substances prescribed.          Review of Systems  Constitutional, neuro, ENT, endocrine, pulmonary, cardiac, gastrointestinal, genitourinary, musculoskeletal, integument and psychiatric systems are negative, except as otherwise noted.    Patient Active Problem List    Diagnosis Date Noted     Dupuytren's contracture of both hands 12/06/2022     Priority: Medium     ALYX (generalized anxiety disorder) 07/21/2021     Priority: Medium     Hx of tubal ligation 01/15/2021     Priority: Medium     Diabetic retinopathy of left eye associated with type 1 diabetes mellitus, macular edema presence unspecified, unspecified retinopathy severity (H) 11/23/2020     Priority: Medium     Hyperglycemia 10/08/2017     Priority: Medium     Major depressive disorder, recurrent episode, moderate (H) 10/04/2016     Priority: Medium     Ulcerative colitis without complications (H) 01/29/2016     Priority: Medium     Anxiety 11/27/2015     Priority: Medium     Noncompliance with medication regimen 11/03/2015     Priority: Medium     Uncontrolled type 1 diabetes mellitus 05/30/2014     Priority: Medium     Problem list name updated by automated process. Provider to review       Tobacco abuse 05/29/2014     Priority: Medium     Facial paralysis/[place] palsy 04/27/2012     Priority: Medium     Type 1 diabetes mellitus with hyperglycemia (H) 10/31/2010     Priority: Medium     HYPERLIPIDEMIA LDL GOAL <100 10/31/2010     Priority: Medium     ASCUS of cervix with negative high risk HPV 06/19/2008     Priority: Medium     6/19/08 ASCUS pap/neg HPV  2009 and 2012 both NIL paps  6/28/17 NIL Pap, Neg HR HPV.  EMB normal. Plan: cotest in 3 years.  12/6/22 ASCUS pap, neg HR HPV. Plan: cotest in 3 years.          Sprain of back 03/10/2008     Priority: Medium     Problem list name updated by automated process. Provider to review        Past Medical History:   Diagnosis Date      Adjustment disorder with anxious mood 11/23/2015     Anxiety 11/27/2015     ASCUS of cervix with negative high risk HPV 06/19/2008     Depressive disorder, not elsewhere classified      Diabetic eye exam (H) 12/19/14     Mixed hyperlipidemia 11/30/2006     Regional enteritis of unspecified site     Ulcerative Colitis     Type I (juvenile type) diabetes mellitus with ketoacidosis, not stated as uncontrolled(250.11) 01/01/2007    Moderately severe intensity.     Type I (juvenile type) diabetes mellitus with ketoacidosis, uncontrolled 02/14/08     Type I (juvenile type) diabetes mellitus without mention of complication, not stated as uncontrolled     diagnosed in 2003     Ulcerative colitis, unspecified     remission. Last flare 6 yrs ago.      Past Surgical History:   Procedure Laterality Date     DILATION AND CURETTAGE SUCTION  04/2010    miscarriage     RETINAL DETACHMENT SURGERY Left      TUBAL LIGATION       [facility] COLONOSCOPY W BIOPSY  08/16/2006     [facility] COLONOSCOPY W/WO BRUSH/WASH       Current Outpatient Medications   Medication Sig Dispense Refill     acetaminophen (TYLENOL) 500 MG tablet Take 500-1,000 mg by mouth every 6 hours as needed for mild pain       Continuous Blood Gluc Sensor (DEXCOM G6 SENSOR) MISC Change every 10 days. 9 each 3     Continuous Blood Gluc Transmit (DEXCOM G6 TRANSMITTER) MISC Change every 90 days. 1 each 3     FLUoxetine (PROZAC) 20 MG capsule Take 3 capsules (60 mg) by mouth daily 270 capsule 4     fluticasone (FLONASE) 50 MCG/ACT nasal spray Spray 1 spray into both nostrils daily 16 g 1     gabapentin (NEURONTIN) 300 MG capsule Take 1 capsule (300 mg) by mouth 3 times daily 90 capsule 1     Glucagon, rDNA, (GLUCAGON EMERGENCY) 1 MG KIT Inject 1 mg into the muscle as needed (Hypoglycemia) 1 kit 3     Insulin Infusion Pump Supplies (T:SLIM T:LOCK INSULIN CART 3ML) MISC 1 each See Admin Instructions Change insulin pump cartridge every 2-3 days. 40 each 3     insulin pen needle (BD  "TARA U/F) 32G X 4 MM miscellaneous USE 3 DAILY OR AS DIRECTED. 270 each 3     levonorgestrel (MIRENA) 20 MCG/24HR IUD 1 each (20 mcg) by Intrauterine route once       Naproxen Sodium (ALEVE PO) Take by mouth as needed for moderate pain        NOVOLOG FLEXPEN 100 UNIT/ML soln 1 unit for every 12 grams of carbohydrates with meals three times per day. Plus correction scale; uses up to 60 units daily. 45 mL 1     NOVOLOG VIAL 100 UNIT/ML soln INJECT 1 UNIT FOR EVERY 12 GRAMS OF CARBS WITH MEALS THREE TIMES PER DAY. PLUS CORRECTION SCALE; USES UP TO 60 UNITS DAILY. 60 mL 3     cetirizine (ZYRTEC) 10 MG tablet Take 1 tablet (10 mg) by mouth daily (Patient not taking: Reported on 11/14/2022) 90 tablet 0     SUMAtriptan (IMITREX) 50 MG tablet Take 1 tablet (50 mg) by mouth at onset of headache for migraine May repeat in 2 hours. Max 4 tablets/24 hours. (Patient not taking: Reported on 11/14/2022) 18 tablet 4       No Known Allergies     Social History     Tobacco Use     Smoking status: Every Day     Packs/day: 0.50     Years: 16.00     Pack years: 8.00     Types: Cigarettes     Smokeless tobacco: Never     Tobacco comments:     working on quiting, somedays up to 4 cigs per day   Substance Use Topics     Alcohol use: Yes     Alcohol/week: 0.0 standard drinks     Comment: occassionally       History   Drug Use No         Objective     /70   Pulse 80   Temp 97.5  F (36.4  C) (Temporal)   Resp 20   Ht 1.692 m (5' 6.6\")   Wt 83.9 kg (185 lb)   SpO2 100%   BMI 29.32 kg/m      Physical Exam    GENERAL APPEARANCE: healthy, alert and no distress     EYES: EOMI, PERRL     HENT: ear canals and TM's normal and nose and mouth without ulcers or lesions     NECK: no adenopathy, no asymmetry, masses, or scars and thyroid normal to palpation     RESP: lungs clear to auscultation - no rales, rhonchi or wheezes     CV: regular rates and rhythm, normal S1 S2, no S3 or S4 and no murmur, click or rub     ABDOMEN:  soft, nontender, " no HSM or masses and bowel sounds normal     MS: extremities normal- no gross deformities noted, no evidence of inflammation in joints, FROM in all extremities.     SKIN: no suspicious lesions or rashes     NEURO: Normal strength and tone, sensory exam grossly normal, mentation intact and speech normal     PSYCH: mentation appears normal. and affect normal/bright     LYMPHATICS: No cervical adenopathy    Recent Labs   Lab Test 06/23/22  1612 10/12/21  0000 06/04/21  0837   HGB  --   --  12.9   PLT  --   --  216     --  139   POTASSIUM 3.8  --  3.7   CR 0.94  --  0.94   A1C 10.0* 8.7* 9.1*        Diagnostics:  No labs were ordered during this visit as it does not impact this surgery.  No EKG required for low risk surgery (cataract, skin procedure, breast biopsy, etc).    Revised Cardiac Risk Index (RCRI):  The patient has the following serious cardiovascular risks for perioperative complications:   - No serious cardiac risks = 0 points     RCRI Interpretation: 0 points: Class I (very low risk - 0.4% complication rate)           Signed Electronically by: Alvaro Guerrero MD  Copy of this evaluation report is provided to requesting physician.      Answers for HPI/ROS submitted by the patient on 1/17/2023  If you checked off any problems, how difficult have these problems made it for you to do your work, take care of things at home, or get along with other people?: Somewhat difficult  PHQ9 TOTAL SCORE: 8

## 2023-01-19 NOTE — RESULT ENCOUNTER NOTE
Dear Vickie,     Here are your recent results.     Thyroid blood work results are within the normal limits.  Hemoglobin A1c has improved compared to previous result however still is higher than desired.  Please implement your insulin regimen as indicated.  We will follow-up with you at your upcoming appointment for additional next labs.  Please let us know if you have any questions or concerns.    Regards,  Siri Rhoades MD

## 2023-02-14 ENCOUNTER — MYC MEDICAL ADVICE (OUTPATIENT)
Dept: ENDOCRINOLOGY | Facility: CLINIC | Age: 38
End: 2023-02-14

## 2023-02-14 ENCOUNTER — HOSPITAL ENCOUNTER (EMERGENCY)
Facility: CLINIC | Age: 38
Discharge: HOME | End: 2023-02-14
Attending: NURSE PRACTITIONER | Admitting: NURSE PRACTITIONER
Payer: COMMERCIAL

## 2023-02-14 ENCOUNTER — APPOINTMENT (OUTPATIENT)
Dept: CT IMAGING | Facility: CLINIC | Age: 38
End: 2023-02-14
Attending: NURSE PRACTITIONER
Payer: COMMERCIAL

## 2023-02-14 VITALS
WEIGHT: 185 LBS | DIASTOLIC BLOOD PRESSURE: 88 MMHG | RESPIRATION RATE: 16 BRPM | TEMPERATURE: 98.4 F | OXYGEN SATURATION: 96 % | SYSTOLIC BLOOD PRESSURE: 134 MMHG | BODY MASS INDEX: 29.32 KG/M2 | HEART RATE: 74 BPM

## 2023-02-14 DIAGNOSIS — R10.9 LEFT SIDED ABDOMINAL PAIN: ICD-10-CM

## 2023-02-14 DIAGNOSIS — E10.39 TYPE 1 DIABETES MELLITUS WITH OTHER OPHTHALMIC COMPLICATION (H): Primary | ICD-10-CM

## 2023-02-14 LAB
ALBUMIN SERPL BCG-MCNC: 4.1 G/DL (ref 3.5–5.2)
ALBUMIN UR-MCNC: NEGATIVE MG/DL
ALP SERPL-CCNC: 99 U/L (ref 35–104)
ALT SERPL W P-5'-P-CCNC: 15 U/L (ref 10–35)
ANION GAP SERPL CALCULATED.3IONS-SCNC: 11 MMOL/L (ref 7–15)
APPEARANCE UR: CLEAR
AST SERPL W P-5'-P-CCNC: 16 U/L (ref 10–35)
BACTERIA #/AREA URNS HPF: ABNORMAL /HPF
BASOPHILS # BLD AUTO: 0 10E3/UL (ref 0–0.2)
BASOPHILS NFR BLD AUTO: 0 %
BILIRUB SERPL-MCNC: 0.3 MG/DL
BILIRUB UR QL STRIP: NEGATIVE
BUN SERPL-MCNC: 12.8 MG/DL (ref 6–20)
CALCIUM SERPL-MCNC: 9 MG/DL (ref 8.6–10)
CHLORIDE SERPL-SCNC: 102 MMOL/L (ref 98–107)
COLOR UR AUTO: YELLOW
CREAT SERPL-MCNC: 0.94 MG/DL (ref 0.51–0.95)
DEPRECATED HCO3 PLAS-SCNC: 25 MMOL/L (ref 22–29)
EOSINOPHIL # BLD AUTO: 0 10E3/UL (ref 0–0.7)
EOSINOPHIL NFR BLD AUTO: 0 %
ERYTHROCYTE [DISTWIDTH] IN BLOOD BY AUTOMATED COUNT: 13 % (ref 10–15)
GFR SERPL CREATININE-BSD FRML MDRD: 79 ML/MIN/1.73M2
GLUCOSE SERPL-MCNC: 177 MG/DL (ref 70–99)
GLUCOSE UR STRIP-MCNC: NEGATIVE MG/DL
HCT VFR BLD AUTO: 40.2 % (ref 35–47)
HGB BLD-MCNC: 13.4 G/DL (ref 11.7–15.7)
HGB UR QL STRIP: NEGATIVE
IMM GRANULOCYTES # BLD: 0 10E3/UL
IMM GRANULOCYTES NFR BLD: 0 %
KETONES UR STRIP-MCNC: NEGATIVE MG/DL
LEUKOCYTE ESTERASE UR QL STRIP: NEGATIVE
LYMPHOCYTES # BLD AUTO: 2.4 10E3/UL (ref 0.8–5.3)
LYMPHOCYTES NFR BLD AUTO: 27 %
MCH RBC QN AUTO: 27.7 PG (ref 26.5–33)
MCHC RBC AUTO-ENTMCNC: 33.3 G/DL (ref 31.5–36.5)
MCV RBC AUTO: 83 FL (ref 78–100)
MONOCYTES # BLD AUTO: 0.6 10E3/UL (ref 0–1.3)
MONOCYTES NFR BLD AUTO: 7 %
NEUTROPHILS # BLD AUTO: 6 10E3/UL (ref 1.6–8.3)
NEUTROPHILS NFR BLD AUTO: 66 %
NITRATE UR QL: NEGATIVE
NRBC # BLD AUTO: 0 10E3/UL
NRBC BLD AUTO-RTO: 0 /100
PH UR STRIP: 5.5 [PH] (ref 5–7)
PLATELET # BLD AUTO: 267 10E3/UL (ref 150–450)
POTASSIUM SERPL-SCNC: 4 MMOL/L (ref 3.4–5.3)
PROT SERPL-MCNC: 6.8 G/DL (ref 6.4–8.3)
RBC # BLD AUTO: 4.83 10E6/UL (ref 3.8–5.2)
RBC URINE: <1 /HPF
SODIUM SERPL-SCNC: 138 MMOL/L (ref 136–145)
SP GR UR STRIP: 1.01 (ref 1–1.03)
SQUAMOUS EPITHELIAL: 3 /HPF
UROBILINOGEN UR STRIP-MCNC: NORMAL MG/DL
WBC # BLD AUTO: 9.1 10E3/UL (ref 4–11)
WBC URINE: 2 /HPF

## 2023-02-14 PROCEDURE — 258N000003 HC RX IP 258 OP 636: Performed by: NURSE PRACTITIONER

## 2023-02-14 PROCEDURE — 99284 EMERGENCY DEPT VISIT MOD MDM: CPT | Performed by: NURSE PRACTITIONER

## 2023-02-14 PROCEDURE — 96375 TX/PRO/DX INJ NEW DRUG ADDON: CPT | Performed by: NURSE PRACTITIONER

## 2023-02-14 PROCEDURE — 74176 CT ABD & PELVIS W/O CONTRAST: CPT

## 2023-02-14 PROCEDURE — 85025 COMPLETE CBC W/AUTO DIFF WBC: CPT | Performed by: NURSE PRACTITIONER

## 2023-02-14 PROCEDURE — 250N000011 HC RX IP 250 OP 636: Performed by: NURSE PRACTITIONER

## 2023-02-14 PROCEDURE — 81001 URINALYSIS AUTO W/SCOPE: CPT | Performed by: NURSE PRACTITIONER

## 2023-02-14 PROCEDURE — 36415 COLL VENOUS BLD VENIPUNCTURE: CPT | Performed by: NURSE PRACTITIONER

## 2023-02-14 PROCEDURE — 96374 THER/PROPH/DIAG INJ IV PUSH: CPT | Performed by: NURSE PRACTITIONER

## 2023-02-14 PROCEDURE — 96361 HYDRATE IV INFUSION ADD-ON: CPT | Performed by: NURSE PRACTITIONER

## 2023-02-14 PROCEDURE — 80053 COMPREHEN METABOLIC PANEL: CPT | Performed by: NURSE PRACTITIONER

## 2023-02-14 PROCEDURE — 99285 EMERGENCY DEPT VISIT HI MDM: CPT | Mod: 25 | Performed by: NURSE PRACTITIONER

## 2023-02-14 RX ORDER — HYDROMORPHONE HYDROCHLORIDE 1 MG/ML
0.5 INJECTION, SOLUTION INTRAMUSCULAR; INTRAVENOUS; SUBCUTANEOUS ONCE
Status: DISCONTINUED | OUTPATIENT
Start: 2023-02-14 | End: 2023-02-14

## 2023-02-14 RX ORDER — SODIUM CHLORIDE 9 MG/ML
INJECTION, SOLUTION INTRAVENOUS CONTINUOUS
Status: DISCONTINUED | OUTPATIENT
Start: 2023-02-14 | End: 2023-02-14 | Stop reason: HOSPADM

## 2023-02-14 RX ORDER — ONDANSETRON 2 MG/ML
4 INJECTION INTRAMUSCULAR; INTRAVENOUS ONCE
Status: COMPLETED | OUTPATIENT
Start: 2023-02-14 | End: 2023-02-14

## 2023-02-14 RX ORDER — HYDROMORPHONE HYDROCHLORIDE 1 MG/ML
0.5 INJECTION, SOLUTION INTRAMUSCULAR; INTRAVENOUS; SUBCUTANEOUS ONCE
Status: COMPLETED | OUTPATIENT
Start: 2023-02-14 | End: 2023-02-14

## 2023-02-14 RX ORDER — ONDANSETRON 2 MG/ML
4 INJECTION INTRAMUSCULAR; INTRAVENOUS ONCE
Status: DISCONTINUED | OUTPATIENT
Start: 2023-02-14 | End: 2023-02-14

## 2023-02-14 RX ADMIN — SODIUM CHLORIDE 1000 ML: 9 INJECTION, SOLUTION INTRAVENOUS at 18:56

## 2023-02-14 RX ADMIN — ONDANSETRON 4 MG: 2 INJECTION INTRAMUSCULAR; INTRAVENOUS at 18:57

## 2023-02-14 RX ADMIN — HYDROMORPHONE HYDROCHLORIDE 0.5 MG: 1 INJECTION, SOLUTION INTRAMUSCULAR; INTRAVENOUS; SUBCUTANEOUS at 18:58

## 2023-02-14 ASSESSMENT — ACTIVITIES OF DAILY LIVING (ADL): ADLS_ACUITY_SCORE: 37

## 2023-02-14 NOTE — ED TRIAGE NOTES
Pt here with left sided abdominal pain. Started today.         Triage Assessment     Row Name 02/14/23 5136       Triage Assessment (Adult)    Airway WDL WDL       Respiratory WDL    Respiratory WDL WDL

## 2023-02-15 NOTE — DISCHARGE INSTRUCTIONS
Your CT scan is suggestive of some degree of the constipation showing  a moderate amount of stool throughout the colon.  I recommend you treat this with MiraLAX as follows to clean out your bowels:  --Mix 2 heaping capfuls of MiraLax (over the counter) into 32 ounces of water or gatorade.  --Shake well.  --Drink 4 ounces every 30 minutes. You can stop once you start having bowel movements.    Return for fevers, vomiting, increased abdominal pain, or not able to have bowel movements.

## 2023-02-15 NOTE — ED PROVIDER NOTES
History     Chief Complaint   Patient presents with     Flank Pain     HPI  Vickie Barnhart is a 37 year old female who presents for evaluation of left abdominal and left flank pain.  Symptoms started this morning.  Gradually worsening.  Denies fever or chills.  Denies nausea or vomiting.  Denies diarrhea or constipation.  Denies urinary symptoms.  She was evaluated for this earlier today at [facility] urgent care in [place].  I am unable to see the notes from that visit.  Patient reports she was told there was blood in her urine and they suspect she has a kidney stone.  She was instructed to come to the emergency department for further evaluation and to get a CT scan.  Patient denies any history of kidney stones.  She is a type I diabetic, has an insulin pump.  She reports her blood sugars have been stable, blood sugar she reports was 206 on arrival here on her monitor.    Allergies:  No Known Allergies    Problem List:    Patient Active Problem List    Diagnosis Date Noted     Dupuytren's contracture of both hands 12/06/2022     Priority: Medium     ALYX (generalized anxiety disorder) 07/21/2021     Priority: Medium     Diabetic retinopathy of right eye associated with type 1 diabetes mellitus, macular edema presence unspecified, unspecified retinopathy severity (H) 11/23/2020     Priority: Medium     Major depressive disorder, recurrent episode, moderate (H) 10/04/2016     Priority: Medium     Ulcerative colitis without complications (H) 01/29/2016     Priority: Medium     Noncompliance with medication regimen 11/03/2015     Priority: Medium     Tobacco abuse 05/29/2014     Priority: Medium     Facial paralysis/[place] palsy 04/27/2012     Priority: Medium     Type 1 diabetes mellitus with hyperglycemia (H) 10/31/2010     Priority: Medium     HYPERLIPIDEMIA LDL GOAL <100 10/31/2010     Priority: Medium     ASCUS of cervix with negative high risk HPV 06/19/2008     Priority: Medium     6/19/08 ASCUS pap/neg  HPV  2009 and 2012 both NIL paps  6/28/17 NIL Pap, Neg HR HPV.  EMB normal. Plan: cotest in 3 years.  12/6/22 ASCUS pap, neg HR HPV. Plan: cotest in 3 years.   [facility].   1/25/23 left msg. Msg states that results have been released to [facility]         Sprain of back 03/10/2008     Priority: Medium     Problem list name updated by automated process. Provider to review          Past Medical History:    Past Medical History:   Diagnosis Date     Adjustment disorder with anxious mood 11/23/2015     Anxiety 11/27/2015     ASCUS of cervix with negative high risk HPV 06/19/2008     Depressive disorder, not elsewhere classified      Diabetic eye exam (H) 12/19/14     Hx of tubal ligation 1/15/2021     Mixed hyperlipidemia 11/30/2006     Regional enteritis of unspecified site      Type I (juvenile type) diabetes mellitus with ketoacidosis, not stated as uncontrolled(250.11) 01/01/2007     Type I (juvenile type) diabetes mellitus with ketoacidosis, uncontrolled 02/14/08     Type I (juvenile type) diabetes mellitus without mention of complication, not stated as uncontrolled      Ulcerative colitis, unspecified        Past Surgical History:    Past Surgical History:   Procedure Laterality Date     DILATION AND CURETTAGE SUCTION  04/2010    miscarriage     RETINAL DETACHMENT SURGERY Left      TUBAL LIGATION       [facility] COLONOSCOPY W BIOPSY  08/16/2006     [facility] COLONOSCOPY W/WO BRUSH/WASH         Family History:    Family History   Problem Relation Age of Onset     Hypertension Father      Diabetes Maternal Grandmother      Cancer Maternal Grandmother      Diabetes Paternal Grandfather      Diabetes Maternal Uncle      Diabetes Maternal Aunt        Social History:  Marital Status:  Single [1]  Social History     Tobacco Use     Smoking status: Every Day     Packs/day: 0.50     Years: 16.00     Pack years: 8.00     Types: Cigarettes     Smokeless tobacco: Never     Tobacco comments:     working on quiting,  somedays up to 4 cigs per day   Vaping Use     Vaping Use: Never used   Substance Use Topics     Alcohol use: Yes     Alcohol/week: 0.0 standard drinks     Comment: occassionally     Drug use: No        Medications:    acetaminophen (TYLENOL) 500 MG tablet  cetirizine (ZYRTEC) 10 MG tablet  Continuous Blood Gluc Sensor (DEXCOM G6 SENSOR) MISC  Continuous Blood Gluc Transmit (DEXCOM G6 TRANSMITTER) MISC  FLUoxetine (PROZAC) 20 MG capsule  fluticasone (FLONASE) 50 MCG/ACT nasal spray  gabapentin (NEURONTIN) 300 MG capsule  Glucagon, rDNA, (GLUCAGON EMERGENCY) 1 MG KIT  Insulin Infusion Pump Supplies (T:SLIM T:LOCK INSULIN CART 3ML) MISC  insulin pen needle (BD TARA U/F) 32G X 4 MM miscellaneous  levonorgestrel (MIRENA) 20 MCG/24HR IUD  Naproxen Sodium (ALEVE PO)  NOVOLOG FLEXPEN 100 UNIT/ML soln  NOVOLOG VIAL 100 UNIT/ML soln  SUMAtriptan (IMITREX) 50 MG tablet          Review of Systems  As mentioned above in the history present illness. All other systems were reviewed and are negative.    Physical Exam   BP: (!) 163/105  Pulse: 73  Temp: 98.4  F (36.9  C)  Resp: 16  Weight: 83.9 kg (185 lb)  SpO2: 100 %      Physical Exam  Constitutional:       General: She is in acute distress (in pain).      Appearance: She is well-developed. She is not ill-appearing.   HENT:      Head: Normocephalic and atraumatic.      Right Ear: External ear normal.      Left Ear: External ear normal.      Nose: Nose normal.      Mouth/Throat:      Mouth: Mucous membranes are moist.   Eyes:      Conjunctiva/sclera: Conjunctivae normal.   Cardiovascular:      Rate and Rhythm: Normal rate and regular rhythm.      Heart sounds: Normal heart sounds. No murmur heard.  Pulmonary:      Effort: Pulmonary effort is normal. No respiratory distress.      Breath sounds: Normal breath sounds.   Abdominal:      General: Bowel sounds are normal. There is no distension.      Palpations: Abdomen is soft.      Tenderness: There is abdominal tenderness in the  left upper quadrant and left lower quadrant. There is left CVA tenderness.   Musculoskeletal:         General: Normal range of motion.   Skin:     General: Skin is warm and dry.      Findings: No rash.   Neurological:      General: No focal deficit present.      Mental Status: She is alert and oriented to person, place, and time.         ED Course                 Procedures              Results for orders placed or performed during the hospital encounter of 02/14/23 (from the past 24 hour(s))   CBC with platelets differential    Narrative    The following orders were created for panel order CBC with platelets differential.  Procedure                               Abnormality         Status                     ---------                               -----------         ------                     CBC with platelets and d...[[id number]]                      Final result                 Please view results for these tests on the individual orders.   Comprehensive metabolic panel   Result Value Ref Range    Sodium 138 136 - 145 mmol/L    Potassium 4.0 3.4 - 5.3 mmol/L    Chloride 102 98 - 107 mmol/L    Carbon Dioxide (CO2) 25 22 - 29 mmol/L    Anion Gap 11 7 - 15 mmol/L    Urea Nitrogen 12.8 6.0 - 20.0 mg/dL    Creatinine 0.94 0.51 - 0.95 mg/dL    Calcium 9.0 8.6 - 10.0 mg/dL    Glucose 177 (H) 70 - 99 mg/dL    Alkaline Phosphatase 99 35 - 104 U/L    AST 16 10 - 35 U/L    ALT 15 10 - 35 U/L    Protein Total 6.8 6.4 - 8.3 g/dL    Albumin 4.1 3.5 - 5.2 g/dL    Bilirubin Total 0.3 <=1.2 mg/dL    GFR Estimate 79 >60 mL/min/1.73m2   CBC with platelets and differential   Result Value Ref Range    WBC Count 9.1 4.0 - 11.0 10e3/uL    RBC Count 4.83 3.80 - 5.20 10e6/uL    Hemoglobin 13.4 11.7 - 15.7 g/dL    Hematocrit 40.2 35.0 - 47.0 %    MCV 83 78 - 100 fL    MCH 27.7 26.5 - 33.0 pg    MCHC 33.3 31.5 - 36.5 g/dL    RDW 13.0 10.0 - 15.0 %    Platelet Count 267 150 - 450 10e3/uL    % Neutrophils 66 %    % Lymphocytes 27 %    %  Monocytes 7 %    % Eosinophils 0 %    % Basophils 0 %    % Immature Granulocytes 0 %    NRBCs per 100 WBC 0 <1 /100    Absolute Neutrophils 6.0 1.6 - 8.3 10e3/uL    Absolute Lymphocytes 2.4 0.8 - 5.3 10e3/uL    Absolute Monocytes 0.6 0.0 - 1.3 10e3/uL    Absolute Eosinophils 0.0 0.0 - 0.7 10e3/uL    Absolute Basophils 0.0 0.0 - 0.2 10e3/uL    Absolute Immature Granulocytes 0.0 <=0.4 10e3/uL    Absolute NRBCs 0.0 10e3/uL   UA with Microscopic reflex to Culture    Specimen: Urine, Midstream   Result Value Ref Range    Color Urine Yellow Colorless, Straw, Light Yellow, Yellow    Appearance Urine Clear Clear    Glucose Urine Negative Negative mg/dL    Bilirubin Urine Negative Negative    Ketones Urine Negative Negative mg/dL    Specific Gravity Urine 1.010 1.003 - 1.035    Blood Urine Negative Negative    pH Urine 5.5 5.0 - 7.0    Protein Albumin Urine Negative Negative mg/dL    Urobilinogen Urine Normal Normal, 2.0 mg/dL    Nitrite Urine Negative Negative    Leukocyte Esterase Urine Negative Negative    Bacteria Urine Many (A) None Seen /HPF    RBC Urine <1 <=2 /HPF    WBC Urine 2 <=5 /HPF    Squamous Epithelials Urine 3 (H) <=1 /HPF    Narrative    Urine Culture not indicated   CT Abdomen Pelvis w/o Contrast    Narrative    EXAM: CT ABDOMEN PELVIS W/O CONTRAST  LOCATION: [facility]  DATE/TIME: 2/14/2023 7:13 PM    INDICATION: left flank and left abdominal pain  COMPARISON: CT abdomen and pelvis 6/27/2019  TECHNIQUE: CT scan of the abdomen and pelvis was performed without IV contrast. Multiplanar reformats were obtained. Dose reduction techniques were used.  CONTRAST: None.    FINDINGS:   LOWER CHEST: Normal.    HEPATOBILIARY: Normal.    PANCREAS: Small pancreatic head and neck are visible. Pancreatic body and tail appear to be absent. Findings suggesting dorsal pancreatic agenesis.    SPLEEN: Normal.    ADRENAL GLANDS: Normal.    KIDNEYS/BLADDER: No hydronephrosis. No  urolithiasis.    BOWEL: Normal caliber loops of small bowel. Moderate amount of stool throughout normal caliber colon. Again seen is a redundant sigmoid colon, extending up to the left upper quadrant. This is nondilated. This loop appears to be herniating through a   mesocolonic defect within the left lower quadrant (images #[id number] series 2), suggesting an internal hernia.. No evidence for obstruction.    LYMPH NODES: Normal    VASCULATURE: Mild calcified atherosclerotic changes. No evidence for an abdominal aortic.    PELVIC ORGANS: IUD in adequate position. Calcified pelvic phleboliths.    MUSCULOSKELETAL: Normal.      Impression    IMPRESSION:   1.  No urolithiasis. No hydronephrosis.  2.  Again seen is a redundant loop of sigmoid colon extending up to the left upper quadrant. No wall thickening or dilatation of this loop. This appears to be extending through a mesocolonic defect, suggesting an internal hernia.. No evidence for   obstruction.  3.  Small head and neck of the pancreas. 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         Medications   0.9% sodium chloride BOLUS (0 mLs Intravenous Stopped 2/14/23 1957)   ondansetron (ZOFRAN) injection 4 mg (4 mg Intravenous Given 2/14/23 1857)   HYDROmorphone (PF) (DILAUDID) injection 0.5 mg (0.5 mg Intravenous Given 2/14/23 1858)       Assessments & Plan (with Medical Decision Making)  No leukocytosis.  Electrolytes are normal.  Kidney function labs are normal.  Glucose is 177 (in the setting of type I diabetic).  Urinalysis is negative for infection and negative for blood.  Abdominal/pelvis CT reveals no evidence of ureteral stone.  No hydronephrosis to set just a recently passed stone.  Again seen is a redundant loop of sigmoid colon extending up to the left upper quadrant.  She has no wall thickening or dilation of this loop of bowel to suggest obstruction or inflammation.  IUD appears in adequate position.  There is moderate  stool burden throughout.  Discussed the lab and imaging findings with patient.  We discussed possibility of being mildly constipated.  I recommend a trial of MiraLAX at home to see if this resolves her symptoms.  Patient was given IV normal saline 1 L bolus, IV Zofran, and IV Dilaudid here for pain.  I do not recommend any further opioid medication as this would slow the bowel down and worsen constipation.   Plan as follows:    Your CT scan is suggestive of some degree of the constipation showing  a moderate amount of stool throughout the colon.  I recommend you treat this with MiraLAX as follows to clean out your bowels:  --Mix 2 heaping capfuls of MiraLax (over the counter) into 32 ounces of water or gatorade.  --Shake well.  --Drink 4 ounces every 30 minutes. You can stop once you start having bowel movements.    Return for fevers, vomiting, increased abdominal pain, or not able to have bowel movements.        Discharge Medication List as of 2/14/2023  7:57 PM          Final diagnoses:   Left sided abdominal pain       2/14/2023   [facility] EMERGENCY DEPT     Karen, Tona Dumont, RAJESH CNP  02/15/23 0007

## 2023-02-17 NOTE — TELEPHONE ENCOUNTER
Diagnosis, Referred by & from: Ulcerative Colitis   Appt date: 5/9/2023   NOTES STATUS DETAILS   OFFICE NOTE from referring provider Internal [facility]:  2/16/23 - MyChart referral from Dr. Guerrero  1/17/23 - PCC OV with Dr. Guerrero   OFFICE NOTE from other specialist [facility]:  1/4/21 - OBGYN OV with Dr. Elmore   DISCHARGE SUMMARY from hospital N/A    DISCHARGE REPORT from the ER [facility]:  2/14/23 - ED OV with Tona Jones NP  11/16/15 - ED OV with Dr. Sheriff   OPERATIVE REPORT N/A    MEDICATION LIST Internal    LABS     BIOPSIES/PATHOLOGY RELATED TO DIAGNOSIS [facility]:  8/16/06 - Colon Biopsy (Case: [id number])  9/30/03 - Colon Biopsy (Case: [id number])   DIAGNOSTIC PROCEDURES N/A    IMAGING (DISC & REPORT)      CT [facility]:  2/14/23 - CT Abd/Pelvis  6/27/19 - CT Abd/Pelvis  11/16/15 - CT Abd/Pelvis   ULTRASOUND  (ENDOANAL/ENDORECTAL) [facility]:  6/25/21 - US Pelvic  3/10/21 - US Pelvic  1/7/21 - US Pelvic  6/30/17 - US Pelvic

## 2023-02-20 ENCOUNTER — TELEPHONE (OUTPATIENT)
Dept: ENDOCRINOLOGY | Facility: CLINIC | Age: 38
End: 2023-02-20
Payer: COMMERCIAL

## 2023-02-20 NOTE — TELEPHONE ENCOUNTER
Called patient and left voicemail. Patient has an appointment on 2/22/2023. Need patient to upload their Tandem device to site for provider to review prior to their appointment.    Mara Porras LPN 02/20/23 2:07 PM

## 2023-02-21 NOTE — TELEPHONE ENCOUNTER
Called patient and left voicemail. Patient has an appointment on 2/22/2023. Need patient to upload their Tandem device to site for provider to review prior to their appointment.    Mara Porras LPN 02/21/23 11:18 AM

## 2023-02-24 RX ORDER — INSULIN GLARGINE 100 [IU]/ML
INJECTION, SOLUTION SUBCUTANEOUS
Qty: 15 ML | Refills: 4 | Status: SHIPPED | OUTPATIENT
Start: 2023-02-24 | End: 2024-04-30

## 2023-02-24 RX ORDER — INSULIN ASPART 100 [IU]/ML
INJECTION, SOLUTION INTRAVENOUS; SUBCUTANEOUS
Qty: 15 ML | Refills: 4 | Status: SHIPPED | OUTPATIENT
Start: 2023-02-24

## 2023-03-14 ENCOUNTER — TELEPHONE (OUTPATIENT)
Dept: ENDOCRINOLOGY | Facility: CLINIC | Age: 38
End: 2023-03-14
Payer: COMMERCIAL

## 2023-03-14 NOTE — TELEPHONE ENCOUNTER
Prescription form received from [place], completed to the best of writers ability, and placed in Dr. Rhoades's file to review and sign when in clinic  on 3/21/23.    Mary Kate Sanchez CMA  Adult Endocrinology  [facility] [place]

## 2023-03-21 ENCOUNTER — MEDICAL CORRESPONDENCE (OUTPATIENT)
Dept: HEALTH INFORMATION MANAGEMENT | Facility: CLINIC | Age: 38
End: 2023-03-21

## 2023-03-21 NOTE — TELEPHONE ENCOUNTER
Form signed by Dr Rhoades and writer faxed back to [facility] at [phone].    Carlos Macdonald [facility]  Adult Endocrinology  [facility]

## 2023-05-02 NOTE — PROGRESS NOTES
Colon and Rectal Surgery Clinic Note    RE: Vickie Barnhart.  : 1985.  ADALBERTO: 2023.    Reason for visit: ulcerative colitis     HPI: Vickie Barnhart is a 37 year old female who presents today for ulcerative colitis. She has a past medical history of anxiety, ASCUS of cervix, depression, hyperlipidemia, and type 1 diabetes. On 2023 she presented to the ER with left abdominal and flank pain. CT showed a redundant loop of sigmoid colon extending up the left upper quadrant. No wall thickening or dilation of the loop. It appeared to extend through a mesocolonic defect, possible internal hernia. Her last colonoscopy in 2006 was normal.     Her last A1C in  was 8.4.     Today Vickie feels well. She has episodes of abdominal pain at times. Her last A1c was checked over 3 mo ago and was >8. She has not done any endoscopic surveillance since >14 y.       Colonoscopy (2006):        Surgical Pathology (2006):  FINAL DIAGNOSIS:  A.  Colon, right, biopsies:       -  No pathologic changes.    B.  Colon, left, biopsies:       -  No pathologic changes.      CT Abdomen/Pelvis (2023):  IMPRESSION:   1.  No urolithiasis. No hydronephrosis.  2.  Again seen is a redundant loop of sigmoid colon extending up to the left upper quadrant. No wall thickening or dilatation of this loop. This appears to be extending through a mesocolonic defect, suggesting an internal hernia. No evidence for   obstruction.  3.  Small head and neck of the pancreas. 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      A1C Trend:   Component Ref Range & Units 1 yr ago  (21) 2 yr ago  (21) 3 yr ago  (20) 3 yr ago  (10/14/19) 3 yr ago  (19) 4 yr ago  (3/19/19) 4 yr ago  (18)    Hemoglobin A1C 0 - 5.6 % 9.1 High   8.7 Abnormal   9.5 Abnormal   8.3 Abnormal   8.9 Abnormal   10.6 Abnormal   11.6 High        Medical history:  1. Adjustment disorder   2. Anxiety   3. ASCUS  of cervix with negative high risk HPV  4. Depression   5. Hyperlipidemia   6. Ulcerative colitis   7. Type 1 diabetes mellitus     Surgical history:  1. D and C in 2010   2. Tubal ligation     Family history:  Family History   Problem Relation Age of Onset     Hypertension Father      Diabetes Maternal Grandmother      Cancer Maternal Grandmother      Diabetes Paternal Grandfather      Diabetes Maternal Uncle      Diabetes Maternal Aunt      Medications:  Current Outpatient Medications   Medication Sig Dispense Refill     acetaminophen (TYLENOL) 500 MG tablet Take 500-1,000 mg by mouth every 6 hours as needed for mild pain       Continuous Blood Gluc Sensor (DEXCOM G6 SENSOR) MISC Change every 10 days. 9 each 3     Continuous Blood Gluc Transmit (DEXCOM G6 TRANSMITTER) MISC Change every 90 days. 1 each 3     FLUoxetine (PROZAC) 20 MG capsule Take 3 capsules (60 mg) by mouth daily 270 capsule 4     fluticasone (FLONASE) 50 MCG/ACT nasal spray Spray 1 spray into both nostrils daily 16 g 1     gabapentin (NEURONTIN) 300 MG capsule Take 1 capsule (300 mg) by mouth 3 times daily 90 capsule 1     Glucagon, rDNA, (GLUCAGON EMERGENCY) 1 MG KIT Inject 1 mg into the muscle as needed (Hypoglycemia) 1 kit 3     insulin aspart (NOVOLOG FLEXPEN) 100 UNIT/ML pen Take 1 unit per 10 grams of carb plus 1 unit for every 60 above 150 Max dose 60 unit daily while off pump 15 mL 4     insulin glargine (LANTUS SOLOSTAR) 100 UNIT/ML pen Take 12 units daily while off pump 15 mL 4     Insulin Infusion Pump Supplies (T:SLIM T:LOCK INSULIN CART 3ML) MISC 1 each See Admin Instructions Change insulin pump cartridge every 2-3 days. 40 each 3     insulin pen needle (BD TARA U/F) 32G X 4 MM miscellaneous USE 3 DAILY OR AS DIRECTED. 270 each 3     levonorgestrel (MIRENA) 20 MCG/24HR IUD 1 each (20 mcg) by Intrauterine route once       Naproxen Sodium (ALEVE PO) Take by mouth as needed for moderate pain        NOVOLOG FLEXPEN 100 UNIT/ML soln 1 unit  for every 12 grams of carbohydrates with meals three times per day. Plus correction scale; uses up to 60 units daily. 45 mL 1     NOVOLOG VIAL 100 UNIT/ML soln INJECT 1 UNIT FOR EVERY 12 GRAMS OF CARBS WITH MEALS THREE TIMES PER DAY. PLUS CORRECTION SCALE; USES UP TO 60 UNITS DAILY. 60 mL 3     cetirizine (ZYRTEC) 10 MG tablet Take 1 tablet (10 mg) by mouth daily (Patient not taking: Reported on 11/14/2022) 90 tablet 0     SUMAtriptan (IMITREX) 50 MG tablet Take 1 tablet (50 mg) by mouth at onset of headache for migraine May repeat in 2 hours. Max 4 tablets/24 hours. (Patient not taking: Reported on 11/14/2022) 18 tablet 4       Allergies:  No Known Allergies    Social history:   Social History     Tobacco Use     Smoking status: Some Days     Packs/day: 0.50     Years: 16.00     Pack years: 8.00     Types: Cigarettes     Smokeless tobacco: Never     Tobacco comments:     working on quiting, somedays up to 4 cigs per day   Vaping Use     Vaping status: Never Used     Passive vaping exposure: Yes   Substance Use Topics     Alcohol use: Yes     Alcohol/week: 0.0 standard drinks of alcohol     Comment: occassionally     Marital status: single.    ROS:  A complete review of systems was performed with the patient and all systems negative except as per HPI.    Physical Examination:  Exam was chaperoned by Jarrett Ortiz, EMT-P  BP (!) 151/86 (BP Location: Left arm, Patient Position: Sitting, Cuff Size: Adult Regular)   Pulse 83   SpO2 100%   General: Well hydrated. No acute distress.  CV: RRR  Lung: Non-labored breathing on RA  Abdomen: Soft, NT. No inguinal adenopathy palpated.  Digital rectal examination: Deferred    ASSESSMENT    This is a 37 year old F with UC currently on no tx or surveillance and DM1, now with abdominal pain and redundant sigmoid. I discussed the case with radiology over the phone. The CT appearance is not concerning and her abdominal pain might have been due to a manifestation of her IBD. She would  need repeat colonoscopy to assess for disease status and cancer surveillance. Risks, benefits, and alternatives of treatment were thoroughly discussed with the patient, she understands these well and agrees to proceed.    All pertinent labs and imaging were personally reviewed by me.      PLAN  - Discussed with radiology, overead orders placed  - Follow up with endocrine for DM control  - Referral to GI to establish care  - Colonoscopy      45 minutes spent on the date of the encounter doing chart review, history and exam, imaging review, documentation and further activities as noted above.      Jona Fan MD    Division of Colon and Rectal Surgery  [facility]    Referring Provider:  Ping Mayers PA-C  [address] [place] [address]     Primary Care Provider:  Alvaro Guerrero

## 2023-05-09 ENCOUNTER — OFFICE VISIT (OUTPATIENT)
Dept: SURGERY | Facility: CLINIC | Age: 38
End: 2023-05-09
Attending: PHYSICIAN ASSISTANT
Payer: COMMERCIAL

## 2023-05-09 ENCOUNTER — PRE VISIT (OUTPATIENT)
Dept: SURGERY | Facility: CLINIC | Age: 38
End: 2023-05-09

## 2023-05-09 ENCOUNTER — HOSPITAL ENCOUNTER (INPATIENT)
Dept: GENERAL RADIOLOGY | Facility: CLINIC | Age: 38
Discharge: HOME | End: 2023-05-09
Attending: SURGERY
Payer: COMMERCIAL

## 2023-05-09 VITALS — HEART RATE: 83 BPM | SYSTOLIC BLOOD PRESSURE: 151 MMHG | DIASTOLIC BLOOD PRESSURE: 86 MMHG | OXYGEN SATURATION: 100 %

## 2023-05-09 DIAGNOSIS — K51.90 ULCERATIVE COLITIS WITHOUT COMPLICATIONS, UNSPECIFIED LOCATION (H): ICD-10-CM

## 2023-05-09 DIAGNOSIS — E78.5 HYPERLIPIDEMIA, UNSPECIFIED HYPERLIPIDEMIA TYPE: ICD-10-CM

## 2023-05-09 DIAGNOSIS — R10.84 ABDOMINAL PAIN, GENERALIZED: Primary | ICD-10-CM

## 2023-05-09 DIAGNOSIS — R10.84 ABDOMINAL PAIN, GENERALIZED: ICD-10-CM

## 2023-05-09 DIAGNOSIS — K51.919 ULCERATIVE COLITIS WITH COMPLICATION, UNSPECIFIED LOCATION (H): ICD-10-CM

## 2023-05-09 DIAGNOSIS — Q43.8 REDUNDANT COLON: ICD-10-CM

## 2023-05-09 DIAGNOSIS — E10.65 TYPE 1 DIABETES MELLITUS WITH HYPERGLYCEMIA (H): Primary | ICD-10-CM

## 2023-05-09 PROCEDURE — 99204 OFFICE O/P NEW MOD 45 MIN: CPT | Performed by: SURGERY

## 2023-05-09 PROCEDURE — 74150 CT ABDOMEN W/O CONTRAST: CPT | Mod: 26 | Performed by: RADIOLOGY

## 2023-05-09 ASSESSMENT — PAIN SCALES - GENERAL: PAINLEVEL: NO PAIN (0)

## 2023-05-09 NOTE — NURSING NOTE
Chief Complaint   Patient presents with     Consult       Vitals:    05/09/23 1600   BP: (!) 151/86   BP Location: Left arm   Patient Position: Sitting   Cuff Size: Adult Regular   Pulse: 83   SpO2: 100%       There is no height or weight on file to calculate BMI.    Jarrett Ortiz EMT-P

## 2023-05-09 NOTE — LETTER
2023       RE: Vickie Barnhart  [address] [facility] [address]       Dear Colleague,    Thank you for referring your patient, Vickie Barnhart, to the [facility] [place] [facility]. Please see a copy of my visit note below.    Colon and Rectal Surgery Clinic Note    RE: Vickie Barnhart.  : 1985.  ADALBERTO: 2023.    Reason for visit: ulcerative colitis     HPI: Vickie Barnhart is a 37 year old female who presents today for ulcerative colitis. She has a past medical history of anxiety, ASCUS of cervix, depression, hyperlipidemia, and type 1 diabetes. On 2023 she presented to the ER with left abdominal and flank pain. CT showed a redundant loop of sigmoid colon extending up the left upper quadrant. No wall thickening or dilation of the loop. It appeared to extend through a mesocolonic defect, possible internal hernia. Her last colonoscopy in 2006 was normal.     Her last A1C in  was 8.4.     Today Vickie feels well. She has episodes of abdominal pain at times. Her last A1c was checked over 3 mo ago and was >8. She has not done any endoscopic surveillance since >14 y.     Colonoscopy (2006):      Surgical Pathology (2006):  FINAL DIAGNOSIS:  A.  Colon, right, biopsies:       -  No pathologic changes.    B.  Colon, left, biopsies:       -  No pathologic changes.    CT Abdomen/Pelvis (2023):  IMPRESSION:   1.  No urolithiasis. No hydronephrosis.  2.  Again seen is a redundant loop of sigmoid colon extending up to the left upper quadrant. No wall thickening or dilatation of this loop. This appears to be extending through a mesocolonic defect, suggesting an internal hernia. No evidence for   obstruction.  3.  Small head and neck of the pancreas. 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    A1C Trend:   Component Ref Range & Units 1 yr ago  (21) 2  yr ago  (2/22/21) 3 yr ago  (1/13/20) 3 yr ago  (10/14/19) 3 yr ago  (7/22/19) 4 yr ago  (3/19/19) 4 yr ago  (12/20/18)    Hemoglobin A1C 0 - 5.6 % 9.1 High   8.7 Abnormal   9.5 Abnormal   8.3 Abnormal   8.9 Abnormal   10.6 Abnormal   11.6 High        Medical history:  1. Adjustment disorder   2. Anxiety   3. ASCUS of cervix with negative high risk HPV  4. Depression   5. Hyperlipidemia   6. Ulcerative colitis   7. Type 1 diabetes mellitus     Surgical history:  1. D and C in 2010   2. Tubal ligation     Family history:  Family History   Problem Relation Age of Onset    Hypertension Father     Diabetes Maternal Grandmother     Cancer Maternal Grandmother     Diabetes Paternal Grandfather     Diabetes Maternal Uncle     Diabetes Maternal Aunt      Medications:  Current Outpatient Medications   Medication Sig Dispense Refill    acetaminophen (TYLENOL) 500 MG tablet Take 500-1,000 mg by mouth every 6 hours as needed for mild pain      Continuous Blood Gluc Sensor (DEXCOM G6 SENSOR) MISC Change every 10 days. 9 each 3    Continuous Blood Gluc Transmit (DEXCOM G6 TRANSMITTER) MISC Change every 90 days. 1 each 3    FLUoxetine (PROZAC) 20 MG capsule Take 3 capsules (60 mg) by mouth daily 270 capsule 4    fluticasone (FLONASE) 50 MCG/ACT nasal spray Spray 1 spray into both nostrils daily 16 g 1    gabapentin (NEURONTIN) 300 MG capsule Take 1 capsule (300 mg) by mouth 3 times daily 90 capsule 1    Glucagon, rDNA, (GLUCAGON EMERGENCY) 1 MG KIT Inject 1 mg into the muscle as needed (Hypoglycemia) 1 kit 3    insulin aspart (NOVOLOG FLEXPEN) 100 UNIT/ML pen Take 1 unit per 10 grams of carb plus 1 unit for every 60 above 150 Max dose 60 unit daily while off pump 15 mL 4    insulin glargine (LANTUS SOLOSTAR) 100 UNIT/ML pen Take 12 units daily while off pump 15 mL 4    Insulin Infusion Pump Supplies (T:SLIM T:LOCK INSULIN CART 3ML) MISC 1 each See Admin Instructions Change insulin pump cartridge every 2-3 days. 40 each 3     insulin pen needle (BD TARA U/F) 32G X 4 MM miscellaneous USE 3 DAILY OR AS DIRECTED. 270 each 3    levonorgestrel (MIRENA) 20 MCG/24HR IUD 1 each (20 mcg) by Intrauterine route once      Naproxen Sodium (ALEVE PO) Take by mouth as needed for moderate pain       NOVOLOG FLEXPEN 100 UNIT/ML soln 1 unit for every 12 grams of carbohydrates with meals three times per day. Plus correction scale; uses up to 60 units daily. 45 mL 1    NOVOLOG VIAL 100 UNIT/ML soln INJECT 1 UNIT FOR EVERY 12 GRAMS OF CARBS WITH MEALS THREE TIMES PER DAY. PLUS CORRECTION SCALE; USES UP TO 60 UNITS DAILY. 60 mL 3    cetirizine (ZYRTEC) 10 MG tablet Take 1 tablet (10 mg) by mouth daily (Patient not taking: Reported on 11/14/2022) 90 tablet 0    SUMAtriptan (IMITREX) 50 MG tablet Take 1 tablet (50 mg) by mouth at onset of headache for migraine May repeat in 2 hours. Max 4 tablets/24 hours. (Patient not taking: Reported on 11/14/2022) 18 tablet 4       Allergies:  No Known Allergies    Social history:   Social History     Tobacco Use    Smoking status: Some Days     Packs/day: 0.50     Years: 16.00     Pack years: 8.00     Types: Cigarettes    Smokeless tobacco: Never    Tobacco comments:     working on quiting, somedays up to 4 cigs per day   Vaping Use    Vaping status: Never Used     Passive vaping exposure: Yes   Substance Use Topics    Alcohol use: Yes     Alcohol/week: 0.0 standard drinks of alcohol     Comment: occassionally     Marital status: single.    ROS:  A complete review of systems was performed with the patient and all systems negative except as per HPI.    Physical Examination:  Exam was chaperoned by Jarrett Ortiz, EMT-P  BP (!) 151/86 (BP Location: Left arm, Patient Position: Sitting, Cuff Size: Adult Regular)   Pulse 83   SpO2 100%   General: Well hydrated. No acute distress.  CV: RRR  Lung: Non-labored breathing on RA  Abdomen: Soft, NT. No inguinal adenopathy palpated.  Digital rectal examination:  Deferred    ASSESSMENT    This is a 37 year old F with UC currently on no tx or surveillance and DM1, now with abdominal pain and redundant sigmoid. I discussed the case with radiology over the phone. The CT appearance is not concerning and her abdominal pain might have been due to a manifestation of her IBD. She would need repeat colonoscopy to assess for disease status and cancer surveillance. Risks, benefits, and alternatives of treatment were thoroughly discussed with the patient, she understands these well and agrees to proceed.    All pertinent labs and imaging were personally reviewed by me.      PLAN  - Discussed with radiology, overead orders placed  - Follow up with endocrine for DM control  - Referral to GI to establish care  - Colonoscopy      45 minutes spent on the date of the encounter doing chart review, history and exam, imaging review, documentation and further activities as noted above.    Referring Provider:  Ping Mayers PA-C  [address] [place] [address]     Primary Care Provider:  Alvaro Guerrero                  Again, thank you for allowing me to participate in the care of your patient.      Sincerely,    Jona Fan MD

## 2023-05-12 ENCOUNTER — OFFICE VISIT (OUTPATIENT)
Dept: FAMILY MEDICINE | Facility: CLINIC | Age: 38
End: 2023-05-12
Payer: COMMERCIAL

## 2023-05-12 VITALS
OXYGEN SATURATION: 100 % | BODY MASS INDEX: 30.07 KG/M2 | RESPIRATION RATE: 16 BRPM | HEART RATE: 70 BPM | DIASTOLIC BLOOD PRESSURE: 84 MMHG | TEMPERATURE: 97.5 F | SYSTOLIC BLOOD PRESSURE: 122 MMHG | HEIGHT: 65 IN | WEIGHT: 180.5 LBS

## 2023-05-12 DIAGNOSIS — E10.65 TYPE 1 DIABETES MELLITUS WITH HYPERGLYCEMIA (H): ICD-10-CM

## 2023-05-12 DIAGNOSIS — E10.319: ICD-10-CM

## 2023-05-12 DIAGNOSIS — F41.1 GAD (GENERALIZED ANXIETY DISORDER): ICD-10-CM

## 2023-05-12 DIAGNOSIS — H69.93 EUSTACHIAN TUBE DYSFUNCTION, BILATERAL: ICD-10-CM

## 2023-05-12 DIAGNOSIS — Z01.818 PREOP GENERAL PHYSICAL EXAM: Primary | ICD-10-CM

## 2023-05-12 DIAGNOSIS — E10.42 DIABETIC POLYNEUROPATHY ASSOCIATED WITH TYPE 1 DIABETES MELLITUS (H): ICD-10-CM

## 2023-05-12 DIAGNOSIS — K51.90 ULCERATIVE COLITIS WITHOUT COMPLICATIONS, UNSPECIFIED LOCATION (H): ICD-10-CM

## 2023-05-12 LAB
ANION GAP SERPL CALCULATED.3IONS-SCNC: 9 MMOL/L (ref 7–15)
BUN SERPL-MCNC: 13 MG/DL (ref 6–20)
CALCIUM SERPL-MCNC: 8.8 MG/DL (ref 8.6–10)
CHLORIDE SERPL-SCNC: 106 MMOL/L (ref 98–107)
CREAT SERPL-MCNC: 1.11 MG/DL (ref 0.51–0.95)
DEPRECATED HCO3 PLAS-SCNC: 26 MMOL/L (ref 22–29)
GFR SERPL CREATININE-BSD FRML MDRD: 65 ML/MIN/1.73M2
GLUCOSE SERPL-MCNC: 92 MG/DL (ref 70–99)
HBA1C MFR BLD: 6.3 %
POTASSIUM SERPL-SCNC: 3.9 MMOL/L (ref 3.4–5.3)
SODIUM SERPL-SCNC: 141 MMOL/L (ref 136–145)

## 2023-05-12 PROCEDURE — 99214 OFFICE O/P EST MOD 30 MIN: CPT | Performed by: FAMILY MEDICINE

## 2023-05-12 PROCEDURE — 80048 BASIC METABOLIC PNL TOTAL CA: CPT | Performed by: FAMILY MEDICINE

## 2023-05-12 PROCEDURE — 83036 HEMOGLOBIN GLYCOSYLATED A1C: CPT | Performed by: FAMILY MEDICINE

## 2023-05-12 PROCEDURE — 36415 COLL VENOUS BLD VENIPUNCTURE: CPT | Performed by: FAMILY MEDICINE

## 2023-05-12 RX ORDER — GABAPENTIN 300 MG/1
300 CAPSULE ORAL AT BEDTIME
Qty: 90 CAPSULE | Refills: 1 | Status: SHIPPED | OUTPATIENT
Start: 2023-05-12

## 2023-05-12 RX ORDER — FLUTICASONE PROPIONATE 50 MCG
1 SPRAY, SUSPENSION (ML) NASAL DAILY
Qty: 16 G | Refills: 1 | Status: SHIPPED | OUTPATIENT
Start: 2023-05-12 | End: 2024-06-24

## 2023-05-12 ASSESSMENT — PATIENT HEALTH QUESTIONNAIRE - PHQ9
SUM OF ALL RESPONSES TO PHQ QUESTIONS 1-9: 7
SUM OF ALL RESPONSES TO PHQ QUESTIONS 1-9: 7
10. IF YOU CHECKED OFF ANY PROBLEMS, HOW DIFFICULT HAVE THESE PROBLEMS MADE IT FOR YOU TO DO YOUR WORK, TAKE CARE OF THINGS AT HOME, OR GET ALONG WITH OTHER PEOPLE: SOMEWHAT DIFFICULT

## 2023-05-12 ASSESSMENT — PAIN SCALES - GENERAL: PAINLEVEL: MILD PAIN (2)

## 2023-05-12 NOTE — PATIENT INSTRUCTIONS
For informational purposes only. Not to replace the advice of your health care provider. Copyright   2003,  [employer] [facility]. All rights reserved. Clinically reviewed by Diann Vazquez MD. [employer] [id number] - REV .  Preparing for Your Surgery  Getting started  A nurse will call you to review your health history and instructions. They will give you an arrival time based on your scheduled surgery time. Please be ready to share:  Your doctor's clinic name and phone number  Your medical, surgical, and anesthesia history  A list of allergies and sensitivities  A list of medicines, including herbal treatments and over-the-counter drugs  Whether the patient has a legal guardian (ask how to send us the papers in advance)  Please tell us if you're pregnant--or if there's any chance you might be pregnant. Some surgeries may injure a fetus (unborn baby), so they require a pregnancy test. Surgeries that are safe for a fetus don't always need a test, and you can choose whether to have one.   If you have a child who's having surgery, please ask for a copy of Preparing for Your Child's Surgery.    Preparing for surgery  Within 10 to 30 days of surgery: Have a pre-op exam (sometimes called an H&P, or History and Physical). This can be done at a clinic or pre-operative center.  If you're having a , you may not need this exam. Talk to your care team.  At your pre-op exam, talk to your care team about all medicines you take. If you need to stop any medicines before surgery, ask when to start taking them again.  We do this for your safety. Many medicines can make you bleed too much during surgery. Some change how well surgery (anesthesia) drugs work.  Call your insurance company to let them know you're having surgery. (If you don't have insurance, call [phone].)  Call your clinic if there's any change in your health. This includes signs of a cold or flu (sore throat, runny nose, cough, rash, fever). It  also includes a scrape or scratch near the surgery site.  If you have questions on the day of surgery, call your hospital or surgery center.  Eating and drinking guidelines  For your safety: Unless your surgeon tells you otherwise, follow the guidelines below.  Eat and drink as usual until 8 hours before you arrive for surgery. After that, no food or milk.  Drink clear liquids until 2 hours before you arrive. These are liquids you can see through, like water, Gatorade, and Propel Water. They also include plain black coffee and tea (no cream or milk), candy, and breath mints. You can spit out gum when you arrive.  If you drink alcohol: Stop drinking it the night before surgery.  If your care team tells you to take medicine on the morning of surgery, it's okay to take it with a sip of water.  Preventing infection  Shower or bathe the night before and morning of your surgery. Follow the instructions your clinic gave you. (If no instructions, use regular soap.)  Don't shave or clip hair near your surgery site. We'll remove the hair if needed.  Don't smoke or vape the morning of surgery. You may chew nicotine gum up to 2 hours before surgery. A nicotine patch is okay.  Note: Some surgeries require you to completely quit smoking and nicotine. Check with your surgeon.  Your care team will make every effort to keep you safe from infection. We will:  Clean our hands often with soap and water (or an alcohol-based hand rub).  Clean the skin at your surgery site with a special soap that kills germs.  Give you a special gown to keep you warm. (Cold raises the risk of infection.)  Wear special hair covers, masks, gowns and gloves during surgery.  Give antibiotic medicine, if prescribed. Not all surgeries need antibiotics.  What to bring on the day of surgery  Photo ID and insurance card  Copy of your health care directive, if you have one  Glasses and hearing aids (bring cases)  You can't wear contacts during surgery  Inhaler and  eye drops, if you use them (tell us about these when you arrive)  CPAP machine or breathing device, if you use them  A few personal items, if spending the night  If you have . . .  A pacemaker, ICD (cardiac defibrillator) or other implant: Bring the ID card.  An implanted stimulator: Bring the remote control.  A legal guardian: Bring a copy of the certified (court-stamped) guardianship papers.  Please remove any jewelry, including body piercings. Leave jewelry and other valuables at home.  If you're going home the day of surgery  You must have a responsible adult drive you home. They should stay with you overnight as well.  If you don't have someone to stay with you, and you aren't safe to go home alone, we may keep you overnight. Insurance often won't pay for this.  After surgery  If it's hard to control your pain or you need more pain medicine, please call your surgeon's office.  Questions?   If you have any questions for your care team, list them here: _________________________________________________________________________________________________________________________________________________________________________ ____________________________________ ____________________________________ ____________________________________    How to Take Your Medication Before Surgery  - Take all of your medications before surgery except as noted below  - manage your insulin pump as you would normally do when you do not eat  -hold naproxen (aleve) for 4 days prior to surgery and ibuprofen (Advil) 1 day prior.

## 2023-05-12 NOTE — PROGRESS NOTES
[address] [address]  Phone: [phone]  Fax: [phone]  Primary Provider: Alvaro Bella  Pre-op Performing Provider: ALVARO BELLA      PREOPERATIVE EVALUATION:  Today's date: 5/12/2023    Vickie Barnhart is a 37 year old female who presents for a preoperative evaluation.      5/12/2023     7:41 AM   Additional Questions   Roomed by Nadine AGGARWAL     Surgical Information:  Surgery/Procedure: Taking blood out of Right Eye  Surgery Location: [facility]  Surgeon: Dr. Diallo  Surgery Date: 05/23/2023  Time of Surgery: 7:30am   Where patient plans to recover: At home with family  Fax number for surgical facility: [phone]    Assessment & Plan     The proposed surgical procedure is considered LOW risk.    ASSESSMENT/ORDERS:    ICD-10-CM    1. Preop general physical exam  Z01.818       2. Diabetic retinopathy of right eye associated with type 1 diabetes mellitus, macular edema presence unspecified, unspecified retinopathy severity (H)  E10.319       3. Ulcerative colitis without complications, unspecified location (H)  K51.90       4. Type 1 diabetes mellitus with hyperglycemia (H)  E10.65       5. ALYX (generalized anxiety disorder)  F41.1       6. Diabetic polyneuropathy associated with type 1 diabetes mellitus (H)  E10.42       7. Eustachian tube dysfunction, bilateral  H69.83              Implanted Device:   - Type of device: insulin pump and continuous glucose monitor Patient advised to bring device information on day of surgery.       - No identified additional risk factors other than previously addressed    Antiplatelet or Anticoagulation Medication Instructions:   - Patient is on no antiplatelet or anticoagulation medications.    Additional Medication Instructions:  Patient is to take all scheduled medications on the day of surgery EXCEPT for modifications listed below:   - Insulin pump: Continue basal rate of  insulin up until the time of surgery.   - Continuous Glucose Monitor (CGM): Patient was made aware on the day of surgery, they should be prepared to remove the Continuous Glucose Monitor (CGM) prior to the operation in order to avoid damage to the equipment during the procedure. The CGM will not be the source of glucose monitoring during the operation.   - ibuprofen (Advil, Motrin): HOLD 1 day before surgery.    - naproxen (Aleve, Naprosyn): HOLD 4 days before surgery.     RECOMMENDATION:  APPROVAL GIVEN to proceed with proposed procedure, without further diagnostic evaluation.            Subjective     HPI related to upcoming procedure: right eye diabetic retinopathy.        5/12/2023     7:41 AM   Preop Questions   1. Have you ever had a heart attack or stroke? No   2. Have you ever had surgery on your heart or blood vessels, such as a stent placement, a coronary artery bypass, or surgery on an artery in your head, neck, heart, or legs? No   3. Do you have chest pain with activity? No   4. Do you have a history of  heart failure? No   5. Do you currently have a cold, bronchitis or symptoms of other infection? No   6. Do you have a cough, shortness of breath, or wheezing? No   7. Do you or anyone in your family have previous history of blood clots? No   8. Do you or does anyone in your family have a serious bleeding problem such as prolonged bleeding following surgeries or cuts? No   9. Have you ever had problems with anemia or been told to take iron pills? YES - younger issues, normal CBC 3 months ago   10. Have you had any abnormal blood loss such as black, tarry or bloody stools, or abnormal vaginal bleeding? No   11. Have you ever had a blood transfusion? No   12. Are you willing to have a blood transfusion if it is medically needed before, during, or after your surgery? Yes   13. Have you or any of your relatives ever had problems with anesthesia? No   14. Do you have sleep apnea, excessive snoring or daytime  drowsiness? No   15. Do you have any artifical heart valves or other implanted medical devices like a pacemaker, defibrillator, or continuous glucose monitor? YES - continuous glucose monitor and insulin pump   15a. What type of device do you have? insulin pump dexcom   15b. Name of the clinic that manages your device:  [facility]   16. Do you have artificial joints? No   17. Are you allergic to latex? YES: rash   18. Is there any chance that you may be pregnant? No       Health Care Directive:  Patient does not have a Health Care Directive or Living Will: Discussed advance care planning with patient; information given to patient to review.    Preoperative Review of :   reviewed - no record of controlled substances prescribed.      Status of Chronic Conditions:  DIABETES - Patient has a longstanding history of DiabetesType Type I . Patient is being treated with insulin pump and denies significant side effects. Control has been better recently. Complicating factors include but are not limited to: retinopathy.       Review of Systems  Constitutional, neuro, ENT, endocrine, pulmonary, cardiac, gastrointestinal, genitourinary, musculoskeletal, integument and psychiatric systems are negative, except as otherwise noted.    Patient Active Problem List    Diagnosis Date Noted     Dupuytren's contracture of both hands 12/06/2022     Priority: Medium     ALYX (generalized anxiety disorder) 07/21/2021     Priority: Medium     Diabetic retinopathy of right eye associated with type 1 diabetes mellitus, macular edema presence unspecified, unspecified retinopathy severity (H) 11/23/2020     Priority: Medium     Major depressive disorder, recurrent episode, moderate (H) 10/04/2016     Priority: Medium     Ulcerative colitis without complications (H) 01/29/2016     Priority: Medium     Noncompliance with medication regimen 11/03/2015     Priority: Medium     Tobacco abuse 05/29/2014     Priority: Medium      Facial paralysis/[place] palsy 04/27/2012     Priority: Medium     Type 1 diabetes mellitus with hyperglycemia (H) 10/31/2010     Priority: Medium     HYPERLIPIDEMIA LDL GOAL <100 10/31/2010     Priority: Medium     ASCUS of cervix with negative high risk HPV 06/19/2008     Priority: Medium     6/19/08 ASCUS pap/neg HPV  2009 and 2012 both NIL paps  6/28/17 NIL Pap, Neg HR HPV.  EMB normal. Plan: cotest in 3 years.  12/6/22 ASCUS pap, neg HR HPV. Plan: cotest in 3 years.   12/14/22 [employer] result note sent.   1/25/23 left msg. Msg states that results have been released to [employer]         Sprain of back 03/10/2008     Priority: Medium     Problem list name updated by automated process. Provider to review        Past Medical History:   Diagnosis Date     Adjustment disorder with anxious mood 11/23/2015     Anxiety 11/27/2015     ASCUS of cervix with negative high risk HPV 06/19/2008     Depressive disorder, not elsewhere classified      Diabetic eye exam (H) 12/19/14     Hx of tubal ligation 1/15/2021     Mixed hyperlipidemia 11/30/2006     Regional enteritis of unspecified site     Ulcerative Colitis     Type I (juvenile type) diabetes mellitus with ketoacidosis, not stated as uncontrolled(250.11) 01/01/2007    Moderately severe intensity.     Type I (juvenile type) diabetes mellitus with ketoacidosis, uncontrolled 02/14/08     Type I (juvenile type) diabetes mellitus without mention of complication, not stated as uncontrolled     diagnosed in 2003     Ulcerative colitis, unspecified     remission. Last flare 6 yrs ago.      Past Surgical History:   Procedure Laterality Date     DILATION AND CURETTAGE SUCTION  04/2010    miscarriage     RETINAL DETACHMENT SURGERY Left      [facility]UB[facility]IG[facility]       [facility]       Current Outpatient Medications   Medication Sig Dispense Refill     acetaminophen (TYLENOL) 500 MG tablet Take 500-1,000 mg by mouth every 6 hours  as needed for mild pain       Continuous Blood Gluc Sensor (DEXCOM G6 SENSOR) MISC Change every 10 days. 9 each 3     Continuous Blood Gluc Transmit (DEXCOM G6 TRANSMITTER) MISC Change every 90 days. 1 each 3     FLUoxetine (PROZAC) 20 MG capsule Take 3 capsules (60 mg) by mouth daily 270 capsule 4     fluticasone (FLONASE) 50 MCG/ACT nasal spray Spray 1 spray into both nostrils daily 16 g 1     gabapentin (NEURONTIN) 300 MG capsule Take 1 capsule (300 mg) by mouth 3 times daily 90 capsule 1     Glucagon, rDNA, (GLUCAGON EMERGENCY) 1 MG KIT Inject 1 mg into the muscle as needed (Hypoglycemia) 1 kit 3     insulin aspart (NOVOLOG FLEXPEN) 100 UNIT/ML pen Take 1 unit per 10 grams of carb plus 1 unit for every 60 above 150 Max dose 60 unit daily while off pump 15 mL 4     insulin glargine (LANTUS SOLOSTAR) 100 UNIT/ML pen Take 12 units daily while off pump 15 mL 4     Insulin Infusion Pump Supplies (T:SLIM T:LOCK INSULIN CART 3ML) MISC 1 each See Admin Instructions Change insulin pump cartridge every 2-3 days. 40 each 3     insulin pen needle (BD TARA U/F) 32G X 4 MM miscellaneous USE 3 DAILY OR AS DIRECTED. 270 each 3     levonorgestrel (MIRENA) 20 MCG/24HR IUD 1 each (20 mcg) by Intrauterine route once       Naproxen Sodium (ALEVE PO) Take by mouth as needed for moderate pain        NOVOLOG FLEXPEN 100 UNIT/ML soln 1 unit for every 12 grams of carbohydrates with meals three times per day. Plus correction scale; uses up to 60 units daily. 45 mL 1     NOVOLOG VIAL 100 UNIT/ML soln INJECT 1 UNIT FOR EVERY 12 GRAMS OF CARBS WITH MEALS THREE TIMES PER DAY. PLUS CORRECTION SCALE; USES UP TO 60 UNITS DAILY. 60 mL 3     SUMAtriptan (IMITREX) 50 MG tablet Take 1 tablet (50 mg) by mouth at onset of headache for migraine May repeat in 2 hours. Max 4 tablets/24 hours. 18 tablet 4     cetirizine (ZYRTEC) 10 MG tablet Take 1 tablet (10 mg) by mouth daily (Patient not taking: Reported on 11/14/2022) 90 tablet 0       No Known  "Allergies     Social History     Tobacco Use     Smoking status: Some Days     Packs/day: 0.50     Years: 16.00     Pack years: 8.00     Types: Cigarettes     Smokeless tobacco: Never     Tobacco comments:     working on quiting, somedays up to 4 cigs per day   Vaping Use     Vaping status: Never Used     Passive vaping exposure: Yes   Substance Use Topics     Alcohol use: Yes     Alcohol/week: 0.0 standard drinks of alcohol     Comment: occassionally       History   Drug Use No         Objective     /84   Pulse 70   Temp 97.5  F (36.4  C) (Temporal)   Resp 16   Ht 1.651 m (5' 5\")   Wt 81.9 kg (180 lb 8 oz)   SpO2 100%   BMI 30.04 kg/m      Physical Exam    GENERAL APPEARANCE: healthy, alert and no distress     EYES: EOMI, PERRL     HENT: ear canals and TM's normal and nose and mouth without ulcers or lesions     NECK: no adenopathy, no asymmetry, masses, or scars and thyroid normal to palpation     RESP: lungs clear to auscultation - no rales, rhonchi or wheezes     CV: regular rates and rhythm, normal S1 S2, no S3 or S4 and no murmur, click or rub     ABDOMEN:  soft, nontender, no HSM or masses and bowel sounds normal     MS: extremities normal- no gross deformities noted, no evidence of inflammation in joints, FROM in all extremities.     SKIN: no suspicious lesions or rashes     NEURO: Normal strength and tone, sensory exam grossly normal, mentation intact and speech normal     PSYCH: mentation appears normal. and affect normal/bright     LYMPHATICS: No cervical adenopathy    Recent Labs   Lab Test 02/14/23  1828 01/17/23  0742 06/23/22  1612 10/12/21  0000 06/04/21  0837   HGB 13.4  --   --   --  12.9     --   --   --  216     --  139  --  139   POTASSIUM 4.0  --  3.8  --  3.7   CR 0.94  --  0.94  --  0.94   A1C  --  8.4* 10.0*   < > 9.1*    < > = values in this interval not displayed.        Diagnostics:  Labs pending at this time.  Results will be reviewed when available.   No EKG " required, no history of coronary heart disease, significant arrhythmia, peripheral arterial disease or other structural heart disease.    Revised Cardiac Risk Index (RCRI):  The patient has the following serious cardiovascular risks for perioperative complications:   - No serious cardiac risks = 0 points     RCRI Interpretation: 0 points: Class I (very low risk - 0.4% complication rate)           Signed Electronically by: Alvaro Guerrero MD  Copy of this evaluation report is provided to requesting physician.      Answers for HPI/ROS submitted by the patient on 5/12/2023  If you checked off any problems, how difficult have these problems made it for you to do your work, take care of things at home, or get along with other people?: Somewhat difficult  PHQ9 TOTAL SCORE: 7

## 2023-05-16 NOTE — RESULT ENCOUNTER NOTE
Necoe,  Your results look good.  Please let me know if you have any questions.    Sincerely,  Dr. Guerrero

## 2023-06-05 ENCOUNTER — TELEPHONE (OUTPATIENT)
Dept: GASTROENTEROLOGY | Facility: CLINIC | Age: 38
End: 2023-06-05
Payer: COMMERCIAL

## 2023-06-05 NOTE — TELEPHONE ENCOUNTER
"Endoscopy Scheduling Screen    Have you had a positive Covid test in the last 14 days?  No    Are you active on MyChart?   Yes    What insurance is in the chart?  Other:  [employer]    Ordering/Referring Provider: VICTORINO GUTIERREZ   (If ordering provider performs procedure, schedule with ordering provider unless otherwise instructed. )    BMI: Estimated body mass index is 30.04 kg/m  as calculated from the following:    Height as of 5/12/23: 1.651 m (5' 5\").    Weight as of 5/12/23: 81.9 kg (180 lb 8 oz).     Sedation Ordered  moderate sedation.   If patient BMI > 50 do not schedule in ASC.    Are you taking any prescription medications for pain?   No    Are you taking methadone or Suboxone?  No    Do you have a history of malignant hyperthermia or adverse reaction to anesthesia?  No    (Females) Are you currently pregnant?   No     Have you been diagnosed or told you have pulmonary hypertension?   No    Do you have an LVAD?  No    Have you been told you have moderate to severe sleep apnea?  No    Have you been told you have COPD, asthma, or any other lung disease?  No    Do you have any heart conditions?  No     Have you ever had or are you awaiting a heart or lung transplant?   No    Have you had a stroke or transient ischemic attack (TIA aka \"mini stroke\" in the last 6 months?   No    Have you been diagnosed with or been told you have cirrhosis of the liver?   No    Are you currently on dialysis?   No    Do you need assistance transferring?   No    BMI: Estimated body mass index is 30.04 kg/m  as calculated from the following:    Height as of 5/12/23: 1.651 m (5' 5\").    Weight as of 5/12/23: 81.9 kg (180 lb 8 oz).     Is patients BMI > 40 and scheduling location [facility]?  No    Do you take the medication Phentermine, Ozempic or Wegovy?  No    Do you take the medication Naltrexone?  No    Do you take blood thinners?  No    Preferred Pharmacy:    [place], [place] - [address]  [address] " [facility] [address]  Phone: [phone] Fax: [phone]    [facility] - [place] - [address]   [address] [facility] [address]  Phone: [phone] Fax: [phone]    [id number] - [place], [place] - [address] [facility] [address] [place] [address] & [facility] [address]  [address] [place] [address]  Phone: [phone] Fax: [phone]    [facility]  [address] [facility] [address]  Phone: [phone] Fax: [phone]    [facility] - [place], [place] - [address] [address]  Phone: [phone] Fax: [phone]    [facility] - [place], [place] - [address]  [address] [facility] [address]  Phone: [phone] Fax: [phone]      Prep   Are you currently on dialysis or do you have chronic kidney disease?  No (standard prep)    Do you have a diagnosis of diabetes?  Yes (Golytely Prep)    Do you have a diagnosis of cystic fibrosis (CF)?  No    On a regular basis do you go 3 -5 days between bowel movements?  No    BMI > 40?  No    Final Scheduling Details   Colonoscopy prep sent?  Standard Golytely - DIABETES  Procedure scheduled  COLONOSCOPY    Surgeon:  JUAN     Date of procedure:  07/25/2023     Schedule PAC:   No    Location  PH    Sedation   MAC/Deep Sedation - PER LOCATION    Patient Reminders:    You will receive a call from a Nurse to review instructions and health history.  This assessment must be completed prior to your procedure.  Failure to complete the Nurse assessment may result in the procedure being cancelled.       On the day of your procedure, please designate an adult(s) who can drive you home stay with you for the next 24 hours. The medicines used in the exam will make you sleepy. You will not be able to drive.       You cannot take public transportation, ride share services, or non-medical taxi service without a responsible caregiver.   Medical transport services are allowed with the requirement that a responsible caregiver will receive you at your destination.  We require that drivers and caregivers are confirmed prior to your procedure.

## 2023-07-15 ENCOUNTER — MYC MEDICAL ADVICE (OUTPATIENT)
Dept: ENDOCRINOLOGY | Facility: CLINIC | Age: 38
End: 2023-07-15
Payer: COMMERCIAL

## 2023-07-15 DIAGNOSIS — E10.39 TYPE 1 DIABETES MELLITUS WITH OTHER OPHTHALMIC COMPLICATION (H): ICD-10-CM

## 2023-07-15 RX ORDER — PROCHLORPERAZINE 25 MG/1
SUPPOSITORY RECTAL
Qty: 9 EACH | Refills: 3 | Status: SHIPPED | OUTPATIENT
Start: 2023-07-15 | End: 2023-07-19

## 2023-07-15 RX ORDER — PROCHLORPERAZINE 25 MG/1
SUPPOSITORY RECTAL
Qty: 1 EACH | Refills: 3 | Status: SHIPPED | OUTPATIENT
Start: 2023-07-15 | End: 2023-07-19

## 2023-07-17 RX ORDER — PROCHLORPERAZINE 25 MG/1
SUPPOSITORY RECTAL
Qty: 1 EACH | Refills: 3 | OUTPATIENT
Start: 2023-07-17

## 2023-07-17 RX ORDER — PROCHLORPERAZINE 25 MG/1
SUPPOSITORY RECTAL
Refills: 3 | OUTPATIENT
Start: 2023-07-17

## 2023-07-19 RX ORDER — PROCHLORPERAZINE 25 MG/1
SUPPOSITORY RECTAL
Qty: 9 EACH | Refills: 3 | Status: SHIPPED | OUTPATIENT
Start: 2023-07-19 | End: 2024-04-30

## 2023-07-19 RX ORDER — PROCHLORPERAZINE 25 MG/1
SUPPOSITORY RECTAL
Qty: 1 EACH | Refills: 3 | Status: SHIPPED | OUTPATIENT
Start: 2023-07-19 | End: 2024-04-30

## 2023-07-24 NOTE — H&P
[facility] Anesthesia Pre-op History and Physical    Vickie Barnhart MRN# [id number]   Age: 37 year old YOB: 1985      Date of Surgery: 7/25/2023 Location [facility]      Date of Exam 7/25/2023 Facility (In hospital)       Home clinic: [facility]  Primary care provider: Alvaro Guerrero         Chief Complaint and/or Reason for Procedure:   No chief complaint on file.  Colonoscopy.  Hx ulcerative colitis.  Exam 2006.       Active problem list:     Patient Active Problem List    Diagnosis Date Noted    Dupuytren's contracture of both hands 12/06/2022     Priority: Medium    ALYX (generalized anxiety disorder) 07/21/2021     Priority: Medium    Diabetic retinopathy of right eye associated with type 1 diabetes mellitus, macular edema presence unspecified, unspecified retinopathy severity (H) 11/23/2020     Priority: Medium    Major depressive disorder, recurrent episode, moderate (H) 10/04/2016     Priority: Medium    Ulcerative colitis without complications (H) 01/29/2016     Priority: Medium    Noncompliance with medication regimen 11/03/2015     Priority: Medium    Tobacco abuse 05/29/2014     Priority: Medium    Facial paralysis/[place] palsy 04/27/2012     Priority: Medium    Type 1 diabetes mellitus with hyperglycemia (H) 10/31/2010     Priority: Medium    HYPERLIPIDEMIA LDL GOAL <100 10/31/2010     Priority: Medium    ASCUS of cervix with negative high risk HPV 06/19/2008     Priority: Medium     6/19/08 ASCUS pap/neg HPV  2009 and 2012 both NIL paps  6/28/17 NIL Pap, Neg HR HPV.  EMB normal. Plan: cotest in 3 years.  12/6/22 ASCUS pap, neg HR HPV. Plan: cotest in 3 years.   12/14/22 [employer] result note sent.   1/25/23 left msg. Msg states that results have been released to [employer]        Sprain of back 03/10/2008     Priority: Medium     Problem list name updated by automated process. Provider to review              Medications  (include herbals and vitamins):   Any Plavix use in the last 7 days? No     No current facility-administered medications for this encounter.     Current Outpatient Medications   Medication Sig    acetaminophen (TYLENOL) 500 MG tablet Take 500-1,000 mg by mouth every 6 hours as needed for mild pain    bisacodyl (DULCOLAX) 5 MG EC tablet Take 2 tablets at 3 pm the day before your procedure. If your procedure is before 11 am, take 2 additional tablets at 11 pm. If your procedure is after 11 am, take 2 additional tablets at 6 am. For additional instructions refer to your colonoscopy prep instructions.    Continuous Blood Gluc Sensor (DEXCOM G6 SENSOR) MISC Change every 10 days.    Continuous Blood Gluc Transmit (DEXCOM G6 TRANSMITTER) MISC Change every 90 days.    FLUoxetine (PROZAC) 20 MG capsule Take 3 capsules (60 mg) by mouth daily    fluticasone (FLONASE) 50 MCG/ACT nasal spray Spray 1 spray into both nostrils daily    gabapentin (NEURONTIN) 300 MG capsule Take 1 capsule (300 mg) by mouth At Bedtime    Glucagon, rDNA, (GLUCAGON EMERGENCY) 1 MG KIT Inject 1 mg into the muscle as needed (Hypoglycemia)    insulin aspart (NOVOLOG FLEXPEN) 100 UNIT/ML pen Take 1 unit per 10 grams of carb plus 1 unit for every 60 above 150 Max dose 60 unit daily while off pump    insulin glargine (LANTUS SOLOSTAR) 100 UNIT/ML pen Take 12 units daily while off pump    Insulin Infusion Pump Supplies (T:SLIM T:LOCK INSULIN CART 3ML) MISC 1 each See Admin Instructions Change insulin pump cartridge every 2-3 days.    insulin pen needle (BD TARA U/F) 32G X 4 MM miscellaneous USE 3 DAILY OR AS DIRECTED.    levonorgestrel (MIRENA) 20 MCG/24HR IUD 1 each (20 mcg) by Intrauterine route once    Naproxen Sodium (ALEVE PO) Take by mouth as needed for moderate pain     NOVOLOG FLEXPEN 100 UNIT/ML soln 1 unit for every 12 grams of carbohydrates with meals three times per day. Plus correction scale; uses up to 60 units daily.    NOVOLOG VIAL 100 UNIT/ML  soln INJECT 1 UNIT FOR EVERY 12 GRAMS OF CARBS WITH MEALS THREE TIMES PER DAY. PLUS CORRECTION SCALE; USES UP TO 60 UNITS DAILY.    polyethylene glycol (GOLYTELY) 236 g suspension The night before the exam at 6 pm drink an 8-ounce glass every 15 minutes until the jug is half empty. If you arrive before 11 AM: Drink the other half of the Golytely jug at 11 PM night before procedure. If you arrive after 11 AM: Drink the other half of the Golytely jug at 6 AM day of procedure. For additional instructions refer to your colonoscopy prep instructions.             Allergies:    No Known Allergies  Allergy to Latex? No  Allergy to tape?   No  Intolerances:             Physical Exam:   All vitals have been reviewed  No data found.  No intake/output data recorded.  Lungs:   No increased work of breathing, good air exchange, clear to auscultation bilaterally, no crackles or wheezing     Cardiovascular:   Normal apical impulse, regular rate and rhythm, normal S1 and S2, no S3 or S4, and no murmur noted             Lab / Radiology Results:            Anesthetic risk and/or ASA classification:       Jude Nix MD

## 2023-07-25 ENCOUNTER — HOSPITAL ENCOUNTER (OUTPATIENT)
Facility: CLINIC | Age: 38
Discharge: HOME | End: 2023-07-25
Attending: INTERNAL MEDICINE | Admitting: INTERNAL MEDICINE
Payer: COMMERCIAL

## 2023-07-25 ENCOUNTER — ANESTHESIA EVENT (OUTPATIENT)
Dept: GASTROENTEROLOGY | Facility: CLINIC | Age: 38
End: 2023-07-25
Payer: COMMERCIAL

## 2023-07-25 ENCOUNTER — ANESTHESIA (OUTPATIENT)
Dept: GASTROENTEROLOGY | Facility: CLINIC | Age: 38
End: 2023-07-25
Payer: COMMERCIAL

## 2023-07-25 VITALS
SYSTOLIC BLOOD PRESSURE: 103 MMHG | OXYGEN SATURATION: 98 % | RESPIRATION RATE: 16 BRPM | TEMPERATURE: 98.5 F | DIASTOLIC BLOOD PRESSURE: 76 MMHG | HEART RATE: 72 BPM

## 2023-07-25 LAB — COLONOSCOPY: NORMAL

## 2023-07-25 PROCEDURE — 250N000009 HC RX 250: Performed by: NURSE ANESTHETIST, CERTIFIED REGISTERED

## 2023-07-25 PROCEDURE — 370N000017 HC ANESTHESIA TECHNICAL FEE, PER MIN: Performed by: INTERNAL MEDICINE

## 2023-07-25 PROCEDURE — 45378 DIAGNOSTIC COLONOSCOPY: CPT | Mod: 74 | Performed by: INTERNAL MEDICINE

## 2023-07-25 PROCEDURE — 250N000011 HC RX IP 250 OP 636: Performed by: NURSE ANESTHETIST, CERTIFIED REGISTERED

## 2023-07-25 PROCEDURE — 258N000003 HC RX IP 258 OP 636: Performed by: NURSE ANESTHETIST, CERTIFIED REGISTERED

## 2023-07-25 RX ORDER — LIDOCAINE HYDROCHLORIDE 20 MG/ML
INJECTION, SOLUTION INFILTRATION; PERINEURAL PRN
Status: DISCONTINUED | OUTPATIENT
Start: 2023-07-25 | End: 2023-07-25

## 2023-07-25 RX ORDER — ONDANSETRON 4 MG/1
4 TABLET, ORALLY DISINTEGRATING ORAL EVERY 30 MIN PRN
Status: DISCONTINUED | OUTPATIENT
Start: 2023-07-25 | End: 2023-07-25 | Stop reason: HOSPADM

## 2023-07-25 RX ORDER — SODIUM CHLORIDE, SODIUM LACTATE, POTASSIUM CHLORIDE, CALCIUM CHLORIDE 600; 310; 30; 20 MG/100ML; MG/100ML; MG/100ML; MG/100ML
INJECTION, SOLUTION INTRAVENOUS CONTINUOUS
Status: DISCONTINUED | OUTPATIENT
Start: 2023-07-25 | End: 2023-07-25 | Stop reason: HOSPADM

## 2023-07-25 RX ORDER — ONDANSETRON 2 MG/ML
4 INJECTION INTRAMUSCULAR; INTRAVENOUS EVERY 30 MIN PRN
Status: DISCONTINUED | OUTPATIENT
Start: 2023-07-25 | End: 2023-07-25 | Stop reason: HOSPADM

## 2023-07-25 RX ORDER — PROPOFOL 10 MG/ML
INJECTION, EMULSION INTRAVENOUS CONTINUOUS PRN
Status: DISCONTINUED | OUTPATIENT
Start: 2023-07-25 | End: 2023-07-25

## 2023-07-25 RX ORDER — PROPOFOL 10 MG/ML
INJECTION, EMULSION INTRAVENOUS PRN
Status: DISCONTINUED | OUTPATIENT
Start: 2023-07-25 | End: 2023-07-25

## 2023-07-25 RX ADMIN — SODIUM CHLORIDE, POTASSIUM CHLORIDE, SODIUM LACTATE AND CALCIUM CHLORIDE: 600; 310; 30; 20 INJECTION, SOLUTION INTRAVENOUS at 09:04

## 2023-07-25 RX ADMIN — PROPOFOL 200 MCG/KG/MIN: 10 INJECTION, EMULSION INTRAVENOUS at 09:28

## 2023-07-25 RX ADMIN — LIDOCAINE HYDROCHLORIDE 50 MG: 20 INJECTION, SOLUTION INFILTRATION; PERINEURAL at 09:28

## 2023-07-25 RX ADMIN — PROPOFOL 70 MG: 10 INJECTION, EMULSION INTRAVENOUS at 09:28

## 2023-07-25 RX ADMIN — LIDOCAINE HYDROCHLORIDE 1 ML: 10 INJECTION, SOLUTION EPIDURAL; INFILTRATION; INTRACAUDAL; PERINEURAL at 09:04

## 2023-07-25 RX ADMIN — PROPOFOL 30 MG: 10 INJECTION, EMULSION INTRAVENOUS at 09:30

## 2023-07-25 ASSESSMENT — ACTIVITIES OF DAILY LIVING (ADL): ADLS_ACUITY_SCORE: 37

## 2023-07-25 ASSESSMENT — LIFESTYLE VARIABLES: TOBACCO_USE: 1

## 2023-07-25 NOTE — ANESTHESIA CARE TRANSFER NOTE
Patient: Vickie Barnhart    Procedure: Procedure(s):  Colonoscopy       Diagnosis: Ulcerative colitis without complications, unspecified location (H) [K51.90]  Diagnosis Additional Information: No value filed.    Anesthesia Type:   MAC     Note:    Oropharynx: oropharynx clear of all foreign objects and spontaneously breathing  Level of Consciousness: drowsy  Oxygen Supplementation: face mask    Independent Airway: airway patency satisfactory and stable  Dentition: dentition unchanged  Vital Signs Stable: post-procedure vital signs reviewed and stable  Report to RN Given: handoff report given  Patient transferred to: Phase II    Handoff Report: Identifed the Patient, Identified the Reponsible Provider, Reviewed the pertinent medical history, Discussed the surgical course, Reviewed Intra-OP anesthesia mangement and issues during anesthesia, Set expectations for post-procedure period and Allowed opportunity for questions and acknowledgement of understanding      Vitals:  Vitals Value Taken Time   /76 07/25/23 1000   Temp     Pulse 72 07/25/23 1000   Resp 16 07/25/23 0950   SpO2 98 % 07/25/23 1003   Vitals shown include unvalidated device data.    Electronically Signed By: RAJESH Raya CRNA  July 25, 2023  10:07 AM

## 2023-07-25 NOTE — ANESTHESIA POSTPROCEDURE EVALUATION
Patient: Vickie Barnhart    Procedure: Procedure(s):  Colonoscopy       Anesthesia Type:  MAC    Note:  Disposition: Outpatient   Postop Pain Control: Uneventful            Sign Out: Well controlled pain   PONV: No   Neuro/Psych: Uneventful            Sign Out: Acceptable/Baseline neuro status   Airway/Respiratory: Uneventful            Sign Out: Acceptable/Baseline resp. status   CV/Hemodynamics: Uneventful            Sign Out: Acceptable CV status   Other NRE: NONE   DID A NON-ROUTINE EVENT OCCUR? No    Event details/Postop Comments:  Pt was happy with anesthesia care.  No complications.  I will follow up with the pt if needed.           Last vitals:  Vitals Value Taken Time   /76 07/25/23 1000   Temp     Pulse 72 07/25/23 1000   Resp 16 07/25/23 0950   SpO2 98 % 07/25/23 1003   Vitals shown include unvalidated device data.    Electronically Signed By: RAJESH Raya CRNA  July 25, 2023  10:07 AM

## 2023-07-25 NOTE — ANESTHESIA PREPROCEDURE EVALUATION
Anesthesia Pre-Procedure Evaluation    Patient: Vickie Barnhart   MRN: [id number] : 1985        Procedure : Procedure(s):  Colonoscopy          Past Medical History:   Diagnosis Date    Adjustment disorder with anxious mood 2015    Anxiety 2015    ASCUS of cervix with negative high risk HPV 2008    Depressive disorder, not elsewhere classified     Diabetic eye exam (H) 12    Hx of tubal ligation 1/15/2021    Mixed hyperlipidemia 2006    Regional enteritis of unspecified site     Ulcerative Colitis    Type I (juvenile type) diabetes mellitus with ketoacidosis, not stated as uncontrolled(250.11) 2007    Moderately severe intensity.    Type I (juvenile type) diabetes mellitus with ketoacidosis, uncontrolled 08    Type I (juvenile type) diabetes mellitus without mention of complication, not stated as uncontrolled     diagnosed in     Ulcerative colitis, unspecified     remission. Last flare 6 yrs ago.       Past Surgical History:   Procedure Laterality Date    DILATION AND CURETTAGE SUCTION  2010    miscarriage    RETINAL DETACHMENT SURGERY Left     TUBAL LIGATION      ZZ COLONOSCOPY W BIOPSY  2006    ZZ COLONOSCOPY W/WO BRUSH/WASH        No Known Allergies   Social History     Tobacco Use    Smoking status: Some Days     Packs/day: 0.50     Years: 16.00     Pack years: 8.00     Types: Cigarettes    Smokeless tobacco: Never    Tobacco comments:     working on quiting, somedays up to 4 cigs per day   Substance Use Topics    Alcohol use: Yes     Alcohol/week: 0.0 standard drinks of alcohol     Comment: occassionally      Wt Readings from Last 1 Encounters:   23 81.9 kg (180 lb 8 oz)        Anesthesia Evaluation   Pt has had prior anesthetic. Type: MAC and General.    No history of anesthetic complications       ROS/MED HX  ENT/Pulmonary:     (+)                tobacco use, Current use,                      Neurologic:  - neg neurologic ROS      Cardiovascular:     (+)  - -   -  - -                                 Previous cardiac testing   Echo: Date: Results:    Stress Test:  Date: Results:    ECG Reviewed:  Date: 1-15-21 Results:  Sinus  Rhythm   WITHIN NORMAL LIMITS  Cath:  Date: Results:      METS/Exercise Tolerance:     Hematologic:  - neg hematologic  ROS     Musculoskeletal:  - neg musculoskeletal ROS     GI/Hepatic:  - neg GI/hepatic ROS  (-) GERD   Renal/Genitourinary:       Endo:     (+) type I DM,  Last HgA1c: 6.3, date: 5-12-23, Using insulin, - using insulin pump. Normal glucose range: 98 pre-op,               Psychiatric/Substance Use:  - neg psychiatric ROS     Infectious Disease:  - neg infectious disease ROS     Malignancy:  - neg malignancy ROS     Other:  - neg other ROS          Physical Exam    Airway        Mallampati: II   TM distance: > 3 FB   Neck ROM: full   Mouth opening: > 3 cm    Respiratory Devices and Support         Dental       (+) Minor Abnormalities - some fillings, tiny chips      Cardiovascular   cardiovascular exam normal       Rhythm and rate: regular and normal     Pulmonary   pulmonary exam normal        breath sounds clear to auscultation           OUTSIDE LABS:  CBC:   Lab Results   Component Value Date    WBC 9.1 02/14/2023    WBC 10.9 06/04/2021    HGB 13.4 02/14/2023    HGB 12.9 06/04/2021    HCT 40.2 02/14/2023    HCT 39.8 06/04/2021     02/14/2023     06/04/2021     BMP:   Lab Results   Component Value Date     05/12/2023     02/14/2023    POTASSIUM 3.9 05/12/2023    POTASSIUM 4.0 02/14/2023    CHLORIDE 106 05/12/2023    CHLORIDE 102 02/14/2023    CO2 26 05/12/2023    CO2 25 02/14/2023    BUN 13.0 05/12/2023    BUN 12.8 02/14/2023    CR 1.11 (H) 05/12/2023    CR 0.94 02/14/2023    GLC 92 05/12/2023     (H) 02/14/2023     COAGS:   Lab Results   Component Value Date    PTT 30 06/26/2017    INR 0.89 06/26/2017     POC:   Lab Results   Component Value Date     (H)  10/08/2017    HCG Negative 06/27/2019     HEPATIC:   Lab Results   Component Value Date    ALBUMIN 4.1 02/14/2023    PROTTOTAL 6.8 02/14/2023    ALT 15 02/14/2023    AST 16 02/14/2023    ALKPHOS 99 02/14/2023    BILITOTAL 0.3 02/14/2023     OTHER:   Lab Results   Component Value Date    LACT 3.2 (H) 05/29/2014    A1C 6.3 (H) 05/12/2023    YELENA 8.8 05/12/2023    PHOS 4.0 10/08/2017    MAG 1.8 10/08/2017    LIPASE 20 05/29/2014    AMYLASE 44 05/29/2014    TSH 3.91 01/17/2023    T4 1.02 06/26/2017    CRP <2.9 10/07/2017       Anesthesia Plan    ASA Status:  2    NPO Status:  NPO Appropriate    Anesthesia Type: MAC.     - Reason for MAC: straight local not clinically adequate   Induction: Intravenous, Propofol.   Maintenance: TIVA.        Consents    Anesthesia Plan(s) and associated risks, benefits, and realistic alternatives discussed. Questions answered and patient/representative(s) expressed understanding.     - Discussed:     - Discussed with:  Patient      - Extended Intubation/Ventilatory Support Discussed: No.      - Patient is DNR/DNI Status: No     Use of blood products discussed: No .     Postoperative Care       PONV prophylaxis: Background Propofol Infusion     Comments:    Other Comments: The risks and benefits of anesthesia, and the alternatives where applicable, have been discussed with the patient, and they wish to proceed.                 RAJESH Raya CRNA

## 2023-07-25 NOTE — DISCHARGE INSTRUCTIONS
[facility]    Home Care Following Endoscopy          Activity:  You have just undergone an endoscopic procedure usually performed with conscious sedation.  Do not work or operate machinery (including a car) for at least 12 hours.    I encourage you to walk and attempt to pass this air as soon as possible.    Diet:  Return to the diet you were on before your procedure but eat lightly for the first 12-24 hours.  Drink plenty of water.  Resume any regular medications unless otherwise advised by your physician.  Please begin any new medication prescribed as a result of your procedure as directed by your physician.   If you had any biopsy or polyp removed please refrain from aspirin or aspirin products for 2 days.  If on Coumadin please restart as instructed by your physician.   Pain:  You may take Tylenol as needed for pain.  Expected Recovery:  You can expect some mild abdominal fullness and/or discomfort due to the air used to inflate your intestinal tract.     Call Your Physician if You Have:    After Colonoscopy:  Worsening persisting abdominal pain which is worse with activity.  Fevers (>101 degrees F), chills or shakes.  Passage of continued blood with bowel movements.     Any questions or concerns about your recovery, please call [phone] or after hours [facility] ([phone]) Nurse Advice Line.    Follow-up Care:  You did have polyps/biopsy tissue sample(s) removed.  The polyps/biopsy tissue sample(s) will be sent to pathology.    You should receive letter in your My Chart from [place] with your results within 1-2 weeks. If you do not participate in My Chart a physical letter will come in the mail in 2-3 weeks.  Please call if you have not received a notification of your results.  If asked to return to clinic please make an appointment 1 week after your procedure.  Call [phone].

## 2023-08-07 ENCOUNTER — TELEPHONE (OUTPATIENT)
Dept: ENDOCRINOLOGY | Facility: CLINIC | Age: 38
End: 2023-08-07

## 2023-08-07 NOTE — TELEPHONE ENCOUNTER
[facility] Call Center    Phone Message    May a detailed message be left on voicemail: yes     Reason for Call: Other: Writer couldn't hear pt clearly but she mentioned that she needs an appt asap or in a month or so to have her medication filled due to insurance? Please call pt back to see what  has available. Writer looked and nothing till next year.      Action Taken: Message routed to:  [facility] ([facility] ENDO    Travel Screening: Not Applicable

## 2023-08-07 NOTE — TELEPHONE ENCOUNTER
8/7 Called and spoke to patient,  appointment is currently scheduled.     Judie chun Procedure   Orthopedics, Podiatry, Sports Medicine, Ent ,Eye , Audiology, Adult Endocrine & Diabetes, Nutrition & Medication Therapy Management Specialties   [facility]

## 2023-08-19 ENCOUNTER — HEALTH MAINTENANCE LETTER (OUTPATIENT)
Age: 38
End: 2023-08-19

## 2023-09-12 NOTE — PATIENT INSTRUCTIONS
Endoscopy will give you a call to schedule your colonoscopy. If you do not hear from them in 2-3 business days please reach out to them directly at [phone] Opt 2  GI will call you to schedule an appointment. If you do not hear from them in 2-3 business days please reach out to them directly at [phone] opt 1           PHYSICAL / OCCUPATIONAL THERAPY - DAILY TREATMENT NOTE (updated )    Patient Name: Jerome Mitchell    Date: 2023    : 1969  Insurance: Payor: MEDICARE / Plan: MEDICARE PART A AND B / Product Type: *No Product type* /      Patient  verified yes     Visit #   Current / Total 6 24   Time   In / Out 1100 1158   Total Treatment Time 58   Pain   In / Out 3-4/10 0/10   Subjective Functional Status/Changes: MRI is scheduled for next Tuesday. TREATMENT AREA =  Left knee pain [M25.562]    OBJECTIVE    Modalities Rationale:     decrease edema, decrease inflammation, and decrease pain to improve patient's ability to progress to PLOF and address remaining functional goals. PD min [x] Estim Unattended, type/location:  IFC to the L knee with ice in semi-reclined                                     [x]  w/ice    []  w/heat    min [] Estim Attended, type/location:                                     []  w/US     []  w/ice    []  w/heat    []  TENS insruct      min []  Mechanical Traction: type/lbs                   []  pro   []  sup   []  int   []  cont    []  before manual    []  after manual    min []  Ultrasound, settings/location:      min []  Iontophoresis w/ dexamethasone, location:                                               []  take home patch       []  in clinic    min  unbilled [x]  Ice     []  Heat    location/position: Semi-reclined    min []  Paraffin,  details:     min []  Vasopneumatic Device, press/temp:     min []  Chirage Herb / Theshakiraa Blowers: If using vaso (only need to measure limb vaso being performed on)      pre-treatment girth :       post-treatment girth :       measured at (landmark location) :      min []  Other:    Skin assessment post-treatment (if applicable):    []  intact    []  redness- no adverse reaction                 []redness - adverse reaction:          Therapeutic Procedures:   Tx Min Billable or 1:1 Min (if diff from Tx Min) Procedure, Rationale, Specifics   40 40

## 2023-10-10 ENCOUNTER — MEDICAL CORRESPONDENCE (OUTPATIENT)
Dept: HEALTH INFORMATION MANAGEMENT | Facility: CLINIC | Age: 38
End: 2023-10-10

## 2023-10-10 ENCOUNTER — DOCUMENTATION ONLY (OUTPATIENT)
Dept: ENDOCRINOLOGY | Facility: CLINIC | Age: 38
End: 2023-10-10
Payer: COMMERCIAL

## 2023-10-10 NOTE — PROGRESS NOTES
Form completed, signed and faxed to [place] at [phone], with confirmed of fax completion via rightfax.    Mary Kate Sanchez, EDE  Adult Endocrinology  [facility] [place]

## 2023-10-10 NOTE — PROGRESS NOTES
Insulin treated diabetes report form received. Form has been completed to the best of writers ability and has been placed in Dr. Lock' file to be complete and sign.    Mary Kate Sanchez, EDE  Adult Endocrinology  [facility] [place]

## 2023-11-06 ENCOUNTER — PATIENT OUTREACH (OUTPATIENT)
Dept: CARE COORDINATION | Facility: CLINIC | Age: 38
End: 2023-11-06
Payer: COMMERCIAL

## 2023-11-13 NOTE — ED NOTES
Pt w/3 wks of malaise, harsh loose cough, congestion with yellow brown phelgm that is just not getting better.  She has tried several OTC meds w/o relief/change.  1/2 ppd smoker but less recently.  No c/o chest pain.    supine. Bed placed in chair position for upright posture and comfort. Pt making good progress this date, PT to cont to follow for acute care needs.       SUBJECTIVE:   Mr. Montemayor [place] states, \"I have been waiting on you\"     Social/Functional Lives With: Spouse  Type of Home: House  Receives Help From: Family  ADL Assistance: Needs assistance  Homemaking Assistance: Independent  Homemaking Responsibilities: Yes  Ambulation Assistance: Needs assistance  Transfer Assistance: Independent  Active : No  Occupation: Retired  OBJECTIVE:     PAIN: VITALS / O2: PRECAUTION / Dee Dee Gutierrez / Candice Delong:   Pre Treatment:      0/10    Post Treatment: 0/10 Vitals    WDL    Oxygen   2 L O2 External Catheter    RESTRICTIONS/PRECAUTIONS:  Restrictions/Precautions  Restrictions/Precautions: Fall Risk  Restrictions/Precautions: Fall Risk     MOBILITY: I Mod I S SBA CGA Min Mod Max Total  NT x2 Comments:   Bed Mobility    Rolling [] [] [] [] [x] [] [] [] [] [] []    Supine to Sit [] [] [] [] [] [x] [] [] [] [] []    Scooting [] [] [] [] [x] [] [] [] [] [] []    Sit to Supine [] [] [] [] [] [x] [] [] [] [] []    Transfers    Sit to Stand [] [] [] [] [] [x] [] [] [] [] []    Bed to Chair [] [] [] [] [] [] [] [] [] [x] []    Stand to Sit [] [] [] [] [] [x] [] [] [] [] []     [] [] [] [] [] [] [] [] [] [] []    I=Independent, Mod I=Modified Independent, S=Supervision, SBA=Standby Assistance, CGA=Contact Guard Assistance,   Min=Minimal Assistance, Mod=Moderate Assistance, Max=Maximal Assistance, Total=Total Assistance, NT=Not Tested    BALANCE: Good Fair+ Fair Fair- Poor NT Comments   Sitting Static [x] [] [] [] [] []    Sitting Dynamic [] [x] [] [] [] []              Standing Static [] [x] [] [] [] []    Standing Dynamic [] [] [x] [] [] []      GAIT: I Mod I S SBA CGA Min Mod Max Total  NT x2 Comments:   Level of Assistance [] [] [] [] [] [x] [] [] [] [] []    Distance 150  feet    DME Gait Belt and Rolling Walker    Gait Quality Decreased rae , Decreased step clearance, Decreased step length, Narrow base of support, Shuffling , and Trunk flexion    Weightbearing Status      Stairs      I=Independent, Mod I=Modified Independent, S=Supervision, SBA=Standby Assistance, CGA=Contact Guard Assistance,   Min=Minimal Assistance, Mod=Moderate Assistance, Max=Maximal Assistance, Total=Total Assistance, NT=Not Tested    PLAN:   FREQUENCY AND DURATION: 3 times/week for duration of hospital stay or until stated goals are met, whichever comes first.    TREATMENT:   TREATMENT:   Therapeutic Activity (25 Minutes): Therapeutic activity included Supine to Sit, Sit to Supine, Scooting, Transfer Training, Ambulation on level ground, Sitting balance , and Standing balance to improve functional Activity tolerance, Balance, Mobility, and Strength.     TREATMENT GRID:  N/A    AFTER TREATMENT PRECAUTIONS: Bed, Bed/Chair Locked, Call light within reach, Needs within reach, RN notified, Side rails x3, and sitter present    INTERDISCIPLINARY COLLABORATION:  RN/ PCT and PT/ PTA    EDUCATION: Education Given To: Patient  Education Provided: Transfer Training  Education Method: Verbal  Barriers to Learning: Cognition  Education Outcome: Continued education needed    TIME IN/OUT:  Time In: 1409  Time Out: 0912  Minutes: [facility], PT

## 2023-11-16 ENCOUNTER — MYC MEDICAL ADVICE (OUTPATIENT)
Dept: ENDOCRINOLOGY | Facility: CLINIC | Age: 38
End: 2023-11-16
Payer: COMMERCIAL

## 2023-11-16 DIAGNOSIS — E10.39 TYPE 1 DIABETES MELLITUS WITH OTHER OPHTHALMIC COMPLICATION (H): ICD-10-CM

## 2023-11-17 RX ORDER — INSULIN ASPART 100 [IU]/ML
INJECTION, SOLUTION INTRAVENOUS; SUBCUTANEOUS
Qty: 60 ML | Refills: 0 | Status: SHIPPED | OUTPATIENT
Start: 2023-11-17 | End: 2024-04-19

## 2023-11-20 ENCOUNTER — PATIENT OUTREACH (OUTPATIENT)
Dept: CARE COORDINATION | Facility: CLINIC | Age: 38
End: 2023-11-20
Payer: COMMERCIAL

## 2023-12-08 ENCOUNTER — TRANSFERRED RECORDS (OUTPATIENT)
Dept: HEALTH INFORMATION MANAGEMENT | Facility: CLINIC | Age: 38
End: 2023-12-08
Payer: COMMERCIAL

## 2023-12-08 LAB — RETINOPATHY: POSITIVE

## 2024-01-06 ENCOUNTER — HEALTH MAINTENANCE LETTER (OUTPATIENT)
Age: 39
End: 2024-01-06

## 2024-01-30 NOTE — COMMUNITY RESOURCES LIST (ENGLISH)
01/30/2024   [facility]  N/A  For questions about this resource list or additional care needs, please contact your primary care clinic or care manager.  Phone: [phone]   Email: N/A   Address: [address] [place] [id number]   Hours: N/A        Housing       Housing search assistance  1  [facility] Distance: 18.43 miles      In-Person   [address] [place] [address]  Language: English  Hours: Mon - Fri 8:00 AM - 4:00 PM  Fees: Free   Phone: (708) 871-7332 Email: Triston@[employer] Website: https://[employer]/     Shelter for individuals  2  [facility] - Main Office - Emergency Housing Assistance - Emergency housing Distance: 20.28 miles      In-Person   [address] [place] [address]  Language: English  Hours: Mon - Fri 6:00 AM - 6:30 PM  Fees: Free   Phone: (413) 661-9676 Email: [email or web address]@[email or web address] Website: [email or web address]          Important Numbers & Websites       Emergency Services   911  [facility] Services   311  Poison Control   (912) 190-4308  Suicide Prevention Lifeline   (403) 894-4647 (TALK)  Child Abuse Hotline   (506) 663-6495 (4-A-Child)  Sexual Assault Hotline   (563) 521-4000 (HOPE)  National Runaway Safeline   (799) 306-9938 (RUNAWAY)  All-Options Talkline   (296) 200-3336  Substance Abuse Referral   (838) 252-1390 (HELP)

## 2024-01-31 ENCOUNTER — HOSPITAL ENCOUNTER (EMERGENCY)
Facility: CLINIC | Age: 39
Discharge: HOME | End: 2024-02-01
Attending: NURSE PRACTITIONER | Admitting: NURSE PRACTITIONER
Payer: COMMERCIAL

## 2024-01-31 ENCOUNTER — APPOINTMENT (OUTPATIENT)
Dept: CT IMAGING | Facility: CLINIC | Age: 39
End: 2024-01-31
Attending: NURSE PRACTITIONER
Payer: COMMERCIAL

## 2024-01-31 VITALS
SYSTOLIC BLOOD PRESSURE: 146 MMHG | TEMPERATURE: 98.2 F | OXYGEN SATURATION: 99 % | HEART RATE: 88 BPM | DIASTOLIC BLOOD PRESSURE: 91 MMHG | RESPIRATION RATE: 18 BRPM

## 2024-01-31 DIAGNOSIS — K51.30 ULCERATIVE RECTOSIGMOIDITIS WITHOUT COMPLICATION (H): ICD-10-CM

## 2024-01-31 DIAGNOSIS — U07.1 COVID-19 VIRUS INFECTION: ICD-10-CM

## 2024-01-31 DIAGNOSIS — E10.319: ICD-10-CM

## 2024-01-31 LAB
ALBUMIN SERPL BCG-MCNC: 4.1 G/DL (ref 3.5–5.2)
ALP SERPL-CCNC: 104 U/L (ref 40–150)
ALT SERPL W P-5'-P-CCNC: 11 U/L (ref 0–50)
ANION GAP SERPL CALCULATED.3IONS-SCNC: 10 MMOL/L (ref 7–15)
AST SERPL W P-5'-P-CCNC: 15 U/L (ref 0–45)
BASOPHILS # BLD AUTO: 0 10E3/UL (ref 0–0.2)
BASOPHILS NFR BLD AUTO: 0 %
BILIRUB SERPL-MCNC: <0.2 MG/DL
BUN SERPL-MCNC: 11 MG/DL (ref 6–20)
CALCIUM SERPL-MCNC: 8.5 MG/DL (ref 8.6–10)
CHLORIDE SERPL-SCNC: 104 MMOL/L (ref 98–107)
CREAT SERPL-MCNC: 1.13 MG/DL (ref 0.51–0.95)
DEPRECATED HCO3 PLAS-SCNC: 25 MMOL/L (ref 22–29)
DEPRECATED S PYO AG THROAT QL EIA: NEGATIVE
EGFRCR SERPLBLD CKD-EPI 2021: 63 ML/MIN/1.73M2
EOSINOPHIL # BLD AUTO: 0 10E3/UL (ref 0–0.7)
EOSINOPHIL NFR BLD AUTO: 0 %
ERYTHROCYTE [DISTWIDTH] IN BLOOD BY AUTOMATED COUNT: 12.5 % (ref 10–15)
FLUAV RNA SPEC QL NAA+PROBE: NEGATIVE
FLUBV RNA RESP QL NAA+PROBE: NEGATIVE
GLUCOSE SERPL-MCNC: 142 MG/DL (ref 70–99)
HCT VFR BLD AUTO: 37.5 % (ref 35–47)
HGB BLD-MCNC: 12.9 G/DL (ref 11.7–15.7)
IMM GRANULOCYTES # BLD: 0 10E3/UL
IMM GRANULOCYTES NFR BLD: 0 %
LYMPHOCYTES # BLD AUTO: 2.4 10E3/UL (ref 0.8–5.3)
LYMPHOCYTES NFR BLD AUTO: 27 %
MCH RBC QN AUTO: 29.5 PG (ref 26.5–33)
MCHC RBC AUTO-ENTMCNC: 34.4 G/DL (ref 31.5–36.5)
MCV RBC AUTO: 86 FL (ref 78–100)
MONOCYTES # BLD AUTO: 1 10E3/UL (ref 0–1.3)
MONOCYTES NFR BLD AUTO: 11 %
NEUTROPHILS # BLD AUTO: 5.4 10E3/UL (ref 1.6–8.3)
NEUTROPHILS NFR BLD AUTO: 62 %
NRBC # BLD AUTO: 0 10E3/UL
NRBC BLD AUTO-RTO: 0 /100
PLATELET # BLD AUTO: 282 10E3/UL (ref 150–450)
POTASSIUM SERPL-SCNC: 3.7 MMOL/L (ref 3.4–5.3)
PROT SERPL-MCNC: 6.8 G/DL (ref 6.4–8.3)
RBC # BLD AUTO: 4.37 10E6/UL (ref 3.8–5.2)
RSV RNA SPEC NAA+PROBE: NEGATIVE
SARS-COV-2 RNA RESP QL NAA+PROBE: POSITIVE
SODIUM SERPL-SCNC: 139 MMOL/L (ref 135–145)
WBC # BLD AUTO: 8.9 10E3/UL (ref 4–11)

## 2024-01-31 PROCEDURE — 83036 HEMOGLOBIN GLYCOSYLATED A1C: CPT

## 2024-01-31 PROCEDURE — 250N000011 HC RX IP 250 OP 636: Performed by: NURSE PRACTITIONER

## 2024-01-31 PROCEDURE — 96374 THER/PROPH/DIAG INJ IV PUSH: CPT | Mod: 59 | Performed by: NURSE PRACTITIONER

## 2024-01-31 PROCEDURE — 99284 EMERGENCY DEPT VISIT MOD MDM: CPT | Performed by: NURSE PRACTITIONER

## 2024-01-31 PROCEDURE — 99285 EMERGENCY DEPT VISIT HI MDM: CPT | Mod: 25 | Performed by: NURSE PRACTITIONER

## 2024-01-31 PROCEDURE — 87637 SARSCOV2&INF A&B&RSV AMP PRB: CPT | Performed by: STUDENT IN AN ORGANIZED HEALTH CARE EDUCATION/TRAINING PROGRAM

## 2024-01-31 PROCEDURE — 80053 COMPREHEN METABOLIC PANEL: CPT | Performed by: NURSE PRACTITIONER

## 2024-01-31 PROCEDURE — 250N000009 HC RX 250: Performed by: NURSE PRACTITIONER

## 2024-01-31 PROCEDURE — 36415 COLL VENOUS BLD VENIPUNCTURE: CPT | Performed by: NURSE PRACTITIONER

## 2024-01-31 PROCEDURE — 74177 CT ABD & PELVIS W/CONTRAST: CPT

## 2024-01-31 PROCEDURE — 96361 HYDRATE IV INFUSION ADD-ON: CPT | Performed by: NURSE PRACTITIONER

## 2024-01-31 PROCEDURE — 87651 STREP A DNA AMP PROBE: CPT | Performed by: STUDENT IN AN ORGANIZED HEALTH CARE EDUCATION/TRAINING PROGRAM

## 2024-01-31 PROCEDURE — 85025 COMPLETE CBC W/AUTO DIFF WBC: CPT | Performed by: NURSE PRACTITIONER

## 2024-01-31 PROCEDURE — 258N000003 HC RX IP 258 OP 636: Performed by: NURSE PRACTITIONER

## 2024-01-31 PROCEDURE — 96375 TX/PRO/DX INJ NEW DRUG ADDON: CPT | Performed by: NURSE PRACTITIONER

## 2024-01-31 RX ORDER — ONDANSETRON 2 MG/ML
4 INJECTION INTRAMUSCULAR; INTRAVENOUS ONCE
Status: COMPLETED | OUTPATIENT
Start: 2024-01-31 | End: 2024-01-31

## 2024-01-31 RX ORDER — HYDROMORPHONE HYDROCHLORIDE 1 MG/ML
0.5 INJECTION, SOLUTION INTRAMUSCULAR; INTRAVENOUS; SUBCUTANEOUS ONCE
Status: COMPLETED | OUTPATIENT
Start: 2024-01-31 | End: 2024-01-31

## 2024-01-31 RX ORDER — IOPAMIDOL 755 MG/ML
500 INJECTION, SOLUTION INTRAVASCULAR ONCE
Status: COMPLETED | OUTPATIENT
Start: 2024-01-31 | End: 2024-01-31

## 2024-01-31 RX ADMIN — HYDROMORPHONE HYDROCHLORIDE 0.5 MG: 1 INJECTION, SOLUTION INTRAMUSCULAR; INTRAVENOUS; SUBCUTANEOUS at 22:14

## 2024-01-31 RX ADMIN — SODIUM CHLORIDE 60 ML: 9 INJECTION, SOLUTION INTRAVENOUS at 23:14

## 2024-01-31 RX ADMIN — IOPAMIDOL 89 ML: 755 INJECTION, SOLUTION INTRAVENOUS at 23:13

## 2024-01-31 RX ADMIN — ONDANSETRON 4 MG: 2 INJECTION INTRAMUSCULAR; INTRAVENOUS at 22:13

## 2024-01-31 RX ADMIN — SODIUM CHLORIDE 1000 ML: 9 INJECTION, SOLUTION INTRAVENOUS at 22:13

## 2024-01-31 ASSESSMENT — ACTIVITIES OF DAILY LIVING (ADL)
ADLS_ACUITY_SCORE: 37
ADLS_ACUITY_SCORE: 35

## 2024-02-01 ENCOUNTER — TELEPHONE (OUTPATIENT)
Dept: FAMILY MEDICINE | Facility: CLINIC | Age: 39
End: 2024-02-01
Payer: COMMERCIAL

## 2024-02-01 LAB — GROUP A STREP BY PCR: NOT DETECTED

## 2024-02-01 PROCEDURE — 96376 TX/PRO/DX INJ SAME DRUG ADON: CPT | Performed by: NURSE PRACTITIONER

## 2024-02-01 PROCEDURE — 250N000011 HC RX IP 250 OP 636: Performed by: NURSE PRACTITIONER

## 2024-02-01 PROCEDURE — 250N000012 HC RX MED GY IP 250 OP 636 PS 637: Performed by: NURSE PRACTITIONER

## 2024-02-01 RX ORDER — PREDNISONE 20 MG/1
TABLET ORAL
Qty: 10 TABLET | Refills: 0 | Status: SHIPPED | OUTPATIENT
Start: 2024-02-01

## 2024-02-01 RX ORDER — HYDROMORPHONE HYDROCHLORIDE 1 MG/ML
0.5 INJECTION, SOLUTION INTRAMUSCULAR; INTRAVENOUS; SUBCUTANEOUS ONCE
Status: COMPLETED | OUTPATIENT
Start: 2024-02-01 | End: 2024-02-01

## 2024-02-01 RX ORDER — PREDNISONE 20 MG/1
40 TABLET ORAL ONCE
Status: COMPLETED | OUTPATIENT
Start: 2024-02-01 | End: 2024-02-01

## 2024-02-01 RX ORDER — HYDROCODONE BITARTRATE AND ACETAMINOPHEN 5; 325 MG/1; MG/1
1 TABLET ORAL EVERY 6 HOURS PRN
Qty: 6 TABLET | Refills: 0 | Status: SHIPPED | OUTPATIENT
Start: 2024-02-01

## 2024-02-01 RX ADMIN — PREDNISONE 40 MG: 20 TABLET ORAL at 00:43

## 2024-02-01 RX ADMIN — HYDROMORPHONE HYDROCHLORIDE 0.5 MG: 1 INJECTION, SOLUTION INTRAMUSCULAR; INTRAVENOUS; SUBCUTANEOUS at 00:42

## 2024-02-01 NOTE — ED TRIAGE NOTES
Headache, sneezing, abd pain, and feeling warm onset today.      Triage Assessment (Adult)       Row Name 01/31/24 1940          Triage Assessment    Airway WDL WDL        Respiratory WDL    Respiratory WDL WDL        Cardiac WDL    Cardiac WDL WDL

## 2024-02-01 NOTE — DISCHARGE INSTRUCTIONS
Paxolovid as prescribed.  Prednisone as prescribed.  Continue to use stool softener and MiraLAX.  Tylenol 650 mg every 4 hours as needed for mild pain and headache.  OR- Norco 1 tablet every 6 hours as needed for moderate/severe pain.    Monitor your blood sugars closely, they will likely go up while you are on the prednisone.    Make an appointment with your primary care provider or gastroenterologist for recheck regarding her ulcerative colitis.      Return to the emergency department for worsening abdominal pain, vomiting, unable to keep fluids down, or any other symptoms of concern.

## 2024-02-01 NOTE — ED PROVIDER NOTES
History     Chief Complaint   Patient presents with    Headache    Abdominal Pain     HPI  Vickie Barnhart is a 38 year old female presents for evaluation of T1DM, diabetic retinopathy, ulcerative colitis, and hyperlipidemia who presents for evaluation of headache and congestion.  Started today.  Additionally she reports low abdominal pains and passing bright red blood per rectum with concern for her ulcerative colitis.  She has not had a bowel movement in several days (4-5 days) and feels that she is constipated.  She has been treating her symptoms with Colace and MiraLAX without relief.    Allergies:  No Known Allergies    Problem List:    Patient Active Problem List    Diagnosis Date Noted    Dupuytren's contracture of both hands 12/06/2022     Priority: Medium    ALYX (generalized anxiety disorder) 07/21/2021     Priority: Medium    Diabetic retinopathy of right eye associated with type 1 diabetes mellitus, macular edema presence unspecified, unspecified retinopathy severity (H) 11/23/2020     Priority: Medium    Major depressive disorder, recurrent episode, moderate (H) 10/04/2016     Priority: Medium    Ulcerative colitis without complications (H) 01/29/2016     Priority: Medium    Noncompliance with medication regimen 11/03/2015     Priority: Medium    Tobacco abuse 05/29/2014     Priority: Medium    Facial paralysis/[place] palsy 04/27/2012     Priority: Medium    Type 1 diabetes mellitus with hyperglycemia (H) 10/31/2010     Priority: Medium    HYPERLIPIDEMIA LDL GOAL <100 10/31/2010     Priority: Medium    ASCUS of cervix with negative high risk HPV 06/19/2008     Priority: Medium     6/19/08 ASCUS pap/neg HPV  2009 and 2012 both NIL paps  6/28/17 NIL Pap, Neg HR HPV.  EMB normal. Plan: cotest in 3 years.  12/6/22 ASCUS pap, neg HR HPV. Plan: cotest in 3 years.   12/14/22 [email or web address] result note sent.   1/25/23 left msg. Msg states that results have been released to [employer]        Sprain of back 03/10/2008      Priority: Medium     Problem list name updated by automated process. Provider to review          Past Medical History:    Past Medical History:   Diagnosis Date    Adjustment disorder with anxious mood 11/23/2015    Anxiety 11/27/2015    ASCUS of cervix with negative high risk HPV 06/19/2008    Depressive disorder, not elsewhere classified     Diabetic eye exam (H) 12/19/14    Hx of tubal ligation 1/15/2021    Mixed hyperlipidemia 11/30/2006    Regional enteritis of unspecified site     Type I (juvenile type) diabetes mellitus with ketoacidosis, not stated as uncontrolled(250.11) 01/01/2007    Type I (juvenile type) diabetes mellitus with ketoacidosis, uncontrolled 02/14/08    Type I (juvenile type) diabetes mellitus without mention of complication, not stated as uncontrolled     Ulcerative colitis, unspecified        Past Surgical History:    Past Surgical History:   Procedure Laterality Date    COLONOSCOPY N/A 7/25/2023    Procedure: Colonoscopy;  Surgeon: Jude Nix MD;  Location:  GI    DILATION AND CURETTAGE SUCTION  04/2010    miscarriage    RETINAL DETACHMENT SURGERY Left     TUBAL LIGATION      ZZ COLONOSCOPY W BIOPSY  08/16/2006    ZZ COLONOSCOPY W/WO BRUSH/WASH         Family History:    Family History   Problem Relation Age of Onset    Hypertension Father     Diabetes Maternal Grandmother     Cancer Maternal Grandmother     Diabetes Paternal Grandfather     Diabetes Maternal Uncle     Diabetes Maternal Aunt        Social History:  Marital Status:  Single [1]  Social History     Tobacco Use    Smoking status: Some Days     Packs/day: 0.50     Years: 16.00     Additional pack years: 0.00     Total pack years: 8.00     Types: Cigarettes    Smokeless tobacco: Never    Tobacco comments:     working on quiting, somedays up to 4 cigs per day   Vaping Use    Vaping Use: Never used   Substance Use Topics    Alcohol use: Yes     Alcohol/week: 0.0 standard drinks of alcohol     Comment: occassionally     Drug use: No        Medications:    HYDROcodone-acetaminophen (NORCO) 5-325 MG tablet  nirmatrelvir and ritonavir (PAXLOVID) 300 mg/100 mg therapy pack  predniSONE (DELTASONE) 20 MG tablet  acetaminophen (TYLENOL) 500 MG tablet  bisacodyl (DULCOLAX) 5 MG EC tablet  Continuous Blood Gluc Sensor (DEXCOM G6 SENSOR) MISC  Continuous Blood Gluc Transmit (DEXCOM G6 TRANSMITTER) MISC  FLUoxetine (PROZAC) 20 MG capsule  fluticasone (FLONASE) 50 MCG/ACT nasal spray  gabapentin (NEURONTIN) 300 MG capsule  Glucagon, rDNA, (GLUCAGON EMERGENCY) 1 MG KIT  insulin aspart (NOVOLOG FLEXPEN) 100 UNIT/ML pen  insulin aspart (NOVOLOG VIAL) 100 UNITS/ML vial  insulin glargine (LANTUS SOLOSTAR) 100 UNIT/ML pen  Insulin Infusion Pump Supplies (T:SLIM T:LOCK INSULIN CART 3ML) MISC  insulin pen needle (BD TARA U/F) 32G X 4 MM miscellaneous  levonorgestrel (MIRENA) 20 MCG/24HR IUD  Naproxen Sodium (ALEVE PO)  NOVOLOG FLEXPEN 100 UNIT/ML soln  polyethylene glycol (GOLYTELY) 236 g suspension          Review of Systems  As mentioned above in the history present illness. All other systems were reviewed and are negative.    Physical Exam   BP: (!) 146/91  Pulse: 88  Temp: 98.2  F (36.8  C)  Resp: 18  SpO2: 99 %      Physical Exam  Constitutional:       General: She is in acute distress.      Appearance: Normal appearance. She is not ill-appearing.   HENT:      Head: Normocephalic and atraumatic.      Right Ear: External ear normal.      Left Ear: External ear normal.      Nose: Congestion present.      Mouth/Throat:      Mouth: Mucous membranes are moist.   Eyes:      Conjunctiva/sclera: Conjunctivae normal.   Cardiovascular:      Rate and Rhythm: Normal rate and regular rhythm.      Heart sounds: Normal heart sounds. No murmur heard.  Pulmonary:      Effort: Pulmonary effort is normal. No respiratory distress.      Breath sounds: Normal breath sounds.   Abdominal:      General: Bowel sounds are normal. There is no distension.      Palpations:  Abdomen is soft.      Tenderness: There is abdominal tenderness in the suprapubic area.   Musculoskeletal:         General: Normal range of motion.   Skin:     General: Skin is warm and dry.      Findings: No rash.   Neurological:      General: No focal deficit present.      Mental Status: She is alert and oriented to person, place, and time.         ED Course              ED Course as of 02/01/24 0050   Thu Feb 01, 2024   0020 I consulted with general surgeon, Dr. Gong regarding the CT results. Felt the functional obstruction is likely from the inflammation in her sigmoid colon from her ulcerative colitis. Recommends finding out how her ulcerative colitis is typically treated which will then help with her ability to pass stool.    0041 At bedside.  I reviewed the imaging results with patient.      Procedures              Results for orders placed or performed during the hospital encounter of 01/31/24 (from the past 24 hour(s))   Symptomatic Influenza A/B, RSV, & SARS-CoV2 PCR (COVID-19) Nasopharyngeal    Specimen: Nasopharyngeal; Swab   Result Value Ref Range    Influenza A PCR Negative Negative    Influenza B PCR Negative Negative    RSV PCR Negative Negative    SARS CoV2 PCR Positive (A) Negative    Narrative    Testing was performed using the Xpert Xpress CoV2/Flu/RSV Assay on the [employer] GeneXpert Instrument. This test should be ordered for the detection of SARS-CoV-2, influenza, and RSV viruses in individuals who meet clinical and/or epidemiological criteria. Test performance is unknown in asymptomatic patients. This test is for in vitro diagnostic use under the FDA EUA for laboratories certified under CLIA to perform high or moderate complexity testing. This test has not been FDA cleared or approved. A negative result does not rule out the presence of PCR inhibitors in the specimen or target RNA in concentration below the limit of detection for the assay. If only one viral target is positive but  coinfection with multiple targets is suspected, the sample should be re-tested with another FDA cleared, approved, or authorized test, if coinfection would change clinical management. This test was validated by the [facility]. These laboratories are certified under the Clinical Laboratory Improvement Amendments of 1988 (CLIA-88) as qualified to perform high complexity laboratory testing.   Streptococcus A Rapid Screen w/Reflex to PCR    Specimen: Throat; Swab   Result Value Ref Range    Group A Strep antigen Negative Negative   CBC with platelets differential    Narrative    The following orders were created for panel order CBC with platelets differential.  Procedure                               Abnormality         Status                     ---------                               -----------         ------                     CBC with platelets and d...[[id number]]                      Final result                 Please view results for these tests on the individual orders.   Comprehensive metabolic panel   Result Value Ref Range    Sodium 139 135 - 145 mmol/L    Potassium 3.7 3.4 - 5.3 mmol/L    Carbon Dioxide (CO2) 25 22 - 29 mmol/L    Anion Gap 10 7 - 15 mmol/L    Urea Nitrogen 11.0 6.0 - 20.0 mg/dL    Creatinine 1.13 (H) 0.51 - 0.95 mg/dL    GFR Estimate 63 >60 mL/min/1.73m2    Calcium 8.5 (L) 8.6 - 10.0 mg/dL    Chloride 104 98 - 107 mmol/L    Glucose 142 (H) 70 - 99 mg/dL    Alkaline Phosphatase 104 40 - 150 U/L    AST 15 0 - 45 U/L    ALT 11 0 - 50 U/L    Protein Total 6.8 6.4 - 8.3 g/dL    Albumin 4.1 3.5 - 5.2 g/dL    Bilirubin Total <0.2 <=1.2 mg/dL   CBC with platelets and differential   Result Value Ref Range    WBC Count 8.9 4.0 - 11.0 10e3/uL    RBC Count 4.37 3.80 - 5.20 10e6/uL    Hemoglobin 12.9 11.7 - 15.7 g/dL    Hematocrit 37.5 35.0 - 47.0 %    MCV 86 78 - 100 fL    MCH 29.5 26.5 - 33.0 pg    MCHC 34.4 31.5 - 36.5 g/dL    RDW 12.5 10.0 - 15.0 %    Platelet Count 282 150 -  450 10e3/uL    % Neutrophils 62 %    % Lymphocytes 27 %    % Monocytes 11 %    % Eosinophils 0 %    % Basophils 0 %    % Immature Granulocytes 0 %    NRBCs per 100 WBC 0 <1 /100    Absolute Neutrophils 5.4 1.6 - 8.3 10e3/uL    Absolute Lymphocytes 2.4 0.8 - 5.3 10e3/uL    Absolute Monocytes 1.0 0.0 - 1.3 10e3/uL    Absolute Eosinophils 0.0 0.0 - 0.7 10e3/uL    Absolute Basophils 0.0 0.0 - 0.2 10e3/uL    Absolute Immature Granulocytes 0.0 <=0.4 10e3/uL    Absolute NRBCs 0.0 10e3/uL   CT Abdomen Pelvis w Contrast    Narrative    EXAM: CT ABDOMEN PELVIS W CONTRAST  LOCATION: [facility]  DATE: 1/31/2024    INDICATION: Low abdominal pain. Bloody stool.  COMPARISON: 02/14/2023.  TECHNIQUE: CT scan of the abdomen and pelvis was performed following injection of IV contrast. Multiplanar reformats were obtained. Dose reduction techniques were used.  CONTRAST: 89 mL Isovue 370.    FINDINGS:   LOWER CHEST: Normal.    HEPATOBILIARY: Normal.    PANCREAS: Normal.    SPLEEN: Normal.    ADRENAL GLANDS: Normal.    KIDNEYS/BLADDER: Normal.    BOWEL: Sigmoid colon is quite redundant. There is wall thickening involving the sigmoid colon which is quite pronounced inferiorly extending into the rectum where there is mucosal hyperenhancement also seen in the involved segment compatible with   segmental colitis. The more proximal colon is mildly distended suggesting some functional obstruction.    LYMPH NODES: Normal.    VASCULATURE: Unremarkable.    PELVIC ORGANS: IUD in appropriate location.    MUSCULOSKELETAL: Normal.      Impression    IMPRESSION:   1.  Wall thickening sigmoid colon and rectum with mucosal hyperenhancement compatible with segmental colitis. There is some upstream colonic dilatation suggesting an element of functional obstruction.    2.  No appendicitis.    3.  The sigmoid colon is quite redundant. No evidence for volvulus on today's exam.       Medications   sodium chloride 0.9% BOLUS  1,000 mL (0 mLs Intravenous Stopped 2/1/24 0042)   ondansetron (ZOFRAN) injection 4 mg (4 mg Intravenous $Given 1/31/24 2213)   HYDROmorphone (PF) (DILAUDID) injection 0.5 mg (0.5 mg Intravenous $Given 1/31/24 2214)   iopamidol (ISOVUE-370) solution 500 mL (89 mLs Intravenous $Given 1/31/24 2313)   new 100 ml saline bag (60 mLs Intravenous $Given 1/31/24 2314)   HYDROmorphone (PF) (DILAUDID) injection 0.5 mg (0.5 mg Intravenous $Given 2/1/24 0042)   predniSONE (DELTASONE) tablet 40 mg (40 mg Oral $Given 2/1/24 0043)       Assessments & Plan (with Medical Decision Making)     38 year old female with headache and congestion for that started today. She is positive for Covid-19 infection. No hypoxia. Lung sounds CTA. She also reports some ongoing low abdominal pains and passing some bright red blood per rectum. She has felt constipated for the last few days without relief with colace or MiraLAX.  She has a history of ulcerative colitis and is concerned for flare.     No leukocytosis.  Normal hemoglobin.  Creatinine mildly elevated 1.13.  Glucose 142 in the setting of T1DM.  Abdominal/pelvis CT reveals wall thickening of the sigmoid colon and rectum consistent with segmental colitis.  There is upstream colonic dilation suggesting an element of functional obstruction.  I consulted with the on-call surgeon regarding her CT findings who suggest that her functional obstruction is likely related to the inflammation of her sigmoid colon due to her ulcerative colitis.  He recommends finding out how her ulcerative colitis is typically treated.  Treatment of the inflammation from her ulcerative colitis will likely help her to be able to pass stool.    I discussed the above with patient.  Patient tells me it has been a while since she has not needed any treatment for ulcerative colitis but in the past she has been given prednisone.  She has not had follow-up with gastroenterology recently.  She did have colonoscopy done 7/25/2023  that had poor preparation of the colon which was full of stool but was otherwise unremarkable.      Regarding her COVID-19 infection, she does meet criteria for treatment with Paxlovid given her risk factors.  I have sent a prescription to her pharmacy.  Patient was given a dose of prednisone 40 mg p.o. here for ulcerative colitis treatment.  I sent prescription for an additional 5-day course of 40 mg daily.  I recommend she have close follow-up with her PCP or gastroenterology for recheck.  Patient requested stronger pain medication for her headaches related to COVID and I have provided her with a prescription for small number of Norco.  Patient instructed have a low threshold for returning if she develops worsening pain, vomiting, or any other symptoms of concern.        Discharge Medication List as of 2/1/2024 12:43 AM        START taking these medications    Details   HYDROcodone-acetaminophen (NORCO) 5-325 MG tablet Take 1 tablet by mouth every 6 hours as needed for severe pain, Disp-6 tablet, R-0, InstyMeds      nirmatrelvir and ritonavir (PAXLOVID) 300 mg/100 mg therapy pack Take 3 tablets by mouth 2 times daily for 5 days, Disp-30 tablet, R-0, E-PrescribeDate of symptom onset: 1/31/2024 ; Risk criteria met: Yes; Weight >40 kg No; Renal fxn: normal;  Drug-Drug interactions reviewed & addressed: Yes      predniSONE (DELTASONE) 20 MG tablet Take two tablets (= 40mg) each day for 5 (five) days, Disp-10 tablet, R-0, E-Prescribe             Final diagnoses:   COVID-19 virus infection   Ulcerative rectosigmoiditis without complication (H)       1/31/2024   [facility] EMERGENCY DEPT       Karen, RAJESH Hamlin CNP  02/01/24 0050

## 2024-02-01 NOTE — TELEPHONE ENCOUNTER
Call from patient.   Has appointment already scheduled for 2/2/24 for bilateral shoulder pain.  She was seen in the ED yesterday for different symptoms and found to be covid positive.     Patient is wondering if she can still come in for her appointment tomorrow. Explained to patient that we are seeing covid positive patients in clinic. Offered to triage symptoms and possibly reschedule appointment but patient declined; she really would prefer to be seen tomorrow.     Did instruct patient we ask she mask when in clinic and patient verbalized understanding of that.     Sonali BEEN, RN  [facility]

## 2024-02-02 ENCOUNTER — OFFICE VISIT (OUTPATIENT)
Dept: FAMILY MEDICINE | Facility: OTHER | Age: 39
End: 2024-02-02
Payer: COMMERCIAL

## 2024-02-02 VITALS
RESPIRATION RATE: 16 BRPM | TEMPERATURE: 98 F | WEIGHT: 185.5 LBS | SYSTOLIC BLOOD PRESSURE: 124 MMHG | BODY MASS INDEX: 30.87 KG/M2 | DIASTOLIC BLOOD PRESSURE: 82 MMHG | OXYGEN SATURATION: 98 % | HEART RATE: 77 BPM

## 2024-02-02 DIAGNOSIS — G89.29 CHRONIC PAIN OF BOTH SHOULDERS: Primary | ICD-10-CM

## 2024-02-02 DIAGNOSIS — F33.1 MAJOR DEPRESSIVE DISORDER, RECURRENT EPISODE, MODERATE (H): ICD-10-CM

## 2024-02-02 DIAGNOSIS — K51.80 OTHER ULCERATIVE COLITIS WITHOUT COMPLICATION (H): ICD-10-CM

## 2024-02-02 DIAGNOSIS — M25.511 CHRONIC PAIN OF BOTH SHOULDERS: Primary | ICD-10-CM

## 2024-02-02 DIAGNOSIS — U07.1 INFECTION DUE TO 2019 NOVEL CORONAVIRUS: ICD-10-CM

## 2024-02-02 DIAGNOSIS — M79.601 BILATERAL ARM PAIN: ICD-10-CM

## 2024-02-02 DIAGNOSIS — E10.319: ICD-10-CM

## 2024-02-02 DIAGNOSIS — M79.602 BILATERAL ARM PAIN: ICD-10-CM

## 2024-02-02 DIAGNOSIS — M25.512 CHRONIC PAIN OF BOTH SHOULDERS: Primary | ICD-10-CM

## 2024-02-02 LAB
ALBUMIN SERPL BCG-MCNC: 4 G/DL (ref 3.5–5.2)
ALP SERPL-CCNC: 104 U/L (ref 40–150)
ALT SERPL W P-5'-P-CCNC: 11 U/L (ref 0–50)
ANION GAP SERPL CALCULATED.3IONS-SCNC: 12 MMOL/L (ref 7–15)
AST SERPL W P-5'-P-CCNC: 13 U/L (ref 0–45)
BILIRUB SERPL-MCNC: <0.2 MG/DL
BUN SERPL-MCNC: 8.2 MG/DL (ref 6–20)
CALCIUM SERPL-MCNC: 8.9 MG/DL (ref 8.6–10)
CHLORIDE SERPL-SCNC: 103 MMOL/L (ref 98–107)
CREAT SERPL-MCNC: 1.04 MG/DL (ref 0.51–0.95)
CRP SERPL-MCNC: 15.66 MG/L
DEPRECATED HCO3 PLAS-SCNC: 26 MMOL/L (ref 22–29)
EGFRCR SERPLBLD CKD-EPI 2021: 69 ML/MIN/1.73M2
GLUCOSE SERPL-MCNC: 153 MG/DL (ref 70–99)
HBA1C MFR BLD: 6.7 %
POTASSIUM SERPL-SCNC: 3.6 MMOL/L (ref 3.4–5.3)
PROT SERPL-MCNC: 6.8 G/DL (ref 6.4–8.3)
SODIUM SERPL-SCNC: 141 MMOL/L (ref 135–145)

## 2024-02-02 PROCEDURE — 80053 COMPREHEN METABOLIC PANEL: CPT | Performed by: PHYSICIAN ASSISTANT

## 2024-02-02 PROCEDURE — 86140 C-REACTIVE PROTEIN: CPT | Performed by: PHYSICIAN ASSISTANT

## 2024-02-02 PROCEDURE — 99214 OFFICE O/P EST MOD 30 MIN: CPT | Performed by: PHYSICIAN ASSISTANT

## 2024-02-02 PROCEDURE — 36415 COLL VENOUS BLD VENIPUNCTURE: CPT | Performed by: PHYSICIAN ASSISTANT

## 2024-02-02 ASSESSMENT — PATIENT HEALTH QUESTIONNAIRE - PHQ9
SUM OF ALL RESPONSES TO PHQ QUESTIONS 1-9: 6
SUM OF ALL RESPONSES TO PHQ QUESTIONS 1-9: 6
10. IF YOU CHECKED OFF ANY PROBLEMS, HOW DIFFICULT HAVE THESE PROBLEMS MADE IT FOR YOU TO DO YOUR WORK, TAKE CARE OF THINGS AT HOME, OR GET ALONG WITH OTHER PEOPLE: NOT DIFFICULT AT ALL

## 2024-02-02 ASSESSMENT — PAIN SCALES - GENERAL: PAINLEVEL: NO PAIN (0)

## 2024-02-02 ASSESSMENT — ANXIETY QUESTIONNAIRES
1. FEELING NERVOUS, ANXIOUS, OR ON EDGE: MORE THAN HALF THE DAYS
IF YOU CHECKED OFF ANY PROBLEMS ON THIS QUESTIONNAIRE, HOW DIFFICULT HAVE THESE PROBLEMS MADE IT FOR YOU TO DO YOUR WORK, TAKE CARE OF THINGS AT HOME, OR GET ALONG WITH OTHER PEOPLE: SOMEWHAT DIFFICULT
7. FEELING AFRAID AS IF SOMETHING AWFUL MIGHT HAPPEN: NOT AT ALL
7. FEELING AFRAID AS IF SOMETHING AWFUL MIGHT HAPPEN: NOT AT ALL
4. TROUBLE RELAXING: SEVERAL DAYS
GAD7 TOTAL SCORE: 6
2. NOT BEING ABLE TO STOP OR CONTROL WORRYING: NOT AT ALL
3. WORRYING TOO MUCH ABOUT DIFFERENT THINGS: NOT AT ALL
8. IF YOU CHECKED OFF ANY PROBLEMS, HOW DIFFICULT HAVE THESE MADE IT FOR YOU TO DO YOUR WORK, TAKE CARE OF THINGS AT HOME, OR GET ALONG WITH OTHER PEOPLE?: SOMEWHAT DIFFICULT
GAD7 TOTAL SCORE: 6
6. BECOMING EASILY ANNOYED OR IRRITABLE: MORE THAN HALF THE DAYS
5. BEING SO RESTLESS THAT IT IS HARD TO SIT STILL: SEVERAL DAYS
GAD7 TOTAL SCORE: 6

## 2024-02-02 NOTE — PROGRESS NOTES
Assessment & Plan     Chronic pain of both shoulders  Bilateral arm pain  This appears to be muscular in nature.  Advised that she see physical therapy.  Advised that narcotics are not indicated for this type of pain.  She is currently on steroids for COVID-19 and this may be helpful as well.  Advised that follow-up should happen in the next 3 to 4 weeks if she fails physical therapy to consider evaluation of the cervical spine.  Since this seems to have resulted from lifting some heavy totes at home in her garage while cleaning overuse is suspected.  Work hardening may be wise.  - Physical Therapy Referral; Future  - Comprehensive metabolic panel (BMP + Alb, Alk Phos, ALT, AST, Total. Bili, TP); Future  - CRP, inflammation; Future  - Comprehensive metabolic panel (BMP + Alb, Alk Phos, ALT, AST, Total. Bili, TP)  - CRP, inflammation    Other ulcerative colitis without complication (H)  Historically noted.  Recent flare of this and she has not seen a gastroenterology in some time.  Do recommend that she proceed with that.  Follow-up with me as needed.  - Adult GI  Referral - Consult Only; Future    Infection due to 2019 novel coronavirus  Historically noted on day 2 of this.  Advised that she wear a mask whenever she is out in public over the next 5 days and follow-up as needed.    Diabetic retinopathy of right eye associated with type 1 diabetes mellitus, macular edema presence unspecified, unspecified retinopathy severity (H)  Will recheck her hemoglobin A1c.  The last couple of values have been very reassuring.  Follow-up with her primary care provider in the near future as recommended.    Major depressive disorder, recurrent episode, moderate (H)  No new concerns with respect to this.  She declines any further evaluation or treatment options.    Nicotine/Tobacco Cessation  She reports that she has been smoking cigarettes. She has a 8 pack-year smoking history. She has never used smokeless  "tobacco.  Nicotine/Tobacco Cessation Plan  Information offered: Patient not interested at this time      BMI  Estimated body mass index is 30.87 kg/m  as calculated from the following:    Height as of 5/12/23: 1.651 m (5' 5\").    Weight as of this encounter: 84.1 kg (185 lb 8 oz).   Weight management plan: Patient was referred to their PCP to discuss a diet and exercise plan.    Rhonda Johnston is a 38 year old, presenting for the following health issues:  covid positive      2/2/2024     8:00 AM   Additional Questions   Roomed by Nelda   Accompanied by      History of Present Illness       Reason for visit:  Inury to my upper arms/stmach issues  Symptom onset:  More than a month  Symptom intensity:  Moderate  Symptom progression:  Worsening  Had these symptoms before:  No    She eats 0-1 servings of fruits and vegetables daily.She consumes 4 sweetened beverage(s) daily.She exercises with enough effort to increase her heart rate 10 to 19 minutes per day.  She exercises with enough effort to increase her heart rate 3 or less days per week.   She is taking medications regularly.     ROS of systems including Constitutional, Eyes, Respiratory, Cardiovascular, Gastroenterology, Genitourinary, Integumentary, Musculoskeletal, Psychiatric were all negative except for pertinent positives noted in my HPI.        Objective    /82   Pulse 77   Temp 98  F (36.7  C) (Temporal)   Resp 16   Wt 84.1 kg (185 lb 8 oz)   SpO2 98%   BMI 30.87 kg/m    Body mass index is 30.87 kg/m .  Physical Exam   Manic is 1 to make sure it was not something that you rubbed up against    No results found for this or any previous visit (from the past 24 hour(s)).        Signed Electronically by: German Allen PA-C    "

## 2024-02-20 ENCOUNTER — THERAPY VISIT (OUTPATIENT)
Dept: PHYSICAL THERAPY | Facility: CLINIC | Age: 39
End: 2024-02-20
Attending: PHYSICIAN ASSISTANT
Payer: COMMERCIAL

## 2024-02-20 DIAGNOSIS — M79.602 BILATERAL ARM PAIN: ICD-10-CM

## 2024-02-20 DIAGNOSIS — M25.512 CHRONIC PAIN OF BOTH SHOULDERS: ICD-10-CM

## 2024-02-20 DIAGNOSIS — G89.29 CHRONIC PAIN OF BOTH SHOULDERS: ICD-10-CM

## 2024-02-20 DIAGNOSIS — M25.511 CHRONIC PAIN OF BOTH SHOULDERS: ICD-10-CM

## 2024-02-20 DIAGNOSIS — M79.601 BILATERAL ARM PAIN: ICD-10-CM

## 2024-02-20 PROCEDURE — 97110 THERAPEUTIC EXERCISES: CPT | Mod: GP | Performed by: PHYSICAL THERAPIST

## 2024-02-20 PROCEDURE — 97161 PT EVAL LOW COMPLEX 20 MIN: CPT | Mod: GP | Performed by: PHYSICAL THERAPIST

## 2024-02-20 NOTE — PROGRESS NOTES
PHYSICAL THERAPY EVALUATION  Type of Visit: Evaluation    See electronic medical record for Abuse and Falls Screening details.    Subjective       Presenting condition or subjective complaint: Shoulders and upper arms  Pt is a 39 yo female who presents to PT with c/o pain in B shoulders since May of 2024 when she was in the garage putting away boxes. She heard a pop and notes that it hurt, but continued to finish cleaning. Pt pain has progressively gotten worse and impacts her ability to use her arms for reaching in the home, performing ADLs such as putting on her bra, and negatively impacts her sleeping.  Date of onset: 05/05/24    Relevant medical history: Bladder or bowel problems; Diabetes; Vision problems   Dates & types of surgery: Tube tied 4/19/13; eye surgery 1/21, 6/21, 1/23, 6/23    Prior diagnostic imaging/testing results:       Prior therapy history for the same diagnosis, illness or injury: No      Prior Level of Function  Transfers:   Ambulation:   ADL:   IADL:     Living Environment  Social support: With a significant other or spouse   Type of home: House   Stairs to enter the home: Yes       Ramp: No   Stairs inside the home: Yes       Help at home: None  Equipment owned:       Employment: Yes    Hobbies/Interests:      Patient goals for therapy: Not able to have an arm above head or lay/sleep on sides    Pain assessment: Pain present     Objective   SHOULDER EVALUATION  PAIN: Pain Level at Rest: 3/10  Pain Level with Use: 10/10, in tears  INTEGUMENTARY (edema, incisions):   POSTURE:  slight cervical protraction, fwd rolled shoulders  GAIT:   Weightbearing Status:   Assistive Device(s):   Gait Deviations:   BALANCE/PROPRIOCEPTION:   WEIGHTBEARING ALIGNMENT:   ROM:   (Degrees) Left AROM Left PROM Right AROM  Right PROM   Shoulder Flexion 135 deg, 3/10  125 deg, 5/10    Shoulder Extension       Shoulder Abduction 120 deg, 4/10  136 deg, 2/10    Shoulder Adduction       Shoulder Internal Rotation T12   L1    Shoulder External Rotation       Shoulder Horizontal Abduction       Shoulder Horizontal Adduction       Shoulder Flexion ER       Shoulder Flexion IR       Elbow Extension       Elbow Flexion       Pain:   End feel:     STRENGTH:   FLEXIBILITY:   SPECIAL TESTS:    Left Right   Impingement     Neer's Negative  Positive   Hawkin's-Gerhard Positive Positive   Coracoid Impingement     Internal impingement     Labral     Anterior Slide     Calvert's Negative  Negative    Crank     Instability     Apprehension (anterior)     Relocation (anterior)     Anterior Load & Shift     Posterior Load & Shift     Posterior instability (with 90 degrees flex)     Multi-Directional Instability      Sulcus     Biceps      Speed's Negative  Positive   Rotator Cuff Tear     Drop Arm     Belly Press     Lift off        PALPATION:   JOINT MOBILITY:   CERVICAL SCREEN:     Assessment & Plan   CLINICAL IMPRESSIONS  Medical Diagnosis: Chronic pain of both shoulders (M25.511, G89.29, M25.512)    Treatment Diagnosis: B shoulder pain, reduced ROM   Impression/Assessment:  Pt presents to PT with pain in B shoulders, R>L, impaired AROM 2/2 pain, and reduced strength in B shoulders. Pt would benefit from skilled PT interventions in order to address her pain, impaired ROM and strength, and instruction in HEP to address all deficits to allow her to return to PLOF without pain.    Clinical Decision Making (Complexity):  Clinical Presentation: Stable/Uncomplicated  Clinical Presentation Rationale: based on medical and personal factors listed in PT evaluation  Clinical Decision Making (Complexity): Low complexity    PLAN OF CARE  Treatment Interventions:  Interventions: Manual Therapy, Neuromuscular Re-education, Therapeutic Activity, Therapeutic Exercise    Long Term Goals     PT Goal 1  Goal Identifier: HEP  Goal Description: Pt will demo independence in performance and progression of strengthening and balance HEP in order to improve  ROGER.  Target Date:  (Ongoing, updates as needed)  PT Goal 2  Goal Identifier: SPADI  Goal Description: Pt will demo improved shoulder pain ratings and function as shown by decreased SPADI score of at least 8 points in order to show significant improvement and greater return to previous function.  Goal Progress: 47/130 (eval)  Target Date: 04/16/24  PT Goal 3  Goal Identifier: Pain  Goal Description: Pt will demo improved pain ratings with overhead reaching no greater than 5/10 in order to allow for improved independent performance of home and work tasks as previous.  Goal Progress: Pt rating pain up to 10/10 with overhead reaching (eval)  Target Date: 04/16/24      Frequency of Treatment: 1x/week  Duration of Treatment: 8 weeks    Recommended Referrals to Other Professionals:   Education Assessment:   Learner/Method: Patient;Listening;Demonstration    Risks and benefits of evaluation/treatment have been explained.   Patient/Family/caregiver agrees with Plan of Care.     Evaluation Time:     PT Sherrie, Low Complexity Minutes (85168): 15       Signing Clinician: Cj Charlton PT

## 2024-02-29 NOTE — TELEPHONE ENCOUNTER
Insulin-Treated Diabetes Mellitus Report completed and signed by Dr. Milo Rhoades.    Faxed to [place] [facility] [address] and [employer] [phone].    Original mailed to patient. Copy sent down to scanning.     Mary Kate Sanchez, [facility]  Adult Endocrinology  [facility]       Medical Necessity Information: It is in your best interest to select a reason for this procedure from the list below. All of these items fulfill various CMS LCD requirements except the new and changing color options. Include Z78.9 (Other Specified Conditions Influencing Health Status) As An Associated Diagnosis?: No Medical Necessity Clause: This procedure was medically necessary because the lesion that was treated was: Detail Level: Detailed Size Of Lesion In Cm: 1 X Size Of Lesion In Cm (Optional): 0 Anesthesia Type: 1% lidocaine with epinephrine Hemostasis: Electrocautery Wound Care: Petrolatum Consent was obtained from the patient. The risks and benefits to therapy were discussed in detail. Specifically, the risks of infection, scarring, bleeding, prolonged wound healing, incomplete removal, allergy to anesthesia, nerve injury and recurrence were addressed. Prior to the procedure, the treatment site was clearly identified and confirmed by the patient. All components of Universal Protocol/PAUSE Rule completed. Post-Care Instructions: I reviewed with the patient in detail post-care instructions. Patient is to keep the biopsy site dry overnight, and then apply bacitracin twice daily until healed. Patient may apply hydrogen peroxide soaks to remove any crusting.

## 2024-03-04 ENCOUNTER — THERAPY VISIT (OUTPATIENT)
Dept: PHYSICAL THERAPY | Facility: CLINIC | Age: 39
End: 2024-03-04
Attending: PHYSICIAN ASSISTANT
Payer: COMMERCIAL

## 2024-03-04 DIAGNOSIS — G89.29 CHRONIC PAIN OF BOTH SHOULDERS: Primary | ICD-10-CM

## 2024-03-04 DIAGNOSIS — M25.511 CHRONIC PAIN OF BOTH SHOULDERS: Primary | ICD-10-CM

## 2024-03-04 DIAGNOSIS — M25.512 CHRONIC PAIN OF BOTH SHOULDERS: Primary | ICD-10-CM

## 2024-03-04 PROCEDURE — 97140 MANUAL THERAPY 1/> REGIONS: CPT | Mod: GP | Performed by: PHYSICAL THERAPIST

## 2024-03-04 PROCEDURE — 97110 THERAPEUTIC EXERCISES: CPT | Mod: GP | Performed by: PHYSICAL THERAPIST

## 2024-03-16 ENCOUNTER — HEALTH MAINTENANCE LETTER (OUTPATIENT)
Age: 39
End: 2024-03-16

## 2024-03-29 ENCOUNTER — TRANSFERRED RECORDS (OUTPATIENT)
Dept: HEALTH INFORMATION MANAGEMENT | Facility: CLINIC | Age: 39
End: 2024-03-29
Payer: COMMERCIAL

## 2024-03-29 LAB — RETINOPATHY: POSITIVE

## 2024-04-04 ENCOUNTER — HOSPITAL ENCOUNTER (EMERGENCY)
Facility: CLINIC | Age: 39
Discharge: HOME | End: 2024-04-04
Attending: PHYSICIAN ASSISTANT | Admitting: PHYSICIAN ASSISTANT
Payer: COMMERCIAL

## 2024-04-04 ENCOUNTER — APPOINTMENT (OUTPATIENT)
Dept: CT IMAGING | Facility: CLINIC | Age: 39
End: 2024-04-04
Attending: PHYSICIAN ASSISTANT
Payer: COMMERCIAL

## 2024-04-04 ENCOUNTER — MYC MEDICAL ADVICE (OUTPATIENT)
Dept: FAMILY MEDICINE | Facility: CLINIC | Age: 39
End: 2024-04-04
Payer: COMMERCIAL

## 2024-04-04 ENCOUNTER — NURSE TRIAGE (OUTPATIENT)
Dept: FAMILY MEDICINE | Facility: CLINIC | Age: 39
End: 2024-04-04
Payer: COMMERCIAL

## 2024-04-04 VITALS
OXYGEN SATURATION: 97 % | RESPIRATION RATE: 18 BRPM | DIASTOLIC BLOOD PRESSURE: 78 MMHG | TEMPERATURE: 100.1 F | BODY MASS INDEX: 30.29 KG/M2 | HEART RATE: 109 BPM | SYSTOLIC BLOOD PRESSURE: 143 MMHG | WEIGHT: 182 LBS

## 2024-04-04 DIAGNOSIS — R93.5 ABNORMAL CT OF THE ABDOMEN: ICD-10-CM

## 2024-04-04 DIAGNOSIS — R10.84 GENERALIZED ABDOMINAL PAIN: ICD-10-CM

## 2024-04-04 DIAGNOSIS — K51.90 ULCERATIVE COLITIS (H): ICD-10-CM

## 2024-04-04 DIAGNOSIS — K62.5 RECTAL BLEEDING: ICD-10-CM

## 2024-04-04 LAB
ALBUMIN SERPL BCG-MCNC: 3.9 G/DL (ref 3.5–5.2)
ALBUMIN UR-MCNC: NEGATIVE MG/DL
ALP SERPL-CCNC: 114 U/L (ref 40–150)
ALT SERPL W P-5'-P-CCNC: 23 U/L (ref 0–50)
ANION GAP SERPL CALCULATED.3IONS-SCNC: 15 MMOL/L (ref 7–15)
APPEARANCE UR: CLEAR
AST SERPL W P-5'-P-CCNC: 18 U/L (ref 0–45)
BACTERIA #/AREA URNS HPF: ABNORMAL /HPF
BASOPHILS # BLD AUTO: 0 10E3/UL (ref 0–0.2)
BASOPHILS NFR BLD AUTO: 0 %
BILIRUB SERPL-MCNC: 0.4 MG/DL
BILIRUB UR QL STRIP: NEGATIVE
BUN SERPL-MCNC: 5.9 MG/DL (ref 6–20)
CALCIUM SERPL-MCNC: 8.7 MG/DL (ref 8.6–10)
CHLORIDE SERPL-SCNC: 103 MMOL/L (ref 98–107)
COLOR UR AUTO: YELLOW
CREAT SERPL-MCNC: 1.06 MG/DL (ref 0.51–0.95)
CRP SERPL-MCNC: 21.26 MG/L
DEPRECATED HCO3 PLAS-SCNC: 20 MMOL/L (ref 22–29)
EGFRCR SERPLBLD CKD-EPI 2021: 68 ML/MIN/1.73M2
EOSINOPHIL # BLD AUTO: 0 10E3/UL (ref 0–0.7)
EOSINOPHIL NFR BLD AUTO: 0 %
ERYTHROCYTE [DISTWIDTH] IN BLOOD BY AUTOMATED COUNT: 13.2 % (ref 10–15)
ERYTHROCYTE [SEDIMENTATION RATE] IN BLOOD BY WESTERGREN METHOD: 87 MM/HR (ref 0–20)
GLUCOSE SERPL-MCNC: 119 MG/DL (ref 70–99)
GLUCOSE UR STRIP-MCNC: NEGATIVE MG/DL
HCT VFR BLD AUTO: 34.2 % (ref 35–47)
HGB BLD-MCNC: 11.9 G/DL (ref 11.7–15.7)
HGB UR QL STRIP: NEGATIVE
IMM GRANULOCYTES # BLD: 0 10E3/UL
IMM GRANULOCYTES NFR BLD: 0 %
KETONES UR STRIP-MCNC: 5 MG/DL
LACTATE SERPL-SCNC: 0.7 MMOL/L (ref 0.7–2)
LEUKOCYTE ESTERASE UR QL STRIP: NEGATIVE
LIPASE SERPL-CCNC: 6 U/L (ref 13–60)
LYMPHOCYTES # BLD AUTO: 1 10E3/UL (ref 0.8–5.3)
LYMPHOCYTES NFR BLD AUTO: 12 %
MAGNESIUM SERPL-MCNC: 1.6 MG/DL (ref 1.7–2.3)
MCH RBC QN AUTO: 28.9 PG (ref 26.5–33)
MCHC RBC AUTO-ENTMCNC: 34.8 G/DL (ref 31.5–36.5)
MCV RBC AUTO: 83 FL (ref 78–100)
MONOCYTES # BLD AUTO: 0.9 10E3/UL (ref 0–1.3)
MONOCYTES NFR BLD AUTO: 11 %
MUCOUS THREADS #/AREA URNS LPF: PRESENT /LPF
NEUTROPHILS # BLD AUTO: 6.3 10E3/UL (ref 1.6–8.3)
NEUTROPHILS NFR BLD AUTO: 76 %
NITRATE UR QL: NEGATIVE
NRBC # BLD AUTO: 0 10E3/UL
NRBC BLD AUTO-RTO: 0 /100
PH UR STRIP: 5 [PH] (ref 5–7)
PLATELET # BLD AUTO: 296 10E3/UL (ref 150–450)
POTASSIUM SERPL-SCNC: 3.2 MMOL/L (ref 3.4–5.3)
PROCALCITONIN SERPL IA-MCNC: 0.09 NG/ML
PROT SERPL-MCNC: 6.9 G/DL (ref 6.4–8.3)
RBC # BLD AUTO: 4.12 10E6/UL (ref 3.8–5.2)
RBC URINE: <1 /HPF
SODIUM SERPL-SCNC: 138 MMOL/L (ref 135–145)
SP GR UR STRIP: 1.01 (ref 1–1.03)
SQUAMOUS EPITHELIAL: 2 /HPF
UROBILINOGEN UR STRIP-MCNC: NORMAL MG/DL
WBC # BLD AUTO: 8.2 10E3/UL (ref 4–11)
WBC URINE: 1 /HPF

## 2024-04-04 PROCEDURE — 81001 URINALYSIS AUTO W/SCOPE: CPT | Performed by: PHYSICIAN ASSISTANT

## 2024-04-04 PROCEDURE — 85025 COMPLETE CBC W/AUTO DIFF WBC: CPT | Performed by: PHYSICIAN ASSISTANT

## 2024-04-04 PROCEDURE — 36415 COLL VENOUS BLD VENIPUNCTURE: CPT | Performed by: PHYSICIAN ASSISTANT

## 2024-04-04 PROCEDURE — 250N000013 HC RX MED GY IP 250 OP 250 PS 637: Performed by: PHYSICIAN ASSISTANT

## 2024-04-04 PROCEDURE — 83605 ASSAY OF LACTIC ACID: CPT | Performed by: PHYSICIAN ASSISTANT

## 2024-04-04 PROCEDURE — 87040 BLOOD CULTURE FOR BACTERIA: CPT | Performed by: PHYSICIAN ASSISTANT

## 2024-04-04 PROCEDURE — 83690 ASSAY OF LIPASE: CPT | Performed by: PHYSICIAN ASSISTANT

## 2024-04-04 PROCEDURE — 96361 HYDRATE IV INFUSION ADD-ON: CPT | Performed by: PHYSICIAN ASSISTANT

## 2024-04-04 PROCEDURE — 99285 EMERGENCY DEPT VISIT HI MDM: CPT | Performed by: PHYSICIAN ASSISTANT

## 2024-04-04 PROCEDURE — 74177 CT ABD & PELVIS W/CONTRAST: CPT

## 2024-04-04 PROCEDURE — 84145 PROCALCITONIN (PCT): CPT | Performed by: PHYSICIAN ASSISTANT

## 2024-04-04 PROCEDURE — 96375 TX/PRO/DX INJ NEW DRUG ADDON: CPT | Performed by: PHYSICIAN ASSISTANT

## 2024-04-04 PROCEDURE — 250N000009 HC RX 250: Performed by: PHYSICIAN ASSISTANT

## 2024-04-04 PROCEDURE — 96365 THER/PROPH/DIAG IV INF INIT: CPT | Mod: 59 | Performed by: PHYSICIAN ASSISTANT

## 2024-04-04 PROCEDURE — 80053 COMPREHEN METABOLIC PANEL: CPT | Performed by: PHYSICIAN ASSISTANT

## 2024-04-04 PROCEDURE — 258N000003 HC RX IP 258 OP 636: Performed by: PHYSICIAN ASSISTANT

## 2024-04-04 PROCEDURE — 99285 EMERGENCY DEPT VISIT HI MDM: CPT | Mod: 25 | Performed by: PHYSICIAN ASSISTANT

## 2024-04-04 PROCEDURE — 86140 C-REACTIVE PROTEIN: CPT | Performed by: PHYSICIAN ASSISTANT

## 2024-04-04 PROCEDURE — 83735 ASSAY OF MAGNESIUM: CPT | Performed by: PHYSICIAN ASSISTANT

## 2024-04-04 PROCEDURE — 250N000011 HC RX IP 250 OP 636: Performed by: PHYSICIAN ASSISTANT

## 2024-04-04 PROCEDURE — 85652 RBC SED RATE AUTOMATED: CPT | Performed by: PHYSICIAN ASSISTANT

## 2024-04-04 RX ORDER — DEXAMETHASONE SODIUM PHOSPHATE 10 MG/ML
10 INJECTION, SOLUTION INTRAMUSCULAR; INTRAVENOUS ONCE
Status: COMPLETED | OUTPATIENT
Start: 2024-04-04 | End: 2024-04-04

## 2024-04-04 RX ORDER — MAGNESIUM SULFATE 1 G/100ML
1 INJECTION INTRAVENOUS ONCE
Status: COMPLETED | OUTPATIENT
Start: 2024-04-04 | End: 2024-04-04

## 2024-04-04 RX ORDER — HYDROMORPHONE HYDROCHLORIDE 1 MG/ML
0.5 INJECTION, SOLUTION INTRAMUSCULAR; INTRAVENOUS; SUBCUTANEOUS EVERY 30 MIN PRN
Status: DISCONTINUED | OUTPATIENT
Start: 2024-04-04 | End: 2024-04-04 | Stop reason: HOSPADM

## 2024-04-04 RX ORDER — DIPHENHYDRAMINE HYDROCHLORIDE 50 MG/ML
25 INJECTION INTRAMUSCULAR; INTRAVENOUS ONCE
Status: COMPLETED | OUTPATIENT
Start: 2024-04-04 | End: 2024-04-04

## 2024-04-04 RX ORDER — POTASSIUM CHLORIDE 1500 MG/1
20 TABLET, EXTENDED RELEASE ORAL ONCE
Status: COMPLETED | OUTPATIENT
Start: 2024-04-04 | End: 2024-04-04

## 2024-04-04 RX ORDER — IOPAMIDOL 755 MG/ML
500 INJECTION, SOLUTION INTRAVASCULAR ONCE
Status: COMPLETED | OUTPATIENT
Start: 2024-04-04 | End: 2024-04-04

## 2024-04-04 RX ORDER — OXYCODONE HYDROCHLORIDE 5 MG/1
5 TABLET ORAL EVERY 6 HOURS PRN
Qty: 12 TABLET | Refills: 0 | Status: SHIPPED | OUTPATIENT
Start: 2024-04-04

## 2024-04-04 RX ORDER — ACETAMINOPHEN 325 MG/1
975 TABLET ORAL ONCE
Status: COMPLETED | OUTPATIENT
Start: 2024-04-04 | End: 2024-04-04

## 2024-04-04 RX ADMIN — SODIUM CHLORIDE 60 ML: 9 INJECTION, SOLUTION INTRAVENOUS at 19:46

## 2024-04-04 RX ADMIN — SODIUM CHLORIDE 1000 ML: 9 INJECTION, SOLUTION INTRAVENOUS at 19:03

## 2024-04-04 RX ADMIN — MAGNESIUM SULFATE HEPTAHYDRATE 1 G: 1 INJECTION, SOLUTION INTRAVENOUS at 19:20

## 2024-04-04 RX ADMIN — IOPAMIDOL 90 ML: 755 INJECTION, SOLUTION INTRAVENOUS at 19:46

## 2024-04-04 RX ADMIN — HYDROMORPHONE HYDROCHLORIDE 0.5 MG: 1 INJECTION, SOLUTION INTRAMUSCULAR; INTRAVENOUS; SUBCUTANEOUS at 19:20

## 2024-04-04 RX ADMIN — ACETAMINOPHEN 975 MG: 325 TABLET, FILM COATED ORAL at 19:20

## 2024-04-04 RX ADMIN — POTASSIUM CHLORIDE 20 MEQ: 1500 TABLET, EXTENDED RELEASE ORAL at 20:05

## 2024-04-04 RX ADMIN — DEXAMETHASONE SODIUM PHOSPHATE 10 MG: 10 INJECTION, SOLUTION INTRAMUSCULAR; INTRAVENOUS at 19:21

## 2024-04-04 RX ADMIN — PROCHLORPERAZINE EDISYLATE 10 MG: 5 INJECTION INTRAMUSCULAR; INTRAVENOUS at 19:20

## 2024-04-04 RX ADMIN — DIPHENHYDRAMINE HYDROCHLORIDE 25 MG: 50 INJECTION, SOLUTION INTRAMUSCULAR; INTRAVENOUS at 19:21

## 2024-04-04 ASSESSMENT — ENCOUNTER SYMPTOMS
ARTHRALGIAS: 0
HEMATURIA: 0
HEADACHES: 0
FATIGUE: 0
COUGH: 0
CHILLS: 0
SHORTNESS OF BREATH: 0
DYSURIA: 0
NECK STIFFNESS: 0
APPETITE CHANGE: 0
MYALGIAS: 0
RHINORRHEA: 0
EYE REDNESS: 0
FEVER: 0
NAUSEA: 0
VOMITING: 0
DIARRHEA: 0
DIZZINESS: 0
ABDOMINAL PAIN: 0
SORE THROAT: 0
ACTIVITY CHANGE: 0

## 2024-04-04 ASSESSMENT — COLUMBIA-SUICIDE SEVERITY RATING SCALE - C-SSRS
2. HAVE YOU ACTUALLY HAD ANY THOUGHTS OF KILLING YOURSELF IN THE PAST MONTH?: NO
1. IN THE PAST MONTH, HAVE YOU WISHED YOU WERE DEAD OR WISHED YOU COULD GO TO SLEEP AND NOT WAKE UP?: NO
6. HAVE YOU EVER DONE ANYTHING, STARTED TO DO ANYTHING, OR PREPARED TO DO ANYTHING TO END YOUR LIFE?: NO

## 2024-04-04 ASSESSMENT — ACTIVITIES OF DAILY LIVING (ADL)
ADLS_ACUITY_SCORE: 35
ADLS_ACUITY_SCORE: 37

## 2024-04-04 NOTE — TELEPHONE ENCOUNTER
Nurse Triage SBAR    Is this a 2nd Level Triage? NO    Situation: Patient sent in MyChart regarding ongoing diarrhea and bloody stools. See MyChart pasted below.     Background: Patient states she has a history of ulcerative colitis.     Assessment: Patient states these symptoms have been ongoing since she was in the ER in January for COVID and inflammation of her large intestine. She states she has not had any improvement. She states some days are a little better, but then she has days like today where she can't be away from the bathroom because she's having watery diarrhea so often. She states there is more blood than stool, with clots, and sometimes blood without stool at all. She states she has abdominal pain and it is hard to get up due to the pain.     Protocol Recommended Disposition:   Go to ED Now, See More Appropriate Protocol    Recommendation: Per protocol, patient was advised to go to ED now. Advised her to keep us updated and let us know if she needs ED follow up appointment made. She verbalized understanding and had no further questions or concerns.    Mague Siddiqi, ROHITN, RN            Vickie MENDOZA [facility] [place] (supporting Alvaro Guerrero MD)24 minutes ago (3:15 PM)     So I was seen in the Ear at the end of January where I was diagnosed with Covid and inflammation in my large intestine and have not seen any improvement still having watery loose bloody stills with clots and a lot of discomfort I really want to feel better and am very tired of getting bounced around. I have days where I feel ok and days I feel horrible and I am tired if my stomach hurting I have tried to change my diet and eating better and it has not helped much so I am wondering if I should just be seen in the er again or what can I do this is very frustrating feeling like this. Any help is appreciated   Reason for Disposition   Blood in stool and without diarrhea   SEVERE rectal bleeding (large blood  clots; constant or on and off bleeding)    Additional Information   Negative: Shock suspected (e.g., cold/pale/clammy skin, too weak to stand, low BP, rapid pulse)   Negative: Difficult to awaken or acting confused (e.g., disoriented, slurred speech)   Negative: Sounds like a life-threatening emergency to the triager   Negative: Vomiting also present and worse than the diarrhea   Negative: Passed out (i.e., fainted, collapsed and was not responding)   Negative: Shock suspected (e.g., cold/pale/clammy skin, too weak to stand, low BP, rapid pulse)   Negative: Vomiting red blood or black (coffee ground) material   Negative: Sounds like a life-threatening emergency to the triager   Negative: Diarrhea is main symptom   Negative: Rectal symptoms    Protocols used: Diarrhea-A-OH, Rectal Bleeding-A-OH

## 2024-04-04 NOTE — ED TRIAGE NOTES
Pt c/o abdominal pain, nausea, and headache. Additional report of bloody diarrhea with clots at time.  Pt states she has been dealing with the above symptoms since January, however, pain is worse today.   Hx ulcerative colitis.

## 2024-04-04 NOTE — ED PROVIDER NOTES
History     Chief Complaint   Patient presents with    Abdominal Pain     HPI  Vickie Barnhart is a 38 year old female with a history of type 1 diabetes and ulcerative colitis since 5 years old who presents for evaluation of increasing abdominal discomfort for the past 8 weeks.  She feels like she is in an ulcerative colitis flare.  She is having 5+ bloody stools per day.  Some clots.  She has had chills off and on, but no documented fever.  Nausea but no vomiting.  Generalized abdominal discomfort is rated 8 on a scale of 10.  Today she started experiencing a headache which she rates 9.5 on a scale of 10.  She does have prior history of migraines, but states that she has been doing quite well in that regard recently.  No recent fall or head injury.  Denies any neck pain or extremity numbness/tingling/weakness.  Feels like the abdominal pain is worsening.  In the end of January she was seen and diagnosed with COVID-19 and placed on a prednisone burst.  This did help the ulcerative colitis symptoms for a couple weeks, but then symptoms returned again.  She did have a colonoscopy about 9 months ago.  Has not seen a formal general GI for quite some time.  She states she has been doing quite well otherwise.  She did quit smoking recently.  She feels her blood sugars are under decent control.          Component Collected Lab   COLONOSCOPY 07/25/2023  9:17 AM [address] [address]  _______________________________________________________________________________  Patient Name: Vickie Barnhart          Procedure Date: 7/25/2023 9:17 AM  MRN: [id number]                       Account Number: [id number]  YOB: 1985              Admit Type: Outpatient  Age: 37                               Room: [place] [address]  Gender: Female                        Note Status: Finalized  Attending MD: WALKER MARTINEZ , ,      Total Sedation  Time:  _______________________________________________________________________________     Procedure:             Colonoscopy  Indications:           Follow-up of ulcerative colitis, Dx age 5, no                         treatment now.  Providers:             WALKER Bearden MD:          VICTORINO GUTIERREZ MD  Medicines:             Monitored Anesthesia Care  Complications:         No immediate complications.  _______________________________________________________________________________  Procedure:             After obtaining informed consent, the colonoscope was                         passed under direct vision. Throughout the procedure,                         the patient's blood pressure, pulse, and oxygen                         saturations were monitored continuously. The                         Colonoscope was introduced through the anus and                         advanced to the transverse colon. The patient                         tolerated the procedure well. The quality of the bowel                         preparation was poor.                                                                                   Findings:       Copious quantities of semi-solid stool was found in the recto-sigmoid       colon, in the descending colon and in the transverse colon, precluding       visualization.       The exam was otherwise without abnormality.                                                                                   Impression:            - Preparation of the colon was poor.                         - Stool in the recto-sigmoid colon, in the descending                         colon and in the transverse colon.                         - The examination was otherwise normal.                         - No specimens collected.  Recommendation:        - Repeat colonoscopy in 1 year for surveillance with                         extended prep.                                                                                      Dr. Walker Nix  ________________  WALKER NIX,  7/25/2023 9:49:00 AM  I was physically present for the entire viewing portion of the exam.WALKER NIX  Number of Addenda: 0    Note Initiated On: 7/25/2023 9:17 AM           Lab Results   Component Value Date    A1C 6.7 01/31/2024    A1C 6.3 05/12/2023    A1C 8.4 01/17/2023    A1C 10.0 06/23/2022    A1C 8.7 10/12/2021    A1C 9.1 06/04/2021    A1C 8.7 02/22/2021    A1C 9.5 01/13/2020    A1C 8.3 10/14/2019             Allergies:  No Known Allergies    Problem List:    Patient Active Problem List    Diagnosis Date Noted    Chronic pain of both shoulders 02/02/2024     Priority: Medium    Bilateral arm pain 02/02/2024     Priority: Medium    Dupuytren's contracture of both hands 12/06/2022     Priority: Medium    ALYX (generalized anxiety disorder) 07/21/2021     Priority: Medium    Diabetic retinopathy of right eye associated with type 1 diabetes mellitus, macular edema presence unspecified, unspecified retinopathy severity (H) 11/23/2020     Priority: Medium    Major depressive disorder, recurrent episode, moderate (H) 10/04/2016     Priority: Medium    Ulcerative colitis without complications (H) 01/29/2016     Priority: Medium    Noncompliance with medication regimen 11/03/2015     Priority: Medium    Tobacco abuse 05/29/2014     Priority: Medium    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  EMB normal. Plan: cotest in 3 years.  12/6/22 ASCUS pap, neg HR HPV. Plan: cotest in 3 years.   12/14/22 Mychart result note sent.   1/25/23 left msg. Msg states that results have been released to [facility]        Sprain of back 03/10/2008     Priority: Medium     Problem list name updated by automated  process. Provider to review          Past Medical History:    Past Medical History:   Diagnosis Date    Adjustment disorder with anxious mood 11/23/2015    Anxiety 11/27/2015    ASCUS of cervix with negative high risk HPV 06/19/2008    Depressive disorder, not elsewhere classified     Diabetic eye exam (H) 12/19/14    Hx of tubal ligation 1/15/2021    Mixed hyperlipidemia 11/30/2006    Regional enteritis of unspecified site     Type I (juvenile type) diabetes mellitus with ketoacidosis, not stated as uncontrolled(250.11) 01/01/2007    Type I (juvenile type) diabetes mellitus with ketoacidosis, uncontrolled 02/14/08    Type I (juvenile type) diabetes mellitus without mention of complication, not stated as uncontrolled     Ulcerative colitis, unspecified        Past Surgical History:    Past Surgical History:   Procedure Laterality Date    COLONOSCOPY N/A 7/25/2023    Procedure: Colonoscopy;  Surgeon: Jude Nix MD;  Location: [facility]    DILATION AND CURETTAGE SUCTION  04/2010    miscarriage    RETINAL DETACHMENT SURGERY Left     TUBAL LIGATION      ZZ COLONOSCOPY W BIOPSY  08/16/2006    ZZ COLONOSCOPY W/WO BRUSH/WASH         Family History:    Family History   Problem Relation Age of Onset    Hypertension Father     Diabetes Maternal Grandmother     Cancer Maternal Grandmother     Diabetes Paternal Grandfather     Diabetes Maternal Uncle     Diabetes Maternal Aunt        Social History:  Marital Status:  Single [1]  Social History     Tobacco Use    Smoking status: Some Days     Packs/day: 0.50     Years: 16.00     Additional pack years: 0.00     Total pack years: 8.00     Types: Cigarettes    Smokeless tobacco: Never    Tobacco comments:     working on quiting, somedays up to 4 cigs per day   Vaping Use    Vaping Use: Never used   Substance Use Topics    Alcohol use: Yes     Alcohol/week: 0.0 standard drinks of alcohol     Comment: occassionally    Drug use: No        Medications:    oxyCODONE (ROXICODONE) 5  MG tablet  acetaminophen (TYLENOL) 500 MG tablet  bisacodyl (DULCOLAX) 5 MG EC tablet  Continuous Blood Gluc Sensor (DEXCOM G6 SENSOR) MISC  Continuous Blood Gluc Transmit (DEXCOM G6 TRANSMITTER) MISC  FLUoxetine (PROZAC) 20 MG capsule  fluticasone (FLONASE) 50 MCG/ACT nasal spray  gabapentin (NEURONTIN) 300 MG capsule  Glucagon, rDNA, (GLUCAGON EMERGENCY) 1 MG KIT  HYDROcodone-acetaminophen (NORCO) 5-325 MG tablet  insulin aspart (NOVOLOG FLEXPEN) 100 UNIT/ML pen  insulin aspart (NOVOLOG VIAL) 100 UNITS/ML vial  insulin glargine (LANTUS SOLOSTAR) 100 UNIT/ML pen  Insulin Infusion Pump Supplies (T:SLIM T:LOCK INSULIN CART 3ML) MISC  insulin pen needle (BD TARA U/F) 32G X 4 MM miscellaneous  levonorgestrel (MIRENA) 20 MCG/24HR IUD  Naproxen Sodium (ALEVE PO)  NOVOLOG FLEXPEN 100 UNIT/ML soln  polyethylene glycol (GOLYTELY) 236 g suspension  predniSONE (DELTASONE) 20 MG tablet          Review of Systems   Constitutional:  Negative for activity change, appetite change, chills, fatigue and fever.   HENT:  Negative for congestion, rhinorrhea and sore throat.    Eyes:  Negative for redness.   Respiratory:  Negative for cough and shortness of breath.    Cardiovascular:  Negative for chest pain.   Gastrointestinal:  Negative for abdominal pain, diarrhea, nausea and vomiting.   Genitourinary:  Negative for dysuria and hematuria.   Musculoskeletal:  Negative for arthralgias, myalgias and neck stiffness.   Skin:  Negative for rash.   Neurological:  Negative for dizziness and headaches.   All other systems reviewed and are negative.      Physical Exam   BP: (!) 143/78  Pulse: 109  Temp: 100.1  F (37.8  C)  Resp: 18  Weight: 82.6 kg (182 lb)  SpO2: 97 %      Physical Exam  Vitals and nursing note reviewed.   Constitutional:       General: She is not in acute distress.     Appearance: She is not diaphoretic.   HENT:      Head: Normocephalic and atraumatic.      Right Ear: External ear normal.      Left Ear: External ear normal.       Nose: Nose normal.      Mouth/Throat:      Mouth: Mucous membranes are moist.      Pharynx: No pharyngeal swelling or oropharyngeal exudate.   Eyes:      General: No scleral icterus.        Right eye: No discharge.         Left eye: No discharge.      Conjunctiva/sclera: Conjunctivae normal.      Pupils: Pupils are equal, round, and reactive to light.   Neck:      Thyroid: No thyromegaly.   Cardiovascular:      Rate and Rhythm: Normal rate and regular rhythm.      Heart sounds: Normal heart sounds. No murmur heard.  Pulmonary:      Effort: Pulmonary effort is normal. No respiratory distress.      Breath sounds: Normal breath sounds. No wheezing or rales.   Chest:      Chest wall: No tenderness.   Abdominal:      General: Bowel sounds are normal. There is no distension.      Palpations: Abdomen is soft. There is no hepatomegaly, splenomegaly or mass.      Tenderness: There is generalized abdominal tenderness. There is no right CVA tenderness, left CVA tenderness, guarding or rebound. Negative signs include Estrada's sign and Rovsing's sign.      Hernia: There is no hernia in the umbilical area or ventral area.   Musculoskeletal:         General: No tenderness or deformity. Normal range of motion.      Cervical back: Normal range of motion and neck supple.   Lymphadenopathy:      Cervical: No cervical adenopathy.   Skin:     General: Skin is warm and dry.      Capillary Refill: Capillary refill takes less than 2 seconds.      Findings: No erythema or rash.   Neurological:      General: No focal deficit present.      Mental Status: She is alert and oriented to person, place, and time.      Cranial Nerves: No cranial nerve deficit.   Psychiatric:         Behavior: Behavior normal.         Thought Content: Thought content normal.         ED Course        Procedures              Critical Care time:  none               Results for orders placed or performed during the hospital encounter of 04/04/24 (from the past 24  hour(s))   CBC with platelets differential    Narrative    The following orders were created for panel order CBC with platelets differential.  Procedure                               Abnormality         Status                     ---------                               -----------         ------                     CBC with platelets and d...[[id number]]  Abnormal            Final result                 Please view results for these tests on the individual orders.   Comprehensive metabolic panel   Result Value Ref Range    Sodium 138 135 - 145 mmol/L    Potassium 3.2 (L) 3.4 - 5.3 mmol/L    Carbon Dioxide (CO2) 20 (L) 22 - 29 mmol/L    Anion Gap 15 7 - 15 mmol/L    Urea Nitrogen 5.9 (L) 6.0 - 20.0 mg/dL    Creatinine 1.06 (H) 0.51 - 0.95 mg/dL    GFR Estimate 68 >60 mL/min/1.73m2    Calcium 8.7 8.6 - 10.0 mg/dL    Chloride 103 98 - 107 mmol/L    Glucose 119 (H) 70 - 99 mg/dL    Alkaline Phosphatase 114 40 - 150 U/L    AST 18 0 - 45 U/L    ALT 23 0 - 50 U/L    Protein Total 6.9 6.4 - 8.3 g/dL    Albumin 3.9 3.5 - 5.2 g/dL    Bilirubin Total 0.4 <=1.2 mg/dL   Lipase   Result Value Ref Range    Lipase 6 (L) 13 - 60 U/L   Lactic acid whole blood w/ reflex x1 in 3 Hr when >2   Result Value Ref Range    Lactic Acid, Initial 0.7 0.7 - 2.0 mmol/L   Procalcitonin   Result Value Ref Range    Procalcitonin 0.09 <0.50 ng/mL   Magnesium   Result Value Ref Range    Magnesium 1.6 (L) 1.7 - 2.3 mg/dL   CRP inflammation   Result Value Ref Range    CRP Inflammation 21.26 (H) <5.00 mg/L   Erythrocyte sedimentation rate auto   Result Value Ref Range    Erythrocyte Sedimentation Rate 87 (H) 0 - 20 mm/hr   CBC with platelets and differential   Result Value Ref Range    WBC Count 8.2 4.0 - 11.0 10e3/uL    RBC Count 4.12 3.80 - 5.20 10e6/uL    Hemoglobin 11.9 11.7 - 15.7 g/dL    Hematocrit 34.2 (L) 35.0 - 47.0 %    MCV 83 78 - 100 fL    MCH 28.9 26.5 - 33.0 pg    MCHC 34.8 31.5 - 36.5 g/dL    RDW 13.2 10.0 - 15.0 %    Platelet Count 296 150  - 450 10e3/uL    % Neutrophils 76 %    % Lymphocytes 12 %    % Monocytes 11 %    % Eosinophils 0 %    % Basophils 0 %    % Immature Granulocytes 0 %    NRBCs per 100 WBC 0 <1 /100    Absolute Neutrophils 6.3 1.6 - 8.3 10e3/uL    Absolute Lymphocytes 1.0 0.8 - 5.3 10e3/uL    Absolute Monocytes 0.9 0.0 - 1.3 10e3/uL    Absolute Eosinophils 0.0 0.0 - 0.7 10e3/uL    Absolute Basophils 0.0 0.0 - 0.2 10e3/uL    Absolute Immature Granulocytes 0.0 <=0.4 10e3/uL    Absolute NRBCs 0.0 10e3/uL   CT Abdomen Pelvis w Contrast    Narrative    EXAM: CT ABDOMEN PELVIS W CONTRAST  LOCATION: [facility]  DATE: 4/4/2024    INDICATION: Generalized abdominal pain. Fever. Bloody diarrhea. History of ulcerative colitis.  COMPARISON: 01/31/2024  TECHNIQUE: CT scan of the abdomen and pelvis was performed following injection of IV contrast. Multiplanar reformats were obtained. Dose reduction techniques were used.  CONTRAST: Isovue 370, 90mL    FINDINGS:   LOWER CHEST: Normal.    HEPATOBILIARY: Liver is within normal limits. Mildly distended gallbladder. No gallbladder wall thickening or pericholecystic fluid.    PANCREAS: Nonvisualization of the pancreatic body and tail which may be secondary to dorsal pancreatic agenesis versus severe atrophy. Normal-appearing ventral pancreas.    SPLEEN: Normal    ADRENAL GLANDS: Normal.    KIDNEYS/BLADDER: Bilateral extrarenal pelvises sees. Kidneys are within normal limits.    BOWEL: Normal caliber small bowel. Large amount of hyperdense stool throughout a mildly dilated colon up to the distal sigmoid colon. Redundant sigmoid colon. Increased wall thickening of the distal sigmoid colon and rectum, involving a longer segment of   the distal sigmoid colon. Rounded, masslike area of low attenuation within the distal sigmoid colon, measuring 2.8 x 2.3 cm (image #[id number] series 3).    LYMPH NODES: Stable, borderline left retroperitoneal lymph nodes measuring up to 1 cm in their  short axis diameters.    VASCULATURE: Mild partially calcified atherosclerotic changes. No abdominal aortic aneurysm.    PELVIC ORGANS: IUD within the endometrium.    MUSCULOSKELETAL: Sclerotic changes with cortical irregularity at the sacroiliac joint suggesting bilateral sacroiliitis.      Impression    IMPRESSION:   1.  Again seen is diffuse wall thickening of the distal sigmoid colon and rectum consistent with inflammation with narrowing producing proximal colonic dilatation. Increased length of involvement compared to the previous exam. New, rounded 2.8 cm area of   low attenuation within the distal sigmoid colon which may represent masslike inflammatory tissue versus neoplasm.   UA with Microscopic reflex to Culture    Specimen: Urine, Midstream   Result Value Ref Range    Color Urine Yellow Colorless, Straw, Light Yellow, Yellow    Appearance Urine Clear Clear    Glucose Urine Negative Negative mg/dL    Bilirubin Urine Negative Negative    Ketones Urine 5 (A) Negative mg/dL    Specific Gravity Urine 1.009 1.003 - 1.035    Blood Urine Negative Negative    pH Urine 5.0 5.0 - 7.0    Protein Albumin Urine Negative Negative mg/dL    Urobilinogen Urine Normal Normal, 2.0 mg/dL    Nitrite Urine Negative Negative    Leukocyte Esterase Urine Negative Negative    Bacteria Urine Few (A) None Seen /HPF    Mucus Urine Present (A) None Seen /LPF    RBC Urine <1 <=2 /HPF    WBC Urine 1 <=5 /HPF    Squamous Epithelials Urine 2 (H) <=1 /HPF    Narrative    Urine Culture not indicated       Medications   HYDROmorphone (PF) (DILAUDID) injection 0.5 mg (0.5 mg Intravenous $Given 4/4/24 1920)   sodium chloride 0.9% BOLUS 1,000 mL (1,000 mLs Intravenous $New Bag 4/4/24 1903)   dexAMETHasone PF (DECADRON) injection 10 mg (10 mg Intravenous $Given 4/4/24 1921)   magnesium sulfate 1 g in 100 mL D5W intermittent infusion (1 g Intravenous $New Bag 4/4/24 1920)   prochlorperazine (COMPAZINE) injection 10 mg (10 mg Intravenous $Given  4/4/24 1920)   diphenhydrAMINE (BENADRYL) injection 25 mg (25 mg Intravenous $Given 4/4/24 1921)   iopamidol (ISOVUE-370) solution 500 mL (90 mLs Intravenous $Given 4/4/24 1946)   sodium chloride 0.9 % bag 100mL for CT scan flush use (60 mLs Intravenous $Given 4/4/24 1946)   acetaminophen (TYLENOL) tablet 975 mg (975 mg Oral $Given 4/4/24 1920)   potassium chloride linda ER (KLOR-CON M20) CR tablet 20 mEq (20 mEq Oral $Given 4/4/24 2005)       Assessments & Plan (with Medical Decision Making)     Ulcerative colitis (H)  Abnormal CT of the abdomen  Generalized abdominal pain  Rectal bleeding     38 year old female type I diabetic with history of ulcerative colitis who presents for evaluation of increasing generalized abdominal pain over the past 8 weeks and bloody stools upwards of 5 times per day.  Chills but no fever at home.  Nausea but no vomiting.  Able to eat and drink.  Started with a headache today rated 9.5 on a scale of 10.  She does have a history of migraines.  No recent fall or head injury.  No URI symptoms.  On exam blood pressure 143/78, temperature 100.1, pulse 109, respiration 18, oxygen saturation 97% on room air.  Patient is in no acute distress.  ENT exam negative.  Cardiopulmonary exam normal.  No adventitious lung sounds.  Abdomen with good bowel sounds.  Generalized tenderness throughout without rebound or guarding.  No masses.  No hernia.  No hepatosplenomegaly.  No skin rashes.  No other exam abnormalities.  IV was established.  Initially, she was given 1 L IV normal saline, IV Benadryl, IV Compazine, IV Decadron, IV magnesium, and IV Dilaudid for symptom management for both the headache and the abdominal pain.  Laboratory levels display only mild elevation in her inflammatory markers with an ESR of 87 and CRP of 21.  Comprehensive metabolic panel with a borderline low potassium at 3.2.  Renal function stable.  LFTs normal.  Elevated glucose and a diabetic patient at 119.  Lipase, lactic acid,  procalcitonin, and CBC all within normal limits.  There is no sign of anemia despite her rectal bleeding.  Urinalysis without nitrite or leukocyte esterase.  She was unable to perform a stool specimen for C. difficile and enteric pathogen testing.  CT of the abdomen/pelvis with diffuse wall thickening of the distal sigmoid colon and rectum consistent with inflammation and narrowing producing proximal colonic dilation.  Increased length of involvement compared to the previous exam.  New rounded 2.8 cm area of low-attenuation within the distal sigmoid colon which may represent masslike inflammatory tissue versus neoplasm.  I reassessed the patient.  Her headache had completely resolved.  She was very pleased.  Also, her abdominal pain had decreased down to 2 on a scale of 10.  She was resting comfortably.    8:56 PM - I spoke with Dr. Colon, GI at [facility].  She did not recommend steroids in this scenario.  She thought the low-grade temperature elevation was potentially related to the inflammatory state of the ulcerative colitis.  With a normal white blood cell count, lactic acid level, and nonacute abdominal exam she did not feel infection was likely.  There is no evidence for perforation or abscess on the CT.  Dr. Colon was concerned about the sigmoid masslike structure, and felt the patient needed an urgent colonoscopy.  She did not feel it needed to be done with an inpatient admission and could be done as an outpatient.  She felt the patient needed colonoscopy with biopsy to further guide therapy given the chance that this certainly could represent a malignancy.  If this were a malignancy, she stated that prednisone or any other type steroid would be contraindicated.  I discussed this with the patient.  I placed an urgent colonoscopy referral.  Left specific instructions that she should have the MiraLAX prep this time, as she did not tolerate the GoLytely prep, and had a poor prep roughly 9 months ago.  Given her  large stool burden, I am going to start her on a stool softener.  I do not want to cause significant diarrhea causing more inflammatory changes.  Strict return instructions discussed with her including higher fever, increasing abdominal pain, or vomiting discussed.  She does not have any obstruction currently, but this would be a risk.  I did send her home with oxycodone for breakthrough pain.  She should use Tylenol first.  No driving for 8 hours after taking oxycodone, as this can impair judgment.  Patient was in agreement with this plan and was suitable for discharge.     I have reviewed the nursing notes.    I have reviewed the findings, diagnosis, plan and need for follow up with the patient.           Medical Decision Making  The patient's presentation was of high complexity (a chronic illness severe exacerbation, progression, or side effect of treatment).    The patient's evaluation involved:  review of 1 test result(s) ordered prior to this encounter (see separate area of note for details)  ordering and/or review of 3+ test(s) in this encounter (see separate area of note for details)  discussion of management or test interpretation with another health professional (see separate area of note for details)    The patient's management necessitated high risk (a parenteral controlled substance) and high risk (drug therapy requiring intensive monitoring (see separate area of note for details)).        New Prescriptions    OXYCODONE (ROXICODONE) 5 MG TABLET    Take 1 tablet (5 mg) by mouth every 6 hours as needed for severe pain       Final diagnoses:   Ulcerative colitis (H)   Abnormal CT of the abdomen - sigmoid colon swelling   Generalized abdominal pain   Rectal bleeding         Disclaimer: This note consists of symbols derived from keyboarding, dictation and/or voice recognition software. As a result, there may be errors in the script that have gone undetected. Please consider this when interpreting information  found in this chart.      4/4/2024   [facility] EMERGENCY DEPT       Dangelo Doshi PA-C  04/04/24 [id number]

## 2024-04-05 ENCOUNTER — PATIENT OUTREACH (OUTPATIENT)
Dept: CARE COORDINATION | Facility: CLINIC | Age: 39
End: 2024-04-05
Payer: COMMERCIAL

## 2024-04-05 NOTE — DISCHARGE INSTRUCTIONS
It was a pleasure working with you today!  I hope your condition improves rapidly!     Please collect the stool samples and soon as possible and drop them off at the lab so that can get started.  Please also start a stool softener in the form of Colace 100 mg twice daily and take this regularly.  Take Tylenol 1000 mg every 6 hours as needed for pain.  Reserve the oxycodone for severe breakthrough pain.  Do not drive for 8 hours after taking oxycodone, as this medication can impair your judgment.    I placed a referral for an emergent colonoscopy.  They should be contacting you tomorrow.  If you do not hear anything by noon, you could call the number in the referral that was provided.  I did put instructions in there that you should likely receive the MiraLAX prep given your problems with the GoLytely prep last time.    Return to the emergency department if you get increasing pain, fever over 100.5 consistently, or vomiting.

## 2024-04-05 NOTE — PROGRESS NOTES
Clinic Care Coordination Contact  Community Health Worker Initial Outreach    CHW Initial Information Gathering:  Referral Source: ED Follow-Up  CHW Additional Questions  If ED/Hospital discharge, follow-up appointment scheduled as recommended?: Other (Pt waiting for call regarding GI referral, declined scheduling ED follow-up with PCP at this time.)  Sukhjindert active?: Yes    Patient accepts CC: No, patient declined at this time. She may be interested in Care Coordination after following up on diabetes and GI referral. CHW recommended she contact her primary clinic if/when she would like to enroll in care coordination and they can get a referral started at that time. Patient will be sent Care Coordination introduction letter for future reference.       Karli Lambert  Community Health Worker  Connected Care Resource Center, [facility]    *Connected Care Resource Team does NOT follow patient ongoing. Referrals are identified based on internal discharge reports and the outreach is to ensure patient has an understanding of their discharge instructions.

## 2024-04-05 NOTE — LETTER
Vickie Barnhart  [address] [place] [id number]    Dear Vickie Barnhart,      I am a team member within the [facility] with [facility]. I recently contacted you to ensure you are doing well following a visit within our health system. I also wanted to take this chance to introduce Clinic Care Coordination should you have any interest in this program in the future.    Below is a description of Clinic Care Coordination and how this team can further assist you:       The Clinic Care Coordination team is made up of a Registered Nurse, , Financial Resource Worker, and a Community Health Worker who understand and can help navigate the health care system. The goal of clinic care coordination is to help you manage your health, improve access to care, and achieve optimal health outcomes. They work alongside your provider to assist you in determining your health and social needs, obtain health care and community resources, and provide you with necessary information and education. Clinic Care Coordination can work with you through any barriers and develop a care plan that helps coordinate and strengthen the relationship between you and your care team.    If you wish to connect with the Clinic Care Coordination Team, please let your [facility] Primary Care Provider or Clinic Care Team know and they can place a referral. The Clinic Care Coordination team will then reach out by phone to further support you.    We are focused on providing you with the highest-quality healthcare experience possible.    Sincerely,   Your care team with [facility]

## 2024-04-07 PROCEDURE — 87507 IADNA-DNA/RNA PROBE TQ 12-25: CPT

## 2024-04-08 ENCOUNTER — TELEPHONE (OUTPATIENT)
Dept: GASTROENTEROLOGY | Facility: CLINIC | Age: 39
End: 2024-04-08

## 2024-04-08 ENCOUNTER — LAB (OUTPATIENT)
Dept: LAB | Facility: CLINIC | Age: 39
End: 2024-04-08
Payer: COMMERCIAL

## 2024-04-08 ENCOUNTER — MYC MEDICAL ADVICE (OUTPATIENT)
Dept: FAMILY MEDICINE | Facility: CLINIC | Age: 39
End: 2024-04-08

## 2024-04-08 DIAGNOSIS — R93.5 ABNORMAL CT OF THE ABDOMEN: Primary | ICD-10-CM

## 2024-04-08 DIAGNOSIS — R10.84 ABDOMINAL PAIN, GENERALIZED: ICD-10-CM

## 2024-04-08 DIAGNOSIS — R10.84 GENERALIZED ABDOMINAL PAIN: ICD-10-CM

## 2024-04-08 DIAGNOSIS — K51.90 ULCERATIVE COLITIS (H): ICD-10-CM

## 2024-04-08 LAB
ADV 40+41 DNA STL QL NAA+NON-PROBE: NEGATIVE
ASTRO TYP 1-8 RNA STL QL NAA+NON-PROBE: NEGATIVE
C CAYETANENSIS DNA STL QL NAA+NON-PROBE: NEGATIVE
CAMPYLOBACTER DNA SPEC NAA+PROBE: NEGATIVE
CRYPTOSP DNA STL QL NAA+NON-PROBE: NEGATIVE
E COLI O157 DNA STL QL NAA+NON-PROBE: NORMAL
E HISTOLYT DNA STL QL NAA+NON-PROBE: NEGATIVE
EAEC ASTA GENE ISLT QL NAA+PROBE: NEGATIVE
EC STX1+STX2 GENES STL QL NAA+NON-PROBE: NEGATIVE
EPEC EAE GENE STL QL NAA+NON-PROBE: NEGATIVE
ETEC LTA+ST1A+ST1B TOX ST NAA+NON-PROBE: NEGATIVE
G LAMBLIA DNA STL QL NAA+NON-PROBE: NEGATIVE
NOROVIRUS GI+II RNA STL QL NAA+NON-PROBE: NEGATIVE
P SHIGELLOIDES DNA STL QL NAA+NON-PROBE: NEGATIVE
RVA RNA STL QL NAA+NON-PROBE: NEGATIVE
SALMONELLA SP RPOD STL QL NAA+PROBE: NEGATIVE
SAPO I+II+IV+V RNA STL QL NAA+NON-PROBE: NEGATIVE
SHIGELLA SP+EIEC IPAH ST NAA+NON-PROBE: NEGATIVE
V CHOLERAE DNA SPEC QL NAA+PROBE: NEGATIVE
VIBRIO DNA SPEC NAA+PROBE: NEGATIVE
Y ENTEROCOL DNA STL QL NAA+PROBE: NEGATIVE

## 2024-04-08 NOTE — TELEPHONE ENCOUNTER
"Endoscopy Scheduling Screen    Have you had a positive Covid test in the last 14 days?  No    What is your communication preference for Instructions and/or Bowel Prep?   MyChart    What insurance is in the chart?  Other:  BC[employer]    Ordering/Referring Provider: WARNER MOYA   (If ordering provider performs procedure, schedule with ordering provider unless otherwise instructed. )    BMI: Estimated body mass index is 30.29 kg/m  as calculated from the following:    Height as of 5/12/23: 1.651 m (5' 5\").    Weight as of 4/4/24: 82.6 kg (182 lb).     Sedation Ordered  moderate sedation.   If patient BMI > 50 do not schedule in [facility].    If patient BMI > 45 do not schedule at [facility].    Are you taking methadone or Suboxone?  No    Have you had difficulties, pain, or discomfort during past endoscopy procedures?  No    Are you taking any prescription medications for pain 3 or more times per week?   NO, No RN review required.    Do you have a history of malignant hyperthermia?  No    (Females) Are you currently pregnant?   No     Have you been diagnosed or told you have pulmonary hypertension?   No    Do you have an LVAD?  No    Have you been told you have moderate to severe sleep apnea?  No    Have you been told you have COPD, asthma, or any other lung disease?  No    Do you have any heart conditions?  No     Have you ever had or are you waiting for an organ transplant?  No. Continue scheduling, no site restrictions.    Have you had a stroke or transient ischemic attack (TIA aka \"mini stroke\" in the last 6 months?   No    Have you been diagnosed with or been told you have cirrhosis of the liver?   No    Are you currently on dialysis?   No    Do you need assistance transferring?   No    BMI: Estimated body mass index is 30.29 kg/m  as calculated from the following:    Height as of 5/12/23: 1.651 m (5' 5\").    Weight as of 4/4/24: 82.6 kg (182 lb).     Is patients BMI > 40 and scheduling location [facility]?  No    Do you take an " injectable medication for weight loss or diabetes (excluding insulin)?  No    Do you take the medication Naltrexone?  No    Do you take blood thinners?  No       Prep   Are you currently on dialysis or do you have chronic kidney disease?  No    Do you have a diagnosis of diabetes?  Yes (Golytely Prep)    Do you have a diagnosis of cystic fibrosis (CF)?  No    On a regular basis do you go 3 -5 days between bowel movements?  No    BMI > 40?  No    Preferred Pharmacy:  [id number] - [address] [address]  Phone: [phone] Fax: [phone]    Final Scheduling Details     Procedure scheduled  Colonoscopy    Surgeon:  RIKKI     Date of procedure:  4/12/2024     Pre-OP / PAC:   No - Not required for this site.    Location  [facility] - Patient preference.    Sedation   MAC/Deep Sedation  [facility]      Patient Reminders:   You will receive a call from a Nurse to review instructions and health history.  This assessment must be completed prior to your procedure.  Failure to complete the Nurse assessment may result in the procedure being cancelled.      On the day of your procedure, please designate an adult(s) who can drive you home stay with you for the next 24 hours. The medicines used in the exam will make you sleepy. You will not be able to drive.      You cannot take public transportation, ride share services, or non-medical taxi service without a responsible caregiver.  Medical transport services are allowed with the requirement that a responsible caregiver will receive you at your destination.  We require that drivers and caregivers are confirmed prior to your procedure.

## 2024-04-09 LAB
BACTERIA BLD CULT: NO GROWTH
BACTERIA BLD CULT: NO GROWTH

## 2024-04-10 RX ORDER — BISACODYL 5 MG/1
TABLET, DELAYED RELEASE ORAL
Qty: 4 TABLET | Refills: 0 | Status: SHIPPED | OUTPATIENT
Start: 2024-04-10

## 2024-04-10 NOTE — TELEPHONE ENCOUNTER
Extended Golytely Bowel Prep . Instructions were sent via [employer]. Bowel prep was sent 4/10/2024 to    [id number] - [place] - [address] [place] [address]

## 2024-04-11 NOTE — H&P
[facility] History and Physical    Vickie Barnhart MRN# [id number]   Age: 38 year old YOB: 1985     Date of Admission:  (Not on file)    Home clinic: [facility] [place]  Primary care provider: Alvaro Guerrero          Impression and Plan:   Impression:   Ulcerative colitis (H) [K51.90] since age 5  Abnormal CT of the abdomen [R93.5] suggested abnormal mass in colon  Generalized abdominal pain [R10.84]  Last CT for surveillance July 2023-poor prep      Plan:   Proceed to Colonoscopy as planned.  The procedure, risks(bleeding, perforation), benefits and alternatives were discussed and the patient agrees to proceed. Cleared for Anesthesia             Chief Complaint:   Ulcerative colitis (H) [K51.90]  Abnormal CT of the abdomen [R93.5]  Generalized abdominal pain [R10.84]    History is obtained from the patient          History of Present Illness:   This 38 year old female is being seen at this time for evaluation for colonoscopy.  Untreated UC since age 16.  Diarrhea and flare ups - since January 2024.  More frequent flareups.           Past Medical History:     Past Medical History:   Diagnosis Date    Adjustment disorder with anxious mood 11/23/2015    Anxiety 11/27/2015    ASCUS of cervix with negative high risk HPV 06/19/2008    Depressive disorder, not elsewhere classified     Diabetic eye exam (H) 12/19/14    Hx of tubal ligation 1/15/2021    Mixed hyperlipidemia 11/30/2006    Regional enteritis of unspecified site     Ulcerative Colitis    Type I (juvenile type) diabetes mellitus with ketoacidosis, not stated as uncontrolled(250.11) 01/01/2007    Moderately severe intensity.    Type I (juvenile type) diabetes mellitus with ketoacidosis, uncontrolled 02/14/08    Type I (juvenile type) diabetes mellitus without mention of complication, not stated as uncontrolled     diagnosed in 2003    Ulcerative colitis, unspecified     remission. Last flare 6 yrs ago.              Past Surgical History:     Past Surgical History:   Procedure Laterality Date    COLONOSCOPY N/A 7/25/2023    Procedure: Colonoscopy;  Surgeon: Jude Nix MD;  Location:  GI    DILATION AND CURETTAGE SUCTION  04/2010    miscarriage    RETINAL DETACHMENT SURGERY Left     TUBAL LIGATION      [facility] COLONOSCOPY W BIOPSY  08/16/2006    [facility] COLONOSCOPY W/WO BRUSH/WASH              Social History:     Social History     Tobacco Use    Smoking status: Some Days     Current packs/day: 0.50     Average packs/day: 0.5 packs/day for 16.0 years (8.0 ttl pk-yrs)     Types: Cigarettes    Smokeless tobacco: Never    Tobacco comments:     working on quiting, somedays up to 4 cigs per day   Substance Use Topics    Alcohol use: Yes     Alcohol/week: 0.0 standard drinks of alcohol     Comment: occassionally            Family History:     Family History   Problem Relation Age of Onset    Hypertension Father     Diabetes Maternal Grandmother     Cancer Maternal Grandmother     Diabetes Paternal Grandfather     Diabetes Maternal Uncle     Diabetes Maternal Aunt             Immunizations:     VACCINE/DOSE   Diptheria   DPT   DTAP   HBIG   Hepatitis A   Hepatitis B   HIB   Influenza   Measles   Meningococcal   MMR   Mumps   Pneumococcal   Polio   Rubella   Small Pox   TDAP   Varicella   Zoster            Allergies:   No Known Allergies         Medications:     No current facility-administered medications for this encounter.     Current Outpatient Medications   Medication Sig Dispense Refill    acetaminophen (TYLENOL) 500 MG tablet Take 500-1,000 mg by mouth every 6 hours as needed for mild pain      bisacodyl (DULCOLAX) 5 MG EC tablet Two days prior to exam take two (2) tablets at 4pm. One day prior to exam take two (2) tablets at 4pm 4 tablet 0    bisacodyl (DULCOLAX) 5 MG EC tablet Take 2 tablets at 3 pm the day before your procedure. If your procedure is before 11 am, take 2 additional tablets at 11 pm. If your procedure is after  11 am, take 2 additional tablets at 6 am. For additional instructions refer to your colonoscopy prep instructions. 4 tablet 0    Continuous Blood Gluc Sensor (DEXCOM G6 SENSOR) MISC Change every 10 days. 9 each 3    Continuous Blood Gluc Transmit (DEXCOM G6 TRANSMITTER) MISC Change every 90 days. 1 each 3    FLUoxetine (PROZAC) 20 MG capsule Take 3 capsules (60 mg) by mouth daily (Patient not taking: Reported on 2/2/2024) 270 capsule 4    fluticasone (FLONASE) 50 MCG/ACT nasal spray Spray 1 spray into both nostrils daily (Patient not taking: Reported on 2/2/2024) 16 g 1    gabapentin (NEURONTIN) 300 MG capsule Take 1 capsule (300 mg) by mouth At Bedtime 90 capsule 1    Glucagon, rDNA, (GLUCAGON EMERGENCY) 1 MG KIT Inject 1 mg into the muscle as needed (Hypoglycemia) (Patient not taking: Reported on 2/2/2024) 1 kit 3    HYDROcodone-acetaminophen (NORCO) 5-325 MG tablet Take 1 tablet by mouth every 6 hours as needed for severe pain 6 tablet 0    insulin aspart (NOVOLOG FLEXPEN) 100 UNIT/ML pen Take 1 unit per 10 grams of carb plus 1 unit for every 60 above 150 Max dose 60 unit daily while off pump 15 mL 4    insulin aspart (NOVOLOG VIAL) 100 UNITS/ML vial Uses up to 60 units per day in insulin pump for basal, bolus, and priming of insulin pump tubing. 60 mL 0    insulin glargine (LANTUS SOLOSTAR) 100 UNIT/ML pen Take 12 units daily while off pump 15 mL 4    Insulin Infusion Pump Supplies (T:SLIM T:LOCK INSULIN CART 3ML) MISC 1 each See Admin Instructions Change insulin pump cartridge every 2-3 days. 40 each 3    insulin pen needle (BD TARA U/F) 32G X 4 MM miscellaneous USE 3 DAILY OR AS DIRECTED. 270 each 3    levonorgestrel (MIRENA) 20 MCG/24HR IUD 1 each (20 mcg) by Intrauterine route once      Naproxen Sodium (ALEVE PO) Take by mouth as needed for moderate pain       NOVOLOG FLEXPEN 100 UNIT/ML soln 1 unit for every 12 grams of carbohydrates with meals three times per day. Plus correction scale; uses up to 60 units  daily. 45 mL 1    oxyCODONE (ROXICODONE) 5 MG tablet Take 1 tablet (5 mg) by mouth every 6 hours as needed for severe pain 12 tablet 0    polyethylene glycol (GOLYTELY) 236 g suspension Take as directed. Two days before your exam fill the first container with water. Cover and shake until mixed well. At 5:00pm drink one 8oz glass every 10-15 minutes until half (1/2) of the first container is empty. Store the remainder in the refrigerator. One day before your exam at 5:00pm drink the second half of the first container until it is gone. Before you go to bed mix the second container with water and put in refrigerator. Six hours before your check in time drink one 8oz glass every 10-15 minutes until half of container is empty. Discard the remainder of solution. 8000 mL 0    polyethylene glycol (GOLYTELY) 236 g suspension The night before the exam at 6 pm drink an 8-ounce glass every 15 minutes until the jug is half empty. If you arrive before 11 AM: Drink the other half of the Golytely jug at 11 PM night before procedure. If you arrive after 11 AM: Drink the other half of the Golytely jug at 6 AM day of procedure. For additional instructions refer to your colonoscopy prep instructions. (Patient not taking: Reported on 2/2/2024) 4000 mL 0    predniSONE (DELTASONE) 20 MG tablet Take two tablets (= 40mg) each day for 5 (five) days 10 tablet 0             Review of Systems:   The review of systems was positive for the following findings.  None.  The remainder of the review of systems was unremarkable.          Physical Exam:   All vitals have been reviewed  not currently breastfeeding.  No intake or output data in the 24 hours ending 04/11/24 0815  SHEENT examination revealed NC/AT, EOMI.  Examination of the chest revealed CTA.  Examination of the heart revealed RRR.  Examination of the abdomen revealed Soft, non tender.  The neuromuscular examination was NL.          Data:   All laboratory data reviewed  No results found  for any visits on 04/12/24.  -     Donnie Barajas MD, FACS

## 2024-04-12 ENCOUNTER — HOSPITAL ENCOUNTER (OUTPATIENT)
Facility: CLINIC | Age: 39
Discharge: HOME | End: 2024-04-12
Attending: SPECIALIST | Admitting: SPECIALIST
Payer: COMMERCIAL

## 2024-04-12 ENCOUNTER — ANESTHESIA EVENT (OUTPATIENT)
Dept: GASTROENTEROLOGY | Facility: CLINIC | Age: 39
End: 2024-04-12
Payer: COMMERCIAL

## 2024-04-12 ENCOUNTER — ANESTHESIA (OUTPATIENT)
Dept: GASTROENTEROLOGY | Facility: CLINIC | Age: 39
End: 2024-04-12
Payer: COMMERCIAL

## 2024-04-12 ENCOUNTER — MYC MEDICAL ADVICE (OUTPATIENT)
Dept: FAMILY MEDICINE | Facility: CLINIC | Age: 39
End: 2024-04-12

## 2024-04-12 VITALS
BODY MASS INDEX: 30.32 KG/M2 | TEMPERATURE: 97.6 F | HEART RATE: 85 BPM | HEIGHT: 65 IN | OXYGEN SATURATION: 100 % | SYSTOLIC BLOOD PRESSURE: 120 MMHG | RESPIRATION RATE: 16 BRPM | WEIGHT: 182 LBS | DIASTOLIC BLOOD PRESSURE: 79 MMHG

## 2024-04-12 LAB
COLONOSCOPY: NORMAL
GLUCOSE BLDC GLUCOMTR-MCNC: 121 MG/DL (ref 70–99)

## 2024-04-12 PROCEDURE — 82962 GLUCOSE BLOOD TEST: CPT

## 2024-04-12 PROCEDURE — 88305 TISSUE EXAM BY PATHOLOGIST: CPT | Mod: 26 | Performed by: PATHOLOGY

## 2024-04-12 PROCEDURE — 45380 COLONOSCOPY AND BIOPSY: CPT | Mod: PT | Performed by: SPECIALIST

## 2024-04-12 PROCEDURE — 258N000003 HC RX IP 258 OP 636: Performed by: NURSE ANESTHETIST, CERTIFIED REGISTERED

## 2024-04-12 PROCEDURE — 370N000017 HC ANESTHESIA TECHNICAL FEE, PER MIN: Performed by: SPECIALIST

## 2024-04-12 PROCEDURE — 88342 IMHCHEM/IMCYTCHM 1ST ANTB: CPT | Mod: 26 | Performed by: PATHOLOGY

## 2024-04-12 PROCEDURE — 88305 TISSUE EXAM BY PATHOLOGIST: CPT | Mod: TC | Performed by: SPECIALIST

## 2024-04-12 PROCEDURE — 250N000009 HC RX 250: Performed by: NURSE ANESTHETIST, CERTIFIED REGISTERED

## 2024-04-12 PROCEDURE — 250N000011 HC RX IP 250 OP 636: Performed by: NURSE ANESTHETIST, CERTIFIED REGISTERED

## 2024-04-12 RX ORDER — ACETAMINOPHEN 325 MG/1
975 TABLET ORAL EVERY 6 HOURS PRN
Status: DISCONTINUED | OUTPATIENT
Start: 2024-04-12 | End: 2024-04-12 | Stop reason: HOSPADM

## 2024-04-12 RX ORDER — SODIUM CHLORIDE, SODIUM LACTATE, POTASSIUM CHLORIDE, CALCIUM CHLORIDE 600; 310; 30; 20 MG/100ML; MG/100ML; MG/100ML; MG/100ML
INJECTION, SOLUTION INTRAVENOUS CONTINUOUS
Status: DISCONTINUED | OUTPATIENT
Start: 2024-04-12 | End: 2024-04-12 | Stop reason: HOSPADM

## 2024-04-12 RX ORDER — LIDOCAINE HYDROCHLORIDE 20 MG/ML
INJECTION, SOLUTION INFILTRATION; PERINEURAL PRN
Status: DISCONTINUED | OUTPATIENT
Start: 2024-04-12 | End: 2024-04-12

## 2024-04-12 RX ORDER — ONDANSETRON 4 MG/1
4 TABLET, ORALLY DISINTEGRATING ORAL EVERY 30 MIN PRN
Status: DISCONTINUED | OUTPATIENT
Start: 2024-04-12 | End: 2024-04-12 | Stop reason: HOSPADM

## 2024-04-12 RX ORDER — PROPOFOL 10 MG/ML
INJECTION, EMULSION INTRAVENOUS CONTINUOUS PRN
Status: DISCONTINUED | OUTPATIENT
Start: 2024-04-12 | End: 2024-04-12

## 2024-04-12 RX ORDER — LIDOCAINE 40 MG/G
CREAM TOPICAL
Status: DISCONTINUED | OUTPATIENT
Start: 2024-04-12 | End: 2024-04-12 | Stop reason: HOSPADM

## 2024-04-12 RX ORDER — NALOXONE HYDROCHLORIDE 0.4 MG/ML
0.1 INJECTION, SOLUTION INTRAMUSCULAR; INTRAVENOUS; SUBCUTANEOUS
Status: DISCONTINUED | OUTPATIENT
Start: 2024-04-12 | End: 2024-04-12 | Stop reason: HOSPADM

## 2024-04-12 RX ORDER — ONDANSETRON 2 MG/ML
4 INJECTION INTRAMUSCULAR; INTRAVENOUS EVERY 30 MIN PRN
Status: DISCONTINUED | OUTPATIENT
Start: 2024-04-12 | End: 2024-04-12 | Stop reason: HOSPADM

## 2024-04-12 RX ORDER — PROPOFOL 10 MG/ML
INJECTION, EMULSION INTRAVENOUS PRN
Status: DISCONTINUED | OUTPATIENT
Start: 2024-04-12 | End: 2024-04-12

## 2024-04-12 RX ADMIN — PROPOFOL 100 MG: 10 INJECTION, EMULSION INTRAVENOUS at 12:59

## 2024-04-12 RX ADMIN — SODIUM CHLORIDE, POTASSIUM CHLORIDE, SODIUM LACTATE AND CALCIUM CHLORIDE 10 ML/HR: 600; 310; 30; 20 INJECTION, SOLUTION INTRAVENOUS at 12:34

## 2024-04-12 RX ADMIN — LIDOCAINE HYDROCHLORIDE 50 MG: 20 INJECTION, SOLUTION INFILTRATION; PERINEURAL at 12:59

## 2024-04-12 RX ADMIN — PROPOFOL 250 MCG/KG/MIN: 10 INJECTION, EMULSION INTRAVENOUS at 12:59

## 2024-04-12 ASSESSMENT — ACTIVITIES OF DAILY LIVING (ADL)
ADLS_ACUITY_SCORE: 35
ADLS_ACUITY_SCORE: 37
ADLS_ACUITY_SCORE: 37

## 2024-04-12 ASSESSMENT — LIFESTYLE VARIABLES: TOBACCO_USE: 1

## 2024-04-12 NOTE — ANESTHESIA CARE TRANSFER NOTE
Patient: Vickie Barnhart    Procedure: Procedure(s):  COLONOSCOPY, WITH BIOPSY       Diagnosis: Ulcerative colitis (H) [K51.90]  Abnormal CT of the abdomen [R93.5]  Generalized abdominal pain [R10.84]  Diagnosis Additional Information: No value filed.    Anesthesia Type:   MAC     Note:    Oropharynx: oropharynx clear of all foreign objects and spontaneously breathing  Level of Consciousness: awake  Oxygen Supplementation: room air    Independent Airway: airway patency satisfactory and stable  Dentition: dentition unchanged  Vital Signs Stable: post-procedure vital signs reviewed and stable  Report to RN Given: handoff report given  Patient transferred to: Phase II    Handoff Report: Identifed the Patient, Identified the Reponsible Provider, Reviewed the pertinent medical history, Discussed the surgical course, Reviewed Intra-OP anesthesia mangement and issues during anesthesia, Set expectations for post-procedure period and Allowed opportunity for questions and acknowledgement of understanding      Vitals:  Vitals Value Taken Time   /79 04/12/24 1350   Temp     Pulse 85 04/12/24 1350   Resp 16 04/12/24 1350   SpO2 100 % 04/12/24 1359   Vitals shown include unfiled device data.    Electronically Signed By: RAJESH Matthews CRNA  April 12, 2024  2:38 PM

## 2024-04-12 NOTE — DISCHARGE INSTRUCTIONS
[facility]    Home Care Following Endoscopy          Activity:  You have just undergone an endoscopic procedure under sedation.  Do not work or operate machinery (including a car) for at least 12 hours.      Diet:  Return to the diet you were on before your procedure but eat lightly for the first 12-24 hours.  Drink plenty of water.  Resume any regular medications unless otherwise advised by your physician.  Please begin any new medication prescribed as a result of your procedure as directed by your physician.   If you had any biopsy or polyp removed please refrain from aspirin or aspirin products for 2 days.  If on Coumadin please restart as instructed by your physician.   Pain:  You may take Tylenol as needed for pain.  Expected Recovery:  You can expect some mild abdominal fullness and/or discomfort due to the air used to inflate your intestinal tract. I encourage you to walk and attempt to pass air as soon as possible.      Call Your Physician if You Have:  After Colonoscopy:  Worsening persisting abdominal pain which is worse with activity.  Fevers (>101 degrees F), chills or shakes.  Passage of continued blood with bowel movements.   Any questions or concerns about your recovery, please call [phone] or after hours [phone] Nurse Advice Line.    Follow-up Care:  If you had polyps/biopsy tissue sample(s) removed, the polyps/biopsy tissue sample(s) will be sent to pathology.  You should receive a letter in your My Chart with your results, within 1-2 weeks. If you do not participate in My Chart a physical letter will come in the mail in 2-3 weeks.  Please call if you have not received a notification of your results.

## 2024-04-12 NOTE — ANESTHESIA POSTPROCEDURE EVALUATION
Patient: Vickie Barnhart    Procedure: Procedure(s):  COLONOSCOPY, WITH BIOPSY       Anesthesia Type:  MAC    Note:  Disposition: Outpatient   Postop Pain Control: Uneventful            Sign Out: Well controlled pain   PONV: No   Neuro/Psych: Uneventful            Sign Out: Acceptable/Baseline neuro status   Airway/Respiratory: Uneventful            Sign Out: Acceptable/Baseline resp. status   CV/Hemodynamics: Uneventful            Sign Out: Acceptable CV status   Other NRE: NONE   DID A NON-ROUTINE EVENT OCCUR? No    Event details/Postop Comments:  Pt was happy with anesthesia care.  No complications.  I will follow up with the pt if needed.           Last vitals:  Vitals Value Taken Time   /79 04/12/24 1350   Temp     Pulse 85 04/12/24 1350   Resp 16 04/12/24 1350   SpO2 100 % 04/12/24 1359   Vitals shown include unfiled device data.    Electronically Signed By: RAJESH Matthews CRNA  April 12, 2024  2:39 PM

## 2024-04-12 NOTE — ANESTHESIA PREPROCEDURE EVALUATION
Anesthesia Pre-Procedure Evaluation    Patient: Vickie Barnhart   MRN: [id number] : 1985        Procedure : Procedure(s):  Colonoscopy          Past Medical History:   Diagnosis Date    Adjustment disorder with anxious mood 2015    Anxiety 2015    ASCUS of cervix with negative high risk HPV 2008    Depressive disorder, not elsewhere classified     Diabetic eye exam (H) 14    Hx of tubal ligation 1/15/2021    Mixed hyperlipidemia 2006    Regional enteritis of unspecified site     Ulcerative Colitis    Type I (juvenile type) diabetes mellitus with ketoacidosis, not stated as uncontrolled(250.11) 2007    Moderately severe intensity.    Type I (juvenile type) diabetes mellitus with ketoacidosis, uncontrolled 08    Type I (juvenile type) diabetes mellitus without mention of complication, not stated as uncontrolled     diagnosed in     Ulcerative colitis, unspecified     remission. Last flare 6 yrs ago.       Past Surgical History:   Procedure Laterality Date    COLONOSCOPY N/A 2023    Procedure: Colonoscopy;  Surgeon: Jude Nix MD;  Location:  GI    DILATION AND CURETTAGE SUCTION  2010    miscarriage    RETINAL DETACHMENT SURGERY Left     TUBAL LIGATION      [facility]  [facility]    [facility] COL[facility]WO BRUSH/WASH        No Known Allergies   Social History     Tobacco Use    Smoking status: Some Days     Current packs/day: 0.50     Average packs/day: 0.5 packs/day for 16.0 years (8.0 ttl pk-yrs)     Types: Cigarettes    Smokeless tobacco: Never    Tobacco comments:     working on quiting, somedays up to 4 cigs per day   Substance Use Topics    Alcohol use: Yes     Alcohol/week: 0.0 standard drinks of alcohol     Comment: occassionally      Wt Readings from Last 1 Encounters:   24 82.6 kg (182 lb)        Anesthesia Evaluation   Pt has had prior anesthetic. Type: MAC and General.    No history of anesthetic complications        ROS/MED HX  ENT/Pulmonary:     (+)                tobacco use, Current use,             recent URI, unresolved, COVID at the end of Jan with prednisone given:        Neurologic:  - neg neurologic ROS     Cardiovascular:     (+)  - -   -  - -                                 Previous cardiac testing   Echo: Date: Results:    Stress Test:  Date: Results:    ECG Reviewed:  Date: 1-15-21 Results:  Sinus  Rhythm   WITHIN NORMAL LIMITS  Cath:  Date: Results:      METS/Exercise Tolerance:     Hematologic:  - neg hematologic  ROS     Musculoskeletal:  - neg musculoskeletal ROS     GI/Hepatic: Comment: - Ulcerative colitis     (+)        bowel prep,         (-) GERD   Renal/Genitourinary:       Endo:     (+) type I DM,  Last HgA1c: 6.3, date: 5-12-23, Using insulin, - using insulin pump. Normal glucose range: 98 pre-op,               Psychiatric/Substance Use:     (+) psychiatric history anxiety and depression       Infectious Disease:  - neg infectious disease ROS     Malignancy:  - neg malignancy ROS     Other:  - neg other ROS          Physical Exam    Airway        Mallampati: II   TM distance: > 3 FB   Neck ROM: full   Mouth opening: > 3 cm    Respiratory Devices and Support         Dental       (+) Minor Abnormalities - some fillings, tiny chips      Cardiovascular   cardiovascular exam normal       Rhythm and rate: regular and normal     Pulmonary   pulmonary exam normal        breath sounds clear to auscultation           OUTSIDE LABS:  CBC:   Lab Results   Component Value Date    WBC 8.2 04/04/2024    WBC 8.9 01/31/2024    HGB 11.9 04/04/2024    HGB 12.9 01/31/2024    HCT 34.2 (L) 04/04/2024    HCT 37.5 01/31/2024     04/04/2024     01/31/2024     BMP:   Lab Results   Component Value Date     04/04/2024     02/02/2024    POTASSIUM 3.2 (L) 04/04/2024    POTASSIUM 3.6 02/02/2024    CHLORIDE 103 04/04/2024    CHLORIDE 103 02/02/2024    CO2 20 (L) 04/04/2024    CO2 26 02/02/2024    BUN 5.9  (L) 04/04/2024    BUN 8.2 02/02/2024    CR 1.06 (H) 04/04/2024    CR 1.04 (H) 02/02/2024     (H) 04/04/2024     (H) 02/02/2024     COAGS:   Lab Results   Component Value Date    PTT 30 06/26/2017    INR 0.89 06/26/2017     POC:   Lab Results   Component Value Date     (H) 10/08/2017    HCG Negative 06/27/2019     HEPATIC:   Lab Results   Component Value Date    ALBUMIN 3.9 04/04/2024    PROTTOTAL 6.9 04/04/2024    ALT 23 04/04/2024    AST 18 04/04/2024    ALKPHOS 114 04/04/2024    BILITOTAL 0.4 04/04/2024     OTHER:   Lab Results   Component Value Date    LACT 0.7 04/04/2024    A1C 6.7 (H) 01/31/2024    YELENA 8.7 04/04/2024    PHOS 4.0 10/08/2017    MAG 1.6 (L) 04/04/2024    LIPASE 6 (L) 04/04/2024    AMYLASE 44 05/29/2014    TSH 3.91 01/17/2023    T4 1.02 06/26/2017    CRP <2.9 10/07/2017    SED 87 (H) 04/04/2024       Anesthesia Plan    ASA Status:  2    NPO Status:  NPO Appropriate    Anesthesia Type: MAC.     - Reason for MAC: straight local not clinically adequate   Induction: Intravenous, Propofol.   Maintenance: TIVA.        Consents    Anesthesia Plan(s) and associated risks, benefits, and realistic alternatives discussed. Questions answered and patient/representative(s) expressed understanding.     - Discussed:     - Discussed with:  Patient      - Extended Intubation/Ventilatory Support Discussed: No.      - Patient is DNR/DNI Status: No     Use of blood products discussed: No .     Postoperative Care       PONV prophylaxis: Background Propofol Infusion     Comments:    Other Comments: The risks and benefits of anesthesia, and the alternatives where applicable, have been discussed with the patient, and they wish to proceed.                 RAJESH Matthews CRNA   Fall [facility]

## 2024-04-15 ENCOUNTER — TELEPHONE (OUTPATIENT)
Dept: FAMILY MEDICINE | Facility: CLINIC | Age: 39
End: 2024-04-15
Payer: COMMERCIAL

## 2024-04-15 DIAGNOSIS — K51.80 OTHER ULCERATIVE COLITIS WITHOUT COMPLICATION (H): Primary | ICD-10-CM

## 2024-04-15 NOTE — TELEPHONE ENCOUNTER
Order/Referral Request    Who is requesting:  colonoscopy dept     Orders being requested: referral for GI     Reason service is needed/diagnosis: colitis/ pt had colonoscopy on Friday.     When are orders needed by: ASAP     Has this been discussed with Provider: No    Does patient have a preference on a Group/Provider/Facility?     Does patient have an appointment scheduled?: No    Where to send orders: Place orders within Epic    Could we send this information to you in [facility] or would you prefer to receive a phone call?:   No preference   Okay to leave a detailed message?: Yes at Cell number on file:    Telephone Information:   Mobile [phone]

## 2024-04-15 NOTE — TELEPHONE ENCOUNTER
Orders signed.  Sending back to team to follow-up on notification and/or subsequent scheduling.    Alvaro Guerrero MD

## 2024-04-17 ENCOUNTER — TELEPHONE (OUTPATIENT)
Dept: GASTROENTEROLOGY | Facility: CLINIC | Age: 39
End: 2024-04-17
Payer: COMMERCIAL

## 2024-04-17 LAB
PATH REPORT.COMMENTS IMP SPEC: NORMAL
PATH REPORT.FINAL DX SPEC: NORMAL
PATH REPORT.GROSS SPEC: NORMAL
PATH REPORT.MICROSCOPIC SPEC OTHER STN: NORMAL
PATH REPORT.MICROSCOPIC SPEC OTHER STN: NORMAL
PATH REPORT.RELEVANT HX SPEC: NORMAL
PHOTO IMAGE: NORMAL

## 2024-04-17 NOTE — TELEPHONE ENCOUNTER
[facility] Call Center    Phone Message    May a detailed message be left on voicemail: Yes    Reason for Call: Other: Patient is currently scheduled on 5/15/24, as visit type New IBD Emergent. This is outside the expected timeline for this referral. Patient has been added to the waitlist.      Action Taken: Message routed to:  Other: GI REFERRAL TRIAGE POOL     Travel Screening: Not Applicable

## 2024-04-18 ENCOUNTER — MYC REFILL (OUTPATIENT)
Dept: ENDOCRINOLOGY | Facility: CLINIC | Age: 39
End: 2024-04-18
Payer: COMMERCIAL

## 2024-04-18 ENCOUNTER — MYC MEDICAL ADVICE (OUTPATIENT)
Dept: FAMILY MEDICINE | Facility: CLINIC | Age: 39
End: 2024-04-18
Payer: COMMERCIAL

## 2024-04-18 DIAGNOSIS — E10.39 TYPE 1 DIABETES MELLITUS WITH OTHER OPHTHALMIC COMPLICATION (H): ICD-10-CM

## 2024-04-18 NOTE — TELEPHONE ENCOUNTER
NOVOLOG 100U/ML SOLN FOR INJ     Last Written Prescription Date:  11/17/23  Last Fill Quantity: 60 ml ,   # refills: 0  Last Office Visit : 11/14/22  Future Office visit:  4/30/24    Routing refill request to provider for review/approval because:  Insulin and insulin pump supplies - refilled per Endocrine clinic.

## 2024-04-19 ENCOUNTER — MYC MEDICAL ADVICE (OUTPATIENT)
Dept: ENDOCRINOLOGY | Facility: CLINIC | Age: 39
End: 2024-04-19
Payer: COMMERCIAL

## 2024-04-19 DIAGNOSIS — E10.39 TYPE 1 DIABETES MELLITUS WITH OTHER OPHTHALMIC COMPLICATION (H): ICD-10-CM

## 2024-04-19 DIAGNOSIS — E10.39 TYPE 1 DIABETES MELLITUS WITH OTHER OPHTHALMIC COMPLICATION (H): Primary | ICD-10-CM

## 2024-04-19 RX ORDER — INSULIN ASPART 100 [IU]/ML
INJECTION, SOLUTION INTRAVENOUS; SUBCUTANEOUS
Qty: 60 ML | Refills: 0 | OUTPATIENT
Start: 2024-04-19

## 2024-04-19 RX ORDER — INSULIN ASPART 100 [IU]/ML
INJECTION, SOLUTION INTRAVENOUS; SUBCUTANEOUS
Qty: 60 ML | Refills: 0 | Status: SHIPPED | OUTPATIENT
Start: 2024-04-19 | End: 2024-04-30

## 2024-04-19 NOTE — TELEPHONE ENCOUNTER
Writer called and talked with Pt. Writer offered Pt a sooner appointment with Dr. Delgadillo on 4/23/2024 at 2:20 PM. Pt declined the appointment due to the location and would like to stick to [facility] only. Writer informed Pt that Writer will reach back out to the Pt when a sooner availability opens up. Pt expressed understanding of the plan.

## 2024-04-20 NOTE — TELEPHONE ENCOUNTER
INSULIN ASPART FLEXPEN INJ, 3ML       Last Written Prescription Date:  2-24-23  Last Fill Quantity: 15 ml,   # refills: 4  Last Office Visit : 11-14-22  Future Office visit: 4-30-24    Routing refill request to provider for review/approval because:  Insulin and insulin pump supplies - refilled per Endocrine clinic.

## 2024-04-22 RX ORDER — INSULIN ASPART 100 [IU]/ML
INJECTION, SOLUTION INTRAVENOUS; SUBCUTANEOUS
Qty: 15 ML | Refills: 0 | Status: SHIPPED | OUTPATIENT
Start: 2024-04-22

## 2024-04-23 RX ORDER — INSULIN ASPART 100 [IU]/ML
INJECTION, SOLUTION INTRAVENOUS; SUBCUTANEOUS
Qty: 60 ML | Refills: 0 | OUTPATIENT
Start: 2024-04-23

## 2024-04-23 NOTE — TELEPHONE ENCOUNTER
called to offer Pt a sooner appointment. Pt accepted an appointment with Dr. Rodaret on 4/24/2024 at 10:30 AM for a virtual visit.

## 2024-04-24 ENCOUNTER — VIRTUAL VISIT (OUTPATIENT)
Dept: GASTROENTEROLOGY | Facility: CLINIC | Age: 39
End: 2024-04-24
Attending: FAMILY MEDICINE
Payer: COMMERCIAL

## 2024-04-24 VITALS — BODY MASS INDEX: 29.99 KG/M2 | WEIGHT: 180 LBS | HEIGHT: 65 IN

## 2024-04-24 DIAGNOSIS — K51.80 OTHER ULCERATIVE COLITIS WITHOUT COMPLICATION (H): Primary | ICD-10-CM

## 2024-04-24 PROCEDURE — 99204 OFFICE O/P NEW MOD 45 MIN: CPT | Mod: 95 | Performed by: INTERNAL MEDICINE

## 2024-04-24 RX ORDER — MESALAMINE 1.2 G/1
1200 TABLET, DELAYED RELEASE ORAL
Qty: 4800 TABLET | Refills: 3 | Status: SHIPPED | OUTPATIENT
Start: 2024-04-24 | End: 2024-06-24

## 2024-04-24 RX ORDER — DICYCLOMINE HCL 20 MG
20 TABLET ORAL 4 TIMES DAILY PRN
Qty: 90 TABLET | Refills: 3 | Status: SHIPPED | OUTPATIENT
Start: 2024-04-24

## 2024-04-24 ASSESSMENT — PAIN SCALES - GENERAL: PAINLEVEL: MILD PAIN (3)

## 2024-04-24 NOTE — PATIENT INSTRUCTIONS
Stop NSAIDs (ibuprofen, motrin, aleve, naproxen, advil, etc).  Acetaminophen is okay for patients with IBD but if it doesn't work for you - discuss further with your primary care provider  You can use dicyclomine (bentyl) as needed for abdominal pain  Please start mesalamine

## 2024-04-24 NOTE — PROGRESS NOTES
"Virtual Visit Details    Type of service:  Video Visit     Originating Location (pt. Location): {video visit patient location:647994::\"Home\"}  {PROVIDER LOCATION On-site should be selected for visits conducted from your clinic location or adjoining [facility] hospital, academic office, or other nearby [facility] building. Off-site should be selected for all other provider locations, including home:546172}  Distant Location (provider location):  {virtual location provider:274595}  Platform used for Video Visit: {Virtual Visit Platforms:000256::\"TalkTo\"}  "

## 2024-04-24 NOTE — NURSING NOTE
Is the patient currently in the state of [place]? YES    Visit mode:VIDEO    If the visit is dropped, the patient can be reconnected by: VIDEO VISIT: Text to cell phone:   Telephone Information:   Mobile [phone]       Will anyone else be joining the visit? NO  (If patient encounters technical issues they should call [phone])    How would you like to obtain your AVS? MyChart    Are changes needed to the allergy or medication list? No    Are refills needed on medications prescribed by this physician? YES    Reason for visit: Video Visit (Consult IBD)    Yee COLLADO

## 2024-04-24 NOTE — PROGRESS NOTES
HPI:    Vickie  presents today for a video visit to discuss UC which was diagnosed as a child.  Hasn't been on therapy for many years - doesn't remember what she was on before.  Was doing well up until January when she developed worsening diarrhea and abdominal pain as well as blood in the stool.  Was seen in the ER x 2 (initially in January and again in march) for these symptoms - was given course of steroids after imaging showed evidence of colitis.  Had colonoscopy on 4/12 which showed pancolitis - biopsies showing acute on chronic inflammation.     Having 4-5 bowel movements a day - sometimes with blood.  Wakes up 3-4 times a night for bowel movements.    Quit smoking in December. No family history of IBD  Does use NSAIDs a few time a week.       Past Medical History:   Diagnosis Date    Adjustment disorder with anxious mood 11/23/2015    Anxiety 11/27/2015    ASCUS of cervix with negative high risk HPV 06/19/2008    Depressive disorder, not elsewhere classified     Diabetic eye exam (H) 12/19/14    Hx of tubal ligation 1/15/2021    Mixed hyperlipidemia 11/30/2006    Regional enteritis of unspecified site     Ulcerative Colitis    Type I (juvenile type) diabetes mellitus with ketoacidosis, not stated as uncontrolled(250.11) 01/01/2007    Moderately severe intensity.    Type I (juvenile type) diabetes mellitus with ketoacidosis, uncontrolled 02/14/08    Type I (juvenile type) diabetes mellitus without mention of complication, not stated as uncontrolled     diagnosed in 2003    Ulcerative colitis, unspecified     remission. Last flare 6 yrs ago.        Past Surgical History:   Procedure Laterality Date    COLONOSCOPY N/A 7/25/2023    Procedure: Colonoscopy;  Surgeon: Jude Nix MD;  Location:  GI    COLONOSCOPY N/A 4/12/2024    Procedure: COLONOSCOPY, WITH BIOPSY;  Surgeon: Donnie Barajas MD;  Location:  GI    DILATION AND CURETTAGE SUCTION  04/2010    miscarriage    RETINAL DETACHMENT SURGERY  Left     TUBAL LIGATION      [facility]UST COLONOSCOPY W BIOPSY  08/16/2006    ZZ COLONOSCOPY W/WO BRUSH/WASH         Family History   Problem Relation Age of Onset    Hypertension Father     Diabetes Maternal Grandmother     Cancer Maternal Grandmother     Diabetes Paternal Grandfather     Diabetes Maternal Uncle     Diabetes Maternal Aunt        Social History     Tobacco Use    Smoking status: Some Days     Current packs/day: 0.50     Average packs/day: 0.5 packs/day for 16.0 years (8.0 ttl pk-yrs)     Types: Cigarettes    Smokeless tobacco: Never    Tobacco comments:     working on quiting, somedays up to 4 cigs per day   Substance Use Topics    Alcohol use: Yes     Alcohol/week: 0.0 standard drinks of alcohol     Comment: occassionally        O:    Gen: no acute distress  HEENT: NCAT  Neck: normal ROM  Resp: nonlabored breathing  Neuro: no gross deficits  Psych: appropriate mood and affect    Assessment and Plan:    # UC pancolitis - longstanding but hasn't been on any therapy in quite some time.  Acute on chronic inflammation on colonoscopy as well as focally positive stains for CMV.  Will start mesalamine and monitor.  Patient is taking a significant amount of NSAIDs and I have advised her to completely abstain from all of these - reports acetaminophen causes her blood sugar to increase so may need to discuss other options with her PCP.  Will get prebiologic labs in the event that she doesn't respond to treatment. Will also check a fecal calprotectin    RTC 2 months    Tatianna Rodarte, DO     Video-Visit Details     Type of service:  Video Visit     Video Start Time: 10:27 AM  Video End Time (time video stopped): 10:40     Originating Location (pt. Location): work     Distant Location (provider location):  [facility]      Mode of Communication:  Video Conference via frenting

## 2024-04-30 ENCOUNTER — OFFICE VISIT (OUTPATIENT)
Dept: ENDOCRINOLOGY | Facility: CLINIC | Age: 39
End: 2024-04-30
Payer: COMMERCIAL

## 2024-04-30 VITALS
DIASTOLIC BLOOD PRESSURE: 88 MMHG | OXYGEN SATURATION: 98 % | HEART RATE: 74 BPM | BODY MASS INDEX: 28.79 KG/M2 | RESPIRATION RATE: 16 BRPM | WEIGHT: 173 LBS | SYSTOLIC BLOOD PRESSURE: 137 MMHG

## 2024-04-30 DIAGNOSIS — E10.39 TYPE 1 DIABETES MELLITUS WITH OTHER OPHTHALMIC COMPLICATION (H): Primary | ICD-10-CM

## 2024-04-30 DIAGNOSIS — E87.6 HYPOKALEMIA: ICD-10-CM

## 2024-04-30 DIAGNOSIS — Z46.81 INSULIN PUMP TITRATION: ICD-10-CM

## 2024-04-30 LAB
ANION GAP SERPL CALCULATED.3IONS-SCNC: 9 MMOL/L (ref 7–15)
BUN SERPL-MCNC: 14.8 MG/DL (ref 6–20)
CALCIUM SERPL-MCNC: 8.8 MG/DL (ref 8.6–10)
CHLORIDE SERPL-SCNC: 105 MMOL/L (ref 98–107)
CHOLEST SERPL-MCNC: 153 MG/DL
CREAT SERPL-MCNC: 0.94 MG/DL (ref 0.51–0.95)
DEPRECATED HCO3 PLAS-SCNC: 27 MMOL/L (ref 22–29)
EGFRCR SERPLBLD CKD-EPI 2021: 78 ML/MIN/1.73M2
FASTING STATUS PATIENT QL REPORTED: NO
GLUCOSE SERPL-MCNC: 121 MG/DL (ref 70–99)
HBA1C MFR BLD: 6.2 % (ref 0–5.6)
HDLC SERPL-MCNC: 37 MG/DL
LDLC SERPL CALC-MCNC: 90 MG/DL
NONHDLC SERPL-MCNC: 116 MG/DL
POTASSIUM SERPL-SCNC: 3.6 MMOL/L (ref 3.4–5.3)
SODIUM SERPL-SCNC: 141 MMOL/L (ref 135–145)
TRIGL SERPL-MCNC: 128 MG/DL
TSH SERPL DL<=0.005 MIU/L-ACNC: 1.81 UIU/ML (ref 0.3–4.2)

## 2024-04-30 PROCEDURE — 83036 HEMOGLOBIN GLYCOSYLATED A1C: CPT | Performed by: INTERNAL MEDICINE

## 2024-04-30 PROCEDURE — 87340 HEPATITIS B SURFACE AG IA: CPT | Performed by: INTERNAL MEDICINE

## 2024-04-30 PROCEDURE — 80061 LIPID PANEL: CPT | Performed by: INTERNAL MEDICINE

## 2024-04-30 PROCEDURE — 80048 BASIC METABOLIC PNL TOTAL CA: CPT | Performed by: INTERNAL MEDICINE

## 2024-04-30 PROCEDURE — 82043 UR ALBUMIN QUANTITATIVE: CPT | Performed by: INTERNAL MEDICINE

## 2024-04-30 PROCEDURE — 82570 ASSAY OF URINE CREATININE: CPT | Performed by: INTERNAL MEDICINE

## 2024-04-30 PROCEDURE — 84443 ASSAY THYROID STIM HORMONE: CPT | Performed by: INTERNAL MEDICINE

## 2024-04-30 PROCEDURE — 99215 OFFICE O/P EST HI 40 MIN: CPT | Performed by: INTERNAL MEDICINE

## 2024-04-30 PROCEDURE — 86706 HEP B SURFACE ANTIBODY: CPT | Performed by: INTERNAL MEDICINE

## 2024-04-30 PROCEDURE — 86481 TB AG RESPONSE T-CELL SUSP: CPT | Performed by: INTERNAL MEDICINE

## 2024-04-30 PROCEDURE — 86803 HEPATITIS C AB TEST: CPT | Performed by: INTERNAL MEDICINE

## 2024-04-30 PROCEDURE — 36415 COLL VENOUS BLD VENIPUNCTURE: CPT | Performed by: INTERNAL MEDICINE

## 2024-04-30 PROCEDURE — 86704 HEP B CORE ANTIBODY TOTAL: CPT | Performed by: INTERNAL MEDICINE

## 2024-04-30 PROCEDURE — 86708 HEPATITIS A ANTIBODY: CPT | Performed by: INTERNAL MEDICINE

## 2024-04-30 RX ORDER — PROCHLORPERAZINE 25 MG/1
SUPPOSITORY RECTAL
Qty: 9 EACH | Refills: 3 | Status: SHIPPED | OUTPATIENT
Start: 2024-04-30

## 2024-04-30 RX ORDER — PROCHLORPERAZINE 25 MG/1
SUPPOSITORY RECTAL
Qty: 1 EACH | Refills: 3 | Status: SHIPPED | OUTPATIENT
Start: 2024-04-30

## 2024-04-30 RX ORDER — INSULIN GLARGINE 100 [IU]/ML
INJECTION, SOLUTION SUBCUTANEOUS
Status: SHIPPED
Start: 2024-04-30

## 2024-04-30 RX ORDER — INSULIN ASPART 100 [IU]/ML
INJECTION, SOLUTION INTRAVENOUS; SUBCUTANEOUS
Qty: 60 ML | Refills: 3 | Status: SHIPPED | OUTPATIENT
Start: 2024-04-30

## 2024-04-30 NOTE — NURSING NOTE
Vickie Barnhart's goals for this visit include:   Chief Complaint   Patient presents with    Follow Up     DM       She requests these members of her care team be copied on today's visit information: yes    PCP: Alvaro Guerrero    Referring Provider:  No referring provider defined for this encounter.    /88 (BP Location: Left arm, Patient Position: Sitting, Cuff Size: Adult Regular)   Pulse 74   Resp 16   Wt 78.5 kg (173 lb)   SpO2 98%   BMI 28.79 kg/m      Do you need any medication refills at today's visit? Yes    Zoltan Smith, EMT

## 2024-04-30 NOTE — RESULT ENCOUNTER NOTE
Hello -    Here are my comments about your recent results:  -HDL(good) cholesterol level is low which can increase your heart disease risk.  A diet high in fat and simple carbohydrates, genetics and being overweight can contribute to this. Your LDL(bad) cholesterol and trigylceride levels are normal.  ADVISE: exercising 150 minutes of aerobic exercise per week (30 minutes 5 days per week or 50 minutes 3 days per week are options), and omega-3 fatty acids (fish oil) 6826-7623 mg daily are helpful to improve this.     -Kidney function is stable (Cr, GFR), Sodium is normal, Potassium is normal, Calcium is normal.  -A1C (test of diabetes control the last 2-3 months) is at your goal. Please continue with your current plan.   -TSH (thyroid stimulating hormone) level is normal which indicates normal thyroid function.  For additional lab test information, labtestsonline.org is an excellent reference..    Please let us know if you have any questions or concerns.     Regards,  Siri Rhoades MD

## 2024-04-30 NOTE — PROGRESS NOTES
Endocrinology Clinic Visit    Chief Complaint: Follow Up (DM)     Information obtained from:Patient      Assessment/Treatment Plan:      Type 1 diabetes complicated with retinopathy     Currently being managed with insulin pump tandem & CGM. 28 days CGM and insulin pump download reviewed. Overall, patient has had significant improvement since starting CGM with hgba1c decreasing to 6.2% today. She has some low glucoses related to not taking carb correction until after she has already started eating. Discussed the importance of entering carb bolus 10-15 minutes prior to when she starts eating. Also discussed the need to start statin in the near future given age 38, however, will defer starting today until her ulcerative colitis is more stable.     Plan:  Continue current pump settings. Patient will focus on entering carb bolus 10-15 minutes prior to eating.    Pump settings:   Basal = 0.45 units per hour  CHO 1: 11 units, 1:10 starting from 7:00 am  Correction factor 1:60 mg/dl  Target 110    - Refilled glucagon  - Refilled insulin glargine 10u in case of pump failure  - Has eye exam tomorrow  - Blood pressure is well-controlled   - Start statin once ulcerative colitis is under control      Component      Latest Ref Rng 4/30/2024  2:04 PM   Sodium      135 - 145 mmol/L 141    Potassium      3.4 - 5.3 mmol/L 3.6    Chloride      98 - 107 mmol/L 105    Carbon Dioxide (CO2)      22 - 29 mmol/L 27    Anion Gap      7 - 15 mmol/L 9    Urea Nitrogen      6.0 - 20.0 mg/dL 14.8    Creatinine      0.51 - 0.95 mg/dL 0.94    GFR Estimate      >60 mL/min/1.73m2 78    Calcium      8.6 - 10.0 mg/dL 8.8    Glucose      70 - 99 mg/dL 121 (H)    Cholesterol      <200 mg/dL 153    Triglycerides      <150 mg/dL 128    HDL Cholesterol      >=50 mg/dL 37 (L)    LDL Cholesterol Calculated      <=100 mg/dL 90    Non HDL Cholesterol      <130 mg/dL 116    Patient Fasting? No     Hemoglobin A1C      0.0 - 5.6 % 6.2 (H)    TSH      0.30 - 4.20 uIU/mL 1.81       Orders Placed This Encounter   Procedures    Albumin Random Urine Quantitative with Creat Ratio    Lipid panel reflex to direct LDL Fasting    Hemoglobin A1c    TSH with free T4 reflex    Basic metabolic panel    Quantiferon TB Gold Plus Grey Tube    Quantiferon TB Gold Plus Green Tube    Quantiferon TB Gold Plus Yellow Tube    Quantiferon TB Gold Plus Purple Tube     Return to clinic in 3 months for RN diabetes visit     Return to clinic in 6 months for follow-up    Jennifer Mistry MD  Internal Medicine Resident, PGY3    Staffed with:   Siri Rhoades MD  Staff Endocrinologist    Division of Endocrinology and Diabetes      Subjective:         HPI: Vickie Duque is a 36 year old female with history of type 1 diabetes.    Currently on tandem insulin pump with settings as below. She has been using insulin IQ.    Current Settings:   Basal = 0.45 units per hour  CHO 1: 11 units   Correction factor 1:60 mg/dl  Target 110.     She has overall been doing much better with her DM1 since starting CGM. She denies significant hypoglycemia episodes. She feels like the pump has helped a lot. She has not needed to use glucagon in a long time and states the prescription has . She sometimes does not enter the carb correction until after she has already started eating. She has not had significant numbness or tingling.     She has been struggling with her ulcerative colitis recently and has had two recent flares in . She required a short course of prednisone at the end of 2024. She quickly had an additional UC flare shortly thereafter. She recently saw GI who prescribed mesalamine. She just started this a few days ago. She endorses ongoing abdominal pain, increased stools and diarrhea related to her UC.      No Known Allergies    Current Outpatient Medications   Medication Sig Dispense Refill    acetaminophen (TYLENOL) 500 MG  tablet Take 500-1,000 mg by mouth every 6 hours as needed for mild pain      bisacodyl (DULCOLAX) 5 MG EC tablet Two days prior to exam take two (2) tablets at 4pm. One day prior to exam take two (2) tablets at 4pm 4 tablet 0    bisacodyl (DULCOLAX) 5 MG EC tablet Take 2 tablets at 3 pm the day before your procedure. If your procedure is before 11 am, take 2 additional tablets at 11 pm. If your procedure is after 11 am, take 2 additional tablets at 6 am. For additional instructions refer to your colonoscopy prep instructions. 4 tablet 0    Continuous Glucose Sensor (DEXCOM G6 SENSOR) MISC Change every 10 days. 9 each 3    Continuous Glucose Transmitter (DEXCOM G6 TRANSMITTER) MISC Change every 90 days. 1 each 3    dicyclomine (BENTYL) 20 MG tablet Take 1 tablet (20 mg) by mouth 4 times daily as needed (abdominal pain) 90 tablet 3    FLUoxetine (PROZAC) 20 MG capsule Take 3 capsules (60 mg) by mouth daily 270 capsule 4    gabapentin (NEURONTIN) 300 MG capsule Take 1 capsule (300 mg) by mouth At Bedtime 90 capsule 1    Glucagon (GVOKE HYPOPEN) 1 MG/0.2ML pen Inject the contents of 1 device under the skin into lower abdomen, outer thigh, or outer upper arm as needed for hypoglycemia. If no response after 15 minutes, additional 1 mg dose from a new device may be injected while waiting for emergency assistance. 2 each 3    HYDROcodone-acetaminophen (NORCO) 5-325 MG tablet Take 1 tablet by mouth every 6 hours as needed for severe pain 6 tablet 0    insulin aspart (NOVOLOG FLEXPEN) 100 UNIT/ML pen Take 1 unit per 10 grams of carb plus 1 unit for every 60 above 150 Max dose 60 unit daily while off pump 15 mL 4    Insulin Aspart FlexPen 100 UNIT/ML SOPN INJECT 1U/10GRAM CARB PLUS 1U PER EVERY 60 ABOVE 150 WHILE OFF PUMP, MAX 60 UNITS DAILY 15 mL 0    insulin glargine (LANTUS SOLOSTAR) 100 UNIT/ML pen Take 10 units daily while off pump      Insulin Infusion Pump Supplies (T:SLIM T:LOCK INSULIN CART 3ML) MISC 1 each See Admin  Instructions Change insulin pump cartridge every 2-3 days. 40 each 3    insulin pen needle (BD TARA U/F) 32G X 4 MM miscellaneous USE 3 DAILY OR AS DIRECTED. 270 each 3    levonorgestrel (MIRENA) 20 MCG/24HR IUD 1 each (20 mcg) by Intrauterine route once      mesalamine (LIALDA) 1.2 g DR tablet Take 1 tablet (1,200 mg) by mouth daily (with breakfast) 4800 tablet 3    Naproxen Sodium (ALEVE PO) Take by mouth as needed for moderate pain       NOVOLOG FLEXPEN 100 UNIT/ML soln 1 unit for every 12 grams of carbohydrates with meals three times per day. Plus correction scale; uses up to 60 units daily. 45 mL 1    NOVOLOG VIAL 100 UNIT/ML soln Uses up to 60 units per day in insulin pump for basal, bolus, and priming of insulin pump tubing. 60 mL 3    oxyCODONE (ROXICODONE) 5 MG tablet Take 1 tablet (5 mg) by mouth every 6 hours as needed for severe pain 12 tablet 0    polyethylene glycol (GOLYTELY) 236 g suspension Take as directed. Two days before your exam fill the first container with water. Cover and shake until mixed well. At 5:00pm drink one 8oz glass every 10-15 minutes until half (1/2) of the first container is empty. Store the remainder in the refrigerator. One day before your exam at 5:00pm drink the second half of the first container until it is gone. Before you go to bed mix the second container with water and put in refrigerator. Six hours before your check in time drink one 8oz glass every 10-15 minutes until half of container is empty. Discard the remainder of solution. 8000 mL 0    polyethylene glycol (GOLYTELY) 236 g suspension The night before the exam at 6 pm drink an 8-ounce glass every 15 minutes until the jug is half empty. If you arrive before 11 AM: Drink the other half of the Golytely jug at 11 PM night before procedure. If you arrive after 11 AM: Drink the other half of the Golytely jug at 6 AM day of procedure. For additional instructions refer to your colonoscopy prep instructions. 4000 mL 0     predniSONE (DELTASONE) 20 MG tablet Take two tablets (= 40mg) each day for 5 (five) days 10 tablet 0    fluticasone (FLONASE) 50 MCG/ACT nasal spray Spray 1 spray into both nostrils daily (Patient not taking: Reported on 2/2/2024) 16 g 1       Review of Systems      as per HPI above    Objective:   /88 (BP Location: Left arm, Patient Position: Sitting, Cuff Size: Adult Regular)   Pulse 74   Resp 16   Wt 78.5 kg (173 lb)   SpO2 98%   BMI 28.79 kg/m      Constitutional: Pleasant no acute cardiopulmonary distress. Non-toxic appearing  HEENT: Normocephalic, atraumatic. No lymphadenopathy  Thyroid: normal size and texture, no nodule palpable, no bruits  Heart: regular rhythm, S1S2, no murmur appreciated  Lung: clear to auscultation bilaterally  Abdomen: soft, NT/ND, no hepatomegaly  Legs: no significant lower extremity edema  Feet: no deformities or ulcers, 2+ DP pulses, normal monofilament sensation  Psychological: appropriate mood and affect     In House Labs:     Hemoglobin A1C   Date Value Ref Range Status   01/31/2024 6.7 (H) <5.7 % Final     Comment:     Normal <5.7%   Prediabetes 5.7-6.4%    Diabetes 6.5% or higher     Note: Adopted from ADA consensus guidelines.   10/12/2021 8.7 (A) 0.0 - 5.7 % Final       TSH   Date Value Ref Range Status   01/17/2023 3.91 0.40 - 4.00 mU/L Final   06/23/2022 2.26 0.40 - 4.00 mU/L Final   01/04/2021 3.96 0.40 - 4.00 mU/L Final   03/19/2019 2.95 0.40 - 4.00 mU/L Final   10/07/2017 2.57 0.40 - 4.00 mU/L Final   06/26/2017 6.05 (H) 0.40 - 4.00 mU/L Final   03/05/2014 3.97 0.4 - 5.0 mU/L Final     T4 Free   Date Value Ref Range Status   06/26/2017 1.02 0.76 - 1.46 ng/dL Final   03/28/2006 1.10 0.70 - 1.85 ng/dL Final       Creatinine   Date Value Ref Range Status   04/04/2024 1.06 (H) 0.51 - 0.95 mg/dL Final   06/04/2021 0.94 0.52 - 1.04 mg/dL Final   40 minutes spent by me on the date of the encounter doing chart/CGM/Insulin PUMP review, history and exam, documentation  and further activities per the note.

## 2024-04-30 NOTE — LETTER
4/30/2024         RE: Vickie Barnhart  [address] [facility] [address]        Dear Colleague,    Thank you for referring your patient, Vickie Barnhart, to the [facility]. Please see a copy of my visit note below.                                                                  Endocrinology Clinic Visit    Chief Complaint: Follow Up (DM)     Information obtained from:Patient      Assessment/Treatment Plan:      Type 1 diabetes complicated with retinopathy     Currently being managed with insulin pump tandem & CGM. 28 days CGM and insulin pump download reviewed. Overall, patient has had significant improvement since starting CGM with hgba1c decreasing to 6.2% today. She has some low glucoses related to not taking carb correction until after she has already started eating. Discussed the importance of entering carb bolus 10-15 minutes prior to when she starts eating. Also discussed the need to start statin in the near future given age 38, however, will defer starting today until her ulcerative colitis is more stable.     Plan:  Continue current pump settings. Patient will focus on entering carb bolus 10-15 minutes prior to eating.    Pump settings:   Basal = 0.45 units per hour  CHO 1: 11 units, 1:10 starting from 7:00 am  Correction factor 1:60 mg/dl  Target 110    - Refilled glucagon  - Refilled insulin glargine 10u in case of pump failure  - Has eye exam tomorrow  - Blood pressure is well-controlled   - Start statin once ulcerative colitis is under control      Component      Latest Ref Rng 4/30/2024  2:04 PM   Sodium      135 - 145 mmol/L 141    Potassium      3.4 - 5.3 mmol/L 3.6    Chloride      98 - 107 mmol/L 105    Carbon Dioxide (CO2)      22 - 29 mmol/L 27    Anion Gap      7 - 15 mmol/L 9    Urea Nitrogen      6.0 - 20.0 mg/dL 14.8    Creatinine      0.51 - 0.95 mg/dL 0.94    GFR Estimate      >60 mL/min/1.73m2 78    Calcium      8.6 - 10.0 mg/dL 8.8    Glucose      70 - 99  mg/dL 121 (H)    Cholesterol      <200 mg/dL 153    Triglycerides      <150 mg/dL 128    HDL Cholesterol      >=50 mg/dL 37 (L)    LDL Cholesterol Calculated      <=100 mg/dL 90    Non HDL Cholesterol      <130 mg/dL 116    Patient Fasting? No    Hemoglobin A1C      0.0 - 5.6 % 6.2 (H)    TSH      0.30 - 4.20 uIU/mL 1.81       Orders Placed This Encounter   Procedures     Albumin Random Urine Quantitative with Creat Ratio     Lipid panel reflex to direct LDL Fasting     Hemoglobin A1c     TSH with free T4 reflex     Basic metabolic panel     Quantiferon TB Gold Plus Grey Tube     Quantiferon TB Gold Plus Green Tube     Quantiferon TB Gold Plus Yellow Tube     Quantiferon TB Gold Plus Purple Tube     Return to clinic in 3 months for RN diabetes visit     Return to clinic in 6 months for follow-up    Jennifer Mistry MD  Internal Medicine Resident, PGY3    Staffed with:   Siri Rhoades MD  Staff Endocrinologist    Division of Endocrinology and Diabetes      Subjective:         HPI: Vickie Duque is a 36 year old female with history of type 1 diabetes.    Currently on tandem insulin pump with settings as below. She has been using insulin IQ.    Current Settings:   Basal = 0.45 units per hour  CHO 1: 11 units   Correction factor 1:60 mg/dl  Target 110.     She has overall been doing much better with her DM1 since starting CGM. She denies significant hypoglycemia episodes. She feels like the pump has helped a lot. She has not needed to use glucagon in a long time and states the prescription has . She sometimes does not enter the carb correction until after she has already started eating. She has not had significant numbness or tingling.     She has been struggling with her ulcerative colitis recently and has had two recent flares in . She required a short course of prednisone at the end of 2024. She quickly had an additional UC flare shortly thereafter. She recently saw GI who prescribed  mesalamine. She just started this a few days ago. She endorses ongoing abdominal pain, increased stools and diarrhea related to her UC.      No Known Allergies    Current Outpatient Medications   Medication Sig Dispense Refill     acetaminophen (TYLENOL) 500 MG tablet Take 500-1,000 mg by mouth every 6 hours as needed for mild pain       bisacodyl (DULCOLAX) 5 MG EC tablet Two days prior to exam take two (2) tablets at 4pm. One day prior to exam take two (2) tablets at 4pm 4 tablet 0     bisacodyl (DULCOLAX) 5 MG EC tablet Take 2 tablets at 3 pm the day before your procedure. If your procedure is before 11 am, take 2 additional tablets at 11 pm. If your procedure is after 11 am, take 2 additional tablets at 6 am. For additional instructions refer to your colonoscopy prep instructions. 4 tablet 0     Continuous Glucose Sensor (DEXCOM G6 SENSOR) MISC Change every 10 days. 9 each 3     Continuous Glucose Transmitter (DEXCOM G6 TRANSMITTER) MISC Change every 90 days. 1 each 3     dicyclomine (BENTYL) 20 MG tablet Take 1 tablet (20 mg) by mouth 4 times daily as needed (abdominal pain) 90 tablet 3     FLUoxetine (PROZAC) 20 MG capsule Take 3 capsules (60 mg) by mouth daily 270 capsule 4     gabapentin (NEURONTIN) 300 MG capsule Take 1 capsule (300 mg) by mouth At Bedtime 90 capsule 1     Glucagon (GVOKE HYPOPEN) 1 MG/0.2ML pen Inject the contents of 1 device under the skin into lower abdomen, outer thigh, or outer upper arm as needed for hypoglycemia. If no response after 15 minutes, additional 1 mg dose from a new device may be injected while waiting for emergency assistance. 2 each 3     HYDROcodone-acetaminophen (NORCO) 5-325 MG tablet Take 1 tablet by mouth every 6 hours as needed for severe pain 6 tablet 0     insulin aspart (NOVOLOG FLEXPEN) 100 UNIT/ML pen Take 1 unit per 10 grams of carb plus 1 unit for every 60 above 150 Max dose 60 unit daily while off pump 15 mL 4     Insulin Aspart FlexPen 100 UNIT/ML SOPN  INJECT 1U/10GRAM CARB PLUS 1U PER EVERY 60 ABOVE 150 WHILE OFF PUMP, MAX 60 UNITS DAILY 15 mL 0     insulin glargine (LANTUS SOLOSTAR) 100 UNIT/ML pen Take 10 units daily while off pump       Insulin Infusion Pump Supplies (T:SLIM T:LOCK INSULIN CART 3ML) MISC 1 each See Admin Instructions Change insulin pump cartridge every 2-3 days. 40 each 3     insulin pen needle (BD TARA U/F) 32G X 4 MM miscellaneous USE 3 DAILY OR AS DIRECTED. 270 each 3     levonorgestrel (MIRENA) 20 MCG/24HR IUD 1 each (20 mcg) by Intrauterine route once       mesalamine (LIALDA) 1.2 g DR tablet Take 1 tablet (1,200 mg) by mouth daily (with breakfast) 4800 tablet 3     Naproxen Sodium (ALEVE PO) Take by mouth as needed for moderate pain        NOVOLOG FLEXPEN 100 UNIT/ML soln 1 unit for every 12 grams of carbohydrates with meals three times per day. Plus correction scale; uses up to 60 units daily. 45 mL 1     NOVOLOG VIAL 100 UNIT/ML soln Uses up to 60 units per day in insulin pump for basal, bolus, and priming of insulin pump tubing. 60 mL 3     oxyCODONE (ROXICODONE) 5 MG tablet Take 1 tablet (5 mg) by mouth every 6 hours as needed for severe pain 12 tablet 0     polyethylene glycol (GOLYTELY) 236 g suspension Take as directed. Two days before your exam fill the first container with water. Cover and shake until mixed well. At 5:00pm drink one 8oz glass every 10-15 minutes until half (1/2) of the first container is empty. Store the remainder in the refrigerator. One day before your exam at 5:00pm drink the second half of the first container until it is gone. Before you go to bed mix the second container with water and put in refrigerator. Six hours before your check in time drink one 8oz glass every 10-15 minutes until half of container is empty. Discard the remainder of solution. 8000 mL 0     polyethylene glycol (GOLYTELY) 236 g suspension The night before the exam at 6 pm drink an 8-ounce glass every 15 minutes until the jug is half  empty. If you arrive before 11 AM: Drink the other half of the Golytely jug at 11 PM night before procedure. If you arrive after 11 AM: Drink the other half of the Golytely jug at 6 AM day of procedure. For additional instructions refer to your colonoscopy prep instructions. 4000 mL 0     predniSONE (DELTASONE) 20 MG tablet Take two tablets (= 40mg) each day for 5 (five) days 10 tablet 0     fluticasone (FLONASE) 50 MCG/ACT nasal spray Spray 1 spray into both nostrils daily (Patient not taking: Reported on 2/2/2024) 16 g 1       Review of Systems      as per HPI above    Objective:   /88 (BP Location: Left arm, Patient Position: Sitting, Cuff Size: Adult Regular)   Pulse 74   Resp 16   Wt 78.5 kg (173 lb)   SpO2 98%   BMI 28.79 kg/m      Constitutional: Pleasant no acute cardiopulmonary distress. Non-toxic appearing  HEENT: Normocephalic, atraumatic. No lymphadenopathy  Thyroid: normal size and texture, no nodule palpable, no bruits  Heart: regular rhythm, S1S2, no murmur appreciated  Lung: clear to auscultation bilaterally  Abdomen: soft, NT/ND, no hepatomegaly  Legs: no significant lower extremity edema  Feet: no deformities or ulcers, 2+ DP pulses, normal monofilament sensation  Psychological: appropriate mood and affect     In House Labs:     Hemoglobin A1C   Date Value Ref Range Status   01/31/2024 6.7 (H) <5.7 % Final     Comment:     Normal <5.7%   Prediabetes 5.7-6.4%    Diabetes 6.5% or higher     Note: Adopted from ADA consensus guidelines.   10/12/2021 8.7 (A) 0.0 - 5.7 % Final       TSH   Date Value Ref Range Status   01/17/2023 3.91 0.40 - 4.00 mU/L Final   06/23/2022 2.26 0.40 - 4.00 mU/L Final   01/04/2021 3.96 0.40 - 4.00 mU/L Final   03/19/2019 2.95 0.40 - 4.00 mU/L Final   10/07/2017 2.57 0.40 - 4.00 mU/L Final   06/26/2017 6.05 (H) 0.40 - 4.00 mU/L Final   03/05/2014 3.97 0.4 - 5.0 mU/L Final     T4 Free   Date Value Ref Range Status   06/26/2017 1.02 0.76 - 1.46 ng/dL Final    03/28/2006 1.10 0.70 - 1.85 ng/dL Final       Creatinine   Date Value Ref Range Status   04/04/2024 1.06 (H) 0.51 - 0.95 mg/dL Final   06/04/2021 0.94 0.52 - 1.04 mg/dL Final   40 minutes spent by me on the date of the encounter doing chart/CGM/Insulin PUMP review, history and exam, documentation and further activities per the note.      Again, thank you for allowing me to participate in the care of your patient.        Sincerely,        Siri Rhoades MD

## 2024-04-30 NOTE — PATIENT INSTRUCTIONS
[place],  ADMINISTER MEALTIME INSULIN 10-15 MINUTES BEFORE YOU EAT    Basal = 0.45 units per hour  CHO 1: 10 units   Correction factor 1:60 mg/dl  Target 110.       Please administer mealtime bolus consistently at least 10-15 minutes before eating.    Don't give multiple correction boluses which is causing low blood sugar. Wait at least 3-4 hours between correction boluses.      Emergency issues: Here are some concerns you should contact us about.  -Vomiting: more than twice.  Please check ketones.  If positive, go to ER. Monitor glucose hourly.   -High glucose (over 300 mg/dL twice in a row): Please check ketones.  If ketones are negative, take an insulin correction and recheck glucose in 1 hour.  If glucose is not coming down, please call the clinic. If ketones are moderate or large, drink lots of water, take an insulin correction 1.5 times your usual correction, and recheck ketones in 1 hour.  If ketones are still present (or you are vomiting), go to the ER.  -High glucose (over 300 mg/dL twice in a row) with a pump:  Twice in doubt, take it out.  Change your pump site. Check ketones.  If ketones are negative, take an insulin correction by syringe/pen and recheck glucose in 1 hour.  If glucose is not coming down, please call the clinic. If ketones are moderate or large, drink lots of water, take an insulin correction 1.5 times your usual correction by syringe/pen, and recheck ketones in 1 hour.  If ketones are still present (or you are vomiting), go to the ER.  -Hypoglycemia (low glucose):   If glucose is less than 70 mg/dL, treat with 15g carb (4 glucose tablets), recheck glucose in 15 minutes.  If low again, repeat.   If glucose is less than 54 mg/dL, treat with 30g carb, recheck glucose in 15 minutes.  If low again, repeat.  Keep glucagon in your home in case of severe hypoglycemia and train someone how to use this.    Emergency kit (please ensure you always have these with you):   Glucose  tablets  Glucagon  Insulin  Syringes/needles   Extra infusion set (if on a pump)  Ketone strips    Backup insulin plan (in case of pump failure):   If your insulin pump fails, your body will not have any insulin available and your blood glucose levels can get dangerously high. This can result in diabetic ketoacidosis making you very sick (abdominal pain, confusion, vomiting, dehydration, positive urine ketones).    You have an active long acting basal insulin (Degludec: Tresiba / Detemir: Levemir / Glargine: Lantus / Toujeo / Semglee / Basaglar) prescription available. Please pick this up from your pharmacy in case your pump fails.  Basal insulin dose: 9 units once per day.    Immediately after your pump fails and until you can  the long acting insulin, use short acting insulin (Aspart: Novolog/Fiasp / Lispro: Humalog / Glulisine:Apidra) injections (pen or vial and syringe) per your correction scale every 4 hours and continue to cover carbohydrates. You can stop this 4 hourly correction after you give yourself the basal insulin dose.    You should also administer short acting insulin subcutaneously to cover carbohydrates and per your correction scale prior to meals.     Contact us for help transitioning back to your pump when this is available.    Contact information:   If you have concerns, please send me a Where I've Been message or call the clinic at [phone].  For more urgent concerns, please call [phone] after hours/weekends and ask to speak with the endocrinologist on call.      Please let me know if you are having low blood sugars less than 70 or over 350 mg/dL.  Do not wait until your next appointment if this is happening.      [facility]-Department of Endocrinology  Haydee Rojas RN, Diabetes Educator: [phone]  Clinic Nurses JOB Rodriguez; CMA's: Roseann Hartmann Yang Sheba   Clinic Fax: [phone]  On-Call Endocrine at [facility] (after hours/weekends): [phone]  option 4  Scheduling Line: [phone]

## 2024-05-01 LAB
CREAT UR-MCNC: 320 MG/DL
HAV AB SER QL IA: NONREACTIVE
HBV CORE AB SERPL QL IA: NONREACTIVE
HBV SURFACE AB SERPL IA-ACNC: 143 M[IU]/ML
HBV SURFACE AB SERPL IA-ACNC: REACTIVE M[IU]/ML
HBV SURFACE AG SERPL QL IA: NONREACTIVE
HCV AB SERPL QL IA: NONREACTIVE
MICROALBUMIN UR-MCNC: <12 MG/L
MICROALBUMIN/CREAT UR: NORMAL MG/G{CREAT}

## 2024-05-02 LAB
GAMMA INTERFERON BACKGROUND BLD IA-ACNC: 0.02 IU/ML
M TB IFN-G BLD-IMP: NEGATIVE
M TB IFN-G CD4+ BCKGRND COR BLD-ACNC: 9.98 IU/ML
MITOGEN IGNF BCKGRD COR BLD-ACNC: 0.02 IU/ML
MITOGEN IGNF BCKGRD COR BLD-ACNC: 0.03 IU/ML
QUANTIFERON MITOGEN: 10 IU/ML
QUANTIFERON NIL TUBE: 0.02 IU/ML
QUANTIFERON TB1 TUBE: 0.05 IU/ML
QUANTIFERON TB2 TUBE: 0.04

## 2024-05-24 NOTE — PROGRESS NOTES
DISCHARGE  Reason for Discharge: Patient has failed to schedule further appointments, has cancelled remaining appts.    Equipment Issued:     Discharge Plan: Patient to continue home program.    Referring Provider:  German Markham PA-C       03/04/24 0500   Appointment Info   Signing clinician's name / credentials Cj Charlton, PT   Visits Used 2   Medical Diagnosis Chronic pain of both shoulders (M25.511, G89.29, M25.512)   PT Tx Diagnosis B shoulder pain, reduced ROM   Progress Note/Certification   Onset of illness/injury or Date of Surgery 05/05/24   Therapy Frequency 1x/week   Predicted Duration 8 weeks   Progress Note Due Date 04/16/24   Progress Note Completed Date 02/20/24   GOALS   PT Goals 2;3   PT Goal 1   Goal Identifier HEP   Goal Description Pt will demo independence in performance and progression of strengthening and balance HEP in order to improve CLOF.   Target Date   (Ongoing, updates as needed)   PT Goal 2   Goal Identifier SPADI   Goal Description Pt will demo improved shoulder pain ratings and function as shown by decreased SPADI score of at least 8 points in order to show significant improvement and greater return to previous function.   Goal Progress 47/130 (eval)   Target Date 04/16/24   PT Goal 3   Goal Identifier Pain   Goal Description Pt will demo improved pain ratings with overhead reaching no greater than 5/10 in order to allow for improved independent performance of home and work tasks as previous.   Goal Progress Pt rating pain up to 10/10 with overhead reaching (eval)   Target Date 04/16/24   Subjective Report   Subjective Report Pt reports doing exercises most days, pain is about the same.   Treatment Interventions (PT)   Interventions Therapeutic Procedure/Exercise;Manual Therapy   Therapeutic Procedure/Exercise   Therapeutic Procedures: strength, endurance, ROM, flexibility minutes (58826) 25   Ther Proc 1 HEP creation   Ther Proc 1 - Details Pt reporting with performance of ER  against yellow TB, instructed to work in pain free ROM. Review of HEP, RTC IR against yellow TB x26 reps B, ER against yellow TB x27 reps L and x19 reps R with fatigue limiting her. Introduced full can scaption for shoulder strength and tolerance x23 reps L and x31 reps R with reports of pain on lowering vs on raising.   Patient Response/Progress Pt tolerating well overall, some pain noted, fatigues fairly quickly with most exercises.   Manual Therapy   Manual Therapy: Mobilization, MFR, MLD, friction massage minutes (98100) 20   Manual Therapy 1 STM/TPR   Manual Therapy 1 - Details STM/TPR to B UT, significant tension noted throughout. Pt responds well to tx, notes mild improvement in her shoulder mobility and comfort, however, continues with tension in the area affecting her overall mobility.   Patient Response/Progress Pt tolerating well.   Education   Learner/Method Patient;Listening;Demonstration   Plan   Home program Posture, RTC strengthening   Plan for next session Assess HEP, advance as tolerated   Total Session Time   Timed Code Treatment Minutes 45   Total Treatment Time (sum of timed and untimed services) 45

## 2024-06-03 ENCOUNTER — PATIENT OUTREACH (OUTPATIENT)
Dept: CARE COORDINATION | Facility: CLINIC | Age: 39
End: 2024-06-03
Payer: COMMERCIAL

## 2024-06-10 ENCOUNTER — MYC MEDICAL ADVICE (OUTPATIENT)
Dept: GASTROENTEROLOGY | Facility: CLINIC | Age: 39
End: 2024-06-10
Payer: COMMERCIAL

## 2024-06-11 DIAGNOSIS — K51.011 ULCERATIVE PANCOLITIS WITH RECTAL BLEEDING (H): Primary | ICD-10-CM

## 2024-06-11 RX ORDER — DICYCLOMINE HYDROCHLORIDE 10 MG/1
10 CAPSULE ORAL 4 TIMES DAILY PRN
Qty: 90 CAPSULE | Refills: 3 | Status: SHIPPED | OUTPATIENT
Start: 2024-06-11

## 2024-06-11 NOTE — TELEPHONE ENCOUNTER
Tatianna Rodarte, DO [place], J[facility]endez  Phone Number: [phone]     I sent in some bentyl.  She needs to do the stool studies I ordered at her appointment - I also add infectious studies as well. Continue mesalamine for now.  Keep upcoming clinic appointment      [employer] message sent to patient with the above info from provider.    Vanessa Clement RN

## 2024-06-14 NOTE — TELEPHONE ENCOUNTER
Spoke to Vickie, discussed treatment plan from Dr. Rodarte.     Reports diarrhea about 1 x week, having a bm every 1 - 2 days, reports urgency with all BMs, most Bms are soft/formed, agreeable to Calprotectin feces and C.Diff, does not want to repeat Enteric stool study.     Does not want to start steroids would like other options. Will discuss at next visit with provider.     Reports biggest concern is abdominal discomfort in the evenings and bedtime.Reports has not tried the bentyl will pick this up today if able. Does take Mesalamine as prescribed.

## 2024-06-24 ENCOUNTER — VIRTUAL VISIT (OUTPATIENT)
Dept: GASTROENTEROLOGY | Facility: CLINIC | Age: 39
End: 2024-06-24
Attending: INTERNAL MEDICINE
Payer: COMMERCIAL

## 2024-06-24 DIAGNOSIS — K51.011 ULCERATIVE PANCOLITIS WITH RECTAL BLEEDING (H): Primary | ICD-10-CM

## 2024-06-24 PROCEDURE — 99214 OFFICE O/P EST MOD 30 MIN: CPT | Mod: 95 | Performed by: PHYSICIAN ASSISTANT

## 2024-06-24 RX ORDER — MESALAMINE 1.2 G/1
4800 TABLET, DELAYED RELEASE ORAL
Qty: 120 TABLET | Refills: 3 | Status: SHIPPED | OUTPATIENT
Start: 2024-06-24 | End: 2024-10-22

## 2024-06-24 RX ORDER — MESALAMINE 1000 MG/1
1000 SUPPOSITORY RECTAL AT BEDTIME
Qty: 30 SUPPOSITORY | Refills: 3 | Status: SHIPPED | OUTPATIENT
Start: 2024-06-24 | End: 2024-10-22

## 2024-06-24 NOTE — NURSING NOTE
Is the patient currently in the state of [place]? YES    Visit mode:VIDEO    If the visit is dropped, the patient can be reconnected by: VIDEO VISIT: Text to cell phone:   Telephone Information:   Mobile [phone]       Will anyone else be joining the visit? NO  (If patient encounters technical issues they should call [phone])    How would you like to obtain your AVS? MyChart    Are changes needed to the allergy or medication list? No    Are refills needed on medications prescribed by this physician? NO    Reason for visit: RECHECK    Marcia COLLADO

## 2024-06-24 NOTE — PATIENT INSTRUCTIONS
It was a pleasure meeting with you today and discussing your healthcare plan. Below is a summary of what we covered:    -- increase mesalamine to 4.8g daily (take 4tabs with breakfast)  -- Labs: have stool studies done asap.   --start using canasa suppository at bedtime.   -- No NSAIDs (ibuprofen, or anything containing ibuprofen)  --please send a message in about a month to let us know how you are doing.    For additional resources about inflammatory bowel disease visit [email or web address].crohnscolitisfoundation.org/    To learn more about Diet and Nutrition in the setting of IBD, check out some of these resources:  https://www.crohnscolitisfoundation.org/diet-and-nutrition/what-should-i-eat  https://www.nimbal.org/ (mSCD)  https://Cinchcastibd.[email or web address] (SCD, mSCD, Autoimmune protocol, IBD-AID, CDED diets with inclusion/exclusion charts)  https://Yoke.[email or web address]/ (mediterranean diet)  [email or web address] (IBD-AID)      Follow up in 3 months.        Please see below for any additional questions and scheduling guidelines.    Sign up for [employer]: [employer] patient portal serves as a secure platform for accessing your medical records from the [facility]. Additionally, [employer] facilitates easy, timely, and secure messaging with your care team. If you have not signed up, you may do so by using the provided code or calling [phone].    Coordinating your care after your visit:  There are multiple options for scheduling your follow-up care based on your provider's recommendation.    How do I schedule a follow-up clinic appointment:   After your appointment, you may receive scheduling assistance with the Clinic Coordinators by having a seat in the waiting room and a Clinic Coordinator will call you up to schedule.  Virtual visits or after you leave the clinic:  Your provider has placed a follow-up order in the [employer] portal for scheduling your return appointment. A member of the scheduling team will  contact you to schedule.  [facility] Scheduling: Timely scheduling through [facility] is advised to ensure appointment availability.   Call to schedule: You may schedule your follow-up appointment(s) by calling [phone], option 1.    How do I schedule my endoscopy or colonoscopy procedure:  If a procedure, such as a colonoscopy or upper endoscopy was ordered by your provider, the scheduling team will contact you to schedule this procedure. Or you may choose to call to schedule at   [phone], option 2.  Please allow 20-30 minutes when scheduling a procedure.    How do I get my blood work done? To get your blood work done, you need to schedule a lab appointment at an [facility]. There are multiple ways to schedule:   At the clinic: The Clinic Coordinator you meet after your visit can help you schedule a lab appointment.   [facility] scheduling: [facility] offers online lab scheduling at all [facility] laboratory locations.   Call to schedule: You can call [phone] to schedule your lab appointment.    How do I schedule my imaging study: To schedule imaging studies, such as CT scans, ultrasounds, MRIs, or X-rays, contact Imaging Services at [phone].    How do I schedule a referral to another doctor: If your provider recommended a referral to another specialist(s), the referral order was placed by your provider. You will receive a phone call to schedule this referral, or you may choose to call the number attached to the referral to self-schedule.    For Post-Visit Question(s):  For any inquiries following today's visit:  Please utilize [employer] messaging and allow 48 hours for reply or contact the Call Center during normal business hours at [phone], option 3.  For Emergent After-hours questions, contact the On-Call GI Fellow through the [facility] at [phone].  In addition, you may contact your Nurse directly using the provided contact information.    Test  Results:  Test results will be accessible via [employer] in compliance with the 21st Century Cures Act. This means that your results will be available to you at the same time as your provider. Often you may see your results before your provider does. Results are reviewed by staff within two weeks with communication follow-up. Results may be released in the patient portal prior to your care team review.    Prescription Refill(s):  Medication prescribed by your provider will be addressed during your visit. For future refills, please coordinate with your pharmacy. If you have not had a recent clinic visit or routine labs, for your safety, your provider may not be able to refill your prescription.

## 2024-06-24 NOTE — PROGRESS NOTES
IBD CLINIC VISIT    CC/REFERRING MD:  Tatianna Rodarte  REASON FOR CONSULTATION: UC    ASSESSMENT/PLAN:    UC: patient reports mild improvement in symptoms with use of mesalamine 1.2g daily - she does endorse abdominal pain, usually starting after eating, improves with use of bentyl. She does endorse tenesmus as well. At this time, recommended she submit stool studies, and will increase dose of mesalamine to 4.8g daily, and add canasa at bedtime. Will have her update us in a month on how she is doing.    --obtain stool studies.    --increase mesalamine to 4.8g daily.    --start canasa at bedtime.    IBD dysplasia surveillance: unknown extent of disease, but given pancolitis, should have CRC screening ever 2-3 years.    IBD Health Care Maintenance:    Misc:  -- Avoid tobacco use  -- Avoid NSAIDs as there is potentially a 25% chance of causing an IBD flare    Return to clinic in 3 months        This note was created with voice recognition software, and while reviewed for accuracy, typos may remain.     Binh Navarrete PA-C  Division of Gastroenterology, Hepatology and Nutrition  [facility]    30 minutes spent on the date of the encounter doing chart review, patient visit, and documentation      IBD HISTORY  IBD type: UC  Age at diagnosis: child (unknown age)  Endoscopic extent of disease: pancolitis  Current IBD medications: mesalamine  Prior IBD surgeries:  Prior IBD Medications:   unknown    DRUG MONITORING  TPMT enzyme activity:     6-TGN/6-MMPN levels:    Biologic concentration:    DISEASE ASSESSMENT  Labs  Recent Labs   Lab Test 04/04/24  1859 09/29/18  1435 10/07/17  2341   CRP  --   --  <2.9   SED 87*  --   --    HGB 11.9   < > 11.3*    < > = values in this interval not displayed.     Fecal calprotectin:   Endoscopy:   --C scope (4/12/24):  - Friable (with contact bleeding) and inflamed mucosa                          in the entire examined colon. Biopsied.                           - The examined portion of the ileum was normal.                          Biopsied.   Final Diagnosis   A(1).  Small bowel, terminal ileum, biopsy:  -Small intestinal mucosa with no significant histopathologic abnormalities.  -Normal villous architecture identified and no prominence in intraepithelial lymphocytes seen.  -Negative for luminal organisms.  -Negative for dysplasia or malignancy.        B(2).  Colon, random locations, biopsies:  -Active chronic colitis with patchy distribution.  (See comment)  -Colonic mucosa with architectural distortion, acute inflammation and increased chronic inflammation.  -Immunohistochemical stains for CMV are focally positive (less than 1 per high-power field)  -Negative for granulomas.  -Negative for dysplasia or malignancy.          Enterography:   C diff:       HPI:   37 y/o F presents for follow up of UC.     Patient was initially evaluated on 4/24/24 by Dr. Rodarte, please see visit details below:    Vickie  presents today for a video visit to discuss UC which was diagnosed as a child.  Hasn't been on therapy for many years - doesn't remember what she was on before.  Was doing well up until January when she developed worsening diarrhea and abdominal pain as well as blood in the stool.  Was seen in the ER x 2 (initially in January and again in march) for these symptoms - was given course of steroids after imaging showed evidence of colitis.  Had colonoscopy on 4/12 which showed pancolitis - biopsies showing acute on chronic inflammation.      Having 4-5 bowel movements a day - sometimes with blood.  Wakes up 3-4 times a night for bowel movements.     Quit smoking in December. No family history of IBD  Does use NSAIDs a few time a week.     6/24/24:  Patient has been taking mesalamine 1.2g daily -- notes mid improvement, but still not great. She will generally have a BM EOD, stools are formed but soft, described as a bristol type 4. Denies BRBPR. She reports  fecal urgency, usually once a week. She denies fecal incontinence. She does endorse tenesmus, that occurs daily. She will experience abdominal pain 3-4x/week, localized around umbilicus, described as a dull pain. Starts after eating, improved with using bentyl. Denies improvement with having a BM. Denies nausea/vomiting. Reports occasional abdominal bloating. She has been completely avoiding NSAIDs.     She is weaning off nicotine, still vaping.     ROS:    No fevers or chills  No weight loss  No blurry vision, double vision or change in vision  No shortness of breath or wheezing  No chest pain or pressure  No arthralgias or myalgias  No rashes or skin changes  No BRBPR, hematochezia, melena      Extra intestinal manifestations of IBD:  No uveitis/episcleritis  No aphthous ulcers   No arthritis   No erythema nodosum/pyoderma gangrenosum.     PERTINENT PAST MEDICAL HISTORY:  Past Medical History:   Diagnosis Date    Adjustment disorder with anxious mood 11/23/2015    Anxiety 11/27/2015    ASCUS of cervix with negative high risk HPV 06/19/2008    Depressive disorder, not elsewhere classified     Diabetic eye exam (H) 12/19/14    Hx of tubal ligation 1/15/2021    Mixed hyperlipidemia 11/30/2006    Regional enteritis of unspecified site     Ulcerative Colitis    Type I (juvenile type) diabetes mellitus with ketoacidosis, not stated as uncontrolled(250.11) 01/01/2007    Moderately severe intensity.    Type I (juvenile type) diabetes mellitus with ketoacidosis, uncontrolled 02/14/08    Type I (juvenile type) diabetes mellitus without mention of complication, not stated as uncontrolled     diagnosed in 2003    Ulcerative colitis, unspecified     remission. Last flare 6 yrs ago.        PREVIOUS SURGERIES:  Past Surgical History:   Procedure Laterality Date    COLONOSCOPY N/A 7/25/2023    Procedure: Colonoscopy;  Surgeon: Jude Nix MD;  Location:  GI    COLONOSCOPY N/A 4/12/2024    Procedure: COLONOSCOPY, WITH  BIOPSY;  Surgeon: Cady Barajas MD;  Location:  GI    DILATION AND CURETTAGE SUCTION  04/2010    miscarriage    RETINAL DETACHMENT SURGERY Left     TUBAL LIGATION      [facility] COLONOSCOPY W BIOPSY  08/16/2006    [facility] COLONOSCOPY W/WO BRUSH/WASH         PREVIOUS ENDOSCOPY:  Results for orders placed or performed during the hospital encounter of 04/12/24   COLONOSCOPY   Result Value Ref Range    COLONOSCOPY       [address], [place] [address]  _______________________________________________________________________________  Patient Name: Vickie Barnhart          Procedure Date: 4/12/2024 12:57 PM  MRN: [id number]                       Account Number: [id number]  YOB: 1985              Admit Type: Outpatient  Age: 38                               Room: [place] [address]  Gender: Female                        Note Status: Finalized  Attending MD: CADY BARAJAS MD,   Total Sedation Time:   _______________________________________________________________________________     Procedure:             Colonoscopy  Indications:           High risk colon cancer surveillance: Ulcerative                          pancolitis of 8 (or more) years duration  Providers:             CADY BARAJAS MD  Referring MD:          FALLON GILBERT  Medicines:             Propofol per Anesthesia  Complications:         No immediate complications.  ___________ ____________________________________________________________________  Procedure:             Pre-Anesthesia Assessment:                         - Prior to the procedure, a History and Physical was                          performed, and patient medications, allergies and                          sensitivities were reviewed. The patient's tolerance                          of previous anesthesia was reviewed.                         - Pre-procedure physical examination revealed no                          contraindications to  sedation.                         After obtaining informed consent, the colonoscope was                          passed under direct vision. Throughout the procedure,                          the patient's blood pressure, pulse, and oxygen                          saturations were monitored continuously. The                          Colonoscope was introduced through the anus and                          advanced to the terminal ileum, with identification of                           the ileocecal valve.                                                                                   Findings:       The perianal and digital rectal examinations were normal.       A diffuse area of severely friable (with contact bleeding) and inflamed        mucosa was found in the entire colon consistent with colitis(Probable        UC). Most severe in sigmoid colon. Biopsies were taken with a cold        forceps for histology.       The terminal ileum appeared normal. Biopsies were taken with a cold        forceps for histology.                                                                                   Impression:            - Friable (with contact bleeding) and inflamed mucosa                          in the entire examined colon. Biopsied.                         - The examined portion of the ileum was normal.                          Biopsied.  Recommendation:        - Await pathology results.                         - Refer to a gastroenterologist at  appointment to be                          scheduled ASAP.                                                                                   Procedure Code(s):       --- Professional ---       38251, Colonoscopy, flexible; with biopsy, single or multiple    CPT copyright 2022 American Medical Association. All rights reserved.    The codes documented in this report are preliminary and upon  review may   be revised to meet current compliance requirements.    Donnie  Karl  ___________________  CADY BRANDT MD  4/12/2024 1:32:06 PM  I was physically present for the entire viewing portion of the exam.CADY BRANDT MD  Number of Addenda: 0    Note Initiated On: 4/12/2024 12:57 PM     ]    ALLERGIES:   No Known Allergies    PERTINENT MEDICATIONS:    Current Outpatient Medications:     acetaminophen (TYLENOL) 500 MG tablet, Take 500-1,000 mg by mouth every 6 hours as needed for mild pain, Disp: , Rfl:     bisacodyl (DULCOLAX) 5 MG EC tablet, Two days prior to exam take two (2) tablets at 4pm. One day prior to exam take two (2) tablets at 4pm, Disp: 4 tablet, Rfl: 0    bisacodyl (DULCOLAX) 5 MG EC tablet, Take 2 tablets at 3 pm the day before your procedure. If your procedure is before 11 am, take 2 additional tablets at 11 pm. If your procedure is after 11 am, take 2 additional tablets at 6 am. For additional instructions refer to your colonoscopy prep instructions., Disp: 4 tablet, Rfl: 0    Continuous Glucose Sensor (DEXCOM G6 SENSOR) MISC, Change every 10 days., Disp: 9 each, Rfl: 3    Continuous Glucose Transmitter (DEXCOM G6 TRANSMITTER) MISC, Change every 90 days., Disp: 1 each, Rfl: 3    dicyclomine (BENTYL) 10 MG capsule, Take 1 capsule (10 mg) by mouth 4 times daily as needed (abdominal pain), Disp: 90 capsule, Rfl: 3    dicyclomine (BENTYL) 20 MG tablet, Take 1 tablet (20 mg) by mouth 4 times daily as needed (abdominal pain), Disp: 90 tablet, Rfl: 3    FLUoxetine (PROZAC) 20 MG capsule, Take 3 capsules (60 mg) by mouth daily, Disp: 270 capsule, Rfl: 4    fluticasone (FLONASE) 50 MCG/ACT nasal spray, Spray 1 spray into both nostrils daily (Patient not taking: Reported on 2/2/2024), Disp: 16 g, Rfl: 1    gabapentin (NEURONTIN) 300 MG capsule, Take 1 capsule (300 mg) by mouth At Bedtime, Disp: 90 capsule, Rfl: 1    Glucagon (GVOKE HYPOPEN) 1 MG/0.2ML pen, Inject the contents of 1 device under the skin into lower abdomen, outer thigh, or outer upper arm as needed  for hypoglycemia. If no response after 15 minutes, additional 1 mg dose from a new device may be injected while waiting for emergency assistance., Disp: 2 each, Rfl: 3    HYDROcodone-acetaminophen (NORCO) 5-325 MG tablet, Take 1 tablet by mouth every 6 hours as needed for severe pain, Disp: 6 tablet, Rfl: 0    insulin aspart (NOVOLOG FLEXPEN) 100 UNIT/ML pen, Take 1 unit per 10 grams of carb plus 1 unit for every 60 above 150 Max dose 60 unit daily while off pump, Disp: 15 mL, Rfl: 4    Insulin Aspart FlexPen 100 UNIT/ML SOPN, INJECT 1U/10GRAM CARB PLUS 1U PER EVERY 60 ABOVE 150 WHILE OFF PUMP, MAX 60 UNITS DAILY, Disp: 15 mL, Rfl: 0    insulin glargine (LANTUS SOLOSTAR) 100 UNIT/ML pen, Take 10 units daily while off pump, Disp: , Rfl:     Insulin Infusion Pump Supplies (T:SLIM T:LOCK INSULIN CART 3ML) MISC, 1 each See Admin Instructions Change insulin pump cartridge every 2-3 days., Disp: 40 each, Rfl: 3    insulin pen needle (BD TARA U/F) 32G X 4 MM miscellaneous, USE 3 DAILY OR AS DIRECTED., Disp: 270 each, Rfl: 3    levonorgestrel (MIRENA) 20 MCG/24HR IUD, 1 each (20 mcg) by Intrauterine route once, Disp:  , Rfl:     mesalamine (LIALDA) 1.2 g DR tablet, Take 1 tablet (1,200 mg) by mouth daily (with breakfast), Disp: 4800 tablet, Rfl: 3    Naproxen Sodium (ALEVE PO), Take by mouth as needed for moderate pain , Disp: , Rfl:     NOVOLOG FLEXPEN 100 UNIT/ML soln, 1 unit for every 12 grams of carbohydrates with meals three times per day. Plus correction scale; uses up to 60 units daily., Disp: 45 mL, Rfl: 1    NOVOLOG VIAL 100 UNIT/ML soln, Uses up to 60 units per day in insulin pump for basal, bolus, and priming of insulin pump tubing., Disp: 60 mL, Rfl: 3    oxyCODONE (ROXICODONE) 5 MG tablet, Take 1 tablet (5 mg) by mouth every 6 hours as needed for severe pain, Disp: 12 tablet, Rfl: 0    polyethylene glycol (GOLYTELY) 236 g suspension, Take as directed. Two days before your exam fill the first container with  water. Cover and shake until mixed well. At 5:00pm drink one 8oz glass every 10-15 minutes until half (1/2) of the first container is empty. Store the remainder in the refrigerator. One day before your exam at 5:00pm drink the second half of the first container until it is gone. Before you go to bed mix the second container with water and put in refrigerator. Six hours before your check in time drink one 8oz glass every 10-15 minutes until half of container is empty. Discard the remainder of solution., Disp: 8000 mL, Rfl: 0    polyethylene glycol (GOLYTELY) 236 g suspension, The night before the exam at 6 pm drink an 8-ounce glass every 15 minutes until the jug is half empty. If you arrive before 11 AM: Drink the other half of the Golytely jug at 11 PM night before procedure. If you arrive after 11 AM: Drink the other half of the Golytely jug at 6 AM day of procedure. For additional instructions refer to your colonoscopy prep instructions., Disp: 4000 mL, Rfl: 0    predniSONE (DELTASONE) 20 MG tablet, Take two tablets (= 40mg) each day for 5 (five) days, Disp: 10 tablet, Rfl: 0    SOCIAL HISTORY:  Social History     Socioeconomic History    Marital status: Single     Spouse name: Not on file    Number of children: 1    Years of education: 12    Highest education level: Not on file   Occupational History    Occupation: Nursing assistant   Tobacco Use    Smoking status: Former     Current packs/day: 0.00     Average packs/day: 0.5 packs/day for 16.0 years (8.0 ttl pk-yrs)     Types: Cigarettes     Quit date: 2023     Years since quittin.5    Smokeless tobacco: Never    Tobacco comments:     working on quiting, somedays up to 4 cigs per day   Vaping Use    Vaping status: Never Used   Substance and Sexual Activity    Alcohol use: Yes     Alcohol/week: 0.0 standard drinks of alcohol     Comment: occassionally    Drug use: No    Sexual activity: Yes     Partners: Male     Birth control/protection: Surgical,  Female Surgical     Comment: tubal ligation   Other Topics Concern     Service No    Blood Transfusions No    Caffeine Concern Yes     Comment: Advised not more than 16 ounces/day    Occupational Exposure Yes     Comment: Visiting [facility] - student online classes    Hobby Hazards No    Sleep Concern No    Stress Concern Yes    Weight Concern No    Special Diet Yes     Comment: IDDM Type I    Back Care Yes     Comment: work-related about 1 year ago    Exercise Yes     Comment: Active at work    Bike Helmet No    Seat Belt Yes    Self-Exams No    Parent/sibling w/ CABG, MI or angioplasty before 65F 55M? Not Asked   Social History Narrative    Lives in [place] with Rod gandhi and her son and their daughter.   Vickie and Rod smoke.   No indoor cats/kittens.   No concerns about domestic violence.     Social Determinants of Health     Financial Resource Strain: Low Risk  (1/29/2024)    Financial Resource Strain     Within the past 12 months, have you or your family members you live with been unable to get utilities (heat, electricity) when it was really needed?: No   Food Insecurity: Low Risk  (1/29/2024)    Food Insecurity     Within the past 12 months, did you worry that your food would run out before you got money to buy more?: No     Within the past 12 months, did the food you bought just not last and you didn t have money to get more?: No   Transportation Needs: Low Risk  (1/29/2024)    Transportation Needs     Within the past 12 months, has lack of transportation kept you from medical appointments, getting your medicines, non-medical meetings or appointments, work, or from getting things that you need?: No   Physical Activity: Not on file   Stress: Not on file   Social Connections: Not on file   Interpersonal Safety: Not on file   Housing Stability: High Risk (1/29/2024)    Housing Stability     Do you have housing? : No     Are you worried about losing your housing?: No       FAMILY  HISTORY:  Family History   Problem Relation Age of Onset    Hypertension Father     Diabetes Maternal Grandmother     Cancer Maternal Grandmother     Diabetes Paternal Grandfather     Diabetes Maternal Uncle     Diabetes Maternal Aunt        Past/family/social history reviewed and no changes    PHYSICAL EXAMINATION:  Constitutional: aaox3, cooperative, pleasant, not dyspneic/diaphoretic, no acute distress  Vitals reviewed: There were no vitals taken for this visit.  Wt:   Wt Readings from Last 2 Encounters:   04/30/24 78.5 kg (173 lb)   04/24/24 81.6 kg (180 lb)      Video physical exam  General: Patient appears well in no acute distress.   Skin: No visualized rash or lesions on visualized skin  Eyes: EOMI, no erythema, sclera icterus or discharge noted  Resp: Appears to be breathing comfortably without accessory muscle usage, speaking in full sentences, no cough  MSK: Appears to have normal range of motion based on visualized movements  Neurologic: No apparent tremors, facial movements symmetric  Psych: affect calm, alert and oriented        PERTINENT STUDIES:  Most recent CBC:  Recent Labs   Lab Test 04/04/24  1859 01/31/24  2212   WBC 8.2 8.9   HGB 11.9 12.9   HCT 34.2* 37.5    282     Most recent hepatic panel:  Recent Labs   Lab Test 04/04/24  1859 02/02/24  0828   ALT 23 11   AST 18 13     Most recent creatinine:  Recent Labs   Lab Test 04/30/24  1404 04/04/24  1859   CR 0.94 1.06*       Video-Visit Details    Video Start Time: 12:25 PM    Type of service:  Video Visit    Video End Time:12:36 PM    Originating Location (pt. Location): Home        Distant Location (provider location):  Off-site    Platform used for Video Visit: Shan

## 2024-07-01 ENCOUNTER — MYC MEDICAL ADVICE (OUTPATIENT)
Dept: EDUCATION SERVICES | Facility: CLINIC | Age: 39
End: 2024-07-01

## 2024-07-01 ENCOUNTER — VIRTUAL VISIT (OUTPATIENT)
Dept: EDUCATION SERVICES | Facility: CLINIC | Age: 39
End: 2024-07-01
Payer: COMMERCIAL

## 2024-07-01 DIAGNOSIS — E10.319: Primary | ICD-10-CM

## 2024-07-01 PROCEDURE — G0108 DIAB MANAGE TRN  PER INDIV: HCPCS | Mod: 95

## 2024-07-01 RX ORDER — ACYCLOVIR 400 MG/1
TABLET ORAL
Qty: 9 EACH | Refills: 3 | Status: SHIPPED | OUTPATIENT
Start: 2024-07-01

## 2024-07-01 RX ORDER — ACYCLOVIR 400 MG/1
TABLET ORAL
Qty: 9 EACH | Refills: 3 | OUTPATIENT
Start: 2024-07-01 | End: 2024-07-01

## 2024-07-01 ASSESSMENT — PAIN SCALES - GENERAL: PAINLEVEL: NO PAIN (0)

## 2024-07-01 NOTE — TELEPHONE ENCOUNTER
Recommendations: My Chart sent to patient  *Lower 7am to 12pm basal from .45 to .40 due to pump suspending  *Possibly set Sleep Mode from 11pm-5am for more consistent control    Maggy Gaona RN, [facility]  Diabetes Education Department  [facility], [place]  Phone: [phone]  [phone]

## 2024-07-01 NOTE — LETTER
7/1/2024      Vickie Barnhart  [address] [facility] [address]      Dear Colleague,    Thank you for referring your patient, Vickie Barnhart, to the [facility]. Please see a copy of my visit note below.    Virtual Visit Details    Type of service:  Video Visit     Originating Location (pt. Location): Other Work    Distant Location (provider location):  Off-site  Platform used for Video Visit: Invodo    Diabetes Self-Management Education & Support    Vickie Barnhart presents today for education related to Type 1 diabetes    Patient is being treated with:  Tandem X2 Insulin Pump  She is accompanied by self    Year of diagnosis: 2003  Referring provider:  [place]  Living Situation: Home with spouse, 3 kids  Employment: Construction    PATIENT CONCERNS/REASON FOR REFERRAL   No concerns    ASSESSMENT:    Taking Medication:     Current Diabetes Management per Patient:  Taking diabetes medications?   Novolog in Tandem Pump    Monitoring  Patient glucose self monitoring as follows: continuously using a continuous glucose monitor (CGM)  CGM: Dexcom G6  BG results:   T:connect Ayan was closed, unable to view current data    Patient's most recent   Lab Results   Component Value Date    A1C 6.2 04/30/2024    A1C 8.7 10/12/2021      Patient's A1C goal: <7.0    Activity: Active at work    Sleep Pattern:  Up at 5am  Bed 9-11:30pm    Healthy Eating:   Patient currently eats 2 meals, 0 snacks per day   Breakfast: Skip  Lunch: Meat sandwich, salad, pop, Gatorade  Dinner: Chicken, pasta, mac and cheese, watermelon, water or gatorade  Water throughout day    Problem Solving:    Patient is at risk of hypoglycemia?: Frequency is one to two times per week, symptoms sweaty, warm, feels lows at 66, treats with soda, chocolate bars  Hospitalizations for hyper or hypoglycemia: Yes, 2007 DKA, 2013 for Hyperglycemia    Healthy Coping and Stress Management:   Sources of stress identified by patient: My health  Coping  mechanisms identified by patient:  Other (please specify):  Prefers to be at home with family    EDUCATION and INSTRUCTION PROVIDED AT THIS VISIT:    T1DM seen to review Tandem pump data at the request of Dr. Rhoades. Most recent A1C=6.2. 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 Patient transitioned to pump therapy in 2021. Since her visit with Dr. Rhoades, she has been trying to pre-meal bolus 15 minutes prior to meal, finds it harder when eating out. Feels she counts CHO accurately, diet doesn't deviate much. She does drink regular soda and Gatorade however does bolus for it. Unfortunately, was not logged into t:connect Ayan so no current data available. Discussed Tandem software upgrade with G7. She is able to complete upgrade at home. G7 ordered today. Will review Tandem report when data is available and contact patient with recommendations.     Patient-stated goal written and given to Vickie Barnhart.  Verbalized and demonstrated understanding of instructions.   See patient instructions  AVS printed and given to patient    PLAN:  *Will My Chart recommendations once Tandem data is available    FOLLOW-UP:    10/29 with Dr. Rhoades  Diabetes Education TBD    Time spent with patient at today's visit was 46 minutes.      Maggy Gaona RN, [facility]  Diabetes Education Department  [facility], [place]  Phone: [phone]  [phone]    Any diabetes medication dose changes were made via the CDE Protocol and Collaborative Practice Agreement with [facility] and [facility].  A copy of this encounter was provided to patient's referring provider.      Again, thank you for allowing me to participate in the care of your patient.        Sincerely,        Maggy Gaona RN

## 2024-07-01 NOTE — NURSING NOTE
Is the patient currently in the state of [place]? YES    Visit mode:VIDEO    If the visit is dropped, the patient can be reconnected by: VIDEO VISIT: Text to cell phone:   Telephone Information:   Mobile [phone]       Will anyone else be joining the visit? NO  (If patient encounters technical issues they should call [phone]    How would you like to obtain your AVS? MyChart    Are changes needed to the allergy or medication list? No    Are refills needed on medications prescribed by this physician? NO    Reason for visit: Diabetes Education    Shelby Kocher VVF

## 2024-07-01 NOTE — PROGRESS NOTES
Virtual Visit Details    Type of service:  Video Visit     Originating Location (pt. Location): Other Work    Distant Location (provider location):  Off-site  Platform used for Video Visit: Shan    Diabetes Self-Management Education & Support    Vickie Barnhart presents today for education related to Type 1 diabetes    Patient is being treated with:  Tandem X2 Insulin Pump  She is accompanied by self    Year of diagnosis: 2003  Referring provider:  [place]  Living Situation: Home with spouse, 3 kids  Employment: Construction    PATIENT CONCERNS/REASON FOR REFERRAL   No concerns    ASSESSMENT:    Taking Medication:     Current Diabetes Management per Patient:  Taking diabetes medications?   Novolog in Tandem Pump    Monitoring  Patient glucose self monitoring as follows: continuously using a continuous glucose monitor (CGM)  CGM: Dexcom G6  BG results:   T:connect Ayan was closed, unable to view current data    Patient's most recent   Lab Results   Component Value Date    A1C 6.2 04/30/2024    A1C 8.7 10/12/2021      Patient's A1C goal: <7.0    Activity: Active at work    Sleep Pattern:  Up at 5am  Bed 9-11:30pm    Healthy Eating:   Patient currently eats 2 meals, 0 snacks per day   Breakfast: Skip  Lunch: Meat sandwich, salad, pop, Gatorade  Dinner: Chicken, pasta, mac and cheese, watermelon, water or gatorade  Water throughout day    Problem Solving:    Patient is at risk of hypoglycemia?: Frequency is one to two times per week, symptoms sweaty, warm, feels lows at 66, treats with soda, chocolate bars  Hospitalizations for hyper or hypoglycemia: Yes, 2007 DKA, 2013 for Hyperglycemia    Healthy Coping and Stress Management:   Sources of stress identified by patient: My health  Coping mechanisms identified by patient:  Other (please specify):  Prefers to be at home with family    EDUCATION and INSTRUCTION PROVIDED AT THIS VISIT:    T1DM seen to review Tandem pump data at the request of Dr. Rhoades. Most recent  A1C=6.2. 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 Patient transitioned to pump therapy in 2021. Since her visit with Dr. Rhoades, she has been trying to pre-meal bolus 15 minutes prior to meal, finds it harder when eating out. Feels she counts CHO accurately, diet doesn't deviate much. She does drink regular soda and Gatorade however does bolus for it. Unfortunately, was not logged into [employer]:connect Ayan so no current data available. Discussed Tandem software upgrade with [employer]. She is able to complete upgrade at home. G7 ordered today. Will review Tandem report when data is available and contact patient with recommendations.     Patient-stated goal written and given to Vickie Barnhart.  Verbalized and demonstrated understanding of instructions.   See patient instructions  AVS printed and given to patient    PLAN:  *Will My Chart recommendations once Tandem data is available    FOLLOW-UP:    10/29 with Dr. Rhoades  Diabetes Education TBD    Time spent with patient at today's visit was 46 minutes.      Maggy Gaona RN, [facility]  Diabetes Education Department  [facility]  Phone: [phone]  [phone]    Any diabetes medication dose changes were made via the CDE Protocol and Collaborative Practice Agreement with [place] and  [facility]aur[facility].  A copy of this encounter was provided to patient's referring provider.

## 2024-07-26 ENCOUNTER — TRANSFERRED RECORDS (OUTPATIENT)
Dept: HEALTH INFORMATION MANAGEMENT | Facility: CLINIC | Age: 39
End: 2024-07-26
Payer: COMMERCIAL

## 2024-07-26 LAB — RETINOPATHY: POSITIVE

## 2024-08-03 ENCOUNTER — HEALTH MAINTENANCE LETTER (OUTPATIENT)
Age: 39
End: 2024-08-03

## 2024-08-08 ENCOUNTER — VIRTUAL VISIT (OUTPATIENT)
Dept: EDUCATION SERVICES | Facility: CLINIC | Age: 39
End: 2024-08-08
Payer: COMMERCIAL

## 2024-08-08 DIAGNOSIS — E10.319: Primary | ICD-10-CM

## 2024-08-08 PROCEDURE — G0108 DIAB MANAGE TRN  PER INDIV: HCPCS | Mod: 95

## 2024-08-08 NOTE — NURSING NOTE
Current patient location:  [place]     Is the patient currently in the state of [place]? YES    Visit mode:VIDEO    If the visit is dropped, the patient can be reconnected by: VIDEO VISIT: Text to cell phone:   Telephone Information:   Mobile [phone]       Will anyone else be joining the visit? NO  (If patient encounters technical issues they should call [phone])    How would you like to obtain your AVS? MyChart    Are changes needed to the allergy or medication list? Pt stated no med changes    Are refills needed on medications prescribed by this physician? NO    Rooming Documentation:  Not applicable      Reason for visit: RECHECK (Follow-up )    Emma COLLADO

## 2024-08-08 NOTE — PATIENT INSTRUCTIONS
"PLAN:  *No changes to pump settings  *Try turning on \"Sleep Mode\" for a few nights. This targets you at 120 and will not give auto corrections  *Continue to work on pre-meal bolusing  *Complete Tandem Software upgrade so you can transition to Dexcom G7 (contact [facility])  *DKA prevention    FOLLOW-UP:    10/29 with Dr. Rhoades  " yes...

## 2024-08-08 NOTE — LETTER
8/8/2024      Vickie Barnhart  [address] [place] [address]      Dear Colleague,    Thank you for referring your patient, Vickie Barnhart, to the [facility]. Please see a copy of my visit note below.    Virtual Visit Details    Type of service:  Video Visit     Originating Location (pt. Location): Other Work    Distant Location (provider location):  Off-site  Platform used for Video Visit: [employer]    Diabetes Self-Management Education & Support    Vickie Barnhart presents today for education related to Type 1 diabetes    Patient is being treated with:  Tandem X2 Insulin Pump  She is accompanied by self    Year of diagnosis: 2003  Referring provider:  [place]  Living Situation: Home with spouse, 3 kids  Employment: Construction    PATIENT CONCERNS/REASON FOR REFERRAL   No concerns    ASSESSMENT:    Taking Medication:     Current Diabetes Management per Patient:  Taking diabetes medications?   Novolog in Tandem Pump    Monitoring  Patient glucose self monitoring as follows: continuously using a continuous glucose monitor (CGM)  CGM: Dexcom G6  BG results:             Patient's most recent   Lab Results   Component Value Date    A1C 6.2 04/30/2024    A1C 8.7 10/12/2021      Patient's A1C goal: <7.0    Activity: Active at work    Sleep Pattern:  Up at 5am  Bed 9-11:30pm    Healthy Eating:   Patient currently eats 2 meals, 0 snacks per day   Breakfast: Skip  Lunch: Meat sandwich, salad, pop, Gatorade  Dinner: Chicken, pasta, mac and cheese, watermelon, water or gatorade  Water throughout day    Problem Solving:    Patient is at risk of hypoglycemia?: Frequency is one to two times per week, symptoms sweaty, warm, feels lows at 66, treats with soda, chocolate bars  Hospitalizations for hyper or hypoglycemia: Yes, 2007 DKA, 2013 for Hyperglycemia    Healthy Coping and Stress Management:   Sources of stress identified by patient: My health  Coping mechanisms identified by patient:  Other  "(please specify):  Prefers to be at home with family    EDUCATION and INSTRUCTION PROVIDED AT THIS VISIT:    T1DM seen to review Tandem pump data. She has not yet completed Tandem software upgrade, will do so in the next few weeks so is able to transition to G7 sensors when transmitter expires the end of month. TIR 64%, with 3.2% mild lows, 1.1% severe lows. Average glucose 156. Timing of lows vary, thinks that overnight low was from sleepwalking (rare) and may have bolused for food not actually eaten, contributes post meal lows to over estimating CHO's. Continues to work on pre-meal bolusing. She feels pump settings are appropriate and does not wish to make any changes. She is agreeable to trying sleep mode for a few nights and if likes will program sleep schedule. A1C ordered. Follow up with Dr. Rhoades in October. Diabetes Education as needed.    Topics Reviewed:  15-15 rule; 8-10 grams in CIQ  DKA prevention    Patient-stated goal written and given to Vickie Barnhart.  Verbalized and demonstrated understanding of instructions.   See patient instructions  AVS printed and given to patient-via My Chart    PLAN:  *No changes to pump settings  *Try turning on \"Sleep Mode\" for a few nights. This targets you at 120 and will not give auto corrections  *Continue to work on pre-meal bolusing  *Complete Tandem Software upgrade so you can transition to Dexcom G7 (contact [facility])  *DKA prevention    FOLLOW-UP:    10/29 with Dr. Rhoades    Time spent with patient at today's visit was 35 minutes.      Maggy Gaona RN, [facility]  Diabetes Education Department  [facility]  Phone: [phone]  [phone]    Any diabetes medication dose changes were made via the CDE Protocol and Collaborative Practice Agreement with [place] [facility].  A copy of this encounter was provided to patient's referring provider.      Again, thank you for allowing me to participate " in the care of your patient.        Sincerely,        Maggy Gaona RN

## 2024-08-08 NOTE — PROGRESS NOTES
Virtual Visit Details    Type of service:  Video Visit     Originating Location (pt. Location): Other Work    Distant Location (provider location):  Off-site  Platform used for Video Visit: Shan    Diabetes Self-Management Education & Support    Vickie LO Obey presents today for education related to Type 1 diabetes    Patient is being treated with:  Tandem X2 Insulin Pump  She is accompanied by self    Year of diagnosis: 2003  Referring provider:  [place]  Living Situation: Home with spouse, 3 kids  Employment: Construction    PATIENT CONCERNS/REASON FOR REFERRAL   No concerns    ASSESSMENT:    Taking Medication:     Current Diabetes Management per Patient:  Taking diabetes medications?   Novolog in Tandem Pump    Monitoring  Patient glucose self monitoring as follows: continuously using a continuous glucose monitor (CGM)  CGM: Dexcom G6  BG results:             Patient's most recent   Lab Results   Component Value Date    A1C 6.2 04/30/2024    A1C 8.7 10/12/2021      Patient's A1C goal: <7.0    Activity: Active at work    Sleep Pattern:  Up at 5am  Bed 9-11:30pm    Healthy Eating:   Patient currently eats 2 meals, 0 snacks per day   Breakfast: Skip  Lunch: Meat sandwich, salad, pop, Gatorade  Dinner: Chicken, pasta, mac and cheese, watermelon, water or gatorade  Water throughout day    Problem Solving:    Patient is at risk of hypoglycemia?: Frequency is one to two times per week, symptoms sweaty, warm, feels lows at 66, treats with soda, chocolate bars  Hospitalizations for hyper or hypoglycemia: Yes, 2007 DKA, 2013 for Hyperglycemia    Healthy Coping and Stress Management:   Sources of stress identified by patient: My health  Coping mechanisms identified by patient:  Other (please specify):  Prefers to be at home with family    EDUCATION and INSTRUCTION PROVIDED AT THIS VISIT:    T1DM seen to review Tandem pump data. She has not yet completed Tandem software upgrade, will do so in the next few weeks so is  "able to transition to G7 sensors when transmitter expires the end of month. TIR 64%, with 3.2% mild lows, 1.1% severe lows. Average glucose 156. Timing of lows vary, thinks that overnight low was from sleepwalking (rare) and may have bolused for food not actually eaten, contributes post meal lows to over estimating CHO's. Continues to work on pre-meal bolusing. She feels pump settings are appropriate and does not wish to make any changes. She is agreeable to trying sleep mode for a few nights and if likes will program sleep schedule. A1C ordered. Follow up with Dr. Rhoades in October. Diabetes Education as needed.    Topics Reviewed:  15-15 rule; 8-10 grams in CIQ  DKA prevention    Patient-stated goal written and given to Vickie Barnhart.  Verbalized and demonstrated understanding of instructions.   See patient instructions  AVS printed and given to patient-via My Chart    PLAN:  *No changes to pump settings  *Try turning on \"Sleep Mode\" for a few nights. This targets you at 120 and will not give auto corrections  *Continue to work on pre-meal bolusing  *Complete Tandem Software upgrade so you can transition to Dexcom G7 (contact [facility])  *DKA prevention    FOLLOW-UP:    10/29 with Dr. Rhoades    Time spent with patient at today's visit was 35 minutes.      Maggy Gaona RN, [facility]  Diabetes Education Department  [facility] [place]  Phone: [phone]  [phone]    Any diabetes medication dose changes were made via the CDE Protocol and Collaborative Practice Agreement with [place] and  [facility]aur[facility].  A copy of this encounter was provided to patient's referring provider.    "

## 2024-08-27 ENCOUNTER — DOCUMENTATION ONLY (OUTPATIENT)
Dept: ENDOCRINOLOGY | Facility: CLINIC | Age: 39
End: 2024-08-27
Payer: COMMERCIAL

## 2024-08-27 NOTE — PROGRESS NOTES
Type of form: Diabetes Therapy supplies Prescription   From:  [employer]   Fax: [phone]  Supplies requested on form:  Insulin pump, infusion sets,, CGM sensors  Provider:  Siri Rhoades MD  Date provider will be in clinic to sign:  9/10/2024 currently out of office   Labeled form completed to the best of this writers ability.    Placed in provider's file.     Aviva Carr CMA  Adult Endocrinology   [facility]

## 2024-09-02 ENCOUNTER — PATIENT OUTREACH (OUTPATIENT)
Dept: CARE COORDINATION | Facility: CLINIC | Age: 39
End: 2024-09-02
Payer: COMMERCIAL

## 2024-09-10 ENCOUNTER — MEDICAL CORRESPONDENCE (OUTPATIENT)
Dept: HEALTH INFORMATION MANAGEMENT | Facility: CLINIC | Age: 39
End: 2024-09-10

## 2024-09-11 NOTE — PROGRESS NOTES
Provider reviewed and signed.   Faxed back to: [phone]  See image below for Timestamp confirmation of successful transmission.         Placed in [place] file      Aviva Carr CMA  Adult Endocrinology   [facility]

## 2024-09-12 ENCOUNTER — DOCUMENTATION ONLY (OUTPATIENT)
Dept: ENDOCRINOLOGY | Facility: CLINIC | Age: 39
End: 2024-09-12
Payer: COMMERCIAL

## 2024-09-12 NOTE — PROGRESS NOTES
Type of form:  Renewal Rx   From:  [employer]  Fax: [phone]  Supplies requested on form:  Insulin therapy supplies  Provider:  Siri Rhoades MD   Date provider will be in clinic to sign:  9/17/2024  Labeled form and completed to the best of this writers ability.    Placed in provider's file    Aviva Carr CMA  Adult Endocrinology   [facility]

## 2024-09-17 ENCOUNTER — MEDICAL CORRESPONDENCE (OUTPATIENT)
Dept: HEALTH INFORMATION MANAGEMENT | Facility: CLINIC | Age: 39
End: 2024-09-17

## 2024-09-17 NOTE — PROGRESS NOTES
Form signed and faxed to [place].    Completion of transmission verified via RightFax.       Mary Kate Sanchez CMA  Adult Endocrinology  [facility], [place]

## 2024-09-18 ENCOUNTER — VIRTUAL VISIT (OUTPATIENT)
Dept: GASTROENTEROLOGY | Facility: CLINIC | Age: 39
End: 2024-09-18
Attending: PHYSICIAN ASSISTANT
Payer: COMMERCIAL

## 2024-09-18 VITALS — WEIGHT: 175 LBS | HEIGHT: 65 IN | BODY MASS INDEX: 29.16 KG/M2

## 2024-09-18 DIAGNOSIS — K51.011 ULCERATIVE PANCOLITIS WITH RECTAL BLEEDING (H): Primary | ICD-10-CM

## 2024-09-18 PROCEDURE — 99214 OFFICE O/P EST MOD 30 MIN: CPT | Mod: 95 | Performed by: INTERNAL MEDICINE

## 2024-09-18 ASSESSMENT — PAIN SCALES - GENERAL: PAINLEVEL: NO PAIN (0)

## 2024-09-18 NOTE — NURSING NOTE
[place] [address] [place] [address]    Is the patient currently in the state of [place]? YES    Visit mode:VIDEO    If the visit is dropped, the patient can be reconnected by: VIDEO VISIT: Text to cell phone:   Telephone Information:   Mobile [phone]       Will anyone else be joining the visit? NO  (If patient encounters technical issues they should call [phone])    How would you like to obtain your AVS? MyChart    Are changes needed to the allergy or medication list? No    Are refills needed on medications prescribed by this physician? NO    Rooming Documentation:  Questionnaire(s) completed      Reason for visit: Video Visit (Recheck IBD)    Yee COLLADO

## 2024-09-18 NOTE — PROGRESS NOTES
HPI:    Vickie presents today for a video visit to discuss for follow-up of UC.  Has been on mesalamine since last appointment and has noticed improvement but continues to have symptoms. Main concern is abdominal cramping - happens daily - has been having to avoid beef as that seems to trigger things. Does use dicyclomine as needed which she finds helpful - hasn't tried scheduling it.   Has been avoiding all NSAIDs.  Generally having 1-3 bowel movements a day without blood.    IBD HISTORY  IBD type: UC  Age at diagnosis: child (unknown age)  Endoscopic extent of disease: pancolitis  Current IBD medications: mesalamine  Prior IBD surgeries:  Prior IBD Medications:   unknown      Past Medical History:   Diagnosis Date    Adjustment disorder with anxious mood 11/23/2015    Anxiety 11/27/2015    ASCUS of cervix with negative high risk HPV 06/19/2008    Depressive disorder, not elsewhere classified     Diabetic eye exam (H) 12/19/14    Hx of tubal ligation 1/15/2021    Mixed hyperlipidemia 11/30/2006    Regional enteritis of unspecified site     Ulcerative Colitis    Type I (juvenile type) diabetes mellitus with ketoacidosis, not stated as uncontrolled(250.11) 01/01/2007    Moderately severe intensity.    Type I (juvenile type) diabetes mellitus with ketoacidosis, uncontrolled 02/14/08    Type I (juvenile type) diabetes mellitus without mention of complication, not stated as uncontrolled     diagnosed in 2003    Ulcerative colitis, unspecified     remission. Last flare 6 yrs ago.        Past Surgical History:   Procedure Laterality Date    COLONOSCOPY N/A 7/25/2023    Procedure: Colonoscopy;  Surgeon: Jude Nix MD;  Location:  GI    COLONOSCOPY N/A 4/12/2024    Procedure: COLONOSCOPY, WITH BIOPSY;  Surgeon: Donnie Barajas MD;  Location:  GI    DILATION AND CURETTAGE SUCTION  04/2010    miscarriage    RETINAL DETACHMENT SURGERY Left     TUBAL LIGATION      [facility] COLONOSCOPY W BIOPSY  08/16/2006    [facility]  COLONOSCOPY W/WO BRUSH/WASH         Family History   Problem Relation Age of Onset    Hypertension Father     Diabetes Maternal Grandmother     Cancer Maternal Grandmother     Diabetes Paternal Grandfather     Diabetes Maternal Uncle     Diabetes Maternal Aunt        Social History     Tobacco Use    Smoking status: Former     Current packs/day: 0.00     Average packs/day: 0.5 packs/day for 16.0 years (8.0 ttl pk-yrs)     Types: Cigarettes     Quit date: 2023     Years since quittin.8    Smokeless tobacco: Never    Tobacco comments:     working on quiting, somedays up to 4 cigs per day   Substance Use Topics    Alcohol use: Yes     Alcohol/week: 0.0 standard drinks of alcohol     Comment: occassionally        O:    Gen: no acute distress  HEENT: NCAT  Neck: normal ROM  Resp: nonlabored breathing  Neuro: no gross deficits  Psych: appropriate mood and affect    Assessment and Plan:    # UC pancolitis - some improvement with mesalamine but continues to have abdominal cramping. Will try scheduling dicyclomine. Also encouraged patient to submit fecal calprotectin - pending results may need to consider flex sig/change in therapy. Will also update labs.    RTC 3 months with me or Binh Rodarte, DO     Video-Visit Details     Type of service:  Video Visit     Video Start Time: 9:56 AM  Video End Time (time video stopped): 10:04 AM    Originating Location (pt. Location): car (parked)     Distant Location (provider location):  remote     Mode of Communication:  Video Conference via [employer]

## 2024-09-18 NOTE — PATIENT INSTRUCTIONS
It was a pleasure meeting with you today and discussing your healthcare plan. Below is a summary of what we covered:    Please have labs done and submit a stool sample - I will let you know next steps based on those results  You can try scheduling the bentyl (dicyclomine) - this can be taken up to 4 times a day and typically works well before meals and at bedtime - if you develop constipation with this you can use miralax, stool softeners or dulcolax      Please see below for any additional questions and scheduling guidelines.    Sign up for [employer]: [employer] patient portal serves as a secure platform for accessing your medical records from the [facility]. Additionally, [employer] facilitates easy, timely, and secure messaging with your care team. If you have not signed up, you may do so by using the provided code or calling [phone].    Coordinating your care after your visit:  There are multiple options for scheduling your follow-up care based on your provider's recommendation.    How do I schedule a follow-up clinic appointment:   After your appointment, you may receive scheduling assistance with the Clinic Coordinators by having a seat in the waiting room and a Clinic Coordinator will call you up to schedule.  Virtual visits or after you leave the clinic:  Your provider has placed a follow-up order in the [employer] portal for scheduling your return appointment. A member of the scheduling team will contact you to schedule.  [employer] Scheduling: Timely scheduling through [employer] is advised to ensure appointment availability.   Call to schedule: You may schedule your follow-up appointment(s) by calling [phone], option 1.    How do I schedule my endoscopy or colonoscopy procedure:  If a procedure, such as a colonoscopy or upper endoscopy was ordered by your provider, the scheduling team will contact you to schedule this procedure. Or you may choose to call to schedule at   [phone], option 2.   Please allow 20-30 minutes when scheduling a procedure.    How do I get my blood work done? To get your blood work done, you need to schedule a lab appointment at an [facility]. There are multiple ways to schedule:   At the clinic: The Clinic Coordinator you meet after your visit can help you schedule a lab appointment.   [employer] scheduling: [employer] offers online lab scheduling at all [facility] laboratory locations.   Call to schedule: You can call [phone] to schedule your lab appointment.    How do I schedule my imaging study: To schedule imaging studies, such as CT scans, ultrasounds, MRIs, or X-rays, contact Imaging Services at [phone].    How do I schedule a referral to another doctor: If your provider recommended a referral to another specialist(s), the referral order was placed by your provider. You will receive a phone call to schedule this referral, or you may choose to call the number attached to the referral to self-schedule.    For Post-Visit Question(s):  For any inquiries following today's visit:  Please utilize [employer] messaging and allow 48 hours for reply or contact the Call Center during normal business hours at [phone], option 3.  For Emergent After-hours questions, contact the On-Call GI Fellow through the [facility]  at [phone].  In addition, you may contact your Nurse directly using the provided contact information.    Test Results:  Test results will be accessible via [employer] in compliance with the 21st Century Cures Act. This means that your results will be available to you at the same time as your provider. Often you may see your results before your provider does. Results are reviewed by staff within two weeks with communication follow-up. Results may be released in the patient portal prior to your care team review.    Prescription Refill(s):  Medication prescribed by your provider will be addressed during your visit. For future  refills, please coordinate with your pharmacy. If you have not had a recent clinic visit or routine labs, for your safety, your provider may not be able to refill your prescription.

## 2024-09-18 NOTE — PROGRESS NOTES
"Virtual Visit Details    Type of service:  Video Visit     Originating Location (pt. Location): {video visit patient location:196030::\"Home\"}  {PROVIDER LOCATION On-site should be selected for visits conducted from your clinic location or adjoining [facility] hospital, academic office, or other nearby [facility] building. Off-site should be selected for all other provider locations, including home:464475}  Distant Location (provider location):  {virtual location provider:584872}  Platform used for Video Visit: {Virtual Visit Platforms:617127::\"[employer]\"}  "

## 2024-10-28 ENCOUNTER — HOSPITAL ENCOUNTER (EMERGENCY)
Facility: CLINIC | Age: 39
Discharge: HOME | End: 2024-10-28
Admitting: EMERGENCY MEDICINE
Payer: COMMERCIAL

## 2024-10-28 VITALS
HEIGHT: 64 IN | HEART RATE: 105 BPM | RESPIRATION RATE: 20 BRPM | DIASTOLIC BLOOD PRESSURE: 90 MMHG | SYSTOLIC BLOOD PRESSURE: 142 MMHG | BODY MASS INDEX: 29.71 KG/M2 | WEIGHT: 174 LBS | TEMPERATURE: 100.6 F | OXYGEN SATURATION: 99 %

## 2024-10-28 PROCEDURE — 99281 EMR DPT VST MAYX REQ PHY/QHP: CPT | Performed by: EMERGENCY MEDICINE

## 2024-10-28 ASSESSMENT — ACTIVITIES OF DAILY LIVING (ADL): ADLS_ACUITY_SCORE: 0

## 2024-11-05 DIAGNOSIS — K51.011 ULCERATIVE PANCOLITIS WITH RECTAL BLEEDING (H): ICD-10-CM

## 2024-11-05 RX ORDER — MESALAMINE 1.2 G/1
4800 TABLET, DELAYED RELEASE ORAL
Qty: 120 TABLET | Refills: 2 | Status: SHIPPED | OUTPATIENT
Start: 2024-11-05 | End: 2025-03-05

## 2024-11-08 ENCOUNTER — TRANSFERRED RECORDS (OUTPATIENT)
Dept: HEALTH INFORMATION MANAGEMENT | Facility: CLINIC | Age: 39
End: 2024-11-08
Payer: COMMERCIAL

## 2024-11-08 LAB — RETINOPATHY: POSITIVE

## 2024-12-21 ENCOUNTER — HEALTH MAINTENANCE LETTER (OUTPATIENT)
Age: 39
End: 2024-12-21

## 2025-01-02 ENCOUNTER — TELEPHONE (OUTPATIENT)
Dept: ENDOCRINOLOGY | Facility: CLINIC | Age: 40
End: 2025-01-02
Payer: MEDICAID

## 2025-01-02 ENCOUNTER — MYC MEDICAL ADVICE (OUTPATIENT)
Dept: ENDOCRINOLOGY | Facility: CLINIC | Age: 40
End: 2025-01-02
Payer: MEDICAID

## 2025-01-02 DIAGNOSIS — E10.319: ICD-10-CM

## 2025-01-02 RX ORDER — ACYCLOVIR 400 MG/1
TABLET ORAL
Qty: 9 EACH | Refills: 3 | OUTPATIENT
Start: 2025-01-02

## 2025-01-02 NOTE — TELEPHONE ENCOUNTER
Please route determinations to the Pharm Diabetes pool ([id number]).      Thank you!    Diabetes Care Services Team   [facility]  [address] [place] [address]

## 2025-01-02 NOTE — TELEPHONE ENCOUNTER
Prior Authorization Not Needed per Insurance    Medication: DEXCOM G7 SENSOR MISC  Insurance Company: Comment:  [place] [employer] [phone]  Expected CoPay: $    Pharmacy Filling the Rx: [facility] - [place], [place] - [address]  Pharmacy Notified: Yes  Patient Notified: Yes

## 2025-01-09 ENCOUNTER — DOCUMENTATION ONLY (OUTPATIENT)
Dept: GASTROENTEROLOGY | Facility: CLINIC | Age: 40
End: 2025-01-09

## 2025-01-09 ENCOUNTER — TELEPHONE (OUTPATIENT)
Dept: GASTROENTEROLOGY | Facility: CLINIC | Age: 40
End: 2025-01-09

## 2025-01-09 ENCOUNTER — OFFICE VISIT (OUTPATIENT)
Dept: GASTROENTEROLOGY | Facility: CLINIC | Age: 40
End: 2025-01-09
Attending: INTERNAL MEDICINE
Payer: MEDICAID

## 2025-01-09 VITALS
DIASTOLIC BLOOD PRESSURE: 86 MMHG | WEIGHT: 180 LBS | HEART RATE: 77 BPM | OXYGEN SATURATION: 98 % | BODY MASS INDEX: 30.9 KG/M2 | SYSTOLIC BLOOD PRESSURE: 146 MMHG

## 2025-01-09 DIAGNOSIS — K51.80 OTHER ULCERATIVE COLITIS WITHOUT COMPLICATION (H): Primary | ICD-10-CM

## 2025-01-09 DIAGNOSIS — K51.011 ULCERATIVE PANCOLITIS WITH RECTAL BLEEDING (H): ICD-10-CM

## 2025-01-09 LAB
ALBUMIN SERPL BCG-MCNC: 4.3 G/DL (ref 3.5–5.2)
ALP SERPL-CCNC: 107 U/L (ref 40–150)
ALT SERPL W P-5'-P-CCNC: 15 U/L (ref 0–50)
ANION GAP SERPL CALCULATED.3IONS-SCNC: 13 MMOL/L (ref 7–15)
AST SERPL W P-5'-P-CCNC: 16 U/L (ref 0–45)
BILIRUB SERPL-MCNC: 0.3 MG/DL
BUN SERPL-MCNC: 15.3 MG/DL (ref 6–20)
CALCIUM SERPL-MCNC: 9.7 MG/DL (ref 8.8–10.4)
CHLORIDE SERPL-SCNC: 103 MMOL/L (ref 98–107)
CREAT SERPL-MCNC: 1.15 MG/DL (ref 0.51–0.95)
CRP SERPL-MCNC: <3 MG/L
EGFRCR SERPLBLD CKD-EPI 2021: 61 ML/MIN/1.73M2
ERYTHROCYTE [DISTWIDTH] IN BLOOD BY AUTOMATED COUNT: 11.9 % (ref 10–15)
GLUCOSE SERPL-MCNC: 146 MG/DL (ref 70–99)
HCO3 SERPL-SCNC: 23 MMOL/L (ref 22–29)
HCT VFR BLD AUTO: 39.1 % (ref 35–47)
HGB BLD-MCNC: 13 G/DL (ref 11.7–15.7)
MCH RBC QN AUTO: 28.4 PG (ref 26.5–33)
MCHC RBC AUTO-ENTMCNC: 33.2 G/DL (ref 31.5–36.5)
MCV RBC AUTO: 85 FL (ref 78–100)
PLATELET # BLD AUTO: 259 10E3/UL (ref 150–450)
POTASSIUM SERPL-SCNC: 4.2 MMOL/L (ref 3.4–5.3)
PROT SERPL-MCNC: 7.4 G/DL (ref 6.4–8.3)
RBC # BLD AUTO: 4.58 10E6/UL (ref 3.8–5.2)
SODIUM SERPL-SCNC: 139 MMOL/L (ref 135–145)
WBC # BLD AUTO: 7.1 10E3/UL (ref 4–11)

## 2025-01-09 ASSESSMENT — PAIN SCALES - GENERAL: PAINLEVEL_OUTOF10: NO PAIN (0)

## 2025-01-09 NOTE — PROGRESS NOTES
"  IBD CLINIC VISIT    CC/REFERRING MD:  Tatianna Rodarte  REASON FOR CONSULTATION: UC    ASSESSMENT/PLAN:    UC: patient has not taken mesalamine since October 2024, took it at the increased dose of 4.8g daily for a few months and didn't notice a difference in her symptoms. She will have a BM daily or EOD, described as a bristol type4/5, denies fecal urgency. She does not feel anything has changed since stopping the mesalamine. Of note, patient had an admission for sepsis in December 2024 and reports that her \"intestines were inflamed\" - will request records, reports taking 5 days or prednisone. She has not submitted stool studies or had previous labs and we discussed the importance of this - she will have labs done today and submit a fecal calprotectin as soon as she can. Based on results, will consider flex sig or change in therapy. Patient does not want to go back on mesalamine, so will remain off therapy for now.   --obtain labs and fecal calprotectin     IBD dysplasia surveillance: unknown extent of disease, but given pancolitis, should have CRC screening ever 2-3 years.    IBD Health Care Maintenance:    Misc:  -- Avoid tobacco use  -- Avoid NSAIDs as there is potentially a 25% chance of causing an IBD flare    Return to clinic in 3 months        This note was created with voice recognition software, and while reviewed for accuracy, typos may remain.     Binh Navarrete PA-C  Division of Gastroenterology, Hepatology and Nutrition  [facility]    30 minutes spent on the date of the encounter doing chart review, patient visit, and documentation      IBD HISTORY  IBD type: UC  Age at diagnosis: child (unknown age)  Endoscopic extent of disease: pancolitis  Current IBD medications: mesalamine  Prior IBD surgeries:  Prior IBD Medications:   unknown    DRUG MONITORING  TPMT enzyme activity:     6-TGN/6-MMPN levels:    Biologic concentration:    DISEASE " ASSESSMENT  Labs  Recent Labs   Lab Test 04/04/24  1859 09/29/18  1435 10/07/17  2341   CRP  --   --  <2.9   SED 87*  --   --    HGB 11.9   < > 11.3*    < > = values in this interval not displayed.     Fecal calprotectin:   Endoscopy:   --C scope (4/12/24):  - Friable (with contact bleeding) and inflamed mucosa                          in the entire examined colon. Biopsied.                          - The examined portion of the ileum was normal.                          Biopsied.   Final Diagnosis   A(1).  Small bowel, terminal ileum, biopsy:  -Small intestinal mucosa with no significant histopathologic abnormalities.  -Normal villous architecture identified and no prominence in intraepithelial lymphocytes seen.  -Negative for luminal organisms.  -Negative for dysplasia or malignancy.        B(2).  Colon, random locations, biopsies:  -Active chronic colitis with patchy distribution.  (See comment)  -Colonic mucosa with architectural distortion, acute inflammation and increased chronic inflammation.  -Immunohistochemical stains for CMV are focally positive (less than 1 per high-power field)  -Negative for granulomas.  -Negative for dysplasia or malignancy.          Enterography:   C diff:       HPI:   37 y/o F presents for follow up of UC.     Patient was initially evaluated on 4/24/24 by Dr. Rodarte, please see visit details below:    Vickie  presents today for a video visit to discuss UC which was diagnosed as a child.  Hasn't been on therapy for many years - doesn't remember what she was on before.  Was doing well up until January when she developed worsening diarrhea and abdominal pain as well as blood in the stool.  Was seen in the ER x 2 (initially in January and again in march) for these symptoms - was given course of steroids after imaging showed evidence of colitis.  Had colonoscopy on 4/12 which showed pancolitis - biopsies showing acute on chronic inflammation.      Having 4-5 bowel movements a day -  sometimes with blood.  Wakes up 3-4 times a night for bowel movements.     Quit smoking in December. No family history of IBD  Does use NSAIDs a few time a week.     6/24/24:  Patient has been taking mesalamine 1.2g daily -- notes mid improvement, but still not great. She will generally have a BM EOD, stools are formed but soft, described as a bristol type 4. Denies BRBPR. She reports fecal urgency, usually once a week. She denies fecal incontinence. She does endorse tenesmus, that occurs daily. She will experience abdominal pain 3-4x/week, localized around umbilicus, described as a dull pain. Starts after eating, improved with using bentyl. Denies improvement with having a BM. Denies nausea/vomiting. Reports occasional abdominal bloating. She has been completely avoiding NSAIDs.     She is weaning off nicotine, still vaping.     9/18/24, Dr. Rodarte:  Vickie presents today for a video visit to discuss for follow-up of UC.  Has been on mesalamine since last appointment and has noticed improvement but continues to have symptoms. Main concern is abdominal cramping - happens daily - has been having to avoid beef as that seems to trigger things. Does use dicyclomine as needed which she finds helpful - hasn't tried scheduling it.   Has been avoiding all NSAIDs.  Generally having 1-3 bowel movements a day without blood.    1/9/25:  Patient increased mesalamine to 4.8g daily -- did this for about 4 months. She notes she would generally have a BM daily or EOD, describes stool as a bristol type 4/5. Denies BRBPR. She denies fecal urgency or incontinence. If she goes more than two days without having a BM, patient will take a stool softener, which does produce a bowel movement. She denies abdominal pain, nausea, or vomiting. She last took bentyl in September 2024. Denies abdominal bloating.  Patient has made some dietary changes - limiting red meat, has been eating more salads and white meat. Patient has also been trying some  "supplements - \"lion's prudence, turkey tale, cintia\". Took this for 2 months and then was hospitalized in December for sepsis, was told she had \"inflammation of intestines\" and was given 5 day course of prednisone. Denies any issues with diarrhea at that time.    ROS:    No fevers or chills  No weight loss  No blurry vision, double vision or change in vision  No arthralgias or myalgias  No rashes or skin changes  No BRBPR, hematochezia, melena      Extra intestinal manifestations of IBD:  No uveitis/episcleritis  No aphthous ulcers   No arthritis   No erythema nodosum/pyoderma gangrenosum.     PERTINENT PAST MEDICAL HISTORY:  Past Medical History:   Diagnosis Date    Adjustment disorder with anxious mood 11/23/2015    Anxiety 11/27/2015    ASCUS of cervix with negative high risk HPV 06/19/2008    Depressive disorder, not elsewhere classified     Diabetic eye exam (H) 12/19/14    Hx of tubal ligation 1/15/2021    Mixed hyperlipidemia 11/30/2006    Regional enteritis of unspecified site     Ulcerative Colitis    Type I (juvenile type) diabetes mellitus with ketoacidosis, not stated as uncontrolled(250.11) 01/01/2007    Moderately severe intensity.    Type I (juvenile type) diabetes mellitus with ketoacidosis, uncontrolled 02/14/08    Type I (juvenile type) diabetes mellitus without mention of complication, not stated as uncontrolled     diagnosed in 2003    Ulcerative colitis, unspecified     remission. Last flare 6 yrs ago.        PREVIOUS SURGERIES:  Past Surgical History:   Procedure Laterality Date    COLONOSCOPY N/A 7/25/2023    Procedure: Colonoscopy;  Surgeon: Jude Nix MD;  Location:  GI    COLONOSCOPY N/A 4/12/2024    Procedure: COLONOSCOPY, WITH BIOPSY;  Surgeon: Donnie Barajas MD;  Location:  GI    DILATION AND CURETTAGE SUCTION  04/2010    miscarriage    RETINAL DETACHMENT SURGERY Left     TUBAL LIGATION      [facility] COLONOSCOPY W BIOPSY  08/16/2006    [facility] COLONOSCOPY W/WO BRUSH/WASH   "       PREVIOUS ENDOSCOPY:  Results for orders placed or performed during the hospital encounter of 04/12/24   COLONOSCOPY    Collection Time: 04/12/24 12:57 PM   Result Value Ref Range    COLONOSCOPY       [facility]  [address] [facility] [address] [place] [address]  _______________________________________________________________________________  Patient Name: Vickie Barnhart          Procedure Date: 4/12/2024 12:57 PM  MRN: [id number]                       Account Number: [id number]  YOB: 1985              Admit Type: Outpatient  Age: 38                               Room: [place] [address]  Gender: Female                        Note Status: Finalized  Attending MD: CADY BRANDT MD,   Total Sedation Time:   _______________________________________________________________________________     Procedure:             Colonoscopy  Indications:           High risk colon cancer surveillance: Ulcerative                          pancolitis of 8 (or more) years duration  Providers:             CADY BRANDT MD  Referring MD:          FALLON GILBERT  Medicines:             Propofol per Anesthesia  Complications:         No immediate complications.  ___________ ____________________________________________________________________  Procedure:             Pre-Anesthesia Assessment:                         - Prior to the procedure, a History and Physical was                          performed, and patient medications, allergies and                          sensitivities were reviewed. The patient's tolerance                          of previous anesthesia was reviewed.                         - Pre-procedure physical examination revealed no                          contraindications to sedation.                         After obtaining informed consent, the colonoscope was                          passed under direct vision. Throughout the procedure,                          the patient's blood  pressure, pulse, and oxygen                          saturations were monitored continuously. The                          Colonoscope was introduced through the anus and                          advanced to the terminal ileum, with identification of                           the ileocecal valve.                                                                                   Findings:       The perianal and digital rectal examinations were normal.       A diffuse area of severely friable (with contact bleeding) and inflamed        mucosa was found in the entire colon consistent with colitis(Probable        UC). Most severe in sigmoid colon. Biopsies were taken with a cold        forceps for histology.       The terminal ileum appeared normal. Biopsies were taken with a cold        forceps for histology.                                                                                   Impression:            - Friable (with contact bleeding) and inflamed mucosa                          in the entire examined colon. Biopsied.                         - The examined portion of the ileum was normal.                          Biopsied.  Recommendation:        - Await pathology results.                         - Refer to a gastroenterologist at  appointment to be                          scheduled ASAP.                                                                                   Procedure Code(s):       --- Professional ---       72000, Colonoscopy, flexible; with biopsy, single or multiple    CPT copyright 2022 American Medical Association. All rights reserved.    The codes documented in this report are preliminary and upon  review may   be revised to meet current compliance requirements.    Donnie Brandt  ___________________  DONNIE BRANDT MD  4/12/2024 1:32:06 PM  I was physically present for the entire viewing portion of the exam.DONNIE BRANDT MD  Number of Addenda: 0    Note Initiated On:  4/12/2024 12:57 PM     ]    ALLERGIES:   No Known Allergies    PERTINENT MEDICATIONS:    Current Outpatient Medications:     acetaminophen (TYLENOL) 500 MG tablet, Take 500-1,000 mg by mouth every 6 hours as needed for mild pain, Disp: , Rfl:     bisacodyl (DULCOLAX) 5 MG EC tablet, Two days prior to exam take two (2) tablets at 4pm. One day prior to exam take two (2) tablets at 4pm, Disp: 4 tablet, Rfl: 0    bisacodyl (DULCOLAX) 5 MG EC tablet, Take 2 tablets at 3 pm the day before your procedure. If your procedure is before 11 am, take 2 additional tablets at 11 pm. If your procedure is after 11 am, take 2 additional tablets at 6 am. For additional instructions refer to your colonoscopy prep instructions., Disp: 4 tablet, Rfl: 0    Continuous Glucose Sensor (DEXCOM G6 SENSOR) MISC, Change every 10 days., Disp: 9 each, Rfl: 3    Continuous Glucose Sensor (DEXCOM G7 SENSOR) MISC, Change every 10 days. Please dispense G7 sensor with underline below LBL code on the side of box. This is needed for compatibility with insulin pump., Disp: 9 each, Rfl: 3    Continuous Glucose Transmitter (DEXCOM G6 TRANSMITTER) MISC, Change every 90 days., Disp: 1 each, Rfl: 3    dicyclomine (BENTYL) 10 MG capsule, Take 1 capsule (10 mg) by mouth 4 times daily as needed (abdominal pain), Disp: 90 capsule, Rfl: 3    dicyclomine (BENTYL) 20 MG tablet, Take 1 tablet (20 mg) by mouth 4 times daily as needed (abdominal pain), Disp: 90 tablet, Rfl: 3    FLUoxetine (PROZAC) 20 MG capsule, Take 3 capsules (60 mg) by mouth daily, Disp: 270 capsule, Rfl: 4    gabapentin (NEURONTIN) 300 MG capsule, Take 1 capsule (300 mg) by mouth At Bedtime, Disp: 90 capsule, Rfl: 1    Glucagon (GVOKE HYPOPEN) 1 MG/0.2ML pen, Inject the contents of 1 device under the skin into lower abdomen, outer thigh, or outer upper arm as needed for hypoglycemia. If no response after 15 minutes, additional 1 mg dose from a new device may be injected while waiting for emergency  assistance., Disp: 2 each, Rfl: 3    HYDROcodone-acetaminophen (NORCO) 5-325 MG tablet, Take 1 tablet by mouth every 6 hours as needed for severe pain (Patient not taking: Reported on 9/18/2024), Disp: 6 tablet, Rfl: 0    insulin aspart (NOVOLOG FLEXPEN) 100 UNIT/ML pen, Take 1 unit per 10 grams of carb plus 1 unit for every 60 above 150 Max dose 60 unit daily while off pump, Disp: 15 mL, Rfl: 4    Insulin Aspart FlexPen 100 UNIT/ML SOPN, INJECT 1U/10GRAM CARB PLUS 1U PER EVERY 60 ABOVE 150 WHILE OFF PUMP, MAX 60 UNITS DAILY, Disp: 15 mL, Rfl: 0    insulin glargine (LANTUS SOLOSTAR) 100 UNIT/ML pen, Take 10 units daily while off pump, Disp: , Rfl:     Insulin Infusion Pump Supplies (T:SLIM T:LOCK INSULIN CART 3ML) MISC, 1 each See Admin Instructions Change insulin pump cartridge every 2-3 days., Disp: 40 each, Rfl: 3    insulin pen needle (BD TARA U/F) 32G X 4 MM miscellaneous, USE 3 DAILY OR AS DIRECTED., Disp: 270 each, Rfl: 3    levonorgestrel (MIRENA) 20 MCG/24HR IUD, 1 each (20 mcg) by Intrauterine route once, Disp:  , Rfl:     mesalamine (LIALDA) 1.2 g DR tablet, TAKE 4 TABLETS (4,800 MG) BY MOUTH DAILY (WITH BREAKFAST)  DAYS, Disp: 120 tablet, Rfl: 2    Naproxen Sodium (ALEVE PO), Take by mouth as needed for moderate pain  (Patient not taking: Reported on 9/18/2024), Disp: , Rfl:     NOVOLOG FLEXPEN 100 UNIT/ML soln, 1 unit for every 12 grams of carbohydrates with meals three times per day. Plus correction scale; uses up to 60 units daily., Disp: 45 mL, Rfl: 1    NOVOLOG VIAL 100 UNIT/ML soln, Uses up to 60 units per day in insulin pump for basal, bolus, and priming of insulin pump tubing., Disp: 60 mL, Rfl: 3    oxyCODONE (ROXICODONE) 5 MG tablet, Take 1 tablet (5 mg) by mouth every 6 hours as needed for severe pain (Patient not taking: Reported on 9/18/2024), Disp: 12 tablet, Rfl: 0    polyethylene glycol (GOLYTELY) 236 g suspension, Take as directed. Two days before your exam fill the first  container with water. Cover and shake until mixed well. At 5:00pm drink one 8oz glass every 10-15 minutes until half (1/2) of the first container is empty. Store the remainder in the refrigerator. One day before your exam at 5:00pm drink the second half of the first container until it is gone. Before you go to bed mix the second container with water and put in refrigerator. Six hours before your check in time drink one 8oz glass every 10-15 minutes until half of container is empty. Discard the remainder of solution., Disp: 8000 mL, Rfl: 0    polyethylene glycol (GOLYTELY) 236 g suspension, The night before the exam at 6 pm drink an 8-ounce glass every 15 minutes until the jug is half empty. If you arrive before 11 AM: Drink the other half of the Golytely jug at 11 PM night before procedure. If you arrive after 11 AM: Drink the other half of the Golytely jug at 6 AM day of procedure. For additional instructions refer to your colonoscopy prep instructions., Disp: 4000 mL, Rfl: 0    predniSONE (DELTASONE) 20 MG tablet, Take two tablets (= 40mg) each day for 5 (five) days (Patient not taking: Reported on 2024), Disp: 10 tablet, Rfl: 0    SOCIAL HISTORY:  Social History     Socioeconomic History    Marital status:      Spouse name: Not on file    Number of children: 1    Years of education: 12    Highest education level: Not on file   Occupational History    Occupation: Nursing assistant   Tobacco Use    Smoking status: Former     Current packs/day: 0.00     Average packs/day: 0.5 packs/day for 16.0 years (8.0 ttl pk-yrs)     Types: Cigarettes     Quit date: 2023     Years since quittin.1    Smokeless tobacco: Never    Tobacco comments:     working on quiting, somedays up to 4 cigs per day   Vaping Use    Vaping status: Never Used   Substance and Sexual Activity    Alcohol use: Yes     Alcohol/week: 0.0 standard drinks of alcohol     Comment: occassionally    Drug use: No    Sexual activity: Yes      Partners: Male     Birth control/protection: Surgical, Female Surgical     Comment: tubal ligation   Other Topics Concern     Service No    Blood Transfusions No    Caffeine Concern Yes     Comment: Advised not more than 16 ounces/day    Occupational Exposure Yes     Comment: Visiting [employer] - student online classes    Hobby Hazards No    Sleep Concern No    Stress Concern Yes    Weight Concern No    Special Diet Yes     Comment: IDDM Type I    Back Care Yes     Comment: work-related about 1 year ago    Exercise Yes     Comment: Active at work    Bike Helmet No    Seat Belt Yes    Self-Exams No    Parent/sibling w/ CABG, MI or angioplasty before 65F 55M? Not Asked   Social History Narrative    Lives in [place] with Rod gandhi and her son and their daughter.   Vickie and Rod smoke.   No indoor cats/kittens.   No concerns about domestic violence.     Social Drivers of Health     Financial Resource Strain: Low Risk  (1/29/2024)    Financial Resource Strain     Within the past 12 months, have you or your family members you live with been unable to get utilities (heat, electricity) when it was really needed?: No   Food Insecurity: Low Risk  (1/29/2024)    Food Insecurity     Within the past 12 months, did you worry that your food would run out before you got money to buy more?: No     Within the past 12 months, did the food you bought just not last and you didn t have money to get more?: No   Transportation Needs: Low Risk  (1/29/2024)    Transportation Needs     Within the past 12 months, has lack of transportation kept you from medical appointments, getting your medicines, non-medical meetings or appointments, work, or from getting things that you need?: No   Physical Activity: Not on file   Stress: Not on file   Social Connections: Not on file   Interpersonal Safety: Not on file   Housing Stability: High Risk (1/29/2024)    Housing Stability     Do you have housing? : No     Are you worried about  losing your housing?: No       FAMILY HISTORY:  Family History   Problem Relation Age of Onset    Hypertension Father     Diabetes Maternal Grandmother     Cancer Maternal Grandmother     Diabetes Paternal Grandfather     Diabetes Maternal Uncle     Diabetes Maternal Aunt        Past/family/social history reviewed and no changes    PHYSICAL EXAMINATION:  Constitutional: aaox3, cooperative, pleasant, not dyspneic/diaphoretic, no acute distress  Vitals reviewed: BP (!) 146/86   Pulse 77   Wt 81.6 kg (180 lb)   SpO2 98%   BMI 30.90 kg/m    Wt:   Wt Readings from Last 2 Encounters:   10/28/24 78.9 kg (174 lb)   09/18/24 79.4 kg (175 lb)        General: Patient appears well in no acute distress.   Skin: No visualized rash or lesions on visualized skin  Eyes: EOMI, no erythema, sclera icterus or discharge noted  Resp: Appears to be breathing comfortably without accessory muscle usage, speaking in full sentences, no cough  MSK: Appears to have normal range of motion based on visualized movements  Neurologic: No apparent tremors, facial movements symmetric  Psych: affect calm, alert and oriented        PERTINENT STUDIES:  Most recent CBC:  Recent Labs   Lab Test 04/04/24  1859 01/31/24  2212   WBC 8.2 8.9   HGB 11.9 12.9   HCT 34.2* 37.5    282     Most recent hepatic panel:  Recent Labs   Lab Test 04/04/24  1859 02/02/24  0828   ALT 23 11   AST 18 13     Most recent creatinine:  Recent Labs   Lab Test 04/30/24  1404 04/04/24  1859   CR 0.94 1.06*

## 2025-01-09 NOTE — NURSING NOTE
Chief Complaint   Patient presents with    Follow Up     She requests these members of her care team be copied on today's visit information:  PCP: Alvaro Guerrero    Referring Provider:  Tatianna Rodarte DO  [id number]  [facility] [place] [address]    Vitals:    01/09/25 1057   BP: (!) 146/86   Pulse: 77   SpO2: 98%   Weight: 81.6 kg (180 lb)     Body mass index is 30.9 kg/m .    Medications were reconciled.    Does patient need any medication refills at today's visit? Lydia Ness

## 2025-01-09 NOTE — PATIENT INSTRUCTIONS
It was a pleasure meeting with you today and discussing your healthcare plan. Below is a summary of what we covered:    --obtain labs and stool studies.   --avoid all NSAIDs.     Follow up in GI clinic 3 months.      Please see below for any additional questions and scheduling guidelines.    Sign up for [employer]: [employer] patient portal serves as a secure platform for accessing your medical records from the [facility]. Additionally, [employer] facilitates easy, timely, and secure messaging with your care team. If you have not signed up, you may do so by using the provided code or calling [phone].    Coordinating your care after your visit:  There are multiple options for scheduling your follow-up care based on your provider's recommendation.    How do I schedule a follow-up clinic appointment:   After your appointment, you may receive scheduling assistance with the Clinic Coordinators by having a seat in the waiting room and a Clinic Coordinator will call you up to schedule.  Virtual visits or after you leave the clinic:  Your provider has placed a follow-up order in the [employer] portal for scheduling your return appointment. A member of the scheduling team will contact you to schedule.  [employer] Scheduling: Timely scheduling through [employer] is advised to ensure appointment availability.   Call to schedule: You may schedule your follow-up appointment(s) by calling [phone], option 1.    How do I schedule my endoscopy or colonoscopy procedure:  If a procedure, such as a colonoscopy or upper endoscopy was ordered by your provider, the scheduling team will contact you to schedule this procedure. Or you may choose to call to schedule at   [phone], option 2.  Please allow 20-30 minutes when scheduling a procedure.    How do I get my blood work done? To get your blood work done, you need to schedule a lab appointment at an [facility]. There are multiple ways to schedule:   At the  clinic: The Clinic Coordinator you meet after your visit can help you schedule a lab appointment.   [facility] scheduling: [facility] offers online lab scheduling at all [facility] laboratory locations.   Call to schedule: You can call [phone] to schedule your lab appointment.    How do I schedule my imaging study: To schedule imaging studies, such as CT scans, ultrasounds, MRIs, or X-rays, contact Imaging Services at [phone].    How do I schedule a referral to another doctor: If your provider recommended a referral to another specialist(s), the referral order was placed by your provider. You will receive a phone call to schedule this referral, or you may choose to call the number attached to the referral to self-schedule.    For Post-Visit Question(s):  For any inquiries following today's visit:  Please utilize [facility] messaging and allow 48 hours for reply or contact the Call Center during normal business hours at [phone], option 3.  For Emergent After-hours questions, contact the On-Call GI Fellow through the [facility]  at [phone].  In addition, you may contact your Nurse directly using the provided contact information.    Test Results:  Test results will be accessible via [employer] in compliance with the 21st Century Cures Act. This means that your results will be available to you at the same time as your provider. Often you may see your results before your provider does. Results are reviewed by staff within two weeks with communication follow-up. Results may be released in the patient portal prior to your care team review.    Prescription Refill(s):  Medication prescribed by your provider will be addressed during your visit. For future refills, please coordinate with your pharmacy. If you have not had a recent clinic visit or routine labs, for your safety, your provider may not be able to refill your prescription.

## 2025-01-09 NOTE — PROGRESS NOTES
I faxed signed Authorization for Release of PHI to [facility] medical records to requesting patient's medical records be faxed to our clinic at [phone].  Paige Ness

## 2025-01-09 NOTE — TELEPHONE ENCOUNTER
I left a detailed voicemail requesting patient's medical records from [facility].  Paige Ness

## 2025-01-09 NOTE — LETTER
"1/9/2025      Vickie Barnhart  [address] [facility] [address]      Dear Colleague,    Thank you for referring your patient, Vickie Barnhart, to the [facility]. Please see a copy of my visit note below.      IBD CLINIC VISIT    CC/REFERRING MD:  Tatianna Rodarte  REASON FOR CONSULTATION: UC    ASSESSMENT/PLAN:    UC: patient has not taken mesalamine since October 2024, took it at the increased dose of 4.8g daily for a few months and didn't notice a difference in her symptoms. She will have a BM daily or EOD, described as a bristol type4/5, denies fecal urgency. She does not feel anything has changed since stopping the mesalamine. Of note, patient had an admission for sepsis in December 2024 and reports that her \"intestines were inflamed\" - will request records, reports taking 5 days or prednisone. She has not submitted stool studies or had previous labs and we discussed the importance of this - she will have labs done today and submit a fecal calprotectin as soon as she can. Based on results, will consider flex sig or change in therapy. Patient does not want to go back on mesalamine, so will remain off therapy for now.   --obtain labs and fecal calprotectin     IBD dysplasia surveillance: unknown extent of disease, but given pancolitis, should have CRC screening ever 2-3 years.    IBD Health Care Maintenance:    Misc:  -- Avoid tobacco use  -- Avoid NSAIDs as there is potentially a 25% chance of causing an IBD flare    Return to clinic in 3 months        This note was created with voice recognition software, and while reviewed for accuracy, typos may remain.     Binh Navarrete PA-C  Division of Gastroenterology, Hepatology and Nutrition  [facility]    30 minutes spent on the date of the encounter doing chart review, patient visit, and documentation      IBD HISTORY  IBD type: UC  Age at diagnosis: child (unknown age)  Endoscopic extent of " disease: pancolitis  Current IBD medications: mesalamine  Prior IBD surgeries:  Prior IBD Medications:   unknown    DRUG MONITORING  TPMT enzyme activity:     6-TGN/6-MMPN levels:    Biologic concentration:    DISEASE ASSESSMENT  Labs  Recent Labs   Lab Test 04/04/24  1859 09/29/18  1435 10/07/17  2341   CRP  --   --  <2.9   SED 87*  --   --    HGB 11.9   < > 11.3*    < > = values in this interval not displayed.     Fecal calprotectin:   Endoscopy:   --C scope (4/12/24):  - Friable (with contact bleeding) and inflamed mucosa                          in the entire examined colon. Biopsied.                          - The examined portion of the ileum was normal.                          Biopsied.   Final Diagnosis   A(1).  Small bowel, terminal ileum, biopsy:  -Small intestinal mucosa with no significant histopathologic abnormalities.  -Normal villous architecture identified and no prominence in intraepithelial lymphocytes seen.  -Negative for luminal organisms.  -Negative for dysplasia or malignancy.        B(2).  Colon, random locations, biopsies:  -Active chronic colitis with patchy distribution.  (See comment)  -Colonic mucosa with architectural distortion, acute inflammation and increased chronic inflammation.  -Immunohistochemical stains for CMV are focally positive (less than 1 per high-power field)  -Negative for granulomas.  -Negative for dysplasia or malignancy.          Enterography:   C diff:       HPI:   37 y/o F presents for follow up of UC.     Patient was initially evaluated on 4/24/24 by Dr. Rodarte, please see visit details below:    Vickie  presents today for a video visit to discuss UC which was diagnosed as a child.  Hasn't been on therapy for many years - doesn't remember what she was on before.  Was doing well up until January when she developed worsening diarrhea and abdominal pain as well as blood in the stool.  Was seen in the ER x 2 (initially in January and again in march) for these symptoms  - was given course of steroids after imaging showed evidence of colitis.  Had colonoscopy on 4/12 which showed pancolitis - biopsies showing acute on chronic inflammation.      Having 4-5 bowel movements a day - sometimes with blood.  Wakes up 3-4 times a night for bowel movements.     Quit smoking in December. No family history of IBD  Does use NSAIDs a few time a week.     6/24/24:  Patient has been taking mesalamine 1.2g daily -- notes mid improvement, but still not great. She will generally have a BM EOD, stools are formed but soft, described as a bristol type 4. Denies BRBPR. She reports fecal urgency, usually once a week. She denies fecal incontinence. She does endorse tenesmus, that occurs daily. She will experience abdominal pain 3-4x/week, localized around umbilicus, described as a dull pain. Starts after eating, improved with using bentyl. Denies improvement with having a BM. Denies nausea/vomiting. Reports occasional abdominal bloating. She has been completely avoiding NSAIDs.     She is weaning off nicotine, still vaping.     9/18/24, Dr. Rodarte:  Vickie presents today for a video visit to discuss for follow-up of UC.  Has been on mesalamine since last appointment and has noticed improvement but continues to have symptoms. Main concern is abdominal cramping - happens daily - has been having to avoid beef as that seems to trigger things. Does use dicyclomine as needed which she finds helpful - hasn't tried scheduling it.   Has been avoiding all NSAIDs.  Generally having 1-3 bowel movements a day without blood.    1/9/25:  Patient increased mesalamine to 4.8g daily -- did this for about 4 months. She notes she would generally have a BM daily or EOD, describes stool as a bristol type 4/5. Denies BRBPR. She denies fecal urgency or incontinence. If she goes more than two days without having a BM, patient will take a stool softener, which does produce a bowel movement. She denies abdominal pain, nausea, or  "vomiting. She last took bentyl in September 2024. Denies abdominal bloating.  Patient has made some dietary changes - limiting red meat, has been eating more salads and white meat. Patient has also been trying some supplements - \"lion's prudence, turkey tale, cintia\". Took this for 2 months and then was hospitalized in December for sepsis, was told she had \"inflammation of intestines\" and was given 5 day course of prednisone. Denies any issues with diarrhea at that time.    ROS:    No fevers or chills  No weight loss  No blurry vision, double vision or change in vision  No arthralgias or myalgias  No rashes or skin changes  No BRBPR, hematochezia, melena      Extra intestinal manifestations of IBD:  No uveitis/episcleritis  No aphthous ulcers   No arthritis   No erythema nodosum/pyoderma gangrenosum.     PERTINENT PAST MEDICAL HISTORY:  Past Medical History:   Diagnosis Date     Adjustment disorder with anxious mood 11/23/2015     Anxiety 11/27/2015     ASCUS of cervix with negative high risk HPV 06/19/2008     Depressive disorder, not elsewhere classified      Diabetic eye exam (H) 12/19/14     Hx of tubal ligation 1/15/2021     Mixed hyperlipidemia 11/30/2006     Regional enteritis of unspecified site     Ulcerative Colitis     Type I (juvenile type) diabetes mellitus with ketoacidosis, not stated as uncontrolled(250.11) 01/01/2007    Moderately severe intensity.     Type I (juvenile type) diabetes mellitus with ketoacidosis, uncontrolled 02/14/08     Type I (juvenile type) diabetes mellitus without mention of complication, not stated as uncontrolled     diagnosed in 2003     Ulcerative colitis, unspecified     remission. Last flare 6 yrs ago.        PREVIOUS SURGERIES:  Past Surgical History:   Procedure Laterality Date     COLONOSCOPY N/A 7/25/2023    Procedure: Colonoscopy;  Surgeon: Jude Nix MD;  Location:  GI     COLONOSCOPY N/A 4/12/2024    Procedure: COLONOSCOPY, WITH BIOPSY;  Surgeon: Karl, " MD Cady;  Location: [facility]     DILATION AND CURETTAGE SUCTION  04/2010    miscarriage     RETINAL DETACHMENT SURGERY Left      TUBAL LIGATION       [facility] COLONOSCOPY W BIOPSY  08/16/2006     [facility]         PREVIOUS ENDOSCOPY:  Results for orders placed or performed during the hospital encounter of 04/12/24   COLONOSCOPY    Collection Time: 04/12/24 12:57 PM   Result Value Ref Range    COLONOSCOPY       [address], [place] [address]  _______________________________________________________________________________  Patient Name: Vickie Barnhart          Procedure Date: 4/12/2024 12:57 PM  MRN: [id number]                       Account Number: [id number]  YOB: 1985              Admit Type: Outpatient  Age: 38                               Room: [place] [address]  Gender: Female                        Note Status: Finalized  Attending MD: CADY BRANDT MD,   Total Sedation Time:   _______________________________________________________________________________     Procedure:             Colonoscopy  Indications:           High risk colon cancer surveillance: Ulcerative                          pancolitis of 8 (or more) years duration  Providers:             CADY BRANDT MD  Referring MD:          FALLON GILBERT  Medicines:             Propofol per Anesthesia  Complications:         No immediate complications.  ___________ ____________________________________________________________________  Procedure:             Pre-Anesthesia Assessment:                         - Prior to the procedure, a History and Physical was                          performed, and patient medications, allergies and                          sensitivities were reviewed. The patient's tolerance                          of previous anesthesia was reviewed.                         - Pre-procedure physical examination revealed no                           contraindications to sedation.                         After obtaining informed consent, the colonoscope was                          passed under direct vision. Throughout the procedure,                          the patient's blood pressure, pulse, and oxygen                          saturations were monitored continuously. The                          Colonoscope was introduced through the anus and                          advanced to the terminal ileum, with identification of                           the ileocecal valve.                                                                                   Findings:       The perianal and digital rectal examinations were normal.       A diffuse area of severely friable (with contact bleeding) and inflamed        mucosa was found in the entire colon consistent with colitis(Probable        UC). Most severe in sigmoid colon. Biopsies were taken with a cold        forceps for histology.       The terminal ileum appeared normal. Biopsies were taken with a cold        forceps for histology.                                                                                   Impression:            - Friable (with contact bleeding) and inflamed mucosa                          in the entire examined colon. Biopsied.                         - The examined portion of the ileum was normal.                          Biopsied.  Recommendation:        - Await pathology results.                         - Refer to a gastroenterologist at  appointment to be                          scheduled ASAP.                                                                                   Procedure Code(s):       --- Professional ---       98008, Colonoscopy, flexible; with biopsy, single or multiple    CPT copyright 2022 American Medical Association. All rights reserved.    The codes documented in this report are preliminary and upon  review may   be revised to meet current compliance  requirements.    Donnie Brandt  ___________________  DONNIE BRANDT MD  4/12/2024 1:32:06 PM  I was physically present for the entire viewing portion of the exam.DONNIE BRANDT MD  Number of Addenda: 0    Note Initiated On: 4/12/2024 12:57 PM     ]    ALLERGIES:   No Known Allergies    PERTINENT MEDICATIONS:    Current Outpatient Medications:      acetaminophen (TYLENOL) 500 MG tablet, Take 500-1,000 mg by mouth every 6 hours as needed for mild pain, Disp: , Rfl:      bisacodyl (DULCOLAX) 5 MG EC tablet, Two days prior to exam take two (2) tablets at 4pm. One day prior to exam take two (2) tablets at 4pm, Disp: 4 tablet, Rfl: 0     bisacodyl (DULCOLAX) 5 MG EC tablet, Take 2 tablets at 3 pm the day before your procedure. If your procedure is before 11 am, take 2 additional tablets at 11 pm. If your procedure is after 11 am, take 2 additional tablets at 6 am. For additional instructions refer to your colonoscopy prep instructions., Disp: 4 tablet, Rfl: 0     Continuous Glucose Sensor (DEXCOM G6 SENSOR) MISC, Change every 10 days., Disp: 9 each, Rfl: 3     Continuous Glucose Sensor (DEXCOM G7 SENSOR) MISC, Change every 10 days. Please dispense G7 sensor with underline below LBL code on the side of box. This is needed for compatibility with insulin pump., Disp: 9 each, Rfl: 3     Continuous Glucose Transmitter (DEXCOM G6 TRANSMITTER) MISC, Change every 90 days., Disp: 1 each, Rfl: 3     dicyclomine (BENTYL) 10 MG capsule, Take 1 capsule (10 mg) by mouth 4 times daily as needed (abdominal pain), Disp: 90 capsule, Rfl: 3     dicyclomine (BENTYL) 20 MG tablet, Take 1 tablet (20 mg) by mouth 4 times daily as needed (abdominal pain), Disp: 90 tablet, Rfl: 3     FLUoxetine (PROZAC) 20 MG capsule, Take 3 capsules (60 mg) by mouth daily, Disp: 270 capsule, Rfl: 4     gabapentin (NEURONTIN) 300 MG capsule, Take 1 capsule (300 mg) by mouth At Bedtime, Disp: 90 capsule, Rfl: 1     Glucagon (GVOKE HYPOPEN) 1 MG/0.2ML pen,  Inject the contents of 1 device under the skin into lower abdomen, outer thigh, or outer upper arm as needed for hypoglycemia. If no response after 15 minutes, additional 1 mg dose from a new device may be injected while waiting for emergency assistance., Disp: 2 each, Rfl: 3     HYDROcodone-acetaminophen (NORCO) 5-325 MG tablet, Take 1 tablet by mouth every 6 hours as needed for severe pain (Patient not taking: Reported on 9/18/2024), Disp: 6 tablet, Rfl: 0     insulin aspart (NOVOLOG FLEXPEN) 100 UNIT/ML pen, Take 1 unit per 10 grams of carb plus 1 unit for every 60 above 150 Max dose 60 unit daily while off pump, Disp: 15 mL, Rfl: 4     Insulin Aspart FlexPen 100 UNIT/ML SOPN, INJECT 1U/10GRAM CARB PLUS 1U PER EVERY 60 ABOVE 150 WHILE OFF PUMP, MAX 60 UNITS DAILY, Disp: 15 mL, Rfl: 0     insulin glargine (LANTUS SOLOSTAR) 100 UNIT/ML pen, Take 10 units daily while off pump, Disp: , Rfl:      Insulin Infusion Pump Supplies (T:SLIM T:LOCK INSULIN CART 3ML) MISC, 1 each See Admin Instructions Change insulin pump cartridge every 2-3 days., Disp: 40 each, Rfl: 3     insulin pen needle (BD TARA U/F) 32G X 4 MM miscellaneous, USE 3 DAILY OR AS DIRECTED., Disp: 270 each, Rfl: 3     levonorgestrel (MIRENA) 20 MCG/24HR IUD, 1 each (20 mcg) by Intrauterine route once, Disp:  , Rfl:      mesalamine (LIALDA) 1.2 g DR tablet, TAKE 4 TABLETS (4,800 MG) BY MOUTH DAILY (WITH BREAKFAST)  DAYS, Disp: 120 tablet, Rfl: 2     Naproxen Sodium (ALEVE PO), Take by mouth as needed for moderate pain  (Patient not taking: Reported on 9/18/2024), Disp: , Rfl:      NOVOLOG FLEXPEN 100 UNIT/ML soln, 1 unit for every 12 grams of carbohydrates with meals three times per day. Plus correction scale; uses up to 60 units daily., Disp: 45 mL, Rfl: 1     NOVOLOG VIAL 100 UNIT/ML soln, Uses up to 60 units per day in insulin pump for basal, bolus, and priming of insulin pump tubing., Disp: 60 mL, Rfl: 3     oxyCODONE (ROXICODONE) 5 MG tablet,  Take 1 tablet (5 mg) by mouth every 6 hours as needed for severe pain (Patient not taking: Reported on 2024), Disp: 12 tablet, Rfl: 0     polyethylene glycol (GOLYTELY) 236 g suspension, Take as directed. Two days before your exam fill the first container with water. Cover and shake until mixed well. At 5:00pm drink one 8oz glass every 10-15 minutes until half (1/2) of the first container is empty. Store the remainder in the refrigerator. One day before your exam at 5:00pm drink the second half of the first container until it is gone. Before you go to bed mix the second container with water and put in refrigerator. Six hours before your check in time drink one 8oz glass every 10-15 minutes until half of container is empty. Discard the remainder of solution., Disp: 8000 mL, Rfl: 0     polyethylene glycol (GOLYTELY) 236 g suspension, The night before the exam at 6 pm drink an 8-ounce glass every 15 minutes until the jug is half empty. If you arrive before 11 AM: Drink the other half of the Golytely jug at 11 PM night before procedure. If you arrive after 11 AM: Drink the other half of the Golytely jug at 6 AM day of procedure. For additional instructions refer to your colonoscopy prep instructions., Disp: 4000 mL, Rfl: 0     predniSONE (DELTASONE) 20 MG tablet, Take two tablets (= 40mg) each day for 5 (five) days (Patient not taking: Reported on 2024), Disp: 10 tablet, Rfl: 0    SOCIAL HISTORY:  Social History     Socioeconomic History     Marital status:      Spouse name: Not on file     Number of children: 1     Years of education: 12     Highest education level: Not on file   Occupational History     Occupation: Nursing assistant   Tobacco Use     Smoking status: Former     Current packs/day: 0.00     Average packs/day: 0.5 packs/day for 16.0 years (8.0 ttl pk-yrs)     Types: Cigarettes     Quit date: 2023     Years since quittin.1     Smokeless tobacco: Never     Tobacco comments:      working on quiting, somedays up to 4 cigs per day   Vaping Use     Vaping status: Never Used   Substance and Sexual Activity     Alcohol use: Yes     Alcohol/week: 0.0 standard drinks of alcohol     Comment: occassionally     Drug use: No     Sexual activity: Yes     Partners: Male     Birth control/protection: Surgical, Female Surgical     Comment: tubal ligation   Other Topics Concern      Service No     Blood Transfusions No     Caffeine Concern Yes     Comment: Advised not more than 16 ounces/day     Occupational Exposure Yes     Comment: Visiting [facility] - student online classes     Hobby Hazards No     Sleep Concern No     Stress Concern Yes     Weight Concern No     Special Diet Yes     Comment: IDDM Type I     Back Care Yes     Comment: work-related about 1 year ago     Exercise Yes     Comment: Active at work     Bike Helmet No     Seat Belt Yes     Self-Exams No     Parent/sibling w/ CABG, MI or angioplasty before 65F 55M? Not Asked   Social History Narrative    Lives in [place] with Rod gandhi and her son and their daughter.   Vickie and Rod smoke.   No indoor cats/kittens.   No concerns about domestic violence.     Social Drivers of Health     Financial Resource Strain: Low Risk  (1/29/2024)    Financial Resource Strain      Within the past 12 months, have you or your family members you live with been unable to get utilities (heat, electricity) when it was really needed?: No   Food Insecurity: Low Risk  (1/29/2024)    Food Insecurity      Within the past 12 months, did you worry that your food would run out before you got money to buy more?: No      Within the past 12 months, did the food you bought just not last and you didn t have money to get more?: No   Transportation Needs: Low Risk  (1/29/2024)    Transportation Needs      Within the past 12 months, has lack of transportation kept you from medical appointments, getting your medicines, non-medical meetings or appointments, work,  or from getting things that you need?: No   Physical Activity: Not on file   Stress: Not on file   Social Connections: Not on file   Interpersonal Safety: Not on file   Housing Stability: High Risk (1/29/2024)    Housing Stability      Do you have housing? : No      Are you worried about losing your housing?: No       FAMILY HISTORY:  Family History   Problem Relation Age of Onset     Hypertension Father      Diabetes Maternal Grandmother      Cancer Maternal Grandmother      Diabetes Paternal Grandfather      Diabetes Maternal Uncle      Diabetes Maternal Aunt        Past/family/social history reviewed and no changes    PHYSICAL EXAMINATION:  Constitutional: aaox3, cooperative, pleasant, not dyspneic/diaphoretic, no acute distress  Vitals reviewed: BP (!) 146/86   Pulse 77   Wt 81.6 kg (180 lb)   SpO2 98%   BMI 30.90 kg/m    Wt:   Wt Readings from Last 2 Encounters:   10/28/24 78.9 kg (174 lb)   09/18/24 79.4 kg (175 lb)        General: Patient appears well in no acute distress.   Skin: No visualized rash or lesions on visualized skin  Eyes: EOMI, no erythema, sclera icterus or discharge noted  Resp: Appears to be breathing comfortably without accessory muscle usage, speaking in full sentences, no cough  MSK: Appears to have normal range of motion based on visualized movements  Neurologic: No apparent tremors, facial movements symmetric  Psych: affect calm, alert and oriented        PERTINENT STUDIES:  Most recent CBC:  Recent Labs   Lab Test 04/04/24  1859 01/31/24  2212   WBC 8.2 8.9   HGB 11.9 12.9   HCT 34.2* 37.5    282     Most recent hepatic panel:  Recent Labs   Lab Test 04/04/24  1859 02/02/24  0828   ALT 23 11   AST 18 13     Most recent creatinine:  Recent Labs   Lab Test 04/30/24  1404 04/04/24  1859   CR 0.94 1.06*       Again, thank you for allowing me to participate in the care of your patient.        Sincerely,        Binh Navarrete PA-C    Electronically signed

## 2025-01-10 NOTE — TELEPHONE ENCOUNTER
I have faxed all the required information to [place] [employer] for a back dated prior authorization to be done.  Thank You!  Kellen Miller CPhT  Prior Auth Specialist

## 2025-01-10 NOTE — TELEPHONE ENCOUNTER
Good morning -    Could we get this PA updated with a start date of 12/01/2024? Pt did not notify the pharmacy of an insurance change and we are needing to backdate the PA for their December claim.    Diabetes Care Services Pharmacy Team  [facility]  Phone: [phone]  Fax: [phone]  Pool: Pharm Diabetes

## 2025-01-11 NOTE — TELEPHONE ENCOUNTER
Prior Authorization Approval    Medication: DEXCOM G7 SENSOR MISC  Authorization Effective Date: 12/1/2024  Authorization Expiration Date: 12/1/2025  Approved Dose/Quantity: 3/30   Reference #: [id number]   Insurance Company: Comment:  [place] [employer] [phone]  Expected CoPay: $    CoPay Card Available:      Financial Assistance Needed:   Which Pharmacy is filling the prescription: [facility] - [place], [place] - [address]  Pharmacy Notified: Yes  Patient Notified: Yes

## 2025-01-13 RX ORDER — ACYCLOVIR 400 MG/1
TABLET ORAL
Qty: 9 EACH | Refills: 3 | Status: SHIPPED | OUTPATIENT
Start: 2025-01-13

## 2025-01-25 ENCOUNTER — HEALTH MAINTENANCE LETTER (OUTPATIENT)
Age: 40
End: 2025-01-25

## 2025-02-21 ENCOUNTER — MYC MEDICAL ADVICE (OUTPATIENT)
Dept: ENDOCRINOLOGY | Facility: CLINIC | Age: 40
End: 2025-02-21
Payer: COMMERCIAL

## 2025-03-17 ENCOUNTER — DOCUMENTATION ONLY (OUTPATIENT)
Dept: ENDOCRINOLOGY | Facility: CLINIC | Age: 40
End: 2025-03-17
Payer: COMMERCIAL

## 2025-03-17 NOTE — PROGRESS NOTES
Type of form:  CMN and supporting clinical progress notes  From:   [facility]  Fax:  [phone]  Supplies requested on form:  CMN, printed last clinical notes from 4/30/2024 along with medical insurance card images front/back  Provider: Siri Rhoades  Date provider will be in clinic to sign:  3/18/2025  Labeled form and completed to the best of this writers ability.    Placed in provider's file     Aviva Carr CMA  Adult Endocrinology   [facility]

## 2025-03-18 ENCOUNTER — MEDICAL CORRESPONDENCE (OUTPATIENT)
Dept: HEALTH INFORMATION MANAGEMENT | Facility: CLINIC | Age: 40
End: 2025-03-18

## 2025-03-18 NOTE — PROGRESS NOTES
Form type: [place]  Form signed by: Dr. Rhoades  Faxed to: [phone]  Completion of transmission verified via RightFax.      Mary Kate Sanchez CMA  Adult Endocrinology  [facility]

## 2025-03-19 ENCOUNTER — DOCUMENTATION ONLY (OUTPATIENT)
Dept: ENDOCRINOLOGY | Facility: CLINIC | Age: 40
End: 2025-03-19
Payer: COMMERCIAL

## 2025-03-19 ENCOUNTER — SCAN LEGAL DOCUMENT (OUTPATIENT)
Dept: ENDOCRINOLOGY | Facility: CLINIC | Age: 40
End: 2025-03-19
Payer: COMMERCIAL

## 2025-03-19 ENCOUNTER — TELEPHONE (OUTPATIENT)
Dept: ENDOCRINOLOGY | Facility: CLINIC | Age: 40
End: 2025-03-19
Payer: COMMERCIAL

## 2025-03-19 NOTE — PROGRESS NOTES
Type of form:  SMN and records request  From:  [facility]   Fax:  [phone]  Supplies requested on form:   SMN and clinical progress notes last visit from 4/30/2024 Next visit is caryl'd for 5/12/2025 and last A1C was 4/30/2024  Need to print [employer] report before faxing  Provider:  Siri Rhoades  Date provider will be in clinic to sign:  04/01/2025  Labeled form and completed to the best of this writers ability.    Placed in provider's file     Aviva Carr CMA  Adult Endocrinology   [facility]

## 2025-03-19 NOTE — PROGRESS NOTES
Created in error      Aviva Carr CMA  Adult Endocrinology   [facility]

## 2025-03-22 ENCOUNTER — HEALTH MAINTENANCE LETTER (OUTPATIENT)
Age: 40
End: 2025-03-22

## 2025-03-25 ENCOUNTER — MEDICAL CORRESPONDENCE (OUTPATIENT)
Dept: HEALTH INFORMATION MANAGEMENT | Facility: CLINIC | Age: 40
End: 2025-03-25

## 2025-03-25 NOTE — PROGRESS NOTES
Provider reviewed and signed  Faxed back to: [phone]  See image below for Timestamp confirmation of successful transmission.           Placed in Tandem file.      Aviva Carr CMA  Adult Endocrinology   [facility]

## 2025-04-17 PROBLEM — K52.9 INFLAMMATORY BOWEL DISEASES (IBD): Status: ACTIVE | Noted: 2025-04-17

## 2025-04-22 ENCOUNTER — TELEPHONE (OUTPATIENT)
Dept: GASTROENTEROLOGY | Facility: CLINIC | Age: 40
End: 2025-04-22
Payer: COMMERCIAL

## 2025-04-22 DIAGNOSIS — K51.80 OTHER ULCERATIVE COLITIS WITHOUT COMPLICATION (H): Primary | ICD-10-CM

## 2025-04-22 NOTE — TELEPHONE ENCOUNTER
I called the patient to complete the stool study prior to their virtual visit on April 25th with Binh Navarrete. The patient is in [place] so will not be able to do the stool study or the virtual visit. When the patient returns to [place], she will complete the stool study at [facility] and then reschedule the appointment.  Paige Ness

## 2025-04-29 NOTE — PROGRESS NOTES
Outcome for 04/29/25 2:02 PM: Data uploaded on Tandem  Dora Fenton MA  Outcome for 05/08/25 11:24 AM: Data obtained via Tandem website  Dora Fenton MA

## 2025-05-08 ENCOUNTER — TELEPHONE (OUTPATIENT)
Dept: ENDOCRINOLOGY | Facility: CLINIC | Age: 40
End: 2025-05-08
Payer: COMMERCIAL

## 2025-05-08 NOTE — TELEPHONE ENCOUNTER
[x] [facility] DME form     [] [facility] RX form    [] [facility] RX form    [x] Provider signature    [] Progress notes request     [] A1[facility] / Lab request    [x] Faxed to [phone]    [x] Sent to [facility]

## 2025-05-12 ENCOUNTER — VIRTUAL VISIT (OUTPATIENT)
Dept: ENDOCRINOLOGY | Facility: CLINIC | Age: 40
End: 2025-05-12
Payer: COMMERCIAL

## 2025-05-12 DIAGNOSIS — Z46.81 INSULIN PUMP TITRATION: ICD-10-CM

## 2025-05-12 DIAGNOSIS — E10.69 TYPE 1 DIABETES MELLITUS WITH OTHER SPECIFIED COMPLICATION (H): Primary | ICD-10-CM

## 2025-05-12 DIAGNOSIS — L97.521 ULCER OF LEFT FOOT, LIMITED TO BREAKDOWN OF SKIN (H): ICD-10-CM

## 2025-05-12 PROCEDURE — 1126F AMNT PAIN NOTED NONE PRSNT: CPT | Mod: 95 | Performed by: INTERNAL MEDICINE

## 2025-05-12 PROCEDURE — 95251 CONT GLUC MNTR ANALYSIS I&R: CPT | Performed by: INTERNAL MEDICINE

## 2025-05-12 PROCEDURE — 98006 SYNCH AUDIO-VIDEO EST MOD 30: CPT | Mod: 25 | Performed by: INTERNAL MEDICINE

## 2025-05-12 ASSESSMENT — PAIN SCALES - GENERAL: PAINLEVEL_OUTOF10: NO PAIN (0)

## 2025-05-12 NOTE — PROGRESS NOTES
Endocrinology Clinic Visit    Chief Complaint: RECHECK     Information obtained from:Patient      Assessment/Treatment Plan:      Type 1 diabetes complicated with retinopathy     Insulin pump tandem & CGM downloaded and reviewed in detail.  Overall reasonable control except postprandial hyperglycemia due to delay in carbohydrate coverage. Discussed the importance of entering carb bolus 10-15 minutes prior to when she starts eating.     Plan:  Continue current pump settings. Patient will focus on entering carb bolus 10-15 minutes prior to eating.    Pump settings:   Basal = 0.45 units per hour  CHO 1: 11 units, 1:10 starting from 7:00 am  Correction factor 1:60 mg/dl  Target 110    - Refilled glucagon  - Refilled insulin glargine 10u in case of pump failure  - Has eye exam tomorrow  - Blood pressure is well-controlled   - Start statin once ulcerative colitis is under control         Orders Placed This Encounter   Procedures    Hemoglobin A1c    TSH with free T4 reflex    Lipid panel reflex to direct LDL Fasting    Albumin Random Urine Quantitative with Creat Ratio    Adult Diabetes Education  Referral    Orthopedic  Referral     Staffed with:   Siri Rhoades MD  Staff Endocrinologist    Division of Endocrinology and Diabetes      Subjective:         HPI: Vickie Duque is a 39 year old female with history of type 1 diabetes.    Currently on tandem insulin pump with settings as below. She has been using insulin IQ.    Current Settings:   Basal = 0.45 units per hour  CHO 1: 11 units   Correction factor 1:60 mg/dl  Target 110.     She has overall been doing much better with her DM1 since starting insulin pump. She denies significant hypoglycemia episodes. She feels like the pump has helped a lot. She has been struggling with her ulcerative colitis recently.                                   No Known Allergies    Current Outpatient Medications    Medication Sig Dispense Refill    acetaminophen (TYLENOL) 500 MG tablet Take 500-1,000 mg by mouth every 6 hours as needed for mild pain      bisacodyl (DULCOLAX) 5 MG EC tablet Two days prior to exam take two (2) tablets at 4pm. One day prior to exam take two (2) tablets at 4pm 4 tablet 0    bisacodyl (DULCOLAX) 5 MG EC tablet Take 2 tablets at 3 pm the day before your procedure. If your procedure is before 11 am, take 2 additional tablets at 11 pm. If your procedure is after 11 am, take 2 additional tablets at 6 am. For additional instructions refer to your colonoscopy prep instructions. (Patient not taking: Reported on 1/9/2025) 4 tablet 0    Continuous Glucose Sensor (DEXCOM G6 SENSOR) MISC Change every 10 days. 9 each 3    Continuous Glucose Sensor (DEXCOM G7 SENSOR) MISC Change every 10 days. Please dispense G7 sensor with underline below LBL code on the side of box. This is needed for compatibility with insulin pump. 9 each 3    Continuous Glucose Transmitter (DEXCOM G6 TRANSMITTER) MISC Change every 90 days. 1 each 3    dicyclomine (BENTYL) 10 MG capsule Take 1 capsule (10 mg) by mouth 4 times daily as needed (abdominal pain) (Patient not taking: Reported on 1/9/2025) 90 capsule 3    dicyclomine (BENTYL) 20 MG tablet Take 1 tablet (20 mg) by mouth 4 times daily as needed (abdominal pain) (Patient not taking: Reported on 1/9/2025) 90 tablet 3    FLUoxetine (PROZAC) 20 MG capsule Take 3 capsules (60 mg) by mouth daily 270 capsule 4    gabapentin (NEURONTIN) 300 MG capsule Take 1 capsule (300 mg) by mouth At Bedtime 90 capsule 1    Glucagon (GVOKE HYPOPEN) 1 MG/0.2ML pen Inject the contents of 1 device under the skin into lower abdomen, outer thigh, or outer upper arm as needed for hypoglycemia. If no response after 15 minutes, additional 1 mg dose from a new device may be injected while waiting for emergency assistance. 2 each 3    HYDROcodone-acetaminophen (NORCO) 5-325 MG tablet Take 1 tablet by mouth  every 6 hours as needed for severe pain (Patient not taking: Reported on 1/9/2025) 6 tablet 0    insulin aspart (NOVOLOG FLEXPEN) 100 UNIT/ML pen Take 1 unit per 10 grams of carb plus 1 unit for every 60 above 150 Max dose 60 unit daily while off pump 15 mL 4    Insulin Aspart FlexPen 100 UNIT/ML SOPN INJECT 1U/10GRAM CARB PLUS 1U PER EVERY 60 ABOVE 150 WHILE OFF PUMP, MAX 60 UNITS DAILY 15 mL 0    insulin glargine (LANTUS SOLOSTAR) 100 UNIT/ML pen Take 10 units daily while off pump      Insulin Infusion Pump Supplies (T:SLIM T:LOCK INSULIN CART 3ML) MISC 1 each See Admin Instructions Change insulin pump cartridge every 2-3 days. 40 each 3    insulin pen needle (BD TARA U/F) 32G X 4 MM miscellaneous USE 3 DAILY OR AS DIRECTED. 270 each 3    levonorgestrel (MIRENA) 20 MCG/24HR IUD 1 each (20 mcg) by Intrauterine route once      Naproxen Sodium (ALEVE PO) Take by mouth as needed for moderate pain  (Patient not taking: Reported on 1/9/2025)      NOVOLOG FLEXPEN 100 UNIT/ML soln 1 unit for every 12 grams of carbohydrates with meals three times per day. Plus correction scale; uses up to 60 units daily. 45 mL 1    NOVOLOG VIAL 100 UNIT/ML soln Uses up to 60 units per day in insulin pump for basal, bolus, and priming of insulin pump tubing. 60 mL 3    oxyCODONE (ROXICODONE) 5 MG tablet Take 1 tablet (5 mg) by mouth every 6 hours as needed for severe pain (Patient not taking: Reported on 1/9/2025) 12 tablet 0    polyethylene glycol (GOLYTELY) 236 g suspension Take as directed. Two days before your exam fill the first container with water. Cover and shake until mixed well. At 5:00pm drink one 8oz glass every 10-15 minutes until half (1/2) of the first container is empty. Store the remainder in the refrigerator. One day before your exam at 5:00pm drink the second half of the first container until it is gone. Before you go to bed mix the second container with water and put in refrigerator. Six hours before your check in time  drink one 8oz glass every 10-15 minutes until half of container is empty. Discard the remainder of solution. (Patient not taking: Reported on 1/9/2025) 8000 mL 0    polyethylene glycol (GOLYTELY) 236 g suspension The night before the exam at 6 pm drink an 8-ounce glass every 15 minutes until the jug is half empty. If you arrive before 11 AM: Drink the other half of the Golytely jug at 11 PM night before procedure. If you arrive after 11 AM: Drink the other half of the Golytely jug at 6 AM day of procedure. For additional instructions refer to your colonoscopy prep instructions. (Patient not taking: Reported on 1/9/2025) 4000 mL 0    predniSONE (DELTASONE) 20 MG tablet Take two tablets (= 40mg) each day for 5 (five) days (Patient not taking: Reported on 1/9/2025) 10 tablet 0       Review of Systems      as per HPI above    Objective:   There were no vitals taken for this visit.    Constitutional: Pleasant no acute cardiopulmonary distress. Non-toxic appearing  Psychological: appropriate mood and affect     In House Labs:     Hemoglobin A1C   Date Value Ref Range Status   04/30/2024 6.2 (H) 0.0 - 5.6 % Final   10/12/2021 8.7 (A) 0.0 - 5.7 % Final       TSH   Date Value Ref Range Status   04/30/2024 1.81 0.30 - 4.20 uIU/mL Final   01/17/2023 3.91 0.40 - 4.00 mU/L Final   06/23/2022 2.26 0.40 - 4.00 mU/L Final   01/04/2021 3.96 0.40 - 4.00 mU/L Final   03/19/2019 2.95 0.40 - 4.00 mU/L Final   10/07/2017 2.57 0.40 - 4.00 mU/L Final   06/26/2017 6.05 (H) 0.40 - 4.00 mU/L Final   03/05/2014 3.97 0.4 - 5.0 mU/L Final     T4 Free   Date Value Ref Range Status   06/26/2017 1.02 0.76 - 1.46 ng/dL Final   03/28/2006 1.10 0.70 - 1.85 ng/dL Final       Creatinine   Date Value Ref Range Status   01/09/2025 1.15 (H) 0.51 - 0.95 mg/dL Final   06/04/2021 0.94 0.52 - 1.04 mg/dL Final     Video-Visit Details    Type of service:  Video Visit  Joined the call at 5/12/2025, 8:47:59 am.  Left the call at 5/12/2025, 9:03:10 am.  You were on  the call for 15 minutes 10 seconds.  Distant Location (provider location):  Off-site.   Platform used for Video Visit: Rezzie  30 minutes spent by me on the date of the encounter outside of CGM and insulin pump review doing chart review, history, documentation and further activities per the note.

## 2025-05-12 NOTE — NURSING NOTE
Current patient location: [address] [place] [id number]    Is the patient currently in the state of [place]? YES    Visit mode: VIDEO    If the visit is dropped, the patient can be reconnected by:VIDEO VISIT: Text to cell phone:   Telephone Information:   Mobile [phone]       Will anyone else be joining the visit? NO  (If patient encounters technical issues they should call [phone])    Are changes needed to the allergy or medication list? No    Are refills needed on medications prescribed by this physician? Discuss with provider    Rooming Documentation:  Questionnaire(s) completed    Reason for visit: RECHECK    Mary COLLADO

## 2025-05-12 NOTE — PATIENT INSTRUCTIONS
Necoe,  ADMINISTER MEALTIME INSULIN 10-15 MINUTES BEFORE YOU EAT    Basal = 0.45 units per hour  CHO 1: 10 units   Correction factor 1:60 mg/dl  Target 110.       Please administer mealtime bolus consistently at least 10-15 minutes before eating.    Don't give multiple correction boluses which is causing low blood sugar. Wait at least 3-4 hours between correction boluses.      Emergency issues: Here are some concerns you should contact us about.  -Vomiting: more than twice.  Please check ketones.  If positive, go to ER. Monitor glucose hourly.   -High glucose (over 300 mg/dL twice in a row): Please check ketones.  If ketones are negative, take an insulin correction and recheck glucose in 1 hour.  If glucose is not coming down, please call the clinic. If ketones are moderate or large, drink lots of water, take an insulin correction 1.5 times your usual correction, and recheck ketones in 1 hour.  If ketones are still present (or you are vomiting), go to the ER.  -High glucose (over 300 mg/dL twice in a row) with a pump:  Twice in doubt, take it out.  Change your pump site. Check ketones.  If ketones are negative, take an insulin correction by syringe/pen and recheck glucose in 1 hour.  If glucose is not coming down, please call the clinic. If ketones are moderate or large, drink lots of water, take an insulin correction 1.5 times your usual correction by syringe/pen, and recheck ketones in 1 hour.  If ketones are still present (or you are vomiting), go to the ER.  -Hypoglycemia (low glucose):   If glucose is less than 70 mg/dL, treat with 15g carb (4 glucose tablets), recheck glucose in 15 minutes.  If low again, repeat.   If glucose is less than 54 mg/dL, treat with 30g carb, recheck glucose in 15 minutes.  If low again, repeat.  Keep glucagon in your home in case of severe hypoglycemia and train someone how to use this.    Emergency kit (please ensure you always have these with you):   Glucose  tablets  Glucagon  Insulin  Syringes/needles   Extra infusion set (if on a pump)  Ketone strips    Backup insulin plan (in case of pump failure):   If your insulin pump fails, your body will not have any insulin available and your blood glucose levels can get dangerously high. This can result in diabetic ketoacidosis making you very sick (abdominal pain, confusion, vomiting, dehydration, positive urine ketones).    You have an active long acting basal insulin (Degludec: Tresiba / Detemir: Levemir / Glargine: Lantus / Toujeo / Semglee / Basaglar) prescription available. Please pick this up from your pharmacy in case your pump fails.  Basal insulin dose: 9 units once per day.    Immediately after your pump fails and until you can  the long acting insulin, use short acting insulin (Aspart: Novolog/Fiasp / Lispro: Humalog / Glulisine:Apidra) injections (pen or vial and syringe) per your correction scale every 4 hours and continue to cover carbohydrates. You can stop this 4 hourly correction after you give yourself the basal insulin dose.    You should also administer short acting insulin subcutaneously to cover carbohydrates and per your correction scale prior to meals.     Contact us for help transitioning back to your pump when this is available.    Contact information:   If you have concerns, please send me a [place] message or call the clinic at [phone].  For more urgent concerns, please call [phone] after hours/weekends and ask to speak with the endocrinologist on call.      Please let me know if you are having low blood sugars less than 70 or over 350 mg/dL.  Do not wait until your next appointment if this is happening.      [facility]-Department of Endocrinology  Haydee Rojas RN, Diabetes Educator: [phone]  Clinic Nurses JOB Rodirguez; CMA's: Roseann Hartmann Yang Sheba   Clinic Fax: [phone]  On-Call Endocrine at [facility] (after hours/weekends): [phone]  option 4  Scheduling Line: [phone]

## 2025-05-12 NOTE — PROGRESS NOTES
"Virtual Visit Details    Type of service:  Video Visit   Video Start Time: {video visit start/end time for provider to select:846834}  Video End Time:{video visit start/end time for provider to select:253311}    Originating Location (pt. Location): {video visit patient location:465969::\"Home\"}  {PROVIDER LOCATION On-site should be selected for visits conducted from your clinic location or adjoining [facility] hospital, academic office, or other nearby [facility] building. Off-site should be selected for all other provider locations, including home:449[address]}  Distant Location (provider location):  {virtual location provider:430346}  Platform used for Video Visit: {Virtual Visit Platforms:705244::\"[place]\"}    "

## 2025-05-12 NOTE — LETTER
5/12/2025      Vickie Barnhart  [address] [facility] [address]      Dear Colleague,    Thank you for referring your patient, Vickie Barnhart, to the [facility]. Please see a copy of my visit note below.    Outcome for 04/29/25 2:02 PM: Data uploaded on Tandem  Dora Fenton MA  Outcome for 05/08/25 11:24 AM: Data obtained via Tandem website  Dora Fenton MA                                                                            Endocrinology Clinic Visit    Chief Complaint: RECHECK     Information obtained from:Patient      Assessment/Treatment Plan:      Type 1 diabetes complicated with retinopathy     Insulin pump tandem & CGM downloaded and reviewed in detail.  Overall reasonable control except postprandial hyperglycemia due to delay in carbohydrate coverage. Discussed the importance of entering carb bolus 10-15 minutes prior to when she starts eating.     Plan:  Continue current pump settings. Patient will focus on entering carb bolus 10-15 minutes prior to eating.    Pump settings:   Basal = 0.45 units per hour  CHO 1: 11 units, 1:10 starting from 7:00 am  Correction factor 1:60 mg/dl  Target 110    - Refilled glucagon  - Refilled insulin glargine 10u in case of pump failure  - Has eye exam tomorrow  - Blood pressure is well-controlled   - Start statin once ulcerative colitis is under control         Orders Placed This Encounter   Procedures     Hemoglobin A1c     TSH with free T4 reflex     Lipid panel reflex to direct LDL Fasting     Albumin Random Urine Quantitative with Creat Ratio     Adult Diabetes Education  Referral     Orthopedic  Referral     Staffed with:   Siri Rhoades MD  Staff Endocrinologist    Division of Endocrinology and Diabetes      Subjective:         HPI: Vickie Duuqe is a 39 year old female with history of type 1 diabetes.    Currently on tandem insulin pump with settings as below. She has been using insulin IQ.    Current  Settings:   Basal = 0.45 units per hour  CHO 1: 11 units   Correction factor 1:60 mg/dl  Target 110.     She has overall been doing much better with her DM1 since starting insulin pump. She denies significant hypoglycemia episodes. She feels like the pump has helped a lot. She has been struggling with her ulcerative colitis recently.                                   No Known Allergies    Current Outpatient Medications   Medication Sig Dispense Refill     acetaminophen (TYLENOL) 500 MG tablet Take 500-1,000 mg by mouth every 6 hours as needed for mild pain       bisacodyl (DULCOLAX) 5 MG EC tablet Two days prior to exam take two (2) tablets at 4pm. One day prior to exam take two (2) tablets at 4pm 4 tablet 0     bisacodyl (DULCOLAX) 5 MG EC tablet Take 2 tablets at 3 pm the day before your procedure. If your procedure is before 11 am, take 2 additional tablets at 11 pm. If your procedure is after 11 am, take 2 additional tablets at 6 am. For additional instructions refer to your colonoscopy prep instructions. (Patient not taking: Reported on 1/9/2025) 4 tablet 0     Continuous Glucose Sensor (DEXCOM G6 SENSOR) MISC Change every 10 days. 9 each 3     Continuous Glucose Sensor (DEXCOM G7 SENSOR) MISC Change every 10 days. Please dispense G7 sensor with underline below LBL code on the side of box. This is needed for compatibility with insulin pump. 9 each 3     Continuous Glucose Transmitter (DEXCOM G6 TRANSMITTER) MISC Change every 90 days. 1 each 3     dicyclomine (BENTYL) 10 MG capsule Take 1 capsule (10 mg) by mouth 4 times daily as needed (abdominal pain) (Patient not taking: Reported on 1/9/2025) 90 capsule 3     dicyclomine (BENTYL) 20 MG tablet Take 1 tablet (20 mg) by mouth 4 times daily as needed (abdominal pain) (Patient not taking: Reported on 1/9/2025) 90 tablet 3     FLUoxetine (PROZAC) 20 MG capsule Take 3 capsules (60 mg) by mouth daily 270 capsule 4     gabapentin (NEURONTIN) 300 MG capsule Take 1  capsule (300 mg) by mouth At Bedtime 90 capsule 1     Glucagon (GVOKE HYPOPEN) 1 MG/0.2ML pen Inject the contents of 1 device under the skin into lower abdomen, outer thigh, or outer upper arm as needed for hypoglycemia. If no response after 15 minutes, additional 1 mg dose from a new device may be injected while waiting for emergency assistance. 2 each 3     HYDROcodone-acetaminophen (NORCO) 5-325 MG tablet Take 1 tablet by mouth every 6 hours as needed for severe pain (Patient not taking: Reported on 1/9/2025) 6 tablet 0     insulin aspart (NOVOLOG FLEXPEN) 100 UNIT/ML pen Take 1 unit per 10 grams of carb plus 1 unit for every 60 above 150 Max dose 60 unit daily while off pump 15 mL 4     Insulin Aspart FlexPen 100 UNIT/ML SOPN INJECT 1U/10GRAM CARB PLUS 1U PER EVERY 60 ABOVE 150 WHILE OFF PUMP, MAX 60 UNITS DAILY 15 mL 0     insulin glargine (LANTUS SOLOSTAR) 100 UNIT/ML pen Take 10 units daily while off pump       Insulin Infusion Pump Supplies (T:SLIM T:LOCK INSULIN CART 3ML) MISC 1 each See Admin Instructions Change insulin pump cartridge every 2-3 days. 40 each 3     insulin pen needle (BD TARA U/F) 32G X 4 MM miscellaneous USE 3 DAILY OR AS DIRECTED. 270 each 3     levonorgestrel (MIRENA) 20 MCG/24HR IUD 1 each (20 mcg) by Intrauterine route once       Naproxen Sodium (ALEVE PO) Take by mouth as needed for moderate pain  (Patient not taking: Reported on 1/9/2025)       NOVOLOG FLEXPEN 100 UNIT/ML soln 1 unit for every 12 grams of carbohydrates with meals three times per day. Plus correction scale; uses up to 60 units daily. 45 mL 1     NOVOLOG VIAL 100 UNIT/ML soln Uses up to 60 units per day in insulin pump for basal, bolus, and priming of insulin pump tubing. 60 mL 3     oxyCODONE (ROXICODONE) 5 MG tablet Take 1 tablet (5 mg) by mouth every 6 hours as needed for severe pain (Patient not taking: Reported on 1/9/2025) 12 tablet 0     polyethylene glycol (GOLYTELY) 236 g suspension Take as directed. Two days  before your exam fill the first container with water. Cover and shake until mixed well. At 5:00pm drink one 8oz glass every 10-15 minutes until half (1/2) of the first container is empty. Store the remainder in the refrigerator. One day before your exam at 5:00pm drink the second half of the first container until it is gone. Before you go to bed mix the second container with water and put in refrigerator. Six hours before your check in time drink one 8oz glass every 10-15 minutes until half of container is empty. Discard the remainder of solution. (Patient not taking: Reported on 1/9/2025) 8000 mL 0     polyethylene glycol (GOLYTELY) 236 g suspension The night before the exam at 6 pm drink an 8-ounce glass every 15 minutes until the jug is half empty. If you arrive before 11 AM: Drink the other half of the Golytely jug at 11 PM night before procedure. If you arrive after 11 AM: Drink the other half of the Golytely jug at 6 AM day of procedure. For additional instructions refer to your colonoscopy prep instructions. (Patient not taking: Reported on 1/9/2025) 4000 mL 0     predniSONE (DELTASONE) 20 MG tablet Take two tablets (= 40mg) each day for 5 (five) days (Patient not taking: Reported on 1/9/2025) 10 tablet 0       Review of Systems      as per HPI above    Objective:   There were no vitals taken for this visit.    Constitutional: Pleasant no acute cardiopulmonary distress. Non-toxic appearing  Psychological: appropriate mood and affect     In House Labs:     Hemoglobin A1C   Date Value Ref Range Status   04/30/2024 6.2 (H) 0.0 - 5.6 % Final   10/12/2021 8.7 (A) 0.0 - 5.7 % Final       TSH   Date Value Ref Range Status   04/30/2024 1.81 0.30 - 4.20 uIU/mL Final   01/17/2023 3.91 0.40 - 4.00 mU/L Final   06/23/2022 2.26 0.40 - 4.00 mU/L Final   01/04/2021 3.96 0.40 - 4.00 mU/L Final   03/19/2019 2.95 0.40 - 4.00 mU/L Final   10/07/2017 2.57 0.40 - 4.00 mU/L Final   06/26/2017 6.05 (H) 0.40 - 4.00 mU/L Final    03/05/2014 3.97 0.4 - 5.0 mU/L Final     T4 Free   Date Value Ref Range Status   06/26/2017 1.02 0.76 - 1.46 ng/dL Final   03/28/2006 1.10 0.70 - 1.85 ng/dL Final       Creatinine   Date Value Ref Range Status   01/09/2025 1.15 (H) 0.51 - 0.95 mg/dL Final   06/04/2021 0.94 0.52 - 1.04 mg/dL Final     Video-Visit Details    Type of service:  Video Visit  Joined the call at 5/12/2025, 8:47:59 am.  Left the call at 5/12/2025, 9:03:10 am.  You were on the call for 15 minutes 10 seconds.  Distant Location (provider location):  Off-site.   Platform used for Video Visit: [employer]  30 minutes spent by me on the date of the encounter outside of CGM and insulin pump review doing chart review, history, documentation and further activities per the note.      Again, thank you for allowing me to participate in the care of your patient.        Sincerely,        Siri Rhoades MD    Electronically signed

## 2025-05-13 ENCOUNTER — PATIENT OUTREACH (OUTPATIENT)
Dept: CARE COORDINATION | Facility: CLINIC | Age: 40
End: 2025-05-13
Payer: COMMERCIAL

## 2025-05-13 ENCOUNTER — MYC REFILL (OUTPATIENT)
Dept: ENDOCRINOLOGY | Facility: CLINIC | Age: 40
End: 2025-05-13
Payer: COMMERCIAL

## 2025-05-13 DIAGNOSIS — E10.39 TYPE 1 DIABETES MELLITUS WITH OTHER OPHTHALMIC COMPLICATION (H): ICD-10-CM

## 2025-05-14 RX ORDER — INSULIN ASPART 100 [IU]/ML
INJECTION, SOLUTION INTRAVENOUS; SUBCUTANEOUS
Qty: 60 ML | Refills: 3 | Status: SHIPPED | OUTPATIENT
Start: 2025-05-14

## 2025-05-14 RX ORDER — INSULIN ASPART 100 [IU]/ML
INJECTION, SOLUTION INTRAVENOUS; SUBCUTANEOUS
Qty: 60 ML | Refills: 3 | OUTPATIENT
Start: 2025-05-14

## 2025-05-14 NOTE — TELEPHONE ENCOUNTER
NOVOLOG VIAL 100 UNIT/ML soln         Last Written Prescription Date:  04/30/24  Last Fill Quantity: 60mL,   # refills: 3  Last Office Visit : 05/12/25  Future Office visit:  12/30/25    Routing refill request to provider for review/approval because:  Insulin and insulin pump supplies - refilled per Endocrine clinic.

## 2025-05-15 ENCOUNTER — PATIENT OUTREACH (OUTPATIENT)
Dept: CARE COORDINATION | Facility: CLINIC | Age: 40
End: 2025-05-15
Payer: COMMERCIAL

## 2025-05-17 ENCOUNTER — LAB (OUTPATIENT)
Dept: LAB | Facility: CLINIC | Age: 40
End: 2025-05-17
Payer: COMMERCIAL

## 2025-05-17 DIAGNOSIS — E10.69 TYPE 1 DIABETES MELLITUS WITH OTHER SPECIFIED COMPLICATION (H): ICD-10-CM

## 2025-05-17 LAB
CHOLEST SERPL-MCNC: 152 MG/DL
CREAT UR-MCNC: 37.8 MG/DL
EST. AVERAGE GLUCOSE BLD GHB EST-MCNC: 143 MG/DL
FASTING STATUS PATIENT QL REPORTED: YES
HBA1C MFR BLD: 6.6 %
HDLC SERPL-MCNC: 50 MG/DL
LDLC SERPL CALC-MCNC: 82 MG/DL
MICROALBUMIN UR-MCNC: 18.3 MG/L
MICROALBUMIN/CREAT UR: 48.41 MG/G CR (ref 0–25)
NONHDLC SERPL-MCNC: 102 MG/DL
TRIGL SERPL-MCNC: 99 MG/DL
TSH SERPL DL<=0.005 MIU/L-ACNC: 3.04 UIU/ML (ref 0.3–4.2)

## 2025-05-17 PROCEDURE — 82043 UR ALBUMIN QUANTITATIVE: CPT

## 2025-05-17 PROCEDURE — 80061 LIPID PANEL: CPT

## 2025-05-17 PROCEDURE — 36415 COLL VENOUS BLD VENIPUNCTURE: CPT

## 2025-05-17 PROCEDURE — 82570 ASSAY OF URINE CREATININE: CPT

## 2025-05-17 PROCEDURE — 84443 ASSAY THYROID STIM HORMONE: CPT

## 2025-05-17 PROCEDURE — 83036 HEMOGLOBIN GLYCOSYLATED A1C: CPT

## 2025-05-19 ENCOUNTER — RESULTS FOLLOW-UP (OUTPATIENT)
Dept: ENDOCRINOLOGY | Facility: CLINIC | Age: 40
End: 2025-05-19

## 2025-05-19 NOTE — RESULT ENCOUNTER NOTE
Hello -    Here are my comments about your recent results:  -Cholesterol levels (LDL,HDL, Triglycerides) are okay.  ADVISE: rechecking  in 1 year.  -A1C (test of diabetes control the last 2-3 months) is at your goal. Please continue with your current plan. -TSH (thyroid stimulating hormone) level is normal which indicates normal thyroid function.  -Microalbumin (urine protein) level is elevated. This is suggestive of early damage to your kidneys from diabetes.  ADVISE: avoiding anti-inflamatory agents such as ibuprofen (Advil, Motrin) or naproxen (Aleve) as much as possible, keeping your blood pressure in a normal range, and we will discuss protective medications at the time of your follow-up appointment after repeating this test result again in 6 months..        Regards,  Siri Rhoades MD

## 2025-05-23 ENCOUNTER — MYC MEDICAL ADVICE (OUTPATIENT)
Dept: ENDOCRINOLOGY | Facility: CLINIC | Age: 40
End: 2025-05-23
Payer: COMMERCIAL

## 2025-05-23 DIAGNOSIS — E10.69 TYPE 1 DIABETES MELLITUS WITH OTHER SPECIFIED COMPLICATION (H): Primary | ICD-10-CM

## 2025-05-29 NOTE — TELEPHONE ENCOUNTER
What This Test Shows  Urine albumin checks for protein leakage from your kidneys. When this level is elevated, it usually indicates that your kidneys are being affected by diabetes or blood sugar control issues.  Our Treatment Plan  Step 1: Focus on blood sugar control first. The most important thing right now is to make sure your hemoglobin A1c and daily blood sugar levels are well-controlled and stable. Good diabetes management is often the best way to protect your kidneys.  Give it time to work. I recommend focusing on optimizing your blood sugar control for the next 6 months. This gives your body time to respond to better diabetes management.  We'll recheck in 6 months. After 6 months of good blood sugar control, we'll test your urine albumin again to see if the protein leakage has improved.  If Levels Stay High  Additional kidney protection may be needed. If your urine albumin remains elevated even after 6 months of good blood sugar control, we'll consider adding a medication specifically designed to protect your kidneys from diabetes-related damage.  Next Steps  Schedule follow-up lab work in 6 months. This will include checking your urine albumin again along with your other diabetes monitoring tests.  Focus on keeping your blood sugars in good control, and we'll reassess your kidney function at your next visit.  Siri Rhoades MD

## 2025-06-06 ENCOUNTER — TRANSFERRED RECORDS (OUTPATIENT)
Dept: FAMILY MEDICINE | Facility: OTHER | Age: 40
End: 2025-06-06
Payer: COMMERCIAL

## 2025-06-06 LAB — RETINOPATHY: POSITIVE

## 2025-06-09 ENCOUNTER — OFFICE VISIT (OUTPATIENT)
Dept: FAMILY MEDICINE | Facility: OTHER | Age: 40
End: 2025-06-09
Payer: COMMERCIAL

## 2025-06-09 VITALS
WEIGHT: 183 LBS | RESPIRATION RATE: 20 BRPM | HEIGHT: 64 IN | DIASTOLIC BLOOD PRESSURE: 84 MMHG | SYSTOLIC BLOOD PRESSURE: 150 MMHG | OXYGEN SATURATION: 98 % | BODY MASS INDEX: 31.24 KG/M2 | TEMPERATURE: 97.3 F | HEART RATE: 83 BPM

## 2025-06-09 DIAGNOSIS — H54.61 VISION LOSS, RIGHT EYE: ICD-10-CM

## 2025-06-09 DIAGNOSIS — Z23 NEED FOR SHINGLES VACCINE: ICD-10-CM

## 2025-06-09 DIAGNOSIS — E10.319: ICD-10-CM

## 2025-06-09 DIAGNOSIS — I10 HTN, GOAL BELOW 140/80: ICD-10-CM

## 2025-06-09 DIAGNOSIS — K08.109 FULL DENTURES: ICD-10-CM

## 2025-06-09 DIAGNOSIS — Z12.11 SCREEN FOR COLON CANCER: Primary | ICD-10-CM

## 2025-06-09 DIAGNOSIS — Z00.00 ROUTINE HISTORY AND PHYSICAL EXAMINATION OF ADULT: ICD-10-CM

## 2025-06-09 DIAGNOSIS — E78.5 HYPERLIPIDEMIA LDL GOAL <100: ICD-10-CM

## 2025-06-09 DIAGNOSIS — K51.80 OTHER ULCERATIVE COLITIS WITHOUT COMPLICATION (H): ICD-10-CM

## 2025-06-09 DIAGNOSIS — F17.200 NICOTINE DEPENDENCE, UNCOMPLICATED, UNSPECIFIED NICOTINE PRODUCT TYPE: ICD-10-CM

## 2025-06-09 DIAGNOSIS — Z12.31 VISIT FOR SCREENING MAMMOGRAM: ICD-10-CM

## 2025-06-09 DIAGNOSIS — Z97.2 FULL DENTURES: ICD-10-CM

## 2025-06-09 PROCEDURE — 90471 IMMUNIZATION ADMIN: CPT | Performed by: PHYSICIAN ASSISTANT

## 2025-06-09 PROCEDURE — 99214 OFFICE O/P EST MOD 30 MIN: CPT | Mod: 25 | Performed by: PHYSICIAN ASSISTANT

## 2025-06-09 PROCEDURE — 90651 9VHPV VACCINE 2/3 DOSE IM: CPT | Performed by: PHYSICIAN ASSISTANT

## 2025-06-09 PROCEDURE — 99207 PR FOOT EXAM NO CHARGE: CPT | Mod: 25 | Performed by: PHYSICIAN ASSISTANT

## 2025-06-09 PROCEDURE — 3077F SYST BP >= 140 MM HG: CPT | Performed by: PHYSICIAN ASSISTANT

## 2025-06-09 PROCEDURE — G2211 COMPLEX E/M VISIT ADD ON: HCPCS | Performed by: PHYSICIAN ASSISTANT

## 2025-06-09 PROCEDURE — 99396 PREV VISIT EST AGE 40-64: CPT | Mod: 25 | Performed by: PHYSICIAN ASSISTANT

## 2025-06-09 PROCEDURE — 1126F AMNT PAIN NOTED NONE PRSNT: CPT | Performed by: PHYSICIAN ASSISTANT

## 2025-06-09 PROCEDURE — 3079F DIAST BP 80-89 MM HG: CPT | Performed by: PHYSICIAN ASSISTANT

## 2025-06-09 RX ORDER — LOSARTAN POTASSIUM 25 MG/1
25 TABLET ORAL DAILY
Qty: 90 TABLET | Refills: 1 | Status: SHIPPED | OUTPATIENT
Start: 2025-06-09

## 2025-06-09 SDOH — HEALTH STABILITY: PHYSICAL HEALTH: ON AVERAGE, HOW MANY DAYS PER WEEK DO YOU ENGAGE IN MODERATE TO STRENUOUS EXERCISE (LIKE A BRISK WALK)?: 3 DAYS

## 2025-06-09 ASSESSMENT — PATIENT HEALTH QUESTIONNAIRE - PHQ9
SUM OF ALL RESPONSES TO PHQ QUESTIONS 1-9: 4
SUM OF ALL RESPONSES TO PHQ QUESTIONS 1-9: 4
10. IF YOU CHECKED OFF ANY PROBLEMS, HOW DIFFICULT HAVE THESE PROBLEMS MADE IT FOR YOU TO DO YOUR WORK, TAKE CARE OF THINGS AT HOME, OR GET ALONG WITH OTHER PEOPLE: SOMEWHAT DIFFICULT

## 2025-06-09 ASSESSMENT — PAIN SCALES - GENERAL: PAINLEVEL_OUTOF10: NO PAIN (0)

## 2025-06-09 ASSESSMENT — SOCIAL DETERMINANTS OF HEALTH (SDOH): HOW OFTEN DO YOU GET TOGETHER WITH FRIENDS OR RELATIVES?: NEVER

## 2025-06-09 NOTE — PATIENT INSTRUCTIONS
"Learning About Benefits of Quitting Smoking  Quitting smoking helps your body in many ways. Quitting lowers your risk for cancer, lung disease, heart attack, stroke, blood vessel disease, and blindness. You'll also get sick less often and heal faster. And after you quit, you may find that your mood is better and you are less stressed.  When and how will you feel healthier after quitting smoking?        In the first hours or days:   Your blood pressure and heart rate go down.  Your oxygen levels increase.        Within weeks or months:   You will cough less and breathe deeper. It may be easier to be active.  Your sense of taste and smell should return.        Over the years:   Your risks of heart disease, heart attack, and stroke are lower.  Your risk of lung cancer is cut by about half after about 10 years. And your risk for other cancers is lower too.  How would quitting help others in your life?    Their heart, lung, and cancer risks may drop, much like yours. They will also be sick less.   If you are or will be pregnant someday, quitting smoking means a healthier .   Where can you learn more?  Go to https://www.[employer].net/patiented  Enter O319 in the search box to learn more about \"Learning About Benefits of Quitting Smoking.\"  Current as of: 2024  Content Version: 14. [employer].   Care instructions adapted under license by your healthcare professional. If you have questions about a medical condition or this instruction, always ask your healthcare professional. [employer] disclaims any warranty or liability for your use of this information.    "

## 2025-06-09 NOTE — PROGRESS NOTES
Preventive Care Visit  [facility]  German Allen PA-C, Family Medicine  Jun 9, 2025      Assessment & Plan     Routine history and physical examination of adult  39-year-old female here for routine physical.  Recently had labs done through her endocrinologist that covers the basis for us here.  Follow-up in 1 year.    Screen for colon cancer  Other ulcerative colitis without complication (H)  History of ulcerative colitis.  Needs to have her colonoscopy repeated.  Follow-up based on results.  - Colonoscopy Screening  Referral; Future    Visit for screening mammogram  Due for screening.  Follow-up based on results.  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  Follow-up with endocrinology strongly recommended.  Overall foot exam is within normal limits today.  - FOOT EXAM    HYPERLIPIDEMIA LDL GOAL <100  LDL goal based on diabetes and hypertension.  Labs reviewed from earlier than in 6 months.    HTN, goal below 140/80  Blood pressure is not to goal.  Advised starting this medication.  Follow-up in 4 to 6 weeks is encouraged.  - losartan (COZAAR) 25 MG tablet; Take 1 tablet (25 mg) by mouth daily.    Vision loss, right eye - partial retinal tear  Follows with ophthalmology.  Continue to do so.    Need for shingles vaccine  Recommended that she call her insurance company to see about coverage for shingles.  She has been affected by a case of shingles to the left face in the past.  Do strongly recommend that she do so given her diabetes and other concomitant conditions.    Full dentures  Has had the same sat for about 12 years.  Strongly recommended that she continues to care for them diligently.  Follow-up with dentistry as  "needed.    Nicotine dependence, uncomplicated, unspecified nicotine product type  Recommended cessation of vaping.  - [place] Quit Partner Referral; Future    Patient has been advised of split billing requirements and indicates understanding: Yes        BMI  Estimated body mass index is 31.41 kg/m  as calculated from the following:    Height as of this encounter: 1.626 m (5' 4\").    Weight as of this encounter: 83 kg (183 lb).   Weight management plan: Discussed healthy diet and exercise guidelines    Counseling  Appropriate preventive services were addressed with this patient via screening, questionnaire, or discussion as appropriate for fall prevention, nutrition, physical activity, Tobacco-use cessation, social engagement, weight loss and cognition.  Checklist reviewing preventive services available has been given to the patient.  Reviewed patient's diet, addressing concerns and/or questions.   She is at risk for lack of exercise and has been provided with information to increase physical activity for the benefit of her well-being.   Patient is at risk for social isolation and has been provided with information about the benefit of social connection.   The patient was instructed to see the dentist every 6 months.   She is at risk for psychosocial distress and has been provided with information to reduce risk.     Rhonda Johnston is a 39 year old, presenting for the following:  Physical        6/9/2025     4:56 PM   Additional Questions   Roomed by luis LESTER      Advance Care Planning    Discussed advance care planning with patient; however, patient declined at this time.        6/9/2025   General Health   How would you rate your overall physical health? (!) FAIR   Feel stress (tense, anxious, or unable to sleep) To some extent   (!) STRESS CONCERN      6/9/2025   Nutrition   Three or more servings of calcium each day? (!) I DON'T KNOW   Diet: Carbohydrate counting   How many servings of fruit and " vegetables per day? (!) 2-3   How many sweetened beverages each day? 0-1         6/9/2025   Exercise   Days per week of moderate/strenous exercise 3 days         6/9/2025   Social Factors   Frequency of gathering with friends or relatives Never   Worry food won't last until get money to buy more No   Food not last or not have enough money for food? No   Do you have housing? (Housing is defined as stable permanent housing and does not include staying outside in a car, in a tent, in an abandoned building, in an overnight shelter, or couch-surfing.) Yes   Are you worried about losing your housing? No   Lack of transportation? No   Unable to get utilities (heat,electricity)? No   (!) SOCIAL CONNECTIONS CONCERN      6/9/2025   Dental   Dentist two times every year? (!) NO       Today's PHQ-9 Score:       6/9/2025     4:50 PM   PHQ-9 SCORE   PHQ-9 Total Score MyChart 4 (Minimal depression)   PHQ-9 Total Score 4        Patient-reported         6/9/2025   Substance Use   If I could quit smoking, I would Neutral   I want to quit somking, worry about health affects Neutral   Willing to make a plan to quit smoking Neutral   Willing to cut down before quitting Neutral   Alcohol more than 3/day or more than 7/wk Not Applicable   Do you use any other substances recreationally? No     Social History     Tobacco Use    Smoking status: Former     Types: Vaping Device     Passive exposure: Past    Smokeless tobacco: Never    Tobacco comments:     working on quiting, somedays up to 4 cigs per day   Vaping Use    Vaping status: Every Day    Substances: Nicotine    Devices: Disposable    Passive vaping exposure: Yes   Substance Use Topics    Alcohol use: Yes     Alcohol/week: 0.0 standard drinks of alcohol     Comment: occassionally    Drug use: No           12/6/2022   LAST FHS-7 RESULTS   1st degree relative breast or ovarian cancer No    Any relative bilateral breast cancer No    Any male have breast cancer No    Any ONE woman have  BOTH breast AND ovarian cancer No    Any woman with breast cancer before 50yrs No    2 or more relatives with breast AND/OR ovarian cancer No    2 or more relatives with breast AND/OR bowel cancer No        Proxy-reported        Mammogram Screening - Mammogram every 1-2 years updated in Health Maintenance based on mutual decision making        6/9/2025   STI Screening   New sexual partner(s) since last STI/HIV test? No     History of abnormal Pap smear: YES - reflected in Problem List and Health Maintenance accordingly        Latest Ref Rng & Units 12/6/2022     3:12 PM 6/28/2017    12:28 PM 6/28/2017    11:45 AM   PAP / HPV   PAP  Atypical squamous cells of undetermined significance (ASC-US)      PAP (Historical)   NIL     HPV 16 DNA Negative Negative   Negative    HPV 18 DNA Negative Negative   Negative    Other HR HPV Negative Negative   Negative      ASCVD Risk   The 10-year ASCVD risk score (Sandrita ESCAMILLA, et al., 2019) is: 1.1%    Values used to calculate the score:      Age: 39 years      Sex: Female      Is Non- : No      Diabetic: Yes      Tobacco smoker: No      Systolic Blood Pressure: 150 mmHg      Is BP treated: No      HDL Cholesterol: 50 mg/dL      Total Cholesterol: 152 mg/dL       Reviewed and updated as needed this visit by Provider                    Past Medical History:   Diagnosis Date    Adjustment disorder with anxious mood 11/23/2015    Anxiety 11/27/2015    ASCUS of cervix with negative high risk HPV 06/19/2008    Depressive disorder, not elsewhere classified     Diabetic eye exam (H) 12/19/2014    Hx of tubal ligation 01/15/2021    Inflammatory bowel diseases (IBD) 04/17/2025    Mixed hyperlipidemia 11/30/2006    Regional enteritis of unspecified site     Ulcerative Colitis    Type I (juvenile type) diabetes mellitus with ketoacidosis, not stated as uncontrolled(250.11) 01/01/2007    Moderately severe intensity.    Type I (juvenile type) diabetes mellitus with  ketoacidosis, uncontrolled 2008    Type I (juvenile type) diabetes mellitus without mention of complication, not stated as uncontrolled     diagnosed in     Ulcerative colitis, unspecified     remission. Last flare 6 yrs ago.      Past Surgical History:   Procedure Laterality Date    COLONOSCOPY N/A 2023    Procedure: Colonoscopy;  Surgeon: Jude Nix MD;  Location: [facility]    COLONOSCOPY N/A 2024    Procedure: COLONOSCOPY, WITH BIOPSY;  Surgeon: Donnie Barajas MD;  Location:  GI    DILATION AND CURETTAGE SUCTION  2010    miscarriage    RETINAL DETACHMENT SURGERY Left     TUBAL LIGATION      [facility] COLONOSCOPY W BIOPSY  2006    [facility] COLONOSCOPY W/WO BRUSH/WASH       OB History    Para Term  AB Living   4 3 3 0 1 3   SAB IAB Ectopic Multiple Live Births   0 0 0 0 3      # Outcome Date GA Lbr Juan Manuel/2nd Weight Sex Type Anes PTL Lv   4 AB  12w3d             Birth Comments: D&C   3 Term 09 39w0d 07:30 3.544 kg (7 lb 13 oz) F IVD   MATHEUS      Birth Comments: Type I      Name: Shaila   2 Term 02 40w0d 07:00 3.771 kg (8 lb 5 oz) M  EPI  MATHEUS      Birth Comments: Gestational diabetes      Name: Garry      Apgar1: 9  Apgar5: 9   1 Term         MATHEUS      Birth Comments: System Generated. Please review and update pregnancy details.     Lab work is in process  Labs reviewed in EPIC  BP Readings from Last 3 Encounters:   25 (!) 150/84   25 (!) 146/86   10/28/24 (!) 142/90    Wt Readings from Last 3 Encounters:   25 83 kg (183 lb)   25 81.6 kg (180 lb)   10/28/24 78.9 kg (174 lb)                  Patient Active Problem List   Diagnosis    Sprain of back    HYPERLIPIDEMIA LDL GOAL <100    Facial paralysis/[place] palsy    Tobacco abuse    Noncompliance with medication regimen    Ulcerative colitis without complications (H)    Major depressive disorder, recurrent episode, moderate (H)    ASCUS of cervix with negative high risk HPV    Diabetic  retinopathy of right eye associated with type 1 diabetes mellitus, macular edema presence unspecified, unspecified retinopathy severity (H)    ALYX (generalized anxiety disorder)    Dupuytren's contracture of both hands    Chronic pain of both shoulders    Bilateral arm pain    Inflammatory bowel diseases (IBD)    HTN, goal below 140/80    Vision loss, right eye - partial retinal tear    Need for shingles vaccine    Full dentures     Past Surgical History:   Procedure Laterality Date    COLONOSCOPY N/A 7/25/2023    Procedure: Colonoscopy;  Surgeon: Jude Nix MD;  Location:  GI    COLONOSCOPY N/A 4/12/2024    Procedure: COLONOSCOPY, WITH BIOPSY;  Surgeon: Donnie Barajas MD;  Location:  GI    DILATION AND CURETTAGE SUCTION  04/2010    miscarriage    RETINAL DETACHMENT SURGERY Left     TUBAL LIGATION      [facility]    [facility]         Social History     Tobacco Use    Smoking status: Former     Types: Vaping Device     Passive exposure: Past    Smokeless tobacco: Never    Tobacco comments:     working on quiting, somedays up to 4 cigs per day   Substance Use Topics    Alcohol use: Yes     Alcohol/week: 0.0 standard drinks of alcohol     Comment: occassionally     Family History   Problem Relation Age of Onset    Hypertension Father     Diabetes Maternal Grandmother     Cancer Maternal Grandmother     Diabetes Paternal Grandfather     Diabetes Maternal Uncle     Diabetes Maternal Aunt          Current Outpatient Medications   Medication Sig Dispense Refill    acetaminophen (TYLENOL) 500 MG tablet Take 500-1,000 mg by mouth every 6 hours as needed for mild pain      bisacodyl (DULCOLAX) 5 MG EC tablet Two days prior to exam take two (2) tablets at 4pm. One day prior to exam take two (2) tablets at 4pm 4 tablet 0    Continuous Glucose Sensor (DEXCOM G6 SENSOR) MISC Change every 10 days. 9 each 3    Continuous Glucose Sensor (DEXCOM G7 SENSOR) MISC Change  every 10 days. Please dispense G7 sensor with underline below LBL code on the side of box. This is needed for compatibility with insulin pump. 9 each 3    Continuous Glucose Transmitter (DEXCOM G6 TRANSMITTER) MISC Change every 90 days. 1 each 3    FLUoxetine (PROZAC) 20 MG capsule Take 3 capsules (60 mg) by mouth daily 270 capsule 4    gabapentin (NEURONTIN) 300 MG capsule Take 1 capsule (300 mg) by mouth At Bedtime 90 capsule 1    Glucagon (GVOKE HYPOPEN) 1 MG/0.2ML pen Inject the contents of 1 device under the skin into lower abdomen, outer thigh, or outer upper arm as needed for hypoglycemia. If no response after 15 minutes, additional 1 mg dose from a new device may be injected while waiting for emergency assistance. 2 each 3    insulin aspart (NOVOLOG FLEXPEN) 100 UNIT/ML pen Take 1 unit per 10 grams of carb plus 1 unit for every 60 above 150 Max dose 60 unit daily while off pump 15 mL 4    Insulin Aspart FlexPen 100 UNIT/ML SOPN INJECT 1U/10GRAM CARB PLUS 1U PER EVERY 60 ABOVE 150 WHILE OFF PUMP, MAX 60 UNITS DAILY 15 mL 0    insulin glargine (LANTUS SOLOSTAR) 100 UNIT/ML pen Take 10 units daily while off pump      Insulin Infusion Pump Supplies (T:SLIM T:LOCK INSULIN CART 3ML) MISC 1 each See Admin Instructions Change insulin pump cartridge every 2-3 days. 40 each 3    insulin pen needle (BD TARA U/F) 32G X 4 MM miscellaneous USE 3 DAILY OR AS DIRECTED. 270 each 3    levonorgestrel (MIRENA) 20 MCG/24HR IUD 1 each (20 mcg) by Intrauterine route once      losartan (COZAAR) 25 MG tablet Take 1 tablet (25 mg) by mouth daily. 90 tablet 1    NOVOLOG FLEXPEN 100 UNIT/ML soln 1 unit for every 12 grams of carbohydrates with meals three times per day. Plus correction scale; uses up to 60 units daily. 45 mL 1    NOVOLOG VIAL 100 UNIT/ML soln Uses up to 60 units per day in insulin pump for basal, bolus, and priming of insulin pump tubing. 60 mL 3    oxyCODONE (ROXICODONE) 5 MG tablet Take 1 tablet (5 mg) by mouth every  6 hours as needed for severe pain 12 tablet 0    polyethylene glycol (GOLYTELY) 236 g suspension Take as directed. Two days before your exam fill the first container with water. Cover and shake until mixed well. At 5:00pm drink one 8oz glass every 10-15 minutes until half (1/2) of the first container is empty. Store the remainder in the refrigerator. One day before your exam at 5:00pm drink the second half of the first container until it is gone. Before you go to bed mix the second container with water and put in refrigerator. Six hours before your check in time drink one 8oz glass every 10-15 minutes until half of container is empty. Discard the remainder of solution. 8000 mL 0    polyethylene glycol (GOLYTELY) 236 g suspension The night before the exam at 6 pm drink an 8-ounce glass every 15 minutes until the jug is half empty. If you arrive before 11 AM: Drink the other half of the Golytely jug at 11 PM night before procedure. If you arrive after 11 AM: Drink the other half of the Golytely jug at 6 AM day of procedure. For additional instructions refer to your colonoscopy prep instructions. 4000 mL 0    Naproxen Sodium (ALEVE PO) Take by mouth as needed for moderate pain  (Patient not taking: Reported on 1/9/2025)       No Known Allergies  Recent Labs   Lab Test 05/17/25  0953 01/09/25  1200 04/30/24  1404 04/04/24  1859 02/02/24  0828 01/31/24  2212 01/17/23  0742 06/23/22  1612 10/12/21  0000 06/04/21  0837 02/22/21  0000 01/15/21  1213   A1C 6.6*  --  6.2*  --   --  6.7*   < > 10.0*   < > 9.1*   < >  --    LDL 82  --  90  --   --   --   --  107*  --  112*  --   --    HDL 50  --  37*  --   --   --   --  41*  --  41*  --   --    TRIG 99  --  128  --   --   --   --  128  --  134  --   --    ALT  --  15  --  23 11 11   < >  --   --  18  --   --    CR  --  1.15* 0.94 1.06* 1.04* 1.13*   < > 0.94  --  0.94  --  0.84   GFRESTIMATED  --  61 78 68 69 63   < > 79  --  78  --  88   GFRESTBLACK  --   --   --   --   --    "--   --   --   --  90  --  >90   POTASSIUM  --  4.2 3.6 3.2* 3.6 3.7   < > 3.8  --  3.7  --  3.3*   TSH 3.04  --  1.81  --   --   --    < > 2.26  --   --   --   --     < > = values in this interval not displayed.               Review of Systems  Constitutional, HEENT, cardiovascular, pulmonary, GI, , musculoskeletal, neuro, skin, endocrine and psych systems are negative, except as otherwise noted.     Objective    Exam  BP (!) 150/84 (BP Location: Right arm, Cuff Size: Adult Regular)   Pulse 83   Temp 97.3  F (36.3  C) (Temporal)   Resp 20   Ht 1.626 m (5' 4\")   Wt 83 kg (183 lb)   LMP  (LMP Unknown)   SpO2 98%   BMI 31.41 kg/m     Estimated body mass index is 31.41 kg/m  as calculated from the following:    Height as of this encounter: 1.626 m (5' 4\").    Weight as of this encounter: 83 kg (183 lb).    Physical Exam  GENERAL: alert and no distress  EYES: Eyes grossly normal to inspection, PERRL and conjunctivae and sclerae normal  HENT: ear canals and TM's normal, nose and mouth without ulcers or lesions  NECK: no adenopathy, no asymmetry, masses, or scars  RESP: lungs clear to auscultation - no rales, rhonchi or wheezes  CV: regular rate and rhythm, normal S1 S2, no S3 or S4, no murmur, click or rub, no peripheral edema  ABDOMEN: soft, nontender, no hepatosplenomegaly, no masses and bowel sounds normal  MS: no gross musculoskeletal defects noted, no edema  SKIN: no suspicious lesions or rashes  NEURO: Normal strength and tone, mentation intact and speech normal  PSYCH: mentation appears normal, affect normal/bright        Signed Electronically by: German Allen PA-C    Answers submitted by the patient for this visit:  Patient Health Questionnaire (Submitted on 6/9/2025)  If you checked off any problems, how difficult have these problems made it for you to do your work, take care of things at home, or get along with other people?: Somewhat difficult  PHQ9 TOTAL SCORE: 4    "

## 2025-07-02 ENCOUNTER — DOCUMENTATION ONLY (OUTPATIENT)
Dept: ENDOCRINOLOGY | Facility: CLINIC | Age: 40
End: 2025-07-02
Payer: COMMERCIAL

## 2025-07-02 NOTE — PROGRESS NOTES
[] [facility] DME form     [] [facility] RX form    [] [facility]    [] Provider signature    [x] Progress notes request 5/12/25    [] A1C / Lab request    [x] Faxed to [phone]    [] Sent to [facility]

## 2025-07-14 ENCOUNTER — OFFICE VISIT (OUTPATIENT)
Dept: FAMILY MEDICINE | Facility: OTHER | Age: 40
End: 2025-07-14
Payer: COMMERCIAL

## 2025-07-14 VITALS
HEART RATE: 77 BPM | SYSTOLIC BLOOD PRESSURE: 130 MMHG | WEIGHT: 179.5 LBS | TEMPERATURE: 98.2 F | DIASTOLIC BLOOD PRESSURE: 78 MMHG | HEIGHT: 65 IN | OXYGEN SATURATION: 98 % | BODY MASS INDEX: 29.91 KG/M2 | RESPIRATION RATE: 18 BRPM

## 2025-07-14 DIAGNOSIS — L98.9 SKIN LESION: Primary | ICD-10-CM

## 2025-07-14 PROCEDURE — 3078F DIAST BP <80 MM HG: CPT | Performed by: PHYSICIAN ASSISTANT

## 2025-07-14 PROCEDURE — 3075F SYST BP GE 130 - 139MM HG: CPT | Performed by: PHYSICIAN ASSISTANT

## 2025-07-14 PROCEDURE — 1126F AMNT PAIN NOTED NONE PRSNT: CPT | Performed by: PHYSICIAN ASSISTANT

## 2025-07-14 PROCEDURE — 99207 PR NO CHARGE LOS: CPT | Performed by: PHYSICIAN ASSISTANT

## 2025-07-14 ASSESSMENT — PAIN SCALES - GENERAL: PAINLEVEL_OUTOF10: NO PAIN (0)

## 2025-07-14 NOTE — PROGRESS NOTES
"  Assessment & Plan     Skin lesion - Rt upper back, left abdomen  Patient has had 2 lesions of concern for her.  The 1 to the right upper back is roughly 6 mm in diameter.  Is irregularly shaped and irregularly pigmented with some of the pigment spilling over into unaffected tissue near the lesion.  It has been bothersome to her and prone to trauma up against her bra straps.  The second lesion is roughly 1 cm in diameter and very darkly pigmented to the left upper abdomen.  This lesion is prone to trauma as well.  We discussed removal of these in clinic today.  - Surgical Pathology Exam  - Surgical Pathology Exam    Subjective   Vickie is a 39 year old, presenting for the following health issues:  Lesion Removal      7/14/2025     5:17 PM   Additional Questions   Roomed by Latonia   Accompanied by Self         7/14/2025     5:17 PM   Patient Reported Additional Medications   Patient reports taking the following new medications NA     History of Present Illness       Reason for visit:  Mole She is missing 7 dose(s) of medications per week.          Review of Systems  Constitutional, HEENT, cardiovascular, pulmonary, GI, , musculoskeletal, neuro, skin, endocrine and psych systems are negative, except as otherwise noted.      Objective    /78   Pulse 77   Temp 98.2  F (36.8  C) (Temporal)   Resp 18   Ht 1.646 m (5' 4.8\")   Wt 81.4 kg (179 lb 8 oz)   LMP  (LMP Unknown)   SpO2 98%   BMI 30.05 kg/m    Body mass index is 30.05 kg/m .  Physical Exam   GENERAL: alert and no distress  RESP: lungs clear to auscultation - no rales, rhonchi or wheezes  CV: regular rate and rhythm, normal S1 S2, no S3 or S4, no murmur, click or rub, no peripheral edema  ABDOMEN: soft, nontender, no hepatosplenomegaly, no masses and bowel sounds normal  MS: no gross musculoskeletal defects noted, no edema  SKIN: no suspicious lesions or rashes other than that a 6 mm mole to the right upper back that is irregularly pigmented and " shaved.  Some of the darker pigment does spillover into the surrounding tissue aside from the mole.  The other lesion of concern is to the left upper abdomen.  Approximately 1 cm rough diameter and raised from the surface it is very darkly pigmented and is starting to bother her related to her bra etc.  Both lesions are prone to trauma from bra straps.    Procedure:  Informed consent is obtained and risk factors discussed to the patients satisfaction.  I answered questions the patient had.  The area of concern is/are cleansed with betadine 3x's.  The base of the lesion(s) is / are infiltrated with 2% lidocaine / epinephrine local with good effect.  The lesion(s) are removed with sharp shave type dissection and placed in pathology containers which have been labeled appropriately.  Cautery is used to obtain hemostasis.  Surgical sites are cleansed and dressed in the usual fashion.  Patient tolerated the procedure well.          Signed Electronically by: German Allen PA-C

## (undated) DEVICE — TUBING SUCTION 6"X3/16" N56A

## (undated) DEVICE — ENDO FORCEP ENDOJAW BIOPSY 2.8MMX230CM FB-220U

## (undated) DEVICE — KIT ENDO TURNOVER/PROCEDURE CARRY-ON 101822

## (undated) DEVICE — SOL WATER IRRIG 1000ML BOTTLE 2F7114

## (undated) RX ORDER — PROPOFOL 10 MG/ML
INJECTION, EMULSION INTRAVENOUS
Status: DISPENSED
Start: 2024-04-12

## (undated) RX ORDER — LIDOCAINE HYDROCHLORIDE 10 MG/ML
INJECTION, SOLUTION EPIDURAL; INFILTRATION; INTRACAUDAL; PERINEURAL
Status: DISPENSED
Start: 2024-04-12

## (undated) RX ORDER — FENTANYL CITRATE-0.9 % NACL/PF 10 MCG/ML
PLASTIC BAG, INJECTION (ML) INTRAVENOUS
Status: DISPENSED
Start: 2024-04-12